# Patient Record
Sex: FEMALE | Race: WHITE | NOT HISPANIC OR LATINO | Employment: OTHER | URBAN - METROPOLITAN AREA
[De-identification: names, ages, dates, MRNs, and addresses within clinical notes are randomized per-mention and may not be internally consistent; named-entity substitution may affect disease eponyms.]

---

## 2017-01-12 ENCOUNTER — COMMUNICATION - HEALTHEAST (OUTPATIENT)
Dept: ADMINISTRATIVE | Facility: CLINIC | Age: 67
End: 2017-01-12

## 2017-03-20 ENCOUNTER — COMMUNICATION - HEALTHEAST (OUTPATIENT)
Dept: CARDIOLOGY | Facility: CLINIC | Age: 67
End: 2017-03-20

## 2017-03-20 DIAGNOSIS — I10 ESSENTIAL HYPERTENSION: ICD-10-CM

## 2017-04-17 ENCOUNTER — AMBULATORY - HEALTHEAST (OUTPATIENT)
Dept: CARDIOLOGY | Facility: CLINIC | Age: 67
End: 2017-04-17

## 2017-04-17 ENCOUNTER — COMMUNICATION - HEALTHEAST (OUTPATIENT)
Dept: CARDIOLOGY | Facility: CLINIC | Age: 67
End: 2017-04-17

## 2017-04-17 DIAGNOSIS — I10 ESSENTIAL HYPERTENSION: ICD-10-CM

## 2017-04-20 ENCOUNTER — AMBULATORY - HEALTHEAST (OUTPATIENT)
Dept: CARDIOLOGY | Facility: CLINIC | Age: 67
End: 2017-04-20

## 2017-04-20 ENCOUNTER — OFFICE VISIT - HEALTHEAST (OUTPATIENT)
Dept: CARDIOLOGY | Facility: CLINIC | Age: 67
End: 2017-04-20

## 2017-04-20 DIAGNOSIS — E78.2 MIXED HYPERLIPIDEMIA: ICD-10-CM

## 2017-04-20 DIAGNOSIS — I10 ESSENTIAL HYPERTENSION: ICD-10-CM

## 2017-04-20 DIAGNOSIS — I25.10 CORONARY ARTERY DISEASE INVOLVING NATIVE CORONARY ARTERY OF NATIVE HEART WITHOUT ANGINA PECTORIS: ICD-10-CM

## 2017-04-20 ASSESSMENT — MIFFLIN-ST. JEOR: SCORE: 1389.05

## 2017-06-19 ENCOUNTER — HOSPITAL ENCOUNTER (OUTPATIENT)
Dept: MAMMOGRAPHY | Facility: HOSPITAL | Age: 67
Discharge: HOME | End: 2017-06-19

## 2017-06-19 DIAGNOSIS — Z12.31 VISIT FOR SCREENING MAMMOGRAM: ICD-10-CM

## 2017-06-26 ENCOUNTER — COMMUNICATION - HEALTHEAST (OUTPATIENT)
Dept: CARDIOLOGY | Facility: CLINIC | Age: 67
End: 2017-06-26

## 2017-06-26 DIAGNOSIS — I10 ESSENTIAL HYPERTENSION: ICD-10-CM

## 2017-07-07 ENCOUNTER — COMMUNICATION - HEALTHEAST (OUTPATIENT)
Dept: CARDIOLOGY | Facility: CLINIC | Age: 67
End: 2017-07-07

## 2017-07-07 DIAGNOSIS — I10 ESSENTIAL HYPERTENSION: ICD-10-CM

## 2017-08-18 ENCOUNTER — TRANSFERRED RECORDS (OUTPATIENT)
Dept: HEALTH INFORMATION MANAGEMENT | Facility: CLINIC | Age: 67
End: 2017-08-18

## 2017-09-25 ENCOUNTER — RECORDS - HEALTHEAST (OUTPATIENT)
Dept: LAB | Facility: CLINIC | Age: 67
End: 2017-09-25

## 2017-09-25 LAB
CHOLEST SERPL-MCNC: 160 MG/DL
FASTING STATUS PATIENT QL REPORTED: YES
HDLC SERPL-MCNC: 48 MG/DL
LDLC SERPL CALC-MCNC: 80 MG/DL
TRIGL SERPL-MCNC: 161 MG/DL

## 2017-12-05 ENCOUNTER — COMMUNICATION - HEALTHEAST (OUTPATIENT)
Dept: CARDIOLOGY | Facility: CLINIC | Age: 67
End: 2017-12-05

## 2017-12-05 DIAGNOSIS — I10 ESSENTIAL HYPERTENSION: ICD-10-CM

## 2018-01-02 ENCOUNTER — RECORDS - HEALTHEAST (OUTPATIENT)
Dept: LAB | Facility: CLINIC | Age: 68
End: 2018-01-02

## 2018-01-03 LAB — BACTERIA SPEC CULT: NORMAL

## 2018-01-18 ENCOUNTER — COMMUNICATION - HEALTHEAST (OUTPATIENT)
Dept: CARDIOLOGY | Facility: CLINIC | Age: 68
End: 2018-01-18

## 2018-01-18 DIAGNOSIS — I10 ESSENTIAL HYPERTENSION: ICD-10-CM

## 2018-01-25 ENCOUNTER — COMMUNICATION - HEALTHEAST (OUTPATIENT)
Dept: ADMINISTRATIVE | Facility: CLINIC | Age: 68
End: 2018-01-25

## 2018-03-02 ENCOUNTER — COMMUNICATION - HEALTHEAST (OUTPATIENT)
Dept: CARDIOLOGY | Facility: CLINIC | Age: 68
End: 2018-03-02

## 2018-03-02 DIAGNOSIS — I25.10 CORONARY ARTERY DISEASE INVOLVING NATIVE CORONARY ARTERY WITHOUT ANGINA PECTORIS: ICD-10-CM

## 2018-04-11 ENCOUNTER — RECORDS - HEALTHEAST (OUTPATIENT)
Dept: LAB | Facility: CLINIC | Age: 68
End: 2018-04-11

## 2018-04-12 LAB — BACTERIA SPEC CULT: NO GROWTH

## 2018-04-19 ENCOUNTER — OFFICE VISIT - HEALTHEAST (OUTPATIENT)
Dept: CARDIOLOGY | Facility: CLINIC | Age: 68
End: 2018-04-19

## 2018-04-19 DIAGNOSIS — I25.10 CORONARY ARTERY DISEASE INVOLVING NATIVE CORONARY ARTERY OF NATIVE HEART WITHOUT ANGINA PECTORIS: ICD-10-CM

## 2018-04-19 DIAGNOSIS — E78.2 MIXED HYPERLIPIDEMIA: ICD-10-CM

## 2018-04-19 DIAGNOSIS — I10 ESSENTIAL HYPERTENSION: ICD-10-CM

## 2018-04-19 ASSESSMENT — MIFFLIN-ST. JEOR: SCORE: 1366.37

## 2018-06-12 ENCOUNTER — COMMUNICATION - HEALTHEAST (OUTPATIENT)
Dept: CARDIOLOGY | Facility: CLINIC | Age: 68
End: 2018-06-12

## 2018-06-12 DIAGNOSIS — I10 ESSENTIAL HYPERTENSION: ICD-10-CM

## 2018-06-20 ENCOUNTER — HOSPITAL ENCOUNTER (OUTPATIENT)
Dept: MAMMOGRAPHY | Facility: CLINIC | Age: 68
Discharge: HOME | End: 2018-06-20
Attending: FAMILY MEDICINE

## 2018-06-20 DIAGNOSIS — Z12.31 VISIT FOR SCREENING MAMMOGRAM: ICD-10-CM

## 2018-06-29 ENCOUNTER — RECORDS - HEALTHEAST (OUTPATIENT)
Dept: LAB | Facility: CLINIC | Age: 68
End: 2018-06-29

## 2018-06-29 LAB
ALBUMIN SERPL-MCNC: 4 G/DL (ref 3.5–5)
ALP SERPL-CCNC: 68 U/L (ref 45–120)
ALT SERPL W P-5'-P-CCNC: 22 U/L (ref 0–45)
ANION GAP SERPL CALCULATED.3IONS-SCNC: 10 MMOL/L (ref 5–18)
AST SERPL W P-5'-P-CCNC: 21 U/L (ref 0–40)
BILIRUB SERPL-MCNC: 0.7 MG/DL (ref 0–1)
BUN SERPL-MCNC: 15 MG/DL (ref 8–22)
CALCIUM SERPL-MCNC: 9.9 MG/DL (ref 8.5–10.5)
CHLORIDE BLD-SCNC: 104 MMOL/L (ref 98–107)
CHOLEST SERPL-MCNC: 162 MG/DL
CO2 SERPL-SCNC: 25 MMOL/L (ref 22–31)
CREAT SERPL-MCNC: 0.99 MG/DL (ref 0.6–1.1)
FASTING STATUS PATIENT QL REPORTED: YES
GFR SERPL CREATININE-BSD FRML MDRD: 56 ML/MIN/1.73M2
GLUCOSE BLD-MCNC: 138 MG/DL (ref 70–125)
HDLC SERPL-MCNC: 46 MG/DL
LDLC SERPL CALC-MCNC: 81 MG/DL
POTASSIUM BLD-SCNC: 4.5 MMOL/L (ref 3.5–5)
PROT SERPL-MCNC: 6.8 G/DL (ref 6–8)
SODIUM SERPL-SCNC: 139 MMOL/L (ref 136–145)
TRIGL SERPL-MCNC: 173 MG/DL

## 2018-07-20 ENCOUNTER — COMMUNICATION - HEALTHEAST (OUTPATIENT)
Dept: CARDIOLOGY | Facility: CLINIC | Age: 68
End: 2018-07-20

## 2018-07-20 DIAGNOSIS — I10 ESSENTIAL HYPERTENSION: ICD-10-CM

## 2018-07-26 ENCOUNTER — RECORDS - HEALTHEAST (OUTPATIENT)
Dept: LAB | Facility: CLINIC | Age: 68
End: 2018-07-26

## 2018-07-27 LAB — BACTERIA SPEC CULT: NO GROWTH

## 2018-12-12 ENCOUNTER — COMMUNICATION - HEALTHEAST (OUTPATIENT)
Dept: CARDIOLOGY | Facility: CLINIC | Age: 68
End: 2018-12-12

## 2018-12-12 DIAGNOSIS — I10 ESSENTIAL HYPERTENSION: ICD-10-CM

## 2019-01-17 ENCOUNTER — COMMUNICATION - HEALTHEAST (OUTPATIENT)
Dept: CARDIOLOGY | Facility: CLINIC | Age: 69
End: 2019-01-17

## 2019-01-17 DIAGNOSIS — I10 ESSENTIAL HYPERTENSION: ICD-10-CM

## 2019-02-14 ENCOUNTER — RECORDS - HEALTHEAST (OUTPATIENT)
Dept: LAB | Facility: CLINIC | Age: 69
End: 2019-02-14

## 2019-02-14 LAB
ANION GAP SERPL CALCULATED.3IONS-SCNC: 13 MMOL/L (ref 5–18)
BUN SERPL-MCNC: 18 MG/DL (ref 8–22)
CALCIUM SERPL-MCNC: 9.7 MG/DL (ref 8.5–10.5)
CHLORIDE BLD-SCNC: 104 MMOL/L (ref 98–107)
CO2 SERPL-SCNC: 23 MMOL/L (ref 22–31)
CREAT SERPL-MCNC: 0.89 MG/DL (ref 0.6–1.1)
GFR SERPL CREATININE-BSD FRML MDRD: >60 ML/MIN/1.73M2
GLUCOSE BLD-MCNC: 119 MG/DL (ref 70–125)
POTASSIUM BLD-SCNC: 4.7 MMOL/L (ref 3.5–5)
SODIUM SERPL-SCNC: 140 MMOL/L (ref 136–145)

## 2019-03-20 ENCOUNTER — COMMUNICATION - HEALTHEAST (OUTPATIENT)
Dept: ADMINISTRATIVE | Facility: CLINIC | Age: 69
End: 2019-03-20

## 2019-04-04 ENCOUNTER — COMMUNICATION - HEALTHEAST (OUTPATIENT)
Dept: CARDIOLOGY | Facility: CLINIC | Age: 69
End: 2019-04-04

## 2019-04-04 RX ORDER — HYDROCHLOROTHIAZIDE 12.5 MG/1
12.5 TABLET ORAL DAILY
Refills: 0 | Status: SHIPPED | COMMUNITY
Start: 2019-02-14

## 2019-04-09 ENCOUNTER — COMMUNICATION - HEALTHEAST (OUTPATIENT)
Dept: CARDIOLOGY | Facility: CLINIC | Age: 69
End: 2019-04-09

## 2019-04-09 DIAGNOSIS — I25.10 CORONARY ARTERY DISEASE INVOLVING NATIVE CORONARY ARTERY WITHOUT ANGINA PECTORIS: ICD-10-CM

## 2019-04-22 ENCOUNTER — COMMUNICATION - HEALTHEAST (OUTPATIENT)
Dept: CARDIOLOGY | Facility: CLINIC | Age: 69
End: 2019-04-22

## 2019-04-22 DIAGNOSIS — I10 ESSENTIAL HYPERTENSION: ICD-10-CM

## 2019-05-09 ENCOUNTER — RECORDS - HEALTHEAST (OUTPATIENT)
Dept: ADMINISTRATIVE | Facility: OTHER | Age: 69
End: 2019-05-09

## 2019-05-09 ENCOUNTER — AMBULATORY - HEALTHEAST (OUTPATIENT)
Dept: CARDIOLOGY | Facility: CLINIC | Age: 69
End: 2019-05-09

## 2019-05-14 ENCOUNTER — OFFICE VISIT - HEALTHEAST (OUTPATIENT)
Dept: CARDIOLOGY | Facility: CLINIC | Age: 69
End: 2019-05-14

## 2019-05-14 DIAGNOSIS — E78.2 MIXED HYPERLIPIDEMIA: ICD-10-CM

## 2019-05-14 DIAGNOSIS — I10 ESSENTIAL HYPERTENSION: ICD-10-CM

## 2019-05-14 DIAGNOSIS — I25.10 CORONARY ARTERY DISEASE INVOLVING NATIVE CORONARY ARTERY OF NATIVE HEART WITHOUT ANGINA PECTORIS: ICD-10-CM

## 2019-05-14 ASSESSMENT — MIFFLIN-ST. JEOR: SCORE: 1389.05

## 2019-06-11 ENCOUNTER — COMMUNICATION - HEALTHEAST (OUTPATIENT)
Dept: CARDIOLOGY | Facility: CLINIC | Age: 69
End: 2019-06-11

## 2019-06-11 DIAGNOSIS — I10 ESSENTIAL HYPERTENSION: ICD-10-CM

## 2019-06-28 ENCOUNTER — RECORDS - HEALTHEAST (OUTPATIENT)
Dept: LAB | Facility: CLINIC | Age: 69
End: 2019-06-28

## 2019-06-28 LAB
ALBUMIN SERPL-MCNC: 4 G/DL (ref 3.5–5)
ALP SERPL-CCNC: 68 U/L (ref 45–120)
ALT SERPL W P-5'-P-CCNC: 20 U/L (ref 0–45)
ANION GAP SERPL CALCULATED.3IONS-SCNC: 12 MMOL/L (ref 5–18)
AST SERPL W P-5'-P-CCNC: 20 U/L (ref 0–40)
BILIRUB SERPL-MCNC: 0.6 MG/DL (ref 0–1)
BUN SERPL-MCNC: 15 MG/DL (ref 8–22)
CALCIUM SERPL-MCNC: 9.5 MG/DL (ref 8.5–10.5)
CHLORIDE BLD-SCNC: 103 MMOL/L (ref 98–107)
CHOLEST SERPL-MCNC: 158 MG/DL
CO2 SERPL-SCNC: 24 MMOL/L (ref 22–31)
CREAT SERPL-MCNC: 0.88 MG/DL (ref 0.6–1.1)
FASTING STATUS PATIENT QL REPORTED: ABNORMAL
GFR SERPL CREATININE-BSD FRML MDRD: >60 ML/MIN/1.73M2
GLUCOSE BLD-MCNC: 120 MG/DL (ref 70–125)
HDLC SERPL-MCNC: 51 MG/DL
LDLC SERPL CALC-MCNC: 77 MG/DL
POTASSIUM BLD-SCNC: 4.2 MMOL/L (ref 3.5–5)
PROT SERPL-MCNC: 6.3 G/DL (ref 6–8)
SODIUM SERPL-SCNC: 139 MMOL/L (ref 136–145)
TRIGL SERPL-MCNC: 152 MG/DL

## 2019-07-22 ENCOUNTER — COMMUNICATION - HEALTHEAST (OUTPATIENT)
Dept: CARDIOLOGY | Facility: CLINIC | Age: 69
End: 2019-07-22

## 2019-07-22 DIAGNOSIS — I10 ESSENTIAL HYPERTENSION: ICD-10-CM

## 2019-07-24 ENCOUNTER — RECORDS - HEALTHEAST (OUTPATIENT)
Dept: LAB | Facility: CLINIC | Age: 69
End: 2019-07-24

## 2019-07-24 LAB
ALBUMIN SERPL-MCNC: 4.1 G/DL (ref 3.5–5)
ALP SERPL-CCNC: 79 U/L (ref 45–120)
ALT SERPL W P-5'-P-CCNC: 26 U/L (ref 0–45)
ANION GAP SERPL CALCULATED.3IONS-SCNC: 11 MMOL/L (ref 5–18)
AST SERPL W P-5'-P-CCNC: 23 U/L (ref 0–40)
BILIRUB SERPL-MCNC: 0.8 MG/DL (ref 0–1)
BUN SERPL-MCNC: 17 MG/DL (ref 8–22)
CALCIUM SERPL-MCNC: 9.9 MG/DL (ref 8.5–10.5)
CHLORIDE BLD-SCNC: 98 MMOL/L (ref 98–107)
CO2 SERPL-SCNC: 23 MMOL/L (ref 22–31)
CREAT SERPL-MCNC: 1.03 MG/DL (ref 0.6–1.1)
GFR SERPL CREATININE-BSD FRML MDRD: 53 ML/MIN/1.73M2
GLUCOSE BLD-MCNC: 128 MG/DL (ref 70–125)
POTASSIUM BLD-SCNC: 4.6 MMOL/L (ref 3.5–5)
PROT SERPL-MCNC: 6.6 G/DL (ref 6–8)
SODIUM SERPL-SCNC: 132 MMOL/L (ref 136–145)

## 2019-07-25 LAB — BACTERIA SPEC CULT: NO GROWTH

## 2019-08-27 ENCOUNTER — HOSPITAL ENCOUNTER (OUTPATIENT)
Dept: MAMMOGRAPHY | Facility: CLINIC | Age: 69
Discharge: HOME | End: 2019-08-27
Attending: FAMILY MEDICINE

## 2019-08-27 DIAGNOSIS — Z12.31 SCREENING MAMMOGRAM, ENCOUNTER FOR: ICD-10-CM

## 2019-12-06 ENCOUNTER — COMMUNICATION - HEALTHEAST (OUTPATIENT)
Dept: CARDIOLOGY | Facility: CLINIC | Age: 69
End: 2019-12-06

## 2019-12-06 DIAGNOSIS — I10 ESSENTIAL HYPERTENSION: ICD-10-CM

## 2020-01-22 ENCOUNTER — COMMUNICATION - HEALTHEAST (OUTPATIENT)
Dept: CARDIOLOGY | Facility: CLINIC | Age: 70
End: 2020-01-22

## 2020-01-22 DIAGNOSIS — I10 ESSENTIAL HYPERTENSION: ICD-10-CM

## 2020-02-26 ENCOUNTER — RECORDS - HEALTHEAST (OUTPATIENT)
Dept: LAB | Facility: CLINIC | Age: 70
End: 2020-02-26

## 2020-02-26 LAB
ALBUMIN SERPL-MCNC: 4.2 G/DL (ref 3.5–5)
ALP SERPL-CCNC: 68 U/L (ref 45–120)
ALT SERPL W P-5'-P-CCNC: 15 U/L (ref 0–45)
ANION GAP SERPL CALCULATED.3IONS-SCNC: 12 MMOL/L (ref 5–18)
AST SERPL W P-5'-P-CCNC: 18 U/L (ref 0–40)
BILIRUB SERPL-MCNC: 0.7 MG/DL (ref 0–1)
BUN SERPL-MCNC: 18 MG/DL (ref 8–28)
CALCIUM SERPL-MCNC: 9.2 MG/DL (ref 8.5–10.5)
CHLORIDE BLD-SCNC: 101 MMOL/L (ref 98–107)
CHOLEST SERPL-MCNC: 152 MG/DL
CO2 SERPL-SCNC: 24 MMOL/L (ref 22–31)
CREAT SERPL-MCNC: 1.02 MG/DL (ref 0.6–1.1)
FASTING STATUS PATIENT QL REPORTED: YES
GFR SERPL CREATININE-BSD FRML MDRD: 54 ML/MIN/1.73M2
GLUCOSE BLD-MCNC: 141 MG/DL (ref 70–125)
HDLC SERPL-MCNC: 47 MG/DL
LDLC SERPL CALC-MCNC: 67 MG/DL
POTASSIUM BLD-SCNC: 4.2 MMOL/L (ref 3.5–5)
PROT SERPL-MCNC: 6.7 G/DL (ref 6–8)
SODIUM SERPL-SCNC: 137 MMOL/L (ref 136–145)
TRIGL SERPL-MCNC: 191 MG/DL

## 2020-03-02 ENCOUNTER — COMMUNICATION - HEALTHEAST (OUTPATIENT)
Dept: CARDIOLOGY | Facility: CLINIC | Age: 70
End: 2020-03-02

## 2020-03-02 DIAGNOSIS — I10 ESSENTIAL HYPERTENSION: ICD-10-CM

## 2020-05-11 ENCOUNTER — RECORDS - HEALTHEAST (OUTPATIENT)
Dept: ADMINISTRATIVE | Facility: OTHER | Age: 70
End: 2020-05-11

## 2020-05-11 ENCOUNTER — AMBULATORY - HEALTHEAST (OUTPATIENT)
Dept: CARDIOLOGY | Facility: CLINIC | Age: 70
End: 2020-05-11

## 2020-05-14 ENCOUNTER — OFFICE VISIT - HEALTHEAST (OUTPATIENT)
Dept: CARDIOLOGY | Facility: CLINIC | Age: 70
End: 2020-05-14

## 2020-05-14 DIAGNOSIS — I10 ESSENTIAL HYPERTENSION: ICD-10-CM

## 2020-05-14 DIAGNOSIS — E78.2 MIXED HYPERLIPIDEMIA: ICD-10-CM

## 2020-05-14 DIAGNOSIS — I25.10 CORONARY ARTERY DISEASE INVOLVING NATIVE CORONARY ARTERY OF NATIVE HEART WITHOUT ANGINA PECTORIS: ICD-10-CM

## 2020-05-14 RX ORDER — ALBUTEROL SULFATE 90 UG/1
AEROSOL, METERED RESPIRATORY (INHALATION)
Status: SHIPPED | COMMUNITY
Start: 2020-05-14 | End: 2022-02-11

## 2020-07-20 ENCOUNTER — COMMUNICATION - HEALTHEAST (OUTPATIENT)
Dept: CARDIOLOGY | Facility: CLINIC | Age: 70
End: 2020-07-20

## 2020-07-20 DIAGNOSIS — I10 ESSENTIAL HYPERTENSION: ICD-10-CM

## 2020-07-22 ENCOUNTER — COMMUNICATION - HEALTHEAST (OUTPATIENT)
Dept: CARDIOLOGY | Facility: CLINIC | Age: 70
End: 2020-07-22

## 2020-07-22 DIAGNOSIS — I10 ESSENTIAL HYPERTENSION: ICD-10-CM

## 2020-08-31 ENCOUNTER — COMMUNICATION - HEALTHEAST (OUTPATIENT)
Dept: CARDIOLOGY | Facility: CLINIC | Age: 70
End: 2020-08-31

## 2020-08-31 ENCOUNTER — RECORDS - HEALTHEAST (OUTPATIENT)
Dept: LAB | Facility: CLINIC | Age: 70
End: 2020-08-31

## 2020-08-31 DIAGNOSIS — I10 ESSENTIAL HYPERTENSION: ICD-10-CM

## 2020-08-31 LAB
ALBUMIN SERPL-MCNC: 4.1 G/DL (ref 3.5–5)
ALP SERPL-CCNC: 63 U/L (ref 45–120)
ALT SERPL W P-5'-P-CCNC: 14 U/L (ref 0–45)
ANION GAP SERPL CALCULATED.3IONS-SCNC: 13 MMOL/L (ref 5–18)
AST SERPL W P-5'-P-CCNC: 14 U/L (ref 0–40)
BILIRUB SERPL-MCNC: 0.6 MG/DL (ref 0–1)
BUN SERPL-MCNC: 14 MG/DL (ref 8–28)
CALCIUM SERPL-MCNC: 9.2 MG/DL (ref 8.5–10.5)
CHLORIDE BLD-SCNC: 100 MMOL/L (ref 98–107)
CO2 SERPL-SCNC: 19 MMOL/L (ref 22–31)
CREAT SERPL-MCNC: 1.06 MG/DL (ref 0.6–1.1)
GFR SERPL CREATININE-BSD FRML MDRD: 51 ML/MIN/1.73M2
GLUCOSE BLD-MCNC: 179 MG/DL (ref 70–125)
POTASSIUM BLD-SCNC: 4.1 MMOL/L (ref 3.5–5)
PROT SERPL-MCNC: 6.7 G/DL (ref 6–8)
SODIUM SERPL-SCNC: 132 MMOL/L (ref 136–145)
VIT B12 SERPL-MCNC: 288 PG/ML (ref 213–816)

## 2020-10-08 ENCOUNTER — COMMUNICATION - HEALTHEAST (OUTPATIENT)
Dept: CARDIOLOGY | Facility: CLINIC | Age: 70
End: 2020-10-08

## 2020-10-08 DIAGNOSIS — I25.10 CORONARY ARTERY DISEASE INVOLVING NATIVE CORONARY ARTERY WITHOUT ANGINA PECTORIS: ICD-10-CM

## 2020-10-08 RX ORDER — NITROGLYCERIN 0.4 MG/1
0.4 TABLET SUBLINGUAL EVERY 5 MIN PRN
Qty: 1 BOTTLE | Refills: 2 | Status: SHIPPED | OUTPATIENT
Start: 2020-10-08 | End: 2022-07-18

## 2020-11-25 ENCOUNTER — HOSPITAL ENCOUNTER (OUTPATIENT)
Dept: MAMMOGRAPHY | Facility: CLINIC | Age: 70
Discharge: HOME | End: 2020-11-25
Attending: FAMILY MEDICINE

## 2020-11-25 DIAGNOSIS — Z12.31 VISIT FOR SCREENING MAMMOGRAM: ICD-10-CM

## 2020-12-28 ENCOUNTER — COMMUNICATION - HEALTHEAST (OUTPATIENT)
Dept: CARDIOLOGY | Facility: CLINIC | Age: 70
End: 2020-12-28

## 2020-12-28 DIAGNOSIS — I10 ESSENTIAL HYPERTENSION: ICD-10-CM

## 2020-12-29 RX ORDER — METOPROLOL SUCCINATE 50 MG/1
TABLET, EXTENDED RELEASE ORAL
Qty: 90 TABLET | Refills: 1 | Status: SHIPPED | OUTPATIENT
Start: 2020-12-29 | End: 2021-07-12

## 2021-01-13 LAB — RETINOPATHY: NORMAL

## 2021-02-24 ENCOUNTER — RECORDS - HEALTHEAST (OUTPATIENT)
Dept: LAB | Facility: CLINIC | Age: 71
End: 2021-02-24

## 2021-02-24 LAB
ALBUMIN SERPL-MCNC: 4.2 G/DL (ref 3.5–5)
ALP SERPL-CCNC: 58 U/L (ref 45–120)
ALT SERPL W P-5'-P-CCNC: 9 U/L (ref 0–45)
ANION GAP SERPL CALCULATED.3IONS-SCNC: 11 MMOL/L (ref 5–18)
AST SERPL W P-5'-P-CCNC: 14 U/L (ref 0–40)
BILIRUB SERPL-MCNC: 0.8 MG/DL (ref 0–1)
BUN SERPL-MCNC: 19 MG/DL (ref 8–28)
CALCIUM SERPL-MCNC: 9.1 MG/DL (ref 8.5–10.5)
CHLORIDE BLD-SCNC: 101 MMOL/L (ref 98–107)
CHOLEST SERPL-MCNC: 144 MG/DL
CO2 SERPL-SCNC: 25 MMOL/L (ref 22–31)
CREAT SERPL-MCNC: 1.12 MG/DL (ref 0.6–1.1)
CREAT UR-MCNC: 50.7 MG/DL
FASTING STATUS PATIENT QL REPORTED: YES
GFR SERPL CREATININE-BSD FRML MDRD: 48 ML/MIN/1.73M2
GLUCOSE BLD-MCNC: 116 MG/DL (ref 70–125)
HDLC SERPL-MCNC: 44 MG/DL
LDLC SERPL CALC-MCNC: 72 MG/DL
MICROALBUMIN UR-MCNC: <0.5 MG/DL (ref 0–1.99)
MICROALBUMIN/CREAT UR: NORMAL MG/G{CREAT}
POTASSIUM BLD-SCNC: 4.2 MMOL/L (ref 3.5–5)
PROT SERPL-MCNC: 6.7 G/DL (ref 6–8)
SODIUM SERPL-SCNC: 137 MMOL/L (ref 136–145)
TRIGL SERPL-MCNC: 139 MG/DL

## 2021-02-25 LAB — BACTERIA SPEC CULT: NO GROWTH

## 2021-03-01 ENCOUNTER — COMMUNICATION - HEALTHEAST (OUTPATIENT)
Dept: CARDIOLOGY | Facility: CLINIC | Age: 71
End: 2021-03-01

## 2021-03-01 DIAGNOSIS — I10 ESSENTIAL HYPERTENSION: ICD-10-CM

## 2021-05-04 ENCOUNTER — RECORDS - HEALTHEAST (OUTPATIENT)
Dept: CARDIOLOGY | Facility: CLINIC | Age: 71
End: 2021-05-04

## 2021-05-04 ENCOUNTER — RECORDS - HEALTHEAST (OUTPATIENT)
Dept: ADMINISTRATIVE | Facility: OTHER | Age: 71
End: 2021-05-04

## 2021-05-06 ENCOUNTER — OFFICE VISIT - HEALTHEAST (OUTPATIENT)
Dept: CARDIOLOGY | Facility: CLINIC | Age: 71
End: 2021-05-06

## 2021-05-06 DIAGNOSIS — I10 ESSENTIAL HYPERTENSION: ICD-10-CM

## 2021-05-06 DIAGNOSIS — E78.2 MIXED HYPERLIPIDEMIA: ICD-10-CM

## 2021-05-06 DIAGNOSIS — I25.10 CORONARY ARTERY DISEASE INVOLVING NATIVE CORONARY ARTERY OF NATIVE HEART WITHOUT ANGINA PECTORIS: ICD-10-CM

## 2021-05-06 RX ORDER — AMOXICILLIN 500 MG/1
TABLET, FILM COATED ORAL
Status: SHIPPED | COMMUNITY
Start: 2020-05-18 | End: 2024-02-26

## 2021-05-06 RX ORDER — FAMOTIDINE 20 MG/1
20 TABLET, FILM COATED ORAL DAILY
Status: SHIPPED | COMMUNITY
Start: 2020-10-07

## 2021-05-06 ASSESSMENT — MIFFLIN-ST. JEOR: SCORE: 1325.55

## 2021-05-26 ENCOUNTER — RECORDS - HEALTHEAST (OUTPATIENT)
Dept: LAB | Facility: CLINIC | Age: 71
End: 2021-05-26

## 2021-05-26 LAB
ANION GAP SERPL CALCULATED.3IONS-SCNC: 13 MMOL/L (ref 5–18)
BASOPHILS # BLD AUTO: 0.1 THOU/UL (ref 0–0.2)
BASOPHILS NFR BLD AUTO: 2 % (ref 0–2)
BUN SERPL-MCNC: 19 MG/DL (ref 8–28)
CALCIUM SERPL-MCNC: 9.2 MG/DL (ref 8.5–10.5)
CHLORIDE BLD-SCNC: 101 MMOL/L (ref 98–107)
CO2 SERPL-SCNC: 23 MMOL/L (ref 22–31)
CREAT SERPL-MCNC: 1.2 MG/DL (ref 0.6–1.1)
EOSINOPHIL # BLD AUTO: 0.2 THOU/UL (ref 0–0.4)
EOSINOPHIL NFR BLD AUTO: 4 % (ref 0–6)
ERYTHROCYTE [DISTWIDTH] IN BLOOD BY AUTOMATED COUNT: 12.9 % (ref 11–14.5)
GFR SERPL CREATININE-BSD FRML MDRD: 44 ML/MIN/1.73M2
GLUCOSE BLD-MCNC: 115 MG/DL (ref 70–125)
HCT VFR BLD AUTO: 39.4 % (ref 35–47)
HGB BLD-MCNC: 12.9 G/DL (ref 12–16)
IMM GRANULOCYTES # BLD: 0 THOU/UL
IMM GRANULOCYTES NFR BLD: 0 %
LYMPHOCYTES # BLD AUTO: 1.6 THOU/UL (ref 0.8–4.4)
LYMPHOCYTES NFR BLD AUTO: 30 % (ref 20–40)
MCH RBC QN AUTO: 30.4 PG (ref 27–34)
MCHC RBC AUTO-ENTMCNC: 32.7 G/DL (ref 32–36)
MCV RBC AUTO: 93 FL (ref 80–100)
MONOCYTES # BLD AUTO: 0.5 THOU/UL (ref 0–0.9)
MONOCYTES NFR BLD AUTO: 10 % (ref 2–10)
NEUTROPHILS # BLD AUTO: 2.9 THOU/UL (ref 2–7.7)
NEUTROPHILS NFR BLD AUTO: 54 % (ref 50–70)
PLATELET # BLD AUTO: 254 THOU/UL (ref 140–440)
PMV BLD AUTO: 10.8 FL (ref 8.5–12.5)
POTASSIUM BLD-SCNC: 4.4 MMOL/L (ref 3.5–5)
RBC # BLD AUTO: 4.25 MILL/UL (ref 3.8–5.4)
SODIUM SERPL-SCNC: 137 MMOL/L (ref 136–145)
VIT B12 SERPL-MCNC: 900 PG/ML (ref 213–816)
WBC: 5.3 THOU/UL (ref 4–11)

## 2021-05-27 VITALS
RESPIRATION RATE: 16 BRPM | DIASTOLIC BLOOD PRESSURE: 56 MMHG | HEART RATE: 68 BPM | BODY MASS INDEX: 34.78 KG/M2 | WEIGHT: 189 LBS | HEIGHT: 62 IN | SYSTOLIC BLOOD PRESSURE: 116 MMHG

## 2021-05-27 NOTE — TELEPHONE ENCOUNTER
----- Message from Elly Turner sent at 4/4/2019 12:44 PM CDT -----  Contact: Patient  Caller: Lisa     Primary cardiologist: Dr. Fernandez    Detailed reason for call: Lisa states her PCP has her taking Hydrochlorothiazide 12.5mg once a day in the morning and she is trying this for one month and wanted Dr. Fernandez to know.    Best phone number: [phone]  Best time to contact: Any  Ok to leave a detailed message? Yes  Device? No    Additional Info: Lisa has an appt scheduled w/Dr. Fernandez on 5/14/19.

## 2021-05-27 NOTE — TELEPHONE ENCOUNTER
----- Message from Ellyeverardo Turner sent at 2019  1:54 PM CDT -----  Contact: Patient  The medication or refill issue is below:    Primary Cardiologist: Dr. Feranndez    Medication: NITROSTAT 0.4 mg SL tablet     Issue / Concern: Lisa states her current Rx of Nitro is  and she asks for a refill of qty 25 to be sent or return call if questions.     Preferred Pharmacy:  [facility] [address] [place] [address] [address]    [facility] Phone Number for Patient: [phone]    Additional Info: Lisa has appt sched w/Dr. Fernandez on 19.

## 2021-05-28 ENCOUNTER — RECORDS - HEALTHEAST (OUTPATIENT)
Dept: ADMINISTRATIVE | Facility: CLINIC | Age: 71
End: 2021-05-28

## 2021-05-30 ENCOUNTER — RECORDS - HEALTHEAST (OUTPATIENT)
Dept: ADMINISTRATIVE | Facility: CLINIC | Age: 71
End: 2021-05-30

## 2021-05-30 VITALS — WEIGHT: 203 LBS | HEIGHT: 62 IN | BODY MASS INDEX: 37.36 KG/M2

## 2021-05-31 ENCOUNTER — RECORDS - HEALTHEAST (OUTPATIENT)
Dept: ADMINISTRATIVE | Facility: CLINIC | Age: 71
End: 2021-05-31

## 2021-06-01 ENCOUNTER — RECORDS - HEALTHEAST (OUTPATIENT)
Dept: LAB | Facility: CLINIC | Age: 71
End: 2021-06-01

## 2021-06-01 ENCOUNTER — RECORDS - HEALTHEAST (OUTPATIENT)
Dept: ADMINISTRATIVE | Facility: CLINIC | Age: 71
End: 2021-06-01

## 2021-06-01 VITALS — BODY MASS INDEX: 36.44 KG/M2 | WEIGHT: 198 LBS | HEIGHT: 62 IN

## 2021-06-01 DIAGNOSIS — I10 ESSENTIAL HYPERTENSION: ICD-10-CM

## 2021-06-01 LAB
SARS-COV-2 PCR COMMENT: NORMAL
SARS-COV-2 RNA SPEC QL NAA+PROBE: NEGATIVE
SARS-COV-2 VIRUS SPECIMEN SOURCE: NORMAL

## 2021-06-01 RX ORDER — LISINOPRIL 40 MG/1
TABLET ORAL
Qty: 90 TABLET | Refills: 1 | Status: SHIPPED | OUTPATIENT
Start: 2021-06-01 | End: 2022-02-08

## 2021-06-02 ENCOUNTER — RECORDS - HEALTHEAST (OUTPATIENT)
Dept: ADMINISTRATIVE | Facility: CLINIC | Age: 71
End: 2021-06-02

## 2021-06-03 VITALS — WEIGHT: 203 LBS | BODY MASS INDEX: 37.36 KG/M2 | HEIGHT: 62 IN

## 2021-06-04 VITALS
WEIGHT: 192.6 LBS | DIASTOLIC BLOOD PRESSURE: 66 MMHG | BODY MASS INDEX: 35.23 KG/M2 | SYSTOLIC BLOOD PRESSURE: 123 MMHG | HEART RATE: 72 BPM

## 2021-06-08 NOTE — PROGRESS NOTES
Review of Systems - History obtained from the patient  General ROS: negative  Psychological ROS: negative  Ophthalmic ROS: negative  ENT ROS: negative  Hematological and Lymphatic ROS: negative  Respiratory ROS: positive for - wheezing  Cardiovascular ROS: negative  Gastrointestinal ROS: negative  Genito-Urinary ROS: negative  Musculoskeletal ROS: negative  Neurological ROS: negative  Dermatological ROS: negative

## 2021-06-15 PROBLEM — I10 ESSENTIAL HYPERTENSION: Status: ACTIVE | Noted: 2017-04-20

## 2021-06-19 NOTE — LETTER
Letter by Latoya Fernandez MD at      Author: Latoya Fernandez MD Service: -- Author Type: --    Filed:  Encounter Date: 3/20/2019 Status: (Other)         Lisa SAM Reyna  [address] [place] [address]      March 20, 2019      Dear Lisa,    This letter is to remind you that you will be due for your follow up appointment with Dr. Latoya Fernandez. To help ensure you are in the best health possible, a regular follow-up with your cardiologist is essential.     Please call our Patient Scheduling Line at [phone] to schedule your appointment at your earliest convenience.  If you have recently scheduled an appointment, please disregard this letter.    We look forward to seeing you again. As always, we are available at the number  above for any questions or concerns you may have.      Sincerely,     The Physicians and Staff of [facility]

## 2021-06-23 ENCOUNTER — RECORDS - HEALTHEAST (OUTPATIENT)
Dept: LAB | Facility: CLINIC | Age: 71
End: 2021-06-23

## 2021-06-23 LAB
ALBUMIN SERPL-MCNC: 4.1 G/DL (ref 3.5–5)
ALP SERPL-CCNC: 73 U/L (ref 45–120)
ALT SERPL W P-5'-P-CCNC: 10 U/L (ref 0–45)
ANION GAP SERPL CALCULATED.3IONS-SCNC: 13 MMOL/L (ref 5–18)
AST SERPL W P-5'-P-CCNC: 15 U/L (ref 0–40)
BILIRUB SERPL-MCNC: 0.5 MG/DL (ref 0–1)
BUN SERPL-MCNC: 15 MG/DL (ref 8–28)
CALCIUM SERPL-MCNC: 9.2 MG/DL (ref 8.5–10.5)
CHLORIDE BLD-SCNC: 97 MMOL/L (ref 98–107)
CO2 SERPL-SCNC: 23 MMOL/L (ref 22–31)
CREAT SERPL-MCNC: 1.13 MG/DL (ref 0.6–1.1)
GFR SERPL CREATININE-BSD FRML MDRD: 47 ML/MIN/1.73M2
GLUCOSE BLD-MCNC: 140 MG/DL (ref 70–125)
POTASSIUM BLD-SCNC: 4.5 MMOL/L (ref 3.5–5)
PROT SERPL-MCNC: 6.7 G/DL (ref 6–8)
SODIUM SERPL-SCNC: 133 MMOL/L (ref 136–145)

## 2021-06-25 NOTE — PROGRESS NOTES
Progress Notes by Latoya Fernandez MD at 4/20/2017  8:30 AM     Author: Latoya Fernandez MD Service: -- Author Type: Physician    Filed: 4/20/2017  9:09 AM Encounter Date: 4/20/2017 Status: Signed    : Latoya Fernandez MD (Physician)           Click to link to [facility]     [facility] HEART CARE NOTE       Assessment/Plan:   A 66-year-old woman presents to clinic today for routine cardiac follow up.    1. Coronary artery disease, non-ST elevation MI.  She has no chest pain.  Continue aspirin, metoprolol, lisinopril, and Zocor.     2. Hypertension. Her blood pressure has been controlled well with metoprolol and lisinopril.      3. Hyperlipidemia. The patient currently on Zocor 80 mg daily.  She has no side effects from Zocor.  The patient couldn't do tolerant Lipitor.  She cannot afford Crestor.  Her last LDL was 96.  Continue lifestyle modifications including diet control, exercise, and weight loss.    Thank you for the opportunity to be involved in the care of Lisa Reyna. If you have any questions, please feel free to contact me.  I will see the patient again in 12 months.     History of Present Illness:   It is my pleasure to see Mrs. Lisa Reyna today at the [facility] for routine cardiology followup. Lisa is a 66-year-old pleasant woman with a medical history of coronary artery disease, myocardial infarction, mild to moderate disease in the LAD per coronary angiogram, hypertension, hyperlipidemia, and obesity.     The patient states that she has been doing quite well since last visit.  She denies any chest pain, shortness of breath, palpitations, dizziness, orthopnea, PND, or leg swelling.  Her blood pressure has been well controlled.      Past Medical History:     Patient Active Problem List   Diagnosis   ? Chest pain   ? Acute myocardial infarction   ? Hypertension   ? Hyperlipidemia   ? GERD (gastroesophageal reflux disease)   ? Angina pectoris   ? Coronary artery disease   ?  Obesity   ? Osteoarthritis of knee   ? Acute abdominal pain   ? Enteritis       Past Surgical History:     Past Surgical History:   Procedure Laterality Date   ? BREAST BIOPSY Left 2008    cyst removal   ? BREAST BIOPSY Right 2004   ? BREAST LUMPECTOMY Right 6/24/2004   ? CERVICAL DISC SURGERY  6/25/2007    C6-7 sarah laminectomy with microdiskectomy   ? OOPHORECTOMY Bilateral 2011   ? TOTAL HIP ARTHROPLASTY Left 9/30/2009   ? TUBAL LIGATION  1/10/1992    with endometrial polyp removal       Family History:     Family History   Problem Relation Age of Onset   ? Colon cancer Maternal Grandfather    ? Cancer Paternal Grandmother    ? Throat cancer Paternal Grandfather    ? Breast cancer Neg Hx        Social History:    reports that she has never smoked. She does not have any smokeless tobacco history on file. She reports that she drinks about 3.0 oz of alcohol per week  She reports that she does not use illicit drugs.    Review of Systems:   General: WNL  Eyes: WNL  Ears/Nose/Throat: WNL  Lungs: WNL  Heart: WNL  Stomach: WNL  Bladder: WNL  Muscle/Joints: WNL  Skin: WNL  Nervous System: WNL  Mental Health: WNL     Blood: WNL    Meds:     Current Outpatient Prescriptions:   ?  ascorbic acid (VITAMIN C) 1000 MG tablet, Take 1,000 mg by mouth as needed. , Disp: , Rfl:   ?  aspirin 81 MG EC tablet, Take 81 mg by mouth daily., Disp: , Rfl:   ?  baclofen (LIORESAL) 10 MG tablet, Take 10 mg by mouth as needed., Disp: , Rfl:   ?  calcium carbonate (OS-TAMI) 600 mg (1,500 mg) tablet, Take 600 mg by mouth 2 (two) times a day., Disp: , Rfl:   ?  cholecalciferol, vitamin D3, (VITAMIN D3) 1,000 unit capsule, Take 1,000 Units by mouth daily., Disp: , Rfl:   ?  lansoprazole (PREVACID) 30 MG capsule, Take 15 mg by mouth daily. , Disp: , Rfl:   ?  lisinopril (PRINIVIL,ZESTRIL) 40 MG tablet, TAKE ONE TABLET BY MOUTH ONCE DAILY, Disp: 90 tablet, Rfl: 0  ?  loratadine (CLARITIN) 10 mg tablet, Take 10 mg by mouth daily as needed for  "allergies., Disp: , Rfl:   ?  metoprolol succinate (TOPROL-XL) 50 MG 24 hr tablet, TAKE ONE TABLET BY MOUTH ONCE DAILY, Disp: 90 tablet, Rfl: 0  ?  nitroglycerin (NITROSTAT) 0.4 MG SL tablet, Place 1 tablet (0.4 mg total) under the tongue every 5 (five) minutes as needed for chest pain., Disp: 90 tablet, Rfl: 12  ?  OMEGA-3 FATTY ACIDS (FISH OIL CONCENTRATE ORAL), Take 1,200 mg by mouth 2 (two) times a day., Disp: , Rfl:   ?  pantoprazole (PROTONIX) 40 MG tablet, Take 40 mg by mouth daily., Disp: , Rfl:   ?  POLYETHYLENE GLYCOL 3350 (MIRALAX ORAL), Take 1 packet by mouth daily as needed. , Disp: , Rfl:   ?  simvastatin (ZOCOR) 80 MG tablet, Take 80 mg by mouth daily., Disp: , Rfl:     Allergies:   Bactrim [sulfamethoxazole-trimethoprim]; Morphine; and Percocet [oxycodone-acetaminophen]      Objective:      Physical Exam  203 lb (92.1 kg)  5' 2\" (1.575 m)  Body mass index is 37.13 kg/(m^2).  /72 (Patient Site: Left Arm, Patient Position: Sitting, Cuff Size: Adult Large)  Pulse 68  Resp 16  Ht 5' 2\" (1.575 m)  Wt 203 lb (92.1 kg)  LMP 06/15/2002  BMI 37.13 kg/m2    General Appearance:   Awake, Alert, No acute distress.   HEENT:  Pupil equal and reactive to light. No scleral icterus; the mucous membranes were moist.   Neck: No cervical bruits. No JVD. No thyromegaly.     Chest: The spine was straight. The chest was symmetric.   Lungs:   Respirations unlabored; Lungs are clear to auscultation. No crackles. No wheezing.   Cardiovascular:   Regular rhythm and rate, normal first and second heart sounds with no murmurs. No rubs or gallops.    Abdomen:  Obese. Soft. No tenderness. Non-distended. Bowels sounds are present   Extremities: Equal tibial pulses. No leg edema.   Skin: No rashes or ulcers. Warm, Dry.   Musculoskeletal: No tenderness. No deformity.   Neurologic: Mood and affect are appropriate. No focal deficits.         Cardiac Imaging Studies  ECHO on 11-3-2014:  Summary  Normal left ventricular size " and systolic function.  Normal left ventricle wall thickness.  Left ventricular ejection fraction is visually estimated to be 60%.  Moderate hypokinesis of the basal inferior segment(s) of the left  ventricle.  No significant valvular abnormalities.    Coronary angiogram with PCI on 7-:  Mild to moderate atherosclerosis, primarily in the LAD  distribution; no obstructive disease identified  NSTEMI may have been secondary to plaque rupture with  subsequent healing, no clear culprit apparent    Lab Review   Lab Results   Component Value Date     09/02/2016    K 4.6 09/02/2016     09/02/2016    CO2 25 09/02/2016    BUN 13 09/02/2016    CREATININE 0.99 09/02/2016    CALCIUM 9.9 09/02/2016     Lab Results   Component Value Date    WBC 4.6 02/25/2016    WBC 12.8 (H) 07/30/2014    HGB 11.6 (L) 02/25/2016    HCT 34.9 (L) 02/25/2016    MCV 94 02/25/2016     02/25/2016     Lab Results   Component Value Date    CHOL 182 09/02/2016    CHOL 169 08/19/2015    CHOL 196 11/03/2014     Lab Results   Component Value Date    HDL 49 (L) 09/02/2016    HDL 46 08/19/2015    HDL 49 11/03/2014     Lab Results   Component Value Date    LDLCALC 96 09/02/2016    LDLCALC 91 08/19/2015    LDLCALC 104 11/03/2014     Lab Results   Component Value Date    TRIG 184 (H) 09/02/2016    TRIG 161 (H) 08/19/2015    TRIG 215 (H) 11/03/2014

## 2021-06-26 NOTE — PROGRESS NOTES
Progress Notes by Latoya Fernandez MD at 4/19/2018  8:30 AM     Author: Latoya Fernandez MD Service: -- Author Type: Physician    Filed: 4/19/2018  8:54 AM Encounter Date: 4/19/2018 Status: Signed    : Latoya Fernandez MD (Physician)           Click to link to [facility]     [facility] HEART CARE NOTE       Assessment/Plan:   A 67-year-old woman presents to clinic today for routine cardiac follow up.    1. Coronary artery disease, non-ST elevation MI.  She has no chest pain.  Continue aspirin, metoprolol, lisinopril, and Zocor.     2. Essential hypertension. Her blood pressure has been controlled well with metoprolol XL 50 mg daily and lisinopril 40 mg daily.  She will monitor her blood pressure frequently.    3. Hyperlipidemia. The patient currently on Zocor 80 mg daily.  She has no side effects from Zocor.  The patient couldn't do tolerant Lipitor.  She cannot afford Crestor.  Her last LDL was 80.  Continue lifestyle modifications including diet control, exercise, and weight loss.    Thank you for the opportunity to be involved in the care of Lisa Reyna. If you have any questions, please feel free to contact me.  I will see the patient again in 12 months.     History of Present Illness:   It is my pleasure to see Mrs. Lisa Reyna today at the [facility] for routine cardiology followup. Lisa is a 67-year-old pleasant woman with a medical history of coronary artery disease, myocardial infarction, mild to moderate disease in the LAD per coronary angiogram in 7-2014, essential hypertension, hyperlipidemia, and obesity.     The patient states that she has been doing quite well over last year.  She denies chest pain, shortness of breath, palpitations, dizziness, orthopnea, PND, or leg swelling.  Her blood pressure has been well controlled.  It is high this morning due to her possible flu.  She just came back from [place] vacation. Her blood pressure was 120/70s 10 days ago.    Past Medical  History:     Patient Active Problem List   Diagnosis   ? Chest pain   ? Acute myocardial infarction   ? Hypertension   ? Hyperlipidemia   ? GERD (gastroesophageal reflux disease)   ? Angina pectoris   ? Coronary artery disease   ? Obesity   ? Osteoarthritis of knee   ? Acute abdominal pain   ? Enteritis   ? Essential hypertension       Past Surgical History:     Past Surgical History:   Procedure Laterality Date   ? BREAST BIOPSY Left 2008    cyst removal   ? BREAST BIOPSY Right 2004   ? BREAST LUMPECTOMY Right 6/24/2004   ? CERVICAL DISC SURGERY  6/25/2007    C6-7 sarah laminectomy with microdiskectomy   ? OOPHORECTOMY Bilateral 2011   ? TOTAL HIP ARTHROPLASTY Left 9/30/2009   ? TUBAL LIGATION  1/10/1992    with endometrial polyp removal       Family History:     Family History   Problem Relation Age of Onset   ? Colon cancer Maternal Grandfather    ? Cancer Paternal Grandmother    ? Throat cancer Paternal Grandfather    ? Breast cancer Neg Hx         Social History:    reports that she has never smoked. She has never used smokeless tobacco. She reports that she drinks about 3.0 oz of alcohol per week  She reports that she does not use illicit drugs.    Review of Systems:   General: WNL  Eyes: WNL  Ears/Nose/Throat: WNL  Lungs: WNL  Heart: WNL  Stomach: WNL  Bladder: WNL  Muscle/Joints: WNL  Skin: WNL  Nervous System: WNL  Mental Health: WNL     Blood: WNL    Meds:     Current Outpatient Prescriptions:   ?  ascorbic acid (VITAMIN C) 1000 MG tablet, Take 1,000 mg by mouth as needed. , Disp: , Rfl:   ?  aspirin 81 MG EC tablet, Take 81 mg by mouth daily., Disp: , Rfl:   ?  baclofen (LIORESAL) 10 MG tablet, Take 10 mg by mouth as needed., Disp: , Rfl:   ?  calcium carbonate (OS-TAMI) 600 mg (1,500 mg) tablet, Take 600 mg by mouth 2 (two) times a day., Disp: , Rfl:   ?  cholecalciferol, vitamin D3, (VITAMIN D3) 1,000 unit capsule, Take 1,000 Units by mouth daily., Disp: , Rfl:   ?  lansoprazole (PREVACID) 30 MG capsule,  "Take 15 mg by mouth daily. , Disp: , Rfl:   ?  lisinopril (PRINIVIL,ZESTRIL) 40 MG tablet, TAKE ONE TABLET BY MOUTH ONCE DAILY, Disp: 90 tablet, Rfl: 1  ?  loratadine (CLARITIN) 10 mg tablet, Take 10 mg by mouth daily as needed for allergies., Disp: , Rfl:   ?  metoprolol succinate (TOPROL-XL) 50 MG 24 hr tablet, TAKE ONE TABLET BY MOUTH ONCE DAILY, Disp: 90 tablet, Rfl: 1  ?  NITROSTAT 0.4 mg SL tablet, DISSOLVE ONE TABLET UNDER THE TONGUE EVERY 5 MINUTES AS NEEDED FOR CHEST PAIN., Disp: 25 tablet, Rfl: 6  ?  OMEGA-3 FATTY ACIDS (FISH OIL CONCENTRATE ORAL), Take 1,200 mg by mouth 2 (two) times a day., Disp: , Rfl:   ?  pantoprazole (PROTONIX) 40 MG tablet, Take 40 mg by mouth daily., Disp: , Rfl:   ?  POLYETHYLENE GLYCOL 3350 (MIRALAX ORAL), Take 1 packet by mouth daily as needed. , Disp: , Rfl:   ?  simvastatin (ZOCOR) 80 MG tablet, Take 80 mg by mouth daily., Disp: , Rfl:     Allergies:   Bactrim [sulfamethoxazole-trimethoprim]; Morphine; and Percocet [oxycodone-acetaminophen]      Objective:      Physical Exam  198 lb (89.8 kg)  5' 2\" (1.575 m)  Body mass index is 36.21 kg/(m^2).  /68 (Patient Site: Right Arm, Patient Position: Sitting, Cuff Size: Adult Large)  Pulse 60  Resp 18  Ht 5' 2\" (1.575 m)  Wt 198 lb (89.8 kg) Comment: per pt  LMP 06/15/2002  BMI 36.21 kg/m2    General Appearance:   Awake, Alert, No acute distress.   HEENT:  Pupil equal and reactive to light. No scleral icterus; the mucous membranes were moist.   Neck: No cervical bruits. No JVD. No thyromegaly.     Chest: The spine was straight. The chest was symmetric.   Lungs:   Respirations unlabored; Lungs are clear to auscultation. No crackles. No wheezing.   Cardiovascular:   Regular rhythm and rate, normal first and second heart sounds with no murmurs. No rubs or gallops.    Abdomen:  Obese. Soft. No tenderness. Non-distended. Bowels sounds are present   Extremities: Equal tibial pulses. No leg edema.   Skin: No rashes or ulcers. " Warm, Dry.   Musculoskeletal: No tenderness. No deformity.   Neurologic: Mood and affect are appropriate. No focal deficits.         EKG: Personally reviewed  Normal sinus rhythm  Septal infarct , age undetermined  Abnormal ECG  When compared with ECG of 31-AUG-2008 06:06,  Septal infarct is now Present    Cardiac Imaging Studies  ECHO on 11-3-2014:  Summary  Normal left ventricular size and systolic function.  Normal left ventricle wall thickness.  Left ventricular ejection fraction is visually estimated to be 60%.  Moderate hypokinesis of the basal inferior segment(s) of the left  ventricle.  No significant valvular abnormalities.    Coronary angiogram with PCI on 7-:  Mild to moderate atherosclerosis, primarily in the LAD  distribution; no obstructive disease identified  NSTEMI may have been secondary to plaque rupture with  subsequent healing, no clear culprit apparent    Lab Review   Lab Results   Component Value Date     11/30/2017    K 4.4 11/30/2017     11/30/2017    CO2 26 11/30/2017    BUN 15 11/30/2017    CREATININE 0.87 11/30/2017    CALCIUM 10.1 11/30/2017     Lab Results   Component Value Date    WBC 4.6 02/25/2016    WBC 12.8 (H) 07/30/2014    HGB 11.6 (L) 02/25/2016    HCT 34.9 (L) 02/25/2016    MCV 94 02/25/2016     02/25/2016     Lab Results   Component Value Date    CHOL 160 09/25/2017    CHOL 182 09/02/2016    CHOL 169 08/19/2015     Lab Results   Component Value Date    HDL 48 (L) 09/25/2017    HDL 49 (L) 09/02/2016    HDL 46 08/19/2015     Lab Results   Component Value Date    LDLCALC 80 09/25/2017    LDLCALC 96 09/02/2016    LDLCALC 91 08/19/2015     Lab Results   Component Value Date    TRIG 161 (H) 09/25/2017    TRIG 184 (H) 09/02/2016    TRIG 161 (H) 08/19/2015     Lab Results   Component Value Date    TROPONINI 1.95 (HH) 07/30/2014     No results found for: BNP  Lab Results   Component Value Date    TSH 3.31 07/31/2014

## 2021-06-27 NOTE — PROGRESS NOTES
Progress Notes by Latoya Fernandez MD at 5/14/2019 10:50 AM     Author: Latoya Fernandez MD Service: -- Author Type: Physician    Filed: 5/14/2019 12:58 PM Encounter Date: 5/14/2019 Status: Signed    : Latoya eFrnandez MD (Physician)           Click to link to [facility] HEART [facility] NOTE       Assessment/Plan:   A 68-year-old woman presents to clinic today for routine cardiac follow up.    1. Coronary artery disease, non-ST elevation MI.  She has no chest pain.  Continue aspirin, metoprolol, lisinopril, and Zocor.     2. Essential hypertension. Her blood pressure has been controlled well with metoprolol XL 50 mg daily and lisinopril 40 mg daily.       3. Hyperlipidemia. The patient currently on Zocor 80 mg daily.  She has no side effects from Zocor.  The patient couldn't do tolerant Lipitor.  She cannot afford Crestor.  She wants to have Lipid profile and LFT check at Dr. Gomez's office in annual physical exam.    Thank you for the opportunity to be involved in the care of Lisa Reyna. If you have any questions, please feel free to contact me.  I will see the patient again in 12 months.     History of Present Illness:   It is my pleasure to see Mrs. Lisa Reyna today at the [facility] for routine cardiology followup. Lisa is a 68-year-old pleasant woman with a medical history of coronary artery disease, myocardial infarction, mild to moderate disease in the LAD per coronary angiogram in 7-2014, essential hypertension, hyperlipidemia, and obesity.     The patient states that she has been doing fine over last year.  She denies chest pain, shortness of breath, palpitations, dizziness, orthopnea, PND, or leg swelling.  She complains of right knee pain. Her blood pressure has been well controlled.      Past Medical History:     Patient Active Problem List   Diagnosis   ? Chest pain   ? Acute myocardial infarction (H)   ? Hypertension   ? Hyperlipidemia   ? GERD (gastroesophageal  reflux disease)   ? Angina pectoris (H)   ? Coronary artery disease   ? Obesity   ? Osteoarthritis of knee   ? Acute abdominal pain   ? Enteritis   ? Essential hypertension       Past Surgical History:     Past Surgical History:   Procedure Laterality Date   ? BREAST BIOPSY Left 2008    cyst removal   ? BREAST BIOPSY Right 2004   ? BREAST LUMPECTOMY Right 6/24/2004   ? CERVICAL DISC SURGERY  6/25/2007    C6-7 sarah laminectomy with microdiskectomy   ? OOPHORECTOMY Bilateral 2011   ? TOTAL HIP ARTHROPLASTY Left 9/30/2009   ? TUBAL LIGATION  1/10/1992    with endometrial polyp removal       Family History:     Family History   Problem Relation Age of Onset   ? Colon cancer Maternal Grandfather    ? Cancer Paternal Grandmother    ? Throat cancer Paternal Grandfather    ? Breast cancer Neg Hx         Social History:    reports that she has never smoked. She has never used smokeless tobacco. She reports that she drinks about 3.0 oz of alcohol per week. She reports that she does not use drugs.    Review of Systems:   General: WNL  Eyes: WNL  Ears/Nose/Throat: WNL  Lungs: WNL  Heart: Leg Swelling  Stomach: WNL  Bladder: WNL  Muscle/Joints: WNL  Skin: WNL  Nervous System: WNL  Mental Health: WNL     Blood: WNL    Meds:     Current Outpatient Medications:   ?  aspirin 81 MG EC tablet, Take 81 mg by mouth daily., Disp: , Rfl:   ?  calcium carbonate (OS-TAMI) 600 mg (1,500 mg) tablet, Take 600 mg by mouth 2 (two) times a day., Disp: , Rfl:   ?  cholecalciferol, vitamin D3, (VITAMIN D3) 1,000 unit capsule, Take 1,000 Units by mouth daily., Disp: , Rfl:   ?  hydroCHLOROthiazide (HYDRODIURIL) 12.5 MG tablet, Take 12.5 mg by mouth daily., Disp: , Rfl: 0  ?  lansoprazole (PREVACID) 30 MG capsule, Take 15 mg by mouth daily. , Disp: , Rfl:   ?  lisinopril (PRINIVIL,ZESTRIL) 40 MG tablet, TAKE 1 TABLET BY MOUTH ONCE DAILY, Disp: 90 tablet, Rfl: 1  ?  metoprolol succinate (TOPROL-XL) 50 MG 24 hr tablet, TAKE 1 TABLET BY MOUTH ONCE DAILY  "**DUE  TO  SEE  DR  IN  APRIL**, Disp: 90 tablet, Rfl: 0  ?  nitroglycerin (NITROSTAT) 0.4 MG SL tablet, Place 1 tablet (0.4 mg total) under the tongue every 5 (five) minutes as needed for chest pain., Disp: 1 Bottle, Rfl: 2  ?  OMEGA-3 FATTY ACIDS (FISH OIL CONCENTRATE ORAL), Take 1,200 mg by mouth 2 (two) times a day., Disp: , Rfl:   ?  simvastatin (ZOCOR) 80 MG tablet, Take 80 mg by mouth daily., Disp: , Rfl:   ?  ascorbic acid (VITAMIN C) 1000 MG tablet, Take 1,000 mg by mouth as needed. , Disp: , Rfl:   ?  baclofen (LIORESAL) 10 MG tablet, Take 10 mg by mouth as needed., Disp: , Rfl:   ?  loratadine (CLARITIN) 10 mg tablet, Take 10 mg by mouth daily as needed for allergies., Disp: , Rfl:   ?  pantoprazole (PROTONIX) 40 MG tablet, Take 40 mg by mouth daily., Disp: , Rfl:   ?  POLYETHYLENE GLYCOL 3350 (MIRALAX ORAL), Take 1 packet by mouth daily as needed. , Disp: , Rfl:     Allergies:   Bactrim [sulfamethoxazole-trimethoprim]; Morphine; and Percocet [oxycodone-acetaminophen]      Objective:      Physical Exam  203 lb (92.1 kg)  5' 2\" (1.575 m)  Body mass index is 37.13 kg/m .  /50 (Patient Site: Left Arm, Patient Position: Sitting, Cuff Size: Adult Large)   Pulse 64   Resp 16   Ht 5' 2\" (1.575 m)   Wt 203 lb (92.1 kg)   LMP 06/15/2002   BMI 37.13 kg/m      General Appearance:   Awake, Alert, No acute distress.   HEENT:  Pupil equal and reactive to light. No scleral icterus; the mucous membranes were moist.   Neck: No cervical bruits. No JVD. No thyromegaly.     Chest: The spine was straight. The chest was symmetric.   Lungs:   Respirations unlabored; Lungs are clear to auscultation. No crackles. No wheezing.   Cardiovascular:   Regular rhythm and rate, normal first and second heart sounds with no murmurs. No rubs or gallops.    Abdomen:  Obese. Soft. No tenderness. Non-distended. Bowels sounds are present   Extremities: Equal tibial pulses. No leg edema.   Skin: No rashes or ulcers. Warm, Dry. "   Musculoskeletal: No tenderness. No deformity.   Neurologic: Mood and affect are appropriate. No focal deficits.         EKG: Personally reviewed  Normal sinus rhythm  Septal infarct , age undetermined  Abnormal ECG  When compared with ECG of 31-AUG-2008 06:06,  Septal infarct is now Present    Cardiac Imaging Studies  ECHO on 11-3-2014:  Summary  Normal left ventricular size and systolic function.  Normal left ventricle wall thickness.  Left ventricular ejection fraction is visually estimated to be 60%.  Moderate hypokinesis of the basal inferior segment(s) of the left  ventricle.  No significant valvular abnormalities.    Coronary angiogram with PCI on 7-:  Mild to moderate atherosclerosis, primarily in the LAD  distribution; no obstructive disease identified  NSTEMI may have been secondary to plaque rupture with  subsequent healing, no clear culprit apparent    Lab Review   Lab Results   Component Value Date     02/14/2019    K 4.7 02/14/2019     02/14/2019    CO2 23 02/14/2019    BUN 18 02/14/2019    CREATININE 0.89 02/14/2019    CALCIUM 9.7 02/14/2019     Lab Results   Component Value Date    WBC 4.6 02/25/2016    WBC 12.8 (H) 07/30/2014    HGB 11.6 (L) 02/25/2016    HCT 34.9 (L) 02/25/2016    MCV 94 02/25/2016     02/25/2016     Lab Results   Component Value Date    CHOL 162 06/29/2018    CHOL 160 09/25/2017    CHOL 182 09/02/2016     Lab Results   Component Value Date    HDL 46 (L) 06/29/2018    HDL 48 (L) 09/25/2017    HDL 49 (L) 09/02/2016     Lab Results   Component Value Date    LDLCALC 81 06/29/2018    LDLCALC 80 09/25/2017    LDLCALC 96 09/02/2016     Lab Results   Component Value Date    TRIG 173 (H) 06/29/2018    TRIG 161 (H) 09/25/2017    TRIG 184 (H) 09/02/2016     Lab Results   Component Value Date    TROPONINI 1.95 (HH) 07/30/2014     No results found for: BNP  Lab Results   Component Value Date    TSH 3.31 07/31/2014

## 2021-06-29 NOTE — PROGRESS NOTES
"Progress Notes by Latoya Fernandez MD at 5/14/2020 11:30 AM     Author: Latoya Fernandez MD Service: -- Author Type: Physician    Filed: 5/14/2020 11:17 AM Encounter Date: 5/14/2020 Status: Signed    : Latoya Fernandez MD (Physician)       The patient has been notified of following:     \"This telephone visit will be conducted via a call between you and your physician/provider. We have found that certain health care needs can be provided without the need for a physical exam.  This service lets us provide the care you need with a phone conversation.  If a prescription is necessary we can send it directly to your pharmacy.  If lab work is needed we can place an order for that and you can then stop by our lab to have the test done at a later time. If during the course of the call the physician/provider feels a telephone visit is not appropriate, you will not be charged for this service.\" Verbal consent has been obtained for this service by care team member:     HEART CARE PHONE ENCOUNTER      The patient has chosen to have the visit conducted as a telephone visit, to reduce risk of exposure given the current status of Coronavirus in our community. This telephone visit is being conducted via a call between the patient and physician/provider. Health care needs are being provided without a physical exam.      Assessment/Plan:   1. Coronary artery disease, non-ST elevation MI.  She has no chest pain.  Continue aspirin, metoprolol, lisinopril, and Zocor.      2. Essential hypertension. Her blood pressure has been controlled well with metoprolol XL 50 mg daily and lisinopril 40 mg daily.        3. Hyperlipidemia. The patient currently on Zocor 80 mg daily.  She has no side effects from Zocor.  The patient couldn't do tolerant Lipitor.  She cannot afford Crestor.  LDL was well controlled per lab on 2-.  LFT was normal.      Follow Up Plan: Follow up in 12 months.  I have reviewed the note as documented.  This accurately captures " the substance of my conversation with the patient.    Total time of call between patient and provider was 5 minutes   Start Time:  11:07 am  Stop Time:  11:12 am       History of Present Illness:   Lisa Reyna is a 69 y.o. female who is being evaluated via a billable telephone visit.    Lisa Reyna is a 69 y.o. female with a medical history of coronary artery disease, myocardial infarction, mild to moderate disease in the LAD per coronary angiogram in 7-2014, essential hypertension, hyperlipidemia, and obesity.      The patient states that she has been doing fine over last year.  She denies chest pain, shortness of breath, palpitations, dizziness, orthopnea, PND, or leg swelling.  She lost some weight by diet control. Her blood pressure has been well controlled.     I have reviewed and updated the patient's Past Medical History, Social History, Family History and Medication List.  Past Medical History:     Patient Active Problem List   Diagnosis   ? Chest pain   ? Acute myocardial infarction (H)   ? Hypertension   ? Hyperlipidemia   ? GERD (gastroesophageal reflux disease)   ? Angina pectoris (H)   ? Coronary artery disease   ? Obesity   ? Osteoarthritis of knee   ? Acute abdominal pain   ? Enteritis   ? Essential hypertension       Past Surgical History:     Past Surgical History:   Procedure Laterality Date   ? BREAST BIOPSY Left 2008    cyst removal   ? BREAST BIOPSY Right 2004   ? BREAST LUMPECTOMY Right 6/24/2004   ? CERVICAL DISC SURGERY  6/25/2007    C6-7 sarah laminectomy with microdiskectomy   ? OOPHORECTOMY Bilateral 2011   ? TOTAL HIP ARTHROPLASTY Left 9/30/2009   ? TUBAL LIGATION  1/10/1992    with endometrial polyp removal       Family History:     Family History   Problem Relation Age of Onset   ? Colon cancer Maternal Grandfather    ? Cancer Paternal Grandmother    ? Throat cancer Paternal Grandfather    ? Breast cancer Neg Hx        Social History:    reports that she has never smoked. She has  never used smokeless tobacco. She reports current alcohol use of about 5.0 standard drinks of alcohol per week. She reports that she does not use drugs.    Review of Systems:   12 systems are reviewed negative except for in HPI.    Meds:     Current Outpatient Medications:   ?  albuterol (PROAIR HFA;PROVENTIL HFA;VENTOLIN HFA) 90 mcg/actuation inhaler, 2 puff(s), Disp: , Rfl:   ?  ascorbic acid (VITAMIN C) 1000 MG tablet, Take 1,000 mg by mouth as needed. , Disp: , Rfl:   ?  aspirin 81 MG EC tablet, Take 81 mg by mouth daily., Disp: , Rfl:   ?  baclofen (LIORESAL) 10 MG tablet, Take 10 mg by mouth as needed., Disp: , Rfl:   ?  calcium carbonate (OS-TAMI) 600 mg (1,500 mg) tablet, Take 600 mg by mouth 2 (two) times a day., Disp: , Rfl:   ?  cholecalciferol, vitamin D3, (VITAMIN D3) 1,000 unit capsule, Take 1,000 Units by mouth daily., Disp: , Rfl:   ?  hydroCHLOROthiazide (HYDRODIURIL) 12.5 MG tablet, Take 12.5 mg by mouth daily., Disp: , Rfl: 0  ?  lansoprazole (PREVACID) 30 MG capsule, Take 15 mg by mouth daily. , Disp: , Rfl:   ?  lisinopriL (PRINIVIL,ZESTRIL) 40 MG tablet, Take 1 tablet (40 mg total) by mouth daily., Disp: 90 tablet, Rfl: 1  ?  loratadine (CLARITIN) 10 mg tablet, Take 10 mg by mouth daily as needed for allergies., Disp: , Rfl:   ?  metoprolol succinate (TOPROL-XL) 50 MG 24 hr tablet, Take 1 tablet (50 mg total) by mouth daily., Disp: 90 tablet, Rfl: 1  ?  OMEGA-3 FATTY ACIDS (FISH OIL CONCENTRATE ORAL), Take 1,200 mg by mouth 2 (two) times a day., Disp: , Rfl:   ?  pantoprazole (PROTONIX) 40 MG tablet, Take 40 mg by mouth daily., Disp: , Rfl:   ?  POLYETHYLENE GLYCOL 3350 (MIRALAX ORAL), Take 1 packet by mouth daily as needed. , Disp: , Rfl:   ?  simvastatin (ZOCOR) 80 MG tablet, Take 80 mg by mouth daily., Disp: , Rfl:   ?  nitroglycerin (NITROSTAT) 0.4 MG SL tablet, Place 1 tablet (0.4 mg total) under the tongue every 5 (five) minutes as needed for chest pain., Disp: 1 Bottle, Rfl: 2      Allergies:   Bactrim [sulfamethoxazole-trimethoprim]; Morphine; and Percocet [oxycodone-acetaminophen]    Objective:      Physical Exam    Physical examination are not performed given phone encounter.  :Physical Examination not performed given phone encounter  Lab Review   Lab Results   Component Value Date     02/26/2020    K 4.2 02/26/2020     02/26/2020    CO2 24 02/26/2020    BUN 18 02/26/2020    CREATININE 1.02 02/26/2020    CALCIUM 9.2 02/26/2020     Lab Results   Component Value Date    WBC 4.6 02/25/2016    WBC 12.8 (H) 07/30/2014    HGB 11.6 (L) 02/25/2016    HCT 34.9 (L) 02/25/2016    MCV 94 02/25/2016     02/25/2016     Lab Results   Component Value Date    CHOL 152 02/26/2020    TRIG 191 (H) 02/26/2020    HDL 47 (L) 02/26/2020        LDL                                                               67                                      02/26/2020

## 2021-06-30 NOTE — PROGRESS NOTES
Progress Notes by Latoya Fernandez MD at 5/6/2021  9:30 AM     Author: Latoya Fernandez MD Service: -- Author Type: Physician    Filed: 5/6/2021 10:14 AM Encounter Date: 5/6/2021 Status: Signed    : Latoya Fernandez MD (Physician)           Click to link to [facility] HEART [facility] NOTE       Assessment/Plan:   A 70-year-old woman presents to clinic today for routine cardiac follow up.    1. Coronary artery disease, non-ST elevation MI.  She has no chest pain.  Continue aspirin, metoprolol, lisinopril, and Zocor.     2. Essential hypertension. Her blood pressure has been controlled well with metoprolol XL 50 mg daily and lisinopril 40 mg daily.       3. Hyperlipidemia. The patient has been on Zocor 80 mg nightly.  She has no side effects of Zocor.  We discussed the dosage of Zocor.  FDA did not recommend milligrams of Zocor.  Her LDL was controlled well, liver function normal, no muscle aching.    Thank you for the opportunity to be involved in the care of Lisa Reyna. If you have any questions, please feel free to contact me.  I will see the patient again in 12 months and as needed.     History of Present Illness:   It is my pleasure to see Mrs. Lisa Reyna today at the [facility] for routine cardiology followup. Lisa is a 70-year-old pleasant woman with a medical history of coronary artery disease, myocardial infarction, mild to moderate disease in the LAD per coronary angiogram in 7-2014, essential hypertension, hyperlipidemia, and obesity.     The patient states that she has been doing fine over last year.  She denies chest pain, shortness of breath, palpitations, dizziness, orthopnea, PND, or leg swelling.  She complains of right knee pain. Her blood pressure and heart rate have been well controlled.  Her LDL was controlled with simvastatin.  She has been taking simvastatin 80 mg for many years, no side effects.  Recent laboratory test are reviewed, normal liver  function.    Past Medical History:     Patient Active Problem List   Diagnosis   ? Chest pain   ? Acute myocardial infarction (H)   ? Hypertension   ? Hyperlipidemia   ? GERD (gastroesophageal reflux disease)   ? Angina pectoris (H)   ? Coronary artery disease   ? Obesity   ? Osteoarthritis of knee   ? Acute abdominal pain   ? Enteritis   ? Essential hypertension       Past Surgical History:     Past Surgical History:   Procedure Laterality Date   ? BREAST BIOPSY Left 2008    cyst removal   ? BREAST BIOPSY Right 2004   ? BREAST LUMPECTOMY Right 6/24/2004   ? CERVICAL DISC SURGERY  6/25/2007    C6-7 sarah laminectomy with microdiskectomy   ? OOPHORECTOMY Bilateral 2011   ? TOTAL HIP ARTHROPLASTY Left 9/30/2009   ? TUBAL LIGATION  1/10/1992    with endometrial polyp removal       Family History:     Family History   Problem Relation Age of Onset   ? Colon cancer Maternal Grandfather    ? Cancer Paternal Grandmother    ? Throat cancer Paternal Grandfather    ? Breast cancer Neg Hx         Social History:    reports that she has never smoked. She has never used smokeless tobacco. She reports current alcohol use of about 5.0 standard drinks of alcohol per week. She reports that she does not use drugs.    Review of Systems:   General: WNL  Eyes: WNL  Ears/Nose/Throat: WNL  Lungs: WNL  Heart: WNL  Stomach: WNL  Bladder: WNL  Muscle/Joints: WNL  Skin: WNL  Nervous System: WNL  Mental Health: WNL     Blood: WNL    Meds:     Current Outpatient Medications:   ?  amoxicillin (AMOXIL) 500 MG tablet, TK 4 TS PO 1 HOUR BEFORE DENTAL APPOINTMENT., Disp: , Rfl:   ?  aspirin 81 MG EC tablet, Take 81 mg by mouth daily., Disp: , Rfl:   ?  calcium carbonate (OS-TAMI) 600 mg (1,500 mg) tablet, Take 600 mg by mouth 2 (two) times a day., Disp: , Rfl:   ?  cholecalciferol, vitamin D3, (VITAMIN D3) 1,000 unit capsule, Take 1,000 Units by mouth daily., Disp: , Rfl:   ?  cyanocobalamin (VITAMIN B-12) 1000 MCG tablet, Take 1,000 mcg by mouth  "daily., Disp: , Rfl:   ?  diclofenac sodium (VOLTAREN) 1 % Gel, APPLY 4 GRAMS TO AFFECTED AREA(S) FOUR TIMES A DAY, Disp: , Rfl:   ?  famotidine (PEPCID AC MAXIMUM STRENGTH) 20 MG tablet, Take 20 mg by mouth daily., Disp: , Rfl:   ?  hydroCHLOROthiazide (HYDRODIURIL) 12.5 MG tablet, Take 12.5 mg by mouth daily., Disp: , Rfl: 0  ?  lisinopriL (PRINIVIL,ZESTRIL) 40 MG tablet, TAKE ONE TABLET BY MOUTH EVERY DAY, Disp: 90 tablet, Rfl: 0  ?  metoprolol succinate (TOPROL-XL) 50 MG 24 hr tablet, TAKE ONE TABLET BY MOUTH EVERY DAY, Disp: 90 tablet, Rfl: 1  ?  nitroglycerin (NITROSTAT) 0.4 MG SL tablet, Place 1 tablet (0.4 mg total) under the tongue every 5 (five) minutes as needed for chest pain., Disp: 1 Bottle, Rfl: 2  ?  OMEGA-3 FATTY ACIDS (FISH OIL CONCENTRATE ORAL), Take 1,200 mg by mouth 2 (two) times a day., Disp: , Rfl:   ?  ONETOUCH ULTRA BLUE TEST STRIP strips, USE TO TEST BLOOD SUGAR ONCE A DAY, Disp: , Rfl:   ?  simvastatin (ZOCOR) 80 MG tablet, Take 80 mg by mouth daily., Disp: , Rfl:   ?  albuterol (PROAIR HFA;PROVENTIL HFA;VENTOLIN HFA) 90 mcg/actuation inhaler, 2 puff(s), Disp: , Rfl:   ?  ascorbic acid (VITAMIN C) 1000 MG tablet, Take 1,000 mg by mouth as needed. , Disp: , Rfl:   ?  baclofen (LIORESAL) 10 MG tablet, Take 10 mg by mouth as needed., Disp: , Rfl:   ?  lansoprazole (PREVACID) 30 MG capsule, Take 15 mg by mouth daily. , Disp: , Rfl:   ?  loratadine (CLARITIN) 10 mg tablet, Take 10 mg by mouth daily as needed for allergies., Disp: , Rfl:   ?  pantoprazole (PROTONIX) 40 MG tablet, Take 40 mg by mouth daily., Disp: , Rfl:   ?  POLYETHYLENE GLYCOL 3350 (MIRALAX ORAL), Take 1 packet by mouth daily as needed. , Disp: , Rfl:     Allergies:   Bactrim [sulfamethoxazole-trimethoprim], Morphine, and Percocet [oxycodone-acetaminophen]      Objective:      Physical Exam  189 lb (85.7 kg)  5' 2\" (1.575 m)  Body mass index is 34.57 kg/m .  /56 (Patient Site: Right Arm, Patient Position: Sitting, Cuff " "Size: Adult Large)   Pulse 68   Resp 16   Ht 5' 2\" (1.575 m)   Wt 189 lb (85.7 kg)   LMP 06/15/2002   BMI 34.57 kg/m      General Appearance:   Awake, Alert, No acute distress.   HEENT:  Pupil equal and reactive to light. No scleral icterus; the mucous membranes were moist.   Neck: No cervical bruits. No JVD. No thyromegaly.     Chest: The spine was straight. The chest was symmetric.   Lungs:   Respirations unlabored; Lungs are clear to auscultation. No crackles. No wheezing.   Cardiovascular:   Regular rhythm and rate, normal first and second heart sounds with no murmurs. No rubs or gallops.    Abdomen:  Obese. Soft. No tenderness. Non-distended. Bowels sounds are present   Extremities: Equal tibial pulses. No leg edema.   Skin: No rashes or ulcers. Warm, Dry.   Musculoskeletal: No tenderness. No deformity.   Neurologic: Mood and affect are appropriate. No focal deficits.         EKG: Personally reviewed  Normal sinus rhythm  Septal infarct , age undetermined  Abnormal ECG  When compared with ECG of 31-AUG-2008 06:06,  Septal infarct is now Present    Cardiac Imaging Studies  ECHO on 11-3-2014:  Summary  Normal left ventricular size and systolic function.  Normal left ventricle wall thickness.  Left ventricular ejection fraction is visually estimated to be 60%.  Moderate hypokinesis of the basal inferior segment(s) of the left  ventricle.  No significant valvular abnormalities.    Coronary angiogram with PCI on 7-:  Mild to moderate atherosclerosis, primarily in the LAD  distribution; no obstructive disease identified  NSTEMI may have been secondary to plaque rupture with  subsequent healing, no clear culprit apparent    Lab Review   Lab Results   Component Value Date     02/24/2021    K 4.2 02/24/2021     02/24/2021    CO2 25 02/24/2021    BUN 19 02/24/2021    CREATININE 1.12 (H) 02/24/2021    CALCIUM 9.1 02/24/2021     Lab Results   Component Value Date    WBC 4.6 02/25/2016    WBC 12.8 " (H) 07/30/2014    HGB 11.6 (L) 02/25/2016    HCT 34.9 (L) 02/25/2016    MCV 94 02/25/2016     02/25/2016     Lab Results   Component Value Date    CHOL 144 02/24/2021    CHOL 152 02/26/2020    CHOL 158 06/28/2019     Lab Results   Component Value Date    HDL 44 (L) 02/24/2021    HDL 47 (L) 02/26/2020    HDL 51 06/28/2019     Lab Results   Component Value Date    LDLCALC 72 02/24/2021    LDLCALC 67 02/26/2020    LDLCALC 77 06/28/2019     Lab Results   Component Value Date    TRIG 139 02/24/2021    TRIG 191 (H) 02/26/2020    TRIG 152 (H) 06/28/2019     Lab Results   Component Value Date    TROPONINI 1.95 (HH) 07/30/2014     No results found for: BNP  Lab Results   Component Value Date    TSH 3.31 07/31/2014

## 2021-07-13 ENCOUNTER — RECORDS - HEALTHEAST (OUTPATIENT)
Dept: ADMINISTRATIVE | Facility: CLINIC | Age: 71
End: 2021-07-13

## 2021-07-14 ENCOUNTER — RECORDS - HEALTHEAST (OUTPATIENT)
Dept: ADMINISTRATIVE | Facility: CLINIC | Age: 71
End: 2021-07-14

## 2021-07-21 ENCOUNTER — RECORDS - HEALTHEAST (OUTPATIENT)
Dept: ADMINISTRATIVE | Facility: CLINIC | Age: 71
End: 2021-07-21

## 2021-09-24 ENCOUNTER — LAB REQUISITION (OUTPATIENT)
Dept: LAB | Facility: CLINIC | Age: 71
End: 2021-09-24
Payer: MEDICARE

## 2021-09-24 DIAGNOSIS — G57.91 UNSPECIFIED MONONEUROPATHY OF RIGHT LOWER LIMB: ICD-10-CM

## 2021-09-24 LAB — VIT B12 SERPL-MCNC: 358 PG/ML (ref 213–816)

## 2021-09-24 PROCEDURE — 82607 VITAMIN B-12: CPT | Mod: ORL | Performed by: NURSE PRACTITIONER

## 2021-12-23 DIAGNOSIS — I10 ESSENTIAL HYPERTENSION: ICD-10-CM

## 2021-12-23 RX ORDER — METOPROLOL SUCCINATE 50 MG/1
50 TABLET, EXTENDED RELEASE ORAL DAILY
Qty: 90 TABLET | Refills: 2 | Status: SHIPPED | OUTPATIENT
Start: 2021-12-23 | End: 2022-11-01

## 2022-01-18 ENCOUNTER — ANCILLARY PROCEDURE (OUTPATIENT)
Dept: MAMMOGRAPHY | Facility: CLINIC | Age: 72
End: 2022-01-18
Attending: NURSE PRACTITIONER
Payer: MEDICARE

## 2022-01-18 DIAGNOSIS — Z12.31 VISIT FOR SCREENING MAMMOGRAM: ICD-10-CM

## 2022-01-18 PROCEDURE — 77067 SCR MAMMO BI INCL CAD: CPT

## 2022-01-20 ENCOUNTER — ANCILLARY PROCEDURE (OUTPATIENT)
Dept: MAMMOGRAPHY | Facility: CLINIC | Age: 72
End: 2022-01-20
Attending: NURSE PRACTITIONER
Payer: MEDICARE

## 2022-01-20 ENCOUNTER — TELEPHONE (OUTPATIENT)
Dept: SURGERY | Facility: CLINIC | Age: 72
End: 2022-01-20
Payer: MEDICARE

## 2022-01-20 DIAGNOSIS — R92.0 MICROCALCIFICATION OF BREAST: ICD-10-CM

## 2022-01-20 PROCEDURE — 77065 DX MAMMO INCL CAD UNI: CPT | Mod: RT

## 2022-01-20 PROCEDURE — 76642 ULTRASOUND BREAST LIMITED: CPT | Mod: RT

## 2022-01-20 NOTE — TELEPHONE ENCOUNTER
Per Breast Radiologist request, scheduled patient to see Dr. Alcantara  for a surgical consult on 1-27-22 at the [facility] [place].  Patient given RN contact information for questions or concerns.      Patient has a history of right breast cancer, DCIS, 18 years ago.  Treated with a lumpectomy followed by radiation.

## 2022-01-21 ENCOUNTER — ANCILLARY PROCEDURE (OUTPATIENT)
Dept: MAMMOGRAPHY | Facility: CLINIC | Age: 72
End: 2022-01-21
Attending: NURSE PRACTITIONER
Payer: MEDICARE

## 2022-01-21 DIAGNOSIS — R92.0 MICROCALCIFICATION OF BREAST: ICD-10-CM

## 2022-01-21 PROCEDURE — 250N000009 HC RX 250: Performed by: NURSE PRACTITIONER

## 2022-01-21 PROCEDURE — 272N000431 US BREAST BIOPSY CORE NEEDLE RIGHT

## 2022-01-21 PROCEDURE — 88305 TISSUE EXAM BY PATHOLOGIST: CPT | Mod: TC | Performed by: NURSE PRACTITIONER

## 2022-01-21 PROCEDURE — 999N000065 MA POST PROCEDURE RIGHT

## 2022-01-21 PROCEDURE — 19083 BX BREAST 1ST LESION US IMAG: CPT | Mod: RT

## 2022-01-21 RX ADMIN — LIDOCAINE HYDROCHLORIDE 10 ML: 10 INJECTION, SOLUTION EPIDURAL; INFILTRATION; INTRACAUDAL; PERINEURAL at 08:24

## 2022-01-24 ENCOUNTER — TELEPHONE (OUTPATIENT)
Dept: MAMMOGRAPHY | Facility: CLINIC | Age: 72
End: 2022-01-24

## 2022-01-24 LAB
PATH REPORT.COMMENTS IMP SPEC: ABNORMAL
PATH REPORT.COMMENTS IMP SPEC: YES
PATH REPORT.FINAL DX SPEC: ABNORMAL
PATH REPORT.GROSS SPEC: ABNORMAL
PATH REPORT.MICROSCOPIC SPEC OTHER STN: ABNORMAL
PATH REPORT.MICROSCOPIC SPEC OTHER STN: ABNORMAL
PATH REPORT.RELEVANT HX SPEC: ABNORMAL
PHOTO IMAGE: ABNORMAL

## 2022-01-24 PROCEDURE — 88305 TISSUE EXAM BY PATHOLOGIST: CPT | Mod: 26 | Performed by: PATHOLOGY

## 2022-01-24 PROCEDURE — 88360 TUMOR IMMUNOHISTOCHEM/MANUAL: CPT | Mod: 26 | Performed by: PATHOLOGY

## 2022-01-24 NOTE — TELEPHONE ENCOUNTER
Pathology report was reviewed with our Breast Center Radiologist, Dr. Ronquillo, who confirmed the recent breast imaging is concordant with the final surgical pathology results.    I phoned patient, confirmed her full name, date of birth, and notified patient of malignant breast biopsy results .      Per Breast Center Radiologist, I have assisted scheduling patient for a surgical consult with Dr. Alcantara on 1/27/22 at the [facility].     Questions were answered and I explained my role as the [facility]  Nurse Coordinator in assisting her with appointments and resources.  New diagnosis information packet will be available for patient at surgical consult.  She has my phone number if she has further questions.  Patient verbalized understanding and agrees with the plan of care.    Ordering provider  has been notified of the results, recommendations of scheduled surgical consultation.       Ashley Kuo RN, BSN, PHN  [facility] Nurse Coordinator  [facility]  [phone]

## 2022-01-27 ENCOUNTER — OFFICE VISIT (OUTPATIENT)
Dept: SURGERY | Facility: CLINIC | Age: 72
End: 2022-01-27
Attending: NURSE PRACTITIONER
Payer: MEDICARE

## 2022-01-27 DIAGNOSIS — C50.311 MALIGNANT NEOPLASM OF LOWER-INNER QUADRANT OF RIGHT BREAST OF FEMALE, ESTROGEN RECEPTOR POSITIVE (H): Primary | ICD-10-CM

## 2022-01-27 DIAGNOSIS — Z17.0 MALIGNANT NEOPLASM OF LOWER-INNER QUADRANT OF RIGHT BREAST OF FEMALE, ESTROGEN RECEPTOR POSITIVE (H): Primary | ICD-10-CM

## 2022-01-27 PROCEDURE — G0463 HOSPITAL OUTPT CLINIC VISIT: HCPCS

## 2022-01-27 PROCEDURE — 99205 OFFICE O/P NEW HI 60 MIN: CPT | Performed by: SPECIALIST

## 2022-01-27 NOTE — PROGRESS NOTES
This is a 71 year old  female who I'm asked to see by Cayla Skelton for evaluation of a right breast cancer.  This was picked up  on screening mammogram.   The patient cannot feel a mass.  A needle biopsy was done which shows an invasive ductal carcinoma.  It is estrogen receptor strongly positive , progesterone receptor positive  and HER-2 negative.  The patient is many years status post right lumpectomy with radiation.  The patient is about 16 years status post right lumpectomy followed by radiation for DCIS.  She did not have any lymph nodes removed.  This new cancer is not near the original area.    She has no family history of breast cancer.      PAST MEDICAL HISTORY:  Past Medical History:   Diagnosis Date     Asthma     Seaonal usually in spring     Breast cancer (H) 2004    rt lumpect     Coronary artery disease      Endometrial polyp 1992    was protruding through cervix     GERD (gastroesophageal reflux disease)      History of measles, mumps, or rubella as a child    hx of measles, mumps and Sami mealses. Rubella immune 5/1980     HTN (hypertension)      Hx of radiation therapy 2004     Hyperlipemia      Osteopenia      Ovarian cyst      Prediabetes      UTI (urinary tract infection)      Past Surgical History:   Procedure Laterality Date     BIOPSY BREAST Left 2008    cyst removal     BIOPSY BREAST Right 2004     CERVICAL DISC SURGERY  6/25/2007    C6-7 sarah laminectomy with microdiskectomy     LUMPECTOMY BREAST Right 6/24/2004     OOPHORECTOMY Bilateral 2011     TOTAL HIP ARTHROPLASTY Left 9/30/2009     TUBAL LIGATION  1/10/1992    with endometrial polyp removal       Medications:    Current Outpatient Medications:      albuterol (PROAIR HFA;PROVENTIL HFA;VENTOLIN HFA) 90 mcg/actuation inhaler, [ALBUTEROL (PROAIR HFA;PROVENTIL HFA;VENTOLIN HFA) 90 MCG/ACTUATION INHALER] 2 puff(s), Disp: , Rfl:      amoxicillin (AMOXIL) 500 MG tablet, [AMOXICILLIN (AMOXIL) 500 MG TABLET] TK 4 TS PO 1 HOUR  BEFORE DENTAL APPOINTMENT., Disp: , Rfl:      ascorbic acid (VITAMIN C) 1000 MG tablet, [ASCORBIC ACID (VITAMIN C) 1000 MG TABLET] Take 1,000 mg by mouth as needed. , Disp: , Rfl:      aspirin 81 MG EC tablet, [ASPIRIN 81 MG EC TABLET] Take 81 mg by mouth daily., Disp: , Rfl:      baclofen (LIORESAL) 10 MG tablet, [BACLOFEN (LIORESAL) 10 MG TABLET] Take 10 mg by mouth as needed., Disp: , Rfl:      calcium carbonate (OS-TAMI) 600 mg (1,500 mg) tablet, [CALCIUM CARBONATE (OS-TAMI) 600 MG (1,500 MG) TABLET] Take 600 mg by mouth 2 (two) times a day., Disp: , Rfl:      cholecalciferol, vitamin D3, (VITAMIN D3) 1,000 unit capsule, [CHOLECALCIFEROL, VITAMIN D3, (VITAMIN D3) 1,000 UNIT CAPSULE] Take 1,000 Units by mouth daily., Disp: , Rfl:      cyanocobalamin (VITAMIN B-12) 1000 MCG tablet, [CYANOCOBALAMIN (VITAMIN B-12) 1000 MCG TABLET] Take 1,000 mcg by mouth daily., Disp: , Rfl:      diclofenac sodium (VOLTAREN) 1 % Gel, [DICLOFENAC SODIUM (VOLTAREN) 1 % GEL] APPLY 4 GRAMS TO AFFECTED AREA(S) FOUR TIMES A DAY, Disp: , Rfl:      famotidine (PEPCID AC MAXIMUM STRENGTH) 20 MG tablet, [FAMOTIDINE (PEPCID AC MAXIMUM STRENGTH) 20 MG TABLET] Take 20 mg by mouth daily., Disp: , Rfl:      hydroCHLOROthiazide (HYDRODIURIL) 12.5 MG tablet, [HYDROCHLOROTHIAZIDE (HYDRODIURIL) 12.5 MG TABLET] Take 12.5 mg by mouth daily., Disp: , Rfl: 0     lansoprazole (PREVACID) 30 MG capsule, [LANSOPRAZOLE (PREVACID) 30 MG CAPSULE] Take 15 mg by mouth daily. , Disp: , Rfl:      lisinopriL (PRINIVIL,ZESTRIL) 40 MG tablet, [LISINOPRIL (PRINIVIL,ZESTRIL) 40 MG TABLET] TAKE ONE TABLET BY MOUTH EVERY DAY, Disp: 90 tablet, Rfl: 1     loratadine (CLARITIN) 10 mg tablet, [LORATADINE (CLARITIN) 10 MG TABLET] Take 10 mg by mouth daily as needed for allergies., Disp: , Rfl:      metoprolol succinate ER (TOPROL-XL) 50 MG 24 hr tablet, Take 1 tablet (50 mg) by mouth daily, Disp: 90 tablet, Rfl: 2     nitroglycerin (NITROSTAT) 0.4 MG SL tablet, [NITROGLYCERIN  (NITROSTAT) 0.4 MG SL TABLET] Place 1 tablet (0.4 mg total) under the tongue every 5 (five) minutes as needed for chest pain., Disp: 1 Bottle, Rfl: 2     OMEGA-3 FATTY ACIDS (FISH OIL CONCENTRATE ORAL), [OMEGA-3 FATTY ACIDS (FISH OIL CONCENTRATE ORAL)] Take 1,200 mg by mouth 2 (two) times a day., Disp: , Rfl:      ONETOUCH ULTRA BLUE TEST STRIP strips, [ONETOUCH ULTRA BLUE TEST STRIP STRIPS] USE TO TEST BLOOD SUGAR ONCE A DAY, Disp: , Rfl:      pantoprazole (PROTONIX) 40 MG tablet, [PANTOPRAZOLE (PROTONIX) 40 MG TABLET] Take 40 mg by mouth daily., Disp: , Rfl:      POLYETHYLENE GLYCOL 3350 (MIRALAX ORAL), [POLYETHYLENE GLYCOL 3350 (MIRALAX ORAL)] Take 1 packet by mouth daily as needed. , Disp: , Rfl:      simvastatin (ZOCOR) 80 MG tablet, [SIMVASTATIN (ZOCOR) 80 MG TABLET] Take 80 mg by mouth daily., Disp: , Rfl:     Allergies:  Allergies   Allergen Reactions     Bactrim [Sulfamethoxazole W/Trimethoprim] Other (See Comments)     Thrush     Morphine Nausea and Vomiting     Percocet [Oxycodone-Acetaminophen] Nausea and Vomiting        Social History:  Social History     Tobacco Use     Smoking status: Never Smoker     Smokeless tobacco: Never Used   Substance Use Topics     Alcohol use: Yes     Alcohol/week: 5.0 standard drinks     Comment: Alcoholic Drinks/day: 5 drinks a week of wine/liquor/beer     Drug use: No        ROS:  Pertinent items are noted in HPI.    Physical Exam  There were no vitals taken for this visit.  General:alert, appears stated age, cooperative and moderately obese  Lungs:clear to auscultation bilaterally  CV:regular rate and rhythm  Breasts: Significant bruising in the right breast.  No palpable masses.  Moderate radiation changes in the breast.  No palpable masses on the left.  Lymph Nodes:Supple without adenopathy  Neuro:Grossly normal  Musculoskeletal: Normal range of motion of her upper extremities.  Skin: Normal skin turgor.    Reviewed her mammograms and ultrasound and pathology.      Impression: Right Breast Cancer. Clinically T1, N0.  Discussed the typical surgical options for treatment of breast cancer which generally are a lumpectomy (partial mastectomy) combined with radiation versus a mastectomy.  Typically, when somebody has had a recurrence of her breast cancer after previous lumpectomy and radiation, I would recommend a mastectomy.  However, we now do not necessarily recommend radiation after lumpectomy in women over the age of 70.  For that reason, I think just doing a lumpectomy again is a reasonable option.  If a lymph node was involved then I think she might need something further done but if her lymph node is negative I think just a lumpectomy followed by hormone therapy) hopefully not chemotherapy) would be adequate.  I did explain that one never knows if you are really a candidate for lumpectomy until you do the surgery and wait to see the final results and explained that it takes several days to get the results back.  The need for reexcision lumpectomy is not uncommon.  I did also explained that I cannot really give her really good data for her risk of yet another breast cancer.  There just simply is not enough data on this type of situation.  I think her risk would be somewhere in the 10% range.  Discussed SLN biopsy.  The procedure and rationale were explained.  Discussed that at this point we do not know yet whether or not she will need chemotherapy and we may not know until we get all of the results of surgery back.  Often times, a test called an Oncotype is needed to determine whether or not chemotherapy is needed.  This can take several weeks to get the results back after surgery.  After thinking about it, the patient does state that she would prefer to just do a lumpectomy.  She understands she is at risk for getting yet another cancer someday.  She asked appropriate questions.  I would like to allow this bruising to go down before doing surgery.      Plan: We'll  schedule for a right lumpectomy with wire localization with sentinel lymph node biopsy.  Almost all of our breast surgeries are now done as an outpatient.  The risks and benefits of surgery were explained.  Also talked about expected recovery time.  Typically arrangements for oncology and radiation oncology are made after surgery once we have the results unless the patient is recommended to do neoadjuvant chemotherapy.

## 2022-01-27 NOTE — LETTER
1/27/2022         RE: Lisa Reyna  [address]  [place] [id number]        Dear Colleague,    Thank you for referring your patient, Lisa Reyna, to the [facility] [place]. Please see a copy of my visit note below.    This is a 71 year old  female who I'm asked to see by Cayla Skelton for evaluation of a right breast cancer.  This was picked up  on screening mammogram.   The patient cannot feel a mass.  A needle biopsy was done which shows an invasive ductal carcinoma.  It is estrogen receptor strongly positive , progesterone receptor positive  and HER-2 negative.  The patient is many years status post right lumpectomy with radiation.  The patient is about 16 years status post right lumpectomy followed by radiation for DCIS.  She did not have any lymph nodes removed.  This new cancer is not near the original area.    She has no family history of breast cancer.      PAST MEDICAL HISTORY:  Past Medical History:   Diagnosis Date     Asthma     Seaonal usually in spring     Breast cancer (H) 2004    rt lumpect     Coronary artery disease      Endometrial polyp 1992    was protruding through cervix     GERD (gastroesophageal reflux disease)      History of measles, mumps, or rubella as a child    hx of measles, mumps and Sinhala mealses. Rubella immune 5/1980     HTN (hypertension)      Hx of radiation therapy 2004     Hyperlipemia      Osteopenia      Ovarian cyst      Prediabetes      UTI (urinary tract infection)      Past Surgical History:   Procedure Laterality Date     BIOPSY BREAST Left 2008    cyst removal     BIOPSY BREAST Right 2004     CERVICAL DISC SURGERY  6/25/2007    C6-7 sarah laminectomy with microdiskectomy     LUMPECTOMY BREAST Right 6/24/2004     OOPHORECTOMY Bilateral 2011     TOTAL HIP ARTHROPLASTY Left 9/30/2009     TUBAL LIGATION  1/10/1992    with endometrial polyp removal       Medications:    Current Outpatient Medications:      albuterol  (PROAIR HFA;PROVENTIL HFA;VENTOLIN HFA) 90 mcg/actuation inhaler, [ALBUTEROL (PROAIR HFA;PROVENTIL HFA;VENTOLIN HFA) 90 MCG/ACTUATION INHALER] 2 puff(s), Disp: , Rfl:      amoxicillin (AMOXIL) 500 MG tablet, [AMOXICILLIN (AMOXIL) 500 MG TABLET] TK 4 TS PO 1 HOUR BEFORE DENTAL APPOINTMENT., Disp: , Rfl:      ascorbic acid (VITAMIN C) 1000 MG tablet, [ASCORBIC ACID (VITAMIN C) 1000 MG TABLET] Take 1,000 mg by mouth as needed. , Disp: , Rfl:      aspirin 81 MG EC tablet, [ASPIRIN 81 MG EC TABLET] Take 81 mg by mouth daily., Disp: , Rfl:      baclofen (LIORESAL) 10 MG tablet, [BACLOFEN (LIORESAL) 10 MG TABLET] Take 10 mg by mouth as needed., Disp: , Rfl:      calcium carbonate (OS-TAMI) 600 mg (1,500 mg) tablet, [CALCIUM CARBONATE (OS-TAMI) 600 MG (1,500 MG) TABLET] Take 600 mg by mouth 2 (two) times a day., Disp: , Rfl:      cholecalciferol, vitamin D3, (VITAMIN D3) 1,000 unit capsule, [CHOLECALCIFEROL, VITAMIN D3, (VITAMIN D3) 1,000 UNIT CAPSULE] Take 1,000 Units by mouth daily., Disp: , Rfl:      cyanocobalamin (VITAMIN B-12) 1000 MCG tablet, [CYANOCOBALAMIN (VITAMIN B-12) 1000 MCG TABLET] Take 1,000 mcg by mouth daily., Disp: , Rfl:      diclofenac sodium (VOLTAREN) 1 % Gel, [DICLOFENAC SODIUM (VOLTAREN) 1 % GEL] APPLY 4 GRAMS TO AFFECTED AREA(S) FOUR TIMES A DAY, Disp: , Rfl:      famotidine (PEPCID AC MAXIMUM STRENGTH) 20 MG tablet, [FAMOTIDINE (PEPCID AC MAXIMUM STRENGTH) 20 MG TABLET] Take 20 mg by mouth daily., Disp: , Rfl:      hydroCHLOROthiazide (HYDRODIURIL) 12.5 MG tablet, [HYDROCHLOROTHIAZIDE (HYDRODIURIL) 12.5 MG TABLET] Take 12.5 mg by mouth daily., Disp: , Rfl: 0     lansoprazole (PREVACID) 30 MG capsule, [LANSOPRAZOLE (PREVACID) 30 MG CAPSULE] Take 15 mg by mouth daily. , Disp: , Rfl:      lisinopriL (PRINIVIL,ZESTRIL) 40 MG tablet, [LISINOPRIL (PRINIVIL,ZESTRIL) 40 MG TABLET] TAKE ONE TABLET BY MOUTH EVERY DAY, Disp: 90 tablet, Rfl: 1     loratadine (CLARITIN) 10 mg tablet, [LORATADINE (CLARITIN) 10 MG  TABLET] Take 10 mg by mouth daily as needed for allergies., Disp: , Rfl:      metoprolol succinate ER (TOPROL-XL) 50 MG 24 hr tablet, Take 1 tablet (50 mg) by mouth daily, Disp: 90 tablet, Rfl: 2     nitroglycerin (NITROSTAT) 0.4 MG SL tablet, [NITROGLYCERIN (NITROSTAT) 0.4 MG SL TABLET] Place 1 tablet (0.4 mg total) under the tongue every 5 (five) minutes as needed for chest pain., Disp: 1 Bottle, Rfl: 2     OMEGA-3 FATTY ACIDS (FISH OIL CONCENTRATE ORAL), [OMEGA-3 FATTY ACIDS (FISH OIL CONCENTRATE ORAL)] Take 1,200 mg by mouth 2 (two) times a day., Disp: , Rfl:      ONETOUCH ULTRA BLUE TEST STRIP strips, [ONETOUCH ULTRA BLUE TEST STRIP STRIPS] USE TO TEST BLOOD SUGAR ONCE A DAY, Disp: , Rfl:      pantoprazole (PROTONIX) 40 MG tablet, [PANTOPRAZOLE (PROTONIX) 40 MG TABLET] Take 40 mg by mouth daily., Disp: , Rfl:      POLYETHYLENE GLYCOL 3350 (MIRALAX ORAL), [POLYETHYLENE GLYCOL 3350 (MIRALAX ORAL)] Take 1 packet by mouth daily as needed. , Disp: , Rfl:      simvastatin (ZOCOR) 80 MG tablet, [SIMVASTATIN (ZOCOR) 80 MG TABLET] Take 80 mg by mouth daily., Disp: , Rfl:     Allergies:  Allergies   Allergen Reactions     Bactrim [Sulfamethoxazole W/Trimethoprim] Other (See Comments)     Thrush     Morphine Nausea and Vomiting     Percocet [Oxycodone-Acetaminophen] Nausea and Vomiting        Social History:  Social History     Tobacco Use     Smoking status: Never Smoker     Smokeless tobacco: Never Used   Substance Use Topics     Alcohol use: Yes     Alcohol/week: 5.0 standard drinks     Comment: Alcoholic Drinks/day: 5 drinks a week of wine/liquor/beer     Drug use: No        ROS:  Pertinent items are noted in HPI.    Physical Exam  There were no vitals taken for this visit.  General:alert, appears stated age, cooperative and moderately obese  Lungs:clear to auscultation bilaterally  CV:regular rate and rhythm  Breasts: Significant bruising in the right breast.  No palpable masses.  Moderate radiation changes in the  breast.  No palpable masses on the left.  Lymph Nodes:Supple without adenopathy  Neuro:Grossly normal  Musculoskeletal: Normal range of motion of her upper extremities.  Skin: Normal skin turgor.    Reviewed her mammograms and ultrasound and pathology.     Impression: Right Breast Cancer. Clinically T1, N0.  Discussed the typical surgical options for treatment of breast cancer which generally are a lumpectomy (partial mastectomy) combined with radiation versus a mastectomy.  Typically, when somebody has had a recurrence of her breast cancer after previous lumpectomy and radiation, I would recommend a mastectomy.  However, we now do not necessarily recommend radiation after lumpectomy in women over the age of 70.  For that reason, I think just doing a lumpectomy again is a reasonable option.  If a lymph node was involved then I think she might need something further done but if her lymph node is negative I think just a lumpectomy followed by hormone therapy) hopefully not chemotherapy) would be adequate.  I did explain that one never knows if you are really a candidate for lumpectomy until you do the surgery and wait to see the final results and explained that it takes several days to get the results back.  The need for reexcision lumpectomy is not uncommon.  I did also explained that I cannot really give her really good data for her risk of yet another breast cancer.  There just simply is not enough data on this type of situation.  I think her risk would be somewhere in the 10% range.  Discussed SLN biopsy.  The procedure and rationale were explained.  Discussed that at this point we do not know yet whether or not she will need chemotherapy and we may not know until we get all of the results of surgery back.  Often times, a test called an Oncotype is needed to determine whether or not chemotherapy is needed.  This can take several weeks to get the results back after surgery.  After thinking about it, the patient  does state that she would prefer to just do a lumpectomy.  She understands she is at risk for getting yet another cancer someday.  She asked appropriate questions.  I would like to allow this bruising to go down before doing surgery.      Plan: We'll schedule for a right lumpectomy with wire localization with sentinel lymph node biopsy.  Almost all of our breast surgeries are now done as an outpatient.  The risks and benefits of surgery were explained.  Also talked about expected recovery time.  Typically arrangements for oncology and radiation oncology are made after surgery once we have the results unless the patient is recommended to do neoadjuvant chemotherapy.              Again, thank you for allowing me to participate in the care of your patient.        Sincerely,        Mami Alcantara MD

## 2022-01-27 NOTE — NURSING NOTE
Lisa is here with her sister to see Dr. Alcantara for a consult of a right breast invasive ductal carcinoma. She is 18 years s/p right lumpectomy with radiation.        Per Dr. Alcantara, I walked Lisa down to surgery scheduler to schedule for a right lumpectomy with wire localization and sentinel lymph node biopsy.   Breast cancer resource folder with direct contact information of breast cancer RN Care Coordinator was given to patient.  Invited questions and offered support.  Patient denies further questions, verbalizes understanding and agrees with this plan.      Ashley Kuo, RN, BSN, PHN  [phone]

## 2022-01-28 ENCOUNTER — LAB REQUISITION (OUTPATIENT)
Dept: LAB | Facility: CLINIC | Age: 72
End: 2022-01-28
Payer: MEDICARE

## 2022-01-28 DIAGNOSIS — G57.91 UNSPECIFIED MONONEUROPATHY OF RIGHT LOWER LIMB: ICD-10-CM

## 2022-01-28 DIAGNOSIS — E78.1 PURE HYPERGLYCERIDEMIA: ICD-10-CM

## 2022-01-28 DIAGNOSIS — E11.9 TYPE 2 DIABETES MELLITUS WITHOUT COMPLICATIONS (H): ICD-10-CM

## 2022-01-28 LAB
ALBUMIN SERPL-MCNC: 3.9 G/DL (ref 3.5–5)
ALP SERPL-CCNC: 67 U/L (ref 45–120)
ALT SERPL W P-5'-P-CCNC: 9 U/L (ref 0–45)
ANION GAP SERPL CALCULATED.3IONS-SCNC: 11 MMOL/L (ref 5–18)
AST SERPL W P-5'-P-CCNC: 15 U/L (ref 0–40)
BILIRUB SERPL-MCNC: 0.9 MG/DL (ref 0–1)
BUN SERPL-MCNC: 17 MG/DL (ref 8–28)
CALCIUM SERPL-MCNC: 9 MG/DL (ref 8.5–10.5)
CHLORIDE BLD-SCNC: 103 MMOL/L (ref 98–107)
CHOLEST SERPL-MCNC: 132 MG/DL
CO2 SERPL-SCNC: 25 MMOL/L (ref 22–31)
CREAT SERPL-MCNC: 0.95 MG/DL (ref 0.6–1.1)
FASTING STATUS PATIENT QL REPORTED: ABNORMAL
GFR SERPL CREATININE-BSD FRML MDRD: 63 ML/MIN/1.73M2
GLUCOSE BLD-MCNC: 126 MG/DL (ref 70–125)
HDLC SERPL-MCNC: 46 MG/DL
LDLC SERPL CALC-MCNC: 67 MG/DL
POTASSIUM BLD-SCNC: 3.8 MMOL/L (ref 3.5–5)
PROT SERPL-MCNC: 6.5 G/DL (ref 6–8)
SODIUM SERPL-SCNC: 139 MMOL/L (ref 136–145)
TRIGL SERPL-MCNC: 97 MG/DL
VIT B12 SERPL-MCNC: 327 PG/ML (ref 213–816)

## 2022-01-28 PROCEDURE — 80053 COMPREHEN METABOLIC PANEL: CPT | Mod: ORL | Performed by: NURSE PRACTITIONER

## 2022-01-28 PROCEDURE — 82607 VITAMIN B-12: CPT | Mod: ORL | Performed by: NURSE PRACTITIONER

## 2022-01-28 PROCEDURE — 80061 LIPID PANEL: CPT | Mod: ORL | Performed by: NURSE PRACTITIONER

## 2022-01-31 PROBLEM — Z17.0 MALIGNANT NEOPLASM OF LOWER-INNER QUADRANT OF RIGHT BREAST OF FEMALE, ESTROGEN RECEPTOR POSITIVE (H): Status: ACTIVE | Noted: 2022-01-31

## 2022-01-31 PROBLEM — C50.311 MALIGNANT NEOPLASM OF LOWER-INNER QUADRANT OF RIGHT BREAST OF FEMALE, ESTROGEN RECEPTOR POSITIVE (H): Status: ACTIVE | Noted: 2022-01-31

## 2022-02-01 DIAGNOSIS — Z11.59 ENCOUNTER FOR SCREENING FOR OTHER VIRAL DISEASES: Primary | ICD-10-CM

## 2022-02-07 ENCOUNTER — LAB (OUTPATIENT)
Dept: FAMILY MEDICINE | Facility: CLINIC | Age: 72
End: 2022-02-07
Payer: MEDICARE

## 2022-02-07 DIAGNOSIS — Z11.59 ENCOUNTER FOR SCREENING FOR OTHER VIRAL DISEASES: ICD-10-CM

## 2022-02-07 LAB — SARS-COV-2 RNA RESP QL NAA+PROBE: NEGATIVE

## 2022-02-07 PROCEDURE — U0005 INFEC AGEN DETEC AMPLI PROBE: HCPCS

## 2022-02-07 PROCEDURE — U0003 INFECTIOUS AGENT DETECTION BY NUCLEIC ACID (DNA OR RNA); SEVERE ACUTE RESPIRATORY SYNDROME CORONAVIRUS 2 (SARS-COV-2) (CORONAVIRUS DISEASE [COVID-19]), AMPLIFIED PROBE TECHNIQUE, MAKING USE OF HIGH THROUGHPUT TECHNOLOGIES AS DESCRIBED BY CMS-2020-01-R: HCPCS

## 2022-02-07 RX ORDER — AMLODIPINE BESYLATE 10 MG/1
10 TABLET ORAL DAILY
COMMUNITY
Start: 2021-09-25 | End: 2022-02-08

## 2022-02-07 RX ORDER — LOSARTAN POTASSIUM 50 MG/1
50 TABLET ORAL DAILY
COMMUNITY
Start: 2021-12-23 | End: 2024-02-26

## 2022-02-07 RX ORDER — LANCETS 33 GAUGE
EACH MISCELLANEOUS
COMMUNITY
Start: 2021-05-26 | End: 2024-02-26

## 2022-02-08 ENCOUNTER — LAB REQUISITION (OUTPATIENT)
Dept: LAB | Facility: CLINIC | Age: 72
End: 2022-02-08
Payer: MEDICARE

## 2022-02-08 ENCOUNTER — TRANSFERRED RECORDS (OUTPATIENT)
Dept: HEALTH INFORMATION MANAGEMENT | Facility: CLINIC | Age: 72
End: 2022-02-08

## 2022-02-08 DIAGNOSIS — I10 ESSENTIAL (PRIMARY) HYPERTENSION: ICD-10-CM

## 2022-02-08 DIAGNOSIS — Z01.818 ENCOUNTER FOR OTHER PREPROCEDURAL EXAMINATION: ICD-10-CM

## 2022-02-08 LAB
ANION GAP SERPL CALCULATED.3IONS-SCNC: 10 MMOL/L (ref 5–18)
BUN SERPL-MCNC: 17 MG/DL (ref 8–28)
CALCIUM SERPL-MCNC: 9.4 MG/DL (ref 8.5–10.5)
CHLORIDE BLD-SCNC: 102 MMOL/L (ref 98–107)
CO2 SERPL-SCNC: 27 MMOL/L (ref 22–31)
CREAT SERPL-MCNC: 1 MG/DL (ref 0.6–1.1)
ERYTHROCYTE [DISTWIDTH] IN BLOOD BY AUTOMATED COUNT: 12.6 % (ref 10–15)
GFR SERPL CREATININE-BSD FRML MDRD: 60 ML/MIN/1.73M2
GLUCOSE BLD-MCNC: 126 MG/DL (ref 70–125)
HCT VFR BLD AUTO: 41.4 % (ref 35–47)
HGB BLD-MCNC: 13.7 G/DL (ref 11.7–15.7)
MCH RBC QN AUTO: 31 PG (ref 26.5–33)
MCHC RBC AUTO-ENTMCNC: 33.1 G/DL (ref 31.5–36.5)
MCV RBC AUTO: 94 FL (ref 78–100)
PLATELET # BLD AUTO: 289 10E3/UL (ref 150–450)
POTASSIUM BLD-SCNC: 4 MMOL/L (ref 3.5–5)
RBC # BLD AUTO: 4.42 10E6/UL (ref 3.8–5.2)
SODIUM SERPL-SCNC: 139 MMOL/L (ref 136–145)
WBC # BLD AUTO: 6.4 10E3/UL (ref 4–11)

## 2022-02-08 PROCEDURE — 85027 COMPLETE CBC AUTOMATED: CPT | Mod: ORL | Performed by: NURSE PRACTITIONER

## 2022-02-08 PROCEDURE — 80048 BASIC METABOLIC PNL TOTAL CA: CPT | Mod: ORL | Performed by: NURSE PRACTITIONER

## 2022-02-08 ASSESSMENT — MIFFLIN-ST. JEOR: SCORE: 1325.08

## 2022-02-10 ENCOUNTER — ANESTHESIA EVENT (OUTPATIENT)
Dept: SURGERY | Facility: AMBULATORY SURGERY CENTER | Age: 72
End: 2022-02-10
Payer: MEDICARE

## 2022-02-11 ENCOUNTER — ANCILLARY PROCEDURE (OUTPATIENT)
Dept: MAMMOGRAPHY | Facility: CLINIC | Age: 72
End: 2022-02-11
Attending: SPECIALIST
Payer: MEDICARE

## 2022-02-11 ENCOUNTER — ANESTHESIA (OUTPATIENT)
Dept: SURGERY | Facility: AMBULATORY SURGERY CENTER | Age: 72
End: 2022-02-11
Payer: MEDICARE

## 2022-02-11 ENCOUNTER — TRANSFERRED RECORDS (OUTPATIENT)
Dept: HEALTH INFORMATION MANAGEMENT | Facility: CLINIC | Age: 72
End: 2022-02-11

## 2022-02-11 ENCOUNTER — HOSPITAL ENCOUNTER (OUTPATIENT)
Facility: AMBULATORY SURGERY CENTER | Age: 72
End: 2022-02-11
Attending: SPECIALIST
Payer: MEDICARE

## 2022-02-11 VITALS
DIASTOLIC BLOOD PRESSURE: 72 MMHG | HEIGHT: 62 IN | SYSTOLIC BLOOD PRESSURE: 189 MMHG | TEMPERATURE: 96 F | HEART RATE: 65 BPM | OXYGEN SATURATION: 98 % | WEIGHT: 190 LBS | RESPIRATION RATE: 16 BRPM | BODY MASS INDEX: 34.96 KG/M2

## 2022-02-11 DIAGNOSIS — Z17.0 MALIGNANT NEOPLASM OF LOWER-INNER QUADRANT OF RIGHT BREAST OF FEMALE, ESTROGEN RECEPTOR POSITIVE (H): ICD-10-CM

## 2022-02-11 DIAGNOSIS — C50.311 MALIGNANT NEOPLASM OF LOWER-INNER QUADRANT OF RIGHT BREAST OF FEMALE, ESTROGEN RECEPTOR POSITIVE (H): ICD-10-CM

## 2022-02-11 PROCEDURE — 272N000431 IMAGE GUIDED BREAST LOCALIZATION W SENT NODE INJ RIGHT

## 2022-02-11 PROCEDURE — 76098 X-RAY EXAM SURGICAL SPECIMEN: CPT

## 2022-02-11 PROCEDURE — A9520 TC99 TILMANOCEPT DIAG 0.5MCI: HCPCS | Performed by: SPECIALIST

## 2022-02-11 PROCEDURE — 19125 EXCISION BREAST LESION: CPT | Mod: RT | Performed by: SPECIALIST

## 2022-02-11 PROCEDURE — 38525 BIOPSY/REMOVAL LYMPH NODES: CPT | Mod: RT | Performed by: SPECIALIST

## 2022-02-11 PROCEDURE — 999N000065 MA POST PROCEDURE RIGHT

## 2022-02-11 PROCEDURE — 250N000009 HC RX 250: Performed by: SPECIALIST

## 2022-02-11 PROCEDURE — 343N000001 HC RX 343: Performed by: SPECIALIST

## 2022-02-11 RX ORDER — SODIUM CHLORIDE, SODIUM LACTATE, POTASSIUM CHLORIDE, CALCIUM CHLORIDE 600; 310; 30; 20 MG/100ML; MG/100ML; MG/100ML; MG/100ML
INJECTION, SOLUTION INTRAVENOUS CONTINUOUS
Status: DISCONTINUED | OUTPATIENT
Start: 2022-02-11 | End: 2022-02-12 | Stop reason: HOSPADM

## 2022-02-11 RX ORDER — OXYCODONE HYDROCHLORIDE 5 MG/1
5 TABLET ORAL EVERY 4 HOURS PRN
Status: DISCONTINUED | OUTPATIENT
Start: 2022-02-11 | End: 2022-02-12 | Stop reason: HOSPADM

## 2022-02-11 RX ORDER — ONDANSETRON 4 MG/1
4 TABLET, ORALLY DISINTEGRATING ORAL EVERY 30 MIN PRN
Status: DISCONTINUED | OUTPATIENT
Start: 2022-02-11 | End: 2022-02-12 | Stop reason: HOSPADM

## 2022-02-11 RX ORDER — HYDROCODONE BITARTRATE AND ACETAMINOPHEN 5; 325 MG/1; MG/1
1 TABLET ORAL
Status: DISCONTINUED | OUTPATIENT
Start: 2022-02-11 | End: 2022-02-12 | Stop reason: HOSPADM

## 2022-02-11 RX ORDER — DEXAMETHASONE SODIUM PHOSPHATE 4 MG/ML
INJECTION, SOLUTION INTRA-ARTICULAR; INTRALESIONAL; INTRAMUSCULAR; INTRAVENOUS; SOFT TISSUE PRN
Status: DISCONTINUED | OUTPATIENT
Start: 2022-02-11 | End: 2022-02-11

## 2022-02-11 RX ORDER — CEFAZOLIN SODIUM 2 G/100ML
2 INJECTION, SOLUTION INTRAVENOUS
Status: DISCONTINUED | OUTPATIENT
Start: 2022-02-11 | End: 2022-02-12 | Stop reason: HOSPADM

## 2022-02-11 RX ORDER — IBUPROFEN 600 MG/1
600 TABLET, FILM COATED ORAL
Status: DISCONTINUED | OUTPATIENT
Start: 2022-02-11 | End: 2022-02-12 | Stop reason: HOSPADM

## 2022-02-11 RX ORDER — HYDROMORPHONE HCL IN WATER/PF 6 MG/30 ML
0.2 PATIENT CONTROLLED ANALGESIA SYRINGE INTRAVENOUS EVERY 5 MIN PRN
Status: DISCONTINUED | OUTPATIENT
Start: 2022-02-11 | End: 2022-02-12 | Stop reason: HOSPADM

## 2022-02-11 RX ORDER — FENTANYL CITRATE 50 UG/ML
25 INJECTION, SOLUTION INTRAMUSCULAR; INTRAVENOUS
Status: DISCONTINUED | OUTPATIENT
Start: 2022-02-11 | End: 2022-02-12 | Stop reason: HOSPADM

## 2022-02-11 RX ORDER — LIDOCAINE 40 MG/G
CREAM TOPICAL
Status: DISCONTINUED | OUTPATIENT
Start: 2022-02-11 | End: 2022-02-12 | Stop reason: HOSPADM

## 2022-02-11 RX ORDER — ONDANSETRON 2 MG/ML
INJECTION INTRAMUSCULAR; INTRAVENOUS PRN
Status: DISCONTINUED | OUTPATIENT
Start: 2022-02-11 | End: 2022-02-11

## 2022-02-11 RX ORDER — CEFAZOLIN SODIUM 2 G/100ML
2 INJECTION, SOLUTION INTRAVENOUS SEE ADMIN INSTRUCTIONS
Status: DISCONTINUED | OUTPATIENT
Start: 2022-02-11 | End: 2022-02-12 | Stop reason: HOSPADM

## 2022-02-11 RX ORDER — HYDROCODONE BITARTRATE AND ACETAMINOPHEN 5; 325 MG/1; MG/1
1 TABLET ORAL EVERY 6 HOURS PRN
Qty: 15 TABLET | Refills: 0 | Status: SHIPPED | OUTPATIENT
Start: 2022-02-11 | End: 2022-03-25

## 2022-02-11 RX ORDER — MEPERIDINE HYDROCHLORIDE 25 MG/ML
12.5 INJECTION INTRAMUSCULAR; INTRAVENOUS; SUBCUTANEOUS
Status: DISCONTINUED | OUTPATIENT
Start: 2022-02-11 | End: 2022-02-12 | Stop reason: HOSPADM

## 2022-02-11 RX ORDER — FENTANYL CITRATE 50 UG/ML
INJECTION, SOLUTION INTRAMUSCULAR; INTRAVENOUS PRN
Status: DISCONTINUED | OUTPATIENT
Start: 2022-02-11 | End: 2022-02-11

## 2022-02-11 RX ORDER — PROPOFOL 10 MG/ML
INJECTION, EMULSION INTRAVENOUS PRN
Status: DISCONTINUED | OUTPATIENT
Start: 2022-02-11 | End: 2022-02-11

## 2022-02-11 RX ORDER — FENTANYL CITRATE 50 UG/ML
25 INJECTION, SOLUTION INTRAMUSCULAR; INTRAVENOUS EVERY 5 MIN PRN
Status: DISCONTINUED | OUTPATIENT
Start: 2022-02-11 | End: 2022-02-12 | Stop reason: HOSPADM

## 2022-02-11 RX ORDER — PROPOFOL 10 MG/ML
INJECTION, EMULSION INTRAVENOUS CONTINUOUS PRN
Status: DISCONTINUED | OUTPATIENT
Start: 2022-02-11 | End: 2022-02-11

## 2022-02-11 RX ORDER — LIDOCAINE HYDROCHLORIDE 20 MG/ML
INJECTION, SOLUTION INFILTRATION; PERINEURAL PRN
Status: DISCONTINUED | OUTPATIENT
Start: 2022-02-11 | End: 2022-02-11

## 2022-02-11 RX ORDER — BUPIVACAINE HYDROCHLORIDE 2.5 MG/ML
INJECTION, SOLUTION INFILTRATION; PERINEURAL PRN
Status: DISCONTINUED | OUTPATIENT
Start: 2022-02-11 | End: 2022-02-11 | Stop reason: HOSPADM

## 2022-02-11 RX ORDER — ONDANSETRON 2 MG/ML
4 INJECTION INTRAMUSCULAR; INTRAVENOUS EVERY 30 MIN PRN
Status: DISCONTINUED | OUTPATIENT
Start: 2022-02-11 | End: 2022-02-12 | Stop reason: HOSPADM

## 2022-02-11 RX ADMIN — SODIUM CHLORIDE, SODIUM LACTATE, POTASSIUM CHLORIDE, CALCIUM CHLORIDE: 600; 310; 30; 20 INJECTION, SOLUTION INTRAVENOUS at 10:45

## 2022-02-11 RX ADMIN — CEFAZOLIN SODIUM 2 G: 2 INJECTION, SOLUTION INTRAVENOUS at 10:48

## 2022-02-11 RX ADMIN — FENTANYL CITRATE 50 MCG: 50 INJECTION, SOLUTION INTRAMUSCULAR; INTRAVENOUS at 10:48

## 2022-02-11 RX ADMIN — DEXAMETHASONE SODIUM PHOSPHATE 10 MG: 4 INJECTION, SOLUTION INTRA-ARTICULAR; INTRALESIONAL; INTRAMUSCULAR; INTRAVENOUS; SOFT TISSUE at 10:55

## 2022-02-11 RX ADMIN — TILMANOCEPT 0.49 MILLICURIE: KIT at 09:22

## 2022-02-11 RX ADMIN — ONDANSETRON 4 MG: 2 INJECTION INTRAMUSCULAR; INTRAVENOUS at 10:55

## 2022-02-11 RX ADMIN — LIDOCAINE HYDROCHLORIDE 10 ML: 10 INJECTION, SOLUTION EPIDURAL; INFILTRATION; INTRACAUDAL; PERINEURAL at 09:33

## 2022-02-11 RX ADMIN — PROPOFOL 30 MG: 10 INJECTION, EMULSION INTRAVENOUS at 10:55

## 2022-02-11 RX ADMIN — PROPOFOL 180 MCG/KG/MIN: 10 INJECTION, EMULSION INTRAVENOUS at 10:55

## 2022-02-11 RX ADMIN — FENTANYL CITRATE 50 MCG: 50 INJECTION, SOLUTION INTRAMUSCULAR; INTRAVENOUS at 10:55

## 2022-02-11 RX ADMIN — LIDOCAINE HYDROCHLORIDE 3 ML: 20 INJECTION, SOLUTION INFILTRATION; PERINEURAL at 10:55

## 2022-02-11 NOTE — ANESTHESIA CARE TRANSFER NOTE
Patient: Lisa Reyna    Procedure: Procedure(s):  Right Lumpectomy after Wire Localization; [place] Lymph Node Biopsy       Diagnosis: Malignant neoplasm of lower-inner quadrant of right breast of female, estrogen receptor positive (H) [C50.311, Z17.0]  Diagnosis Additional Information: No value filed.    Anesthesia Type:   MAC     Note:      Level of Consciousness: awake  Oxygen Supplementation: room air    Independent Airway: airway patency satisfactory and stable  Dentition: dentition unchanged  Vital Signs Stable: post-procedure vital signs reviewed and stable  Report to RN Given: handoff report given  Patient transferred to: Phase II    Handoff Report: Identifed the Patient, Identified the Reponsible Provider, Reviewed the pertinent medical history, Discussed the surgical course, Reviewed Intra-OP anesthesia mangement and issues during anesthesia, Set expectations for post-procedure period and Allowed opportunity for questions and acknowledgement of understanding      Vitals:  Vitals Value Taken Time   /58 02/11/22 1133   Temp 96  F (35.6  C) 02/11/22 1133   Pulse 74 02/11/22 1133   Resp 16 02/11/22 1133   SpO2 100 % 02/11/22 1133       Electronically Signed By: GONZALO Alejandre CRNA  February 11, 2022  11:35 AM

## 2022-02-11 NOTE — OP NOTE
Operative Note    Name:  Lisa Reyna  PCP:  Cayla Skelton  Procedure Date:  2/11/2022       Procedure:  Procedure(s):  Right Lumpectomy after Wire Localization; [place] Lymph Node Biopsy     Pre-Procedure Diagnosis:  Malignant neoplasm of lower-inner quadrant of right breast of female, estrogen receptor positive (H) [C50.311, Z17.0]     Post-Procedure Diagnosis:    Same     Surgeon(s):  Mami Alcantara MD     Assistant: None      Anesthesia Type:  Monitor Anesthesia Care       Findings:  Normal appearing SLN.    Operative Report:    The patient was brought to the operating suite where she was placed in the supine position.  She is prepped and draped in a sterile fashion.  After infiltration with a combination of 1% lidocaine and quarter percent Marcaine an elliptical shaped incision was made adjacent to the wire in the medial right breast.  This was carried down through the subcutaneous tissues and then around the now palpable mass  widely with electrocautery.  Once removed the specimen was marked and sent to radiology and then  pathology for permanent sectioning.  A curvilinear incision was then made in the lower portion of the axilla and carried down deep into the axilla fat pad.  Using the gamma probe I identified 1 sentinel lymph node(s).  This(mitchell) were removed with electrocautery and sent for permanent sectioning.  Both wounds were inspected for hemostasis and closed with 3-0 Vicryl to the subcutaneous tissues and a subcuticular stitch of 4-0 Monocryl to the skin.  Dressings were placed.  The patient tolerated the procedure well.    Estimated Blood Loss:   15cc    Specimens:    ID Type Source Tests Collected by Time Destination   1 :  Lumpectomy Breast, Right SURGICAL PATHOLOGY EXAM Mami Alcantara MD 2/11/2022 11:07 AM    2 :  Biopsy Lymph Node(s) [place], Breast, Right SURGICAL PATHOLOGY EXAM Mami Alcantara MD 2/11/2022 11:11 AM            Drains:        Complications:    Kat MACKAY  MD Maricruz     Date: 2/11/2022  Time: 11:30 AM

## 2022-02-11 NOTE — DISCHARGE INSTRUCTIONS
If you have any questions or concerns regarding your procedure, please contact Dr. Alcantara, her office number is [phone].    Discharge Instructions for Lumpectomy or Breast Biopsy     You just had a procedure to remove a lump or a small piece of tissue from your breast. After surgery, be sure to have an adult drive you home. Ask your healthcare provider when you will get the results of the biopsy. Will they call you or will you talk about it at your next visit? Make a follow-up appointment as directed by your healthcare provider.     What to expect    These are common after a lumpectomy or breast biopsy:      Bruising and mild swelling around the incision    Mild discomfort for a few days    Feeling tired for a day or so    Feeling anxious or down    Diet    Here's what to eat and drink after a lumpectomy or breast biopsy:      Start with liquids and light, easy-to-digest foods, such as bananas and dry toast. As you feel up to it, slowly return to your normal diet.    Drink at least  6 to 8 glasses of water or other nonalcoholic fluids a day, unless directed otherwise.    Activity    What you can do after a lumpectomy or breast biopsy:     After the procedure, take it easy for the rest of the day.    If you had general anesthesia, don't use machinery or power tools, drink alcohol, or make any major decisions for at least the first 24 hours.    Ask your healthcare providers how you should care for the dressing and when you can take it off.    You may shower and pat the cut (incision) dry, but don't soak in a tub until you see the healthcare provider again.     Return to normal activities (including driving) after 24 hours.     Bandage and incision care    To care for your incision site:    Take pain medicines as directed. Don t wait until the pain gets bad before taking them. Don t drink alcohol while on pain medicines.    Place an ice pack over the bandaged incision for no longer than 20 minutes at a time. Do  this as instructed by your healthcare provider. To make an ice pack, put ice cubes in a plastic bag that seals at the top. Wrap the bag in a clean, thin towel or cloth. Never put ice or an ice pack directly on your skin.    Wear a comfortable, snug-fitting bra at all times, even to bed. This helps keep swelling down.    If your incision has been closed with glue, don't apply lotion, ointment, or cream to the area. Doing so can cause the glue to dissolve.    If strips of tape have been used to close your incision, don't pull them off. Let them fall off on their own.    If you have a gauze bandage, keep it and the wound dry for 48 hours. If the gauze bandage gets wet, replace it with a clean, dry bandage.    When to call your healthcare provider    Call your healthcare provider right away if you have any of these:     Vomiting or nausea that does not go away    Fever of 101.5  F (38.6 C) or higher, or chills    Fluid leaking from the incision that smells bad    Pain not relieved by pain medicines    Bleeding, warmth, redness, or hard swelling around the incision    Chest pain or shortness of breath    Trouble urinating, blood in urine, pain when urinating, or urine that's cloudy or smells bad    Discharge Instructions: After Your Surgery    You ve just had surgery. During surgery, you were given medicine called anesthesia to keep you relaxed and free of pain. After surgery, you may have some pain or nausea. This is common. Here are some tips for feeling better and getting well after surgery.    Going home    Your healthcare provider will show you how to take care of yourself when you go home. He or she will also answer your questions. Have an adult family member or friend drive you home. For the first 24 hours after your surgery:      Don't drive or use heavy equipment.    Don't make important decisions or sign legal papers.    Don't drink alcohol.    Have someone stay with you for the next 24 hours. He or she can  watch for problems and help keep you safe.  Be sure to go to all follow-up visits with your healthcare provider. And rest after your surgery for as long as your healthcare provider tells you to.    Coping with pain    *Pain after surgery in normal and expected*    If you have pain after surgery, pain medicine will help you feel better. Take it as told, before pain becomes severe. Also, ask your healthcare provider or pharmacist about other ways to control pain. This might be with heat, ice, or relaxation. And follow any other instructions your surgeon or nurse gives you.    Tips for taking pain medicine    To get the best relief possible, remember these points:      Pain medicines can upset your stomach. Taking them with a little food may help.    Most pain relievers taken by mouth need at least 20 to 30 minutes to start to work.    Don't wait till your pain becomes severe before you take your medicine. Try to time your medicine so that you can take it before starting an activity. This might be before you get dressed, go for a walk, or sit down for dinner.    Constipation is a common side effect of pain medicines. You can take medicines such as laxatives (Miralax) or stool softeners to help ease constipation. Drinking lots of fluids and eating foods such as fruits and vegetables that are high in fiber can also help.    Drinking alcohol and taking pain medicine can cause dizziness and slow your breathing. It can even be deadly. Don't drink alcohol while taking pain medicine.    Pain medicine can make you react more slowly to things. Don't drive or run machinery while taking pain medicine.  Your healthcare provider may tell you to take acetaminophen to help ease your pain. Ask him or her how much you are supposed to take each day. Acetaminophen or other pain relievers may interact with your prescription medicines or other over-the-counter (OTC) medicines. Some prescription medicines have acetaminophen and other  ingredients. Using both prescription and OTC acetaminophen for pain can cause you to overdose. Read the labels on your OTC medicines with care. This will help you to clearly know the list of ingredients, how much to take, and any warnings. It may also help you not take too much acetaminophen. If you have questions or don't understand the information, ask your pharmacist or healthcare provider to explain it to you before you take the OTC medicine.    Managing nausea    Some people have an upset stomach after surgery. This is often because of anesthesia, pain, or pain medicine, or the stress of surgery. These tips will help you handle nausea and eat healthy foods as you get better. If you were on a special food plan before surgery, ask your healthcare provider if you should follow it while you get better. These tips may help:      Don't push yourself to eat. Your body will tell you when to eat and how much.    Start off with clear liquids and soup. They are easier to digest.    Next try semi-solid foods, such as mashed potatoes, applesauce, and gelatin, as you feel ready.    Slowly move to solid foods. Don t eat fatty, rich, or spicy foods at first.    Don't force yourself to have 3 large meals a day. Instead eat smaller amounts more often.    Take pain medicines with a small amount of solid food, such as crackers or toast, to prevent nausea.    If you have obstructive sleep apnea    You were given anesthesia medicine during surgery to keep you comfortable and free of pain. After surgery, you may have more apnea spells because of this medicine and other medicines you were given. The spells may last longer than usual.   At home:      Keep using the continuous positive airway pressure (CPAP) device when you sleep. Unless your healthcare provider tells you not to, use it when you sleep, day or night. CPAP is a common device used to treat obstructive sleep apnea.    Talk with your provider before taking any pain medicine,  muscle relaxants, or sedatives. Your provider will tell you about the possible dangers of taking these medicines.

## 2022-02-11 NOTE — ANESTHESIA POSTPROCEDURE EVALUATION
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    Anesthesia Type:  MAC    Note:  Disposition: Outpatient   Postop Pain Control: Uneventful            Sign Out: Well controlled pain   PONV: No   Neuro/Psych: Uneventful            Sign Out: Acceptable/Baseline neuro status   Airway/Respiratory: Uneventful            Sign Out: Acceptable/Baseline resp. status   CV/Hemodynamics: Uneventful            Sign Out: Acceptable CV status; No obvious hypovolemia; No obvious fluid overload   Other NRE: NONE   DID A NON-ROUTINE EVENT OCCUR? No           Last vitals:  Vitals Value Taken Time   /78 02/11/22 1202   Temp 96  F (35.6  C) 02/11/22 1133   Pulse 63 02/11/22 1208   Resp 16 02/11/22 1200   SpO2 100 % 02/11/22 1208   Vitals shown include unvalidated device data.    Electronically Signed By: Fatemeh Fuentes MD  February 11, 2022  12:49 PM

## 2022-02-11 NOTE — PROGRESS NOTES
"Dr. Alcantara notified via telephone that patient would prefer prescription ofr tylenol-codeine. Pt reports she get ill from hydrocodone-tylenol and cant eat. Dr. Alcantara states, \"I will put an order in but it won't be until later.\" Patient agrees with plan.     Jacqueline Henson RN on 2/11/2022 at 12:32 PM    "

## 2022-02-11 NOTE — ANESTHESIA PREPROCEDURE EVALUATION
Anesthesia Pre-Procedure Evaluation    Patient: Lisa Reyna   MRN: [id number] : 1950        Preoperative Diagnosis: Malignant neoplasm of lower-inner quadrant of right breast of female, estrogen receptor positive (H) [C50.311, Z17.0]    Procedure : Procedure(s):  Right Lumpectomy after Wire Localization; [place] Lymph Node Biopsy          Past Medical History:   Diagnosis Date     Arthritis      Asthma     Seaonal usually in spring     Breast cancer (H)     rt lumpect     Coronary artery disease      Endometrial polyp     was protruding through cervix     GERD (gastroesophageal reflux disease)      Heart attack (H)      Heart murmur      History of measles, mumps, or rubella as a child    hx of measles, mumps and [place] mealses. Rubella immune 1980     HTN (hypertension)      Hx of radiation therapy      Hyperlipemia      Osteopenia      Other chronic pain      Ovarian cyst      Prediabetes      UTI (urinary tract infection)      Walking troubles       Past Surgical History:   Procedure Laterality Date     BIOPSY BREAST Left     cyst removal     BIOPSY BREAST Right      CERVICAL DISC SURGERY  2007    C6-7 sarah laminectomy with microdiskectomy     LUMPECTOMY BREAST Right 2004     OOPHORECTOMY Bilateral      TOTAL HIP ARTHROPLASTY Left 2009     TUBAL LIGATION  1/10/1992    with endometrial polyp removal     WRIST SURGERY        Allergies   Allergen Reactions     Vicodin Es [Hydrocodone-Acetaminophen] Nausea and Vomiting     Does well with Tylenol #3     Bactrim [Sulfamethoxazole W/Trimethoprim] Other (See Comments)     Thrush     Morphine Nausea and Vomiting     Percocet [Oxycodone-Acetaminophen] Nausea and Vomiting      Social History     Tobacco Use     Smoking status: Never Smoker     Smokeless tobacco: Never Used   Substance Use Topics     Alcohol use: Yes     Alcohol/week: 5.0 standard drinks     Comment: Alcoholic Drinks/day: 5 drinks a week of  wine/liquor/beer      Wt Readings from Last 1 Encounters:   02/08/22 86.2 kg (190 lb)        Anesthesia Evaluation            ROS/MED HX  ENT/Pulmonary:  - neg pulmonary ROS   (+) asthma     Neurologic:  - neg neurologic ROS     Cardiovascular:  - neg cardiovascular ROS   (+) hypertension--CAD angina-past MI --valvular problems/murmurs     METS/Exercise Tolerance:     Hematologic:  - neg hematologic  ROS     Musculoskeletal:  - neg musculoskeletal ROS     GI/Hepatic:  - neg GI/hepatic ROS     Renal/Genitourinary:  - neg Renal ROS     Endo:  - neg endo ROS   (+) Obesity,     Psychiatric/Substance Use:  - neg psychiatric ROS     Infectious Disease:  - neg infectious disease ROS     Malignancy:  - neg malignancy ROS     Other:  - neg other ROS    (+) , H/O Chronic Pain,        Physical Exam    Airway  airway exam normal      Mallampati: II   TM distance: > 3 FB   Neck ROM: full     Respiratory Devices and Support         Dental  no notable dental history         Cardiovascular   cardiovascular exam normal       Rhythm and rate: regular and normal     Pulmonary   pulmonary exam normal        breath sounds clear to auscultation           OUTSIDE LABS:  CBC:   Lab Results   Component Value Date    WBC 6.4 02/08/2022    WBC 5.3 05/26/2021    HGB 13.7 02/08/2022    HGB 12.9 05/26/2021    HCT 41.4 02/08/2022    HCT 39.4 05/26/2021     02/08/2022     05/26/2021     BMP:   Lab Results   Component Value Date     02/08/2022     01/28/2022    POTASSIUM 4.0 02/08/2022    POTASSIUM 3.8 01/28/2022    CHLORIDE 102 02/08/2022    CHLORIDE 103 01/28/2022    CO2 27 02/08/2022    CO2 25 01/28/2022    BUN 17 02/08/2022    BUN 17 01/28/2022    CR 1.00 02/08/2022    CR 0.95 01/28/2022     (H) 02/08/2022     (H) 01/28/2022     COAGS: No results found for: PTT, INR, FIBR  POC: No results found for: BGM, HCG, HCGS  HEPATIC:   Lab Results   Component Value Date    ALBUMIN 3.9 01/28/2022    PROTTOTAL 6.5  01/28/2022    ALT 9 01/28/2022    AST 15 01/28/2022    ALKPHOS 67 01/28/2022    BILITOTAL 0.9 01/28/2022     OTHER:   Lab Results   Component Value Date    TAMI 9.4 02/08/2022       Anesthesia Plan    ASA Status:  3      Anesthesia Type: MAC.      Maintenance: TIVA.        Consents            Postoperative Care    Pain management: IV analgesics.   PONV prophylaxis: Ondansetron (or other 5HT-3), Dexamethasone or Solumedrol     Comments:                Carlos Coffey MD

## 2022-02-14 ENCOUNTER — TELEPHONE (OUTPATIENT)
Dept: SURGERY | Facility: CLINIC | Age: 72
End: 2022-02-14
Payer: MEDICARE

## 2022-02-14 NOTE — TELEPHONE ENCOUNTER
Patient called, wondering if she needs to wear a bra continuously or can she remove it at night.  Told her ok to take it off at night.  Try to wear it as much as comfort allows.  Patient states she is doing well.  Only taking an occasional tylenol for pain as needed.  She will come see Dr. Alcantara for her post op this Friday. Told her to call sooner as needed.

## 2022-02-18 ENCOUNTER — OFFICE VISIT (OUTPATIENT)
Dept: SURGERY | Facility: CLINIC | Age: 72
End: 2022-02-18
Attending: NURSE PRACTITIONER
Payer: MEDICARE

## 2022-02-18 ENCOUNTER — PATIENT OUTREACH (OUTPATIENT)
Dept: ONCOLOGY | Facility: CLINIC | Age: 72
End: 2022-02-18

## 2022-02-18 DIAGNOSIS — Z17.0 MALIGNANT NEOPLASM OF LOWER-INNER QUADRANT OF RIGHT BREAST OF FEMALE, ESTROGEN RECEPTOR POSITIVE (H): Primary | ICD-10-CM

## 2022-02-18 DIAGNOSIS — C50.311 MALIGNANT NEOPLASM OF LOWER-INNER QUADRANT OF RIGHT BREAST OF FEMALE, ESTROGEN RECEPTOR POSITIVE (H): Primary | ICD-10-CM

## 2022-02-18 PROCEDURE — 99024 POSTOP FOLLOW-UP VISIT: CPT | Performed by: SPECIALIST

## 2022-02-18 ASSESSMENT — PAIN SCALES - GENERAL: PAINLEVEL: NO PAIN (0)

## 2022-02-18 NOTE — NURSING NOTE
Patient is accompanied by her sister, today for a post op visit with Dr. Alcantara S/P right lumpectomy with sentinel lymph node biopsy 2/11/22.     Patient reports she is doing well, and has no complaints of pain or signs of infection.     Per Dr. Alcantara, patient will be referred to [facility].  Pt. prefers the [facility] location.  Dr. Alcantara will contact her when the Oncotype is back.  Follow-up with Dr. Alcantara in 1 year with bilateral mammograms.    Patient denies further questions, verbalizes understanding and agrees with this plan.      Ashley Kuo, RN, BSN, PHN  Breast Center Imaging Nurse Coordinator   [facility]  [address]  [place] [address]  [phone]

## 2022-02-18 NOTE — PROGRESS NOTES
In for follow-up of her right lumpectomy  with sentinel lymph node biopsy.  She is feeling well.  She is having very minimal pain.      Physical exam:  Appears well.  Does not appear in any discomfort.  Breasts: Incisions are healing nicely with no signs of infection.  No swelling.    Pathology: The tumor was 1.2 cm.  The margins are negative.  Her sentinel lymph node is Negative.    Impression: Postop visit. Doing well. Reviewed with her again that she is at somewhat higher risk for local recurrence. There is no role for radiation since she is already had radiation. She definitely needs hormone therapy.  But also chemotherapy is not ruled out.  Did explain what an Oncotype assay and the role for that.  We talked about different oncology groups.  She would like to be seen at [facility] now.    Plan:We'll refer her onto [facility] oncology.  He prefers the [facility] location.  I will also contact her when the Oncotype is back.  Follow-up with me in 1 year with bilateral mammograms.

## 2022-02-18 NOTE — PROGRESS NOTES
New Patient Oncology Nurse Navigator Note     Referring provider: Dr. Mami Alcantara      Referring Clinic/Organization: [facility]     Referred to (specialty:) Medical Oncology      Date Referral Received: February 18, 2022     Evaluation for:  Breast cancer     Clinical History (per Nurse review of records provided):    Lisa has a newly diagnosed right breast cancer.  This was picked up  on screening mammogram.   The patient cannot feel a mass.  A needle biopsy was done which shows an invasive ductal carcinoma.  It is estrogen receptor strongly positive , progesterone receptor positive  and HER-2 negative.      Dr. Alcantara performed right lumpectomy and sentinel lymph node biopsy on 2/11/22.    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             a) Nuclear grade: Intermediate             b) Patterns: Solid and focal cribriform             c) Size: 10%   d) Margins: Uninvolved, at 3 mm from the nearest inferior margin, 5 mm   from medial margin        3) ADDITIONAL FINDINGS:        Background atrophic ductal and lobular units with focal weakly   proliferative fibrocystic changes, sclerosing fibroadenoma, sclerosing   adenosis, duct ectasia, and columnar cell changes        Benign skin with no evidence of Paget's disease   4) Previous biopsy site present, with cavity formation, extensive   hemorrhage, fat necrosis, and organizing changes     B) RIGHT SENTINEL LYMPH NODE, BIOPSY:        - ONE BENIGN LYMPH NODE (0/1), WITH NO EVIDENCE OF METASTATIC   CARCINOMA     PATHOLOGIC  STAGE: pT1c, (sn)pN0, pM-Not applicable     Dr. Alcantara has ordered an Oncotype and estimated report date is 3/9.  (Oncotype subsequently reported on 3/1/22 with RS of 30.)       2004  She had a previous diagnosis of high grade. DCIS in the right breast Dr. Javier Jones performed right lumpectomy on 6/24/2004 at [facility].  She did not have any lymph nodes removed.      She then followed with Dr. Noé Fernandes of [facility] for medical oncology.  Dr. Caleb Elena was her radiation oncologist.   Lisa received 37 treatments of radiation therapy.      Patient recalls starting tamoxifen and recalls taking it for >5 years however, Dr. Roper' notes indicate she started on anastrozole in October 2004 through September 2009.         Per Dr. Alcantara's notes, this new cancer is not near the original area.     In 2008 Dr. Javier Jones performed left breast excision for a cyst on 9/11/2008.       Records Location: See Bookmarked material     Records Needed: [facility] records from 2004 - medical oncology AND radiation oncology (if NO radiation records with [facility] please check [facility]  She saw Dr. Roper for at least 5 years and states she had radiation therapy with them as well (37 days).  She recalls her right lumpectomy in 2004 being performed at [facility].    2004 core biopsy pathology report and lumpectomy reports.   2008 cyst excision pathology (9/11/2008)  Consults and progress notes from Dr. Fernandes, medical oncologist, at [facility]  Consult and progress notes from radiation oncologist at [facility]  Consult and progress notes from Dr. Javier Jones ([facility]) in 2008 AND 2004

## 2022-02-18 NOTE — LETTER
2/18/2022         RE: Lisa Reyna  [address]  [place] [id number]        Dear Colleague,    Thank you for referring your patient, Lisa Reyna, to the [facility] [place]. Please see a copy of my visit note below.    In for follow-up of her right lumpectomy  with sentinel lymph node biopsy.  She is feeling well.  She is having very minimal pain.      Physical exam:  Appears well.  Does not appear in any discomfort.  Breasts: Incisions are healing nicely with no signs of infection.  No swelling.    Pathology: The tumor was 1.2 cm.  The margins are negative.  Her sentinel lymph node is Negative.    Impression: Postop visit. Doing well. Reviewed with her again that she is at somewhat higher risk for local recurrence. There is no role for radiation since she is already had radiation. She definitely needs hormone therapy.  But also chemotherapy is not ruled out.  Did explain what an Oncotype assay and the role for that.  We talked about different oncology groups.  She would like to be seen at [facility] now.    Plan:We'll refer her onto [facility] oncology.  He prefers the [facility] location.  I will also contact her when the Oncotype is back.  Follow-up with me in 1 year with bilateral mammograms.        Again, thank you for allowing me to participate in the care of your patient.        Sincerely,        Mami Alcantara MD

## 2022-02-21 ENCOUNTER — DOCUMENTATION ONLY (OUTPATIENT)
Dept: SURGERY | Facility: CLINIC | Age: 72
End: 2022-02-21
Payer: MEDICARE

## 2022-02-21 NOTE — PROGRESS NOTES
Per Dr. Alcantara's  request, order for Oncotype submitted through [employer] online portal.

## 2022-02-23 NOTE — PROGRESS NOTES
Telephoned and spoke with Lisa to get more background her previous breast cancer so records could be obtained if possible. Please see my original documentation on 2/18 with those details.

## 2022-03-01 ENCOUNTER — TELEPHONE (OUTPATIENT)
Dept: SURGERY | Facility: CLINIC | Age: 72
End: 2022-03-01
Payer: MEDICARE

## 2022-03-01 NOTE — TELEPHONE ENCOUNTER
Called patient with her Oncotype score of 30.  Told her this is a little higher than scores that fall into the low risk category and chemotherapy may be of benefit to her.  Told her the next step is to discuss this with Dr. Koch.  Her appointment is on 3-11-22, but told her WESLEY Barrett, said she could move her appointment up to 3-4-22 at 1100 if she wished.  Patient said she will ask her sister if she can come on 3-4-22. She will call Nena back either way about the move up of her Dr. Koch appointment.  Support and reassurance provided, invited calls.

## 2022-03-01 NOTE — PROGRESS NOTES
Oncotype report returned today and Lisa was offered an appointment with Dr. Koch on 3/4, moving her originally scheduled consult up one week.  Lisa telephoned to inform writer she is not able to come to see Dr. Koch on 3/4 and we will leave her consult as originally scheduled on 3/11. Nena Lee RN

## 2022-03-07 NOTE — PROGRESS NOTES
RECORDS STATUS - BREAST    RECORDS REQUESTED FROM: [facility]/ [facility] / [place] : [facility] records from 2004    DATE REQUESTED: 3/11/2022   NOTES DETAILS STATUS   OFFICE NOTE from referring provider Complete Mami Wyatt MD    OFFICE NOTE from medical oncologist [facility] and [facility] Records are being uploaded by HIM 3/10/2022 3:12pm KEB   OFFICE NOTE from surgeon Complete See Breast Biopsy in EPIC   OFFICE NOTE from radiation oncologist     DISCHARGE REPORT from the ER     OPERATIVE REPORT Complete See Breast Biopsy in EPIC   MEDICATION LIST Complete [facility]   CLINICAL TRIAL TREATMENTS TO DATE     LABS     REQUEST BLOCKS FOR ALL BREAST CANCER PTS     PATHOLOGY REPORTS  (Tissue diagnosis, Stage, ER/MO percentage positive and intensity of staining, HER2 IHC, FISH, and all biopsies from breast and any distant metastasis)                 Complete 2/11/2022  A) RIGHT BREAST, ORIENTED LUMPECTOMY:        1) INVASIVE DUCTAL CARCINOMA, WITH FOCAL MICROPAPILLARY PATTERN     1/21/2022  BREAST, RIGHT, 2:00, 6 CM FROM NIPPLE, MASS, ULTRASOUND-GUIDED NEEDLE CORE BIOPSY:    INVASIVE DUCTAL CARCINOMA    Original Biopsy was done 18 years ago at [facility] or [facility]. ([facility])    GENONOMIC TESTING     TYPE:   (Next Generation Sequencing, including Foundation One testing, and Oncotype score)     IMAGING (NEED IMAGES & REPORT)     CT SCANS     MRI     MAMMO Complete 2/11/2022 more in PACS   ULTRASOUND Complete US breast Biopsy 1/21/2022 more in PACS   PET     BONE SCAN     BRAIN MRI       Action    Action Taken 3/7/2022 2:20pm KEB     I called pt Lisa -unavailable. I called again - all her older records (dx with breast cancer 18 years ago) are either at [facility] or [facility].     I pulled her [facility] images into PACS    I called our internal HIM Dept to see if they can recover the pt's old biopsy reports. Ph: [phone] #3, #5  - I spoke to medical records and they asked me to fax over a  request. Fax: [phone]     3/10/2022 9:34AM KEB   [facility]'s HIM called said they are having trouble accessing the path report. They have to request her paper chart. They are going to do more digging...     3/10/2022 1:36pm LEA   I spoke to Clarisse in our internal HIM Dept ([facility] Records) . Clarisse said her records should be at [facility] - she has records dating back to 2004.     I called pt Lisa - she has records at [facility]   [phone] #[phone] - I spoke the medical records dept-     I sent nurse Barrett an ib-message letting her know anderina Gonzalez doesn't have a printer and isn't able to sign an POLLY for [facility].     I called [facility]'s medical records dept and they have historical records from 2004- 2005! They also have some [facility] records and will fax everything over very soon.     I sent nurse Barrett a follow up in-basket message with the good news.     2:20pm LEA   I faxed andreina Gonzalez's old path reports to HIM    3/10/2022 3:11pm LEA   I faxed records from [facility] to HIM!

## 2022-03-11 ENCOUNTER — PRE VISIT (OUTPATIENT)
Dept: ONCOLOGY | Facility: CLINIC | Age: 72
End: 2022-03-11

## 2022-03-11 ENCOUNTER — PATIENT OUTREACH (OUTPATIENT)
Dept: ONCOLOGY | Facility: CLINIC | Age: 72
End: 2022-03-11
Payer: MEDICARE

## 2022-03-11 ENCOUNTER — PREP FOR PROCEDURE (OUTPATIENT)
Dept: SURGERY | Facility: CLINIC | Age: 72
End: 2022-03-11
Payer: MEDICARE

## 2022-03-11 ENCOUNTER — HOSPITAL ENCOUNTER (OUTPATIENT)
Facility: AMBULATORY SURGERY CENTER | Age: 72
End: 2022-03-11
Attending: SPECIALIST
Payer: MEDICARE

## 2022-03-11 ENCOUNTER — TELEPHONE (OUTPATIENT)
Dept: SURGERY | Facility: CLINIC | Age: 72
End: 2022-03-11
Payer: MEDICARE

## 2022-03-11 ENCOUNTER — ONCOLOGY VISIT (OUTPATIENT)
Dept: ONCOLOGY | Facility: CLINIC | Age: 72
End: 2022-03-11
Attending: SPECIALIST
Payer: MEDICARE

## 2022-03-11 VITALS
DIASTOLIC BLOOD PRESSURE: 82 MMHG | TEMPERATURE: 97.9 F | HEART RATE: 95 BPM | RESPIRATION RATE: 18 BRPM | OXYGEN SATURATION: 98 % | BODY MASS INDEX: 35.74 KG/M2 | WEIGHT: 194.2 LBS | HEIGHT: 62 IN | SYSTOLIC BLOOD PRESSURE: 197 MMHG

## 2022-03-11 DIAGNOSIS — Z11.59 ENCOUNTER FOR SCREENING FOR OTHER VIRAL DISEASES: Primary | ICD-10-CM

## 2022-03-11 DIAGNOSIS — Z17.0 MALIGNANT NEOPLASM OF LOWER-INNER QUADRANT OF RIGHT BREAST OF FEMALE, ESTROGEN RECEPTOR POSITIVE (H): Primary | ICD-10-CM

## 2022-03-11 DIAGNOSIS — Z51.11 ENCOUNTER FOR ANTINEOPLASTIC CHEMOTHERAPY: ICD-10-CM

## 2022-03-11 DIAGNOSIS — C50.311 MALIGNANT NEOPLASM OF LOWER-INNER QUADRANT OF RIGHT BREAST OF FEMALE, ESTROGEN RECEPTOR POSITIVE (H): Primary | ICD-10-CM

## 2022-03-11 PROCEDURE — 99205 OFFICE O/P NEW HI 60 MIN: CPT | Performed by: INTERNAL MEDICINE

## 2022-03-11 PROCEDURE — G0463 HOSPITAL OUTPT CLINIC VISIT: HCPCS

## 2022-03-11 RX ORDER — PROCHLORPERAZINE MALEATE 10 MG
10 TABLET ORAL EVERY 6 HOURS PRN
Qty: 30 TABLET | Refills: 3 | Status: SHIPPED | OUTPATIENT
Start: 2022-03-11 | End: 2022-07-20

## 2022-03-11 RX ORDER — DEXAMETHASONE 4 MG/1
8 TABLET ORAL 2 TIMES DAILY WITH MEALS
Qty: 10 TABLET | Refills: 3 | Status: SHIPPED | OUTPATIENT
Start: 2022-03-11 | End: 2022-07-20

## 2022-03-11 RX ORDER — LIDOCAINE/PRILOCAINE 2.5 %-2.5%
CREAM (GRAM) TOPICAL ONCE
Qty: 30 G | Refills: 0 | Status: SHIPPED | OUTPATIENT
Start: 2022-03-11 | End: 2024-02-26

## 2022-03-11 ASSESSMENT — PAIN SCALES - GENERAL: PAINLEVEL: NO PAIN (0)

## 2022-03-11 NOTE — LETTER
"    3/11/2022         RE: Lisa Reyna  [address]  [facility] [address]        Dear Colleague,    Thank you for referring your patient, Lisa Reyna, to the [facility]. Please see a copy of my visit note below.    Oncology Rooming Note    March 11, 2022 11:03 AM   Lisa Reyna is a 71 year old female who presents for:    Chief Complaint   Patient presents with     Oncology Clinic Visit     Initial Vitals: BP (!) 197/82   Pulse 95   Temp 97.9  F (36.6  C)   Resp 18   Ht 1.575 m (5' 2\")   Wt 88.1 kg (194 lb 3.2 oz)   SpO2 98%   BMI 35.52 kg/m   Estimated body mass index is 35.52 kg/m  as calculated from the following:    Height as of this encounter: 1.575 m (5' 2\").    Weight as of this encounter: 88.1 kg (194 lb 3.2 oz). Body surface area is 1.96 meters squared.  No Pain (0) Comment: Data Unavailable   No LMP recorded. Patient is postmenopausal.  Allergies reviewed: Yes  Medications reviewed: Yes    Medications: Medication refills not needed today.  Pharmacy name entered into [facility]: [facility], [place], [place] [address] [place], [place] - [address] [place] [address]    Clinical concerns: None       Yamile Hernandez LPN              [facility] Hematology and Oncology Consult Note    Patient: Lisa Reyna  MRN: [id number]  Date of Service: Mar 11, 2022       Reason for Visit    Chief Complaint   Patient presents with     Oncology Clinic Visit         Assessment/Plan    #.  Invasive ductal carcinoma of LIQ of right breast, stage IA, ER+MA+HER2-. Oncotype 30/19%, >15%.  #.  H/o DCIS of right breast in 2004.  #. CAD with h/o NSTEMI in 2014. On medical Mx.  #. HTN, uncontrolled.     Reviewed clinical course and pathology, oncotype result. Discussed the pathophysiology of breast cancer, stages, treatment goals, treatment options. She has stage I breast cancer. Treatment goal is cure. Discussed about adjuvant therapy to decrease breast cancer recurrence and improve overall " survival. Given high oncotype score, she will benefit from adjuvant chemotherapy. She agreed with that. She has CAD and ongoing risk factors, therefore, I recommended non Adriamycin based chemotherapy with docetaxel/cyclophosamide every 3 weeks for 4 cycles. I reviewed the side effects.    Requested Dr. Alcantara for port placement.   She is considering reconstruction after right breast mastectomy. She asked about the benefit of prophylactic left breast mastectomy.  I explained that there would not likely benefit by doing prophylactic left breast mastectomy with current clinical information.  However, it is reasonable to discuss with genetic counselor and perform genetic testing to make educated decision.  She agree with that.  Referral to genetic counselor was made today.   Follow-up in a week or 2 after port placement and prior to starting cycle #1 adjuvant chemotherapy.  She will proceed with surgery after completion of chemotherapy.    ECOG Performance 0 - Independent    Encounter Diagnoses:    Problem List Items Addressed This Visit        Oncology Diagnoses    Malignant neoplasm of lower-inner quadrant of right breast of female, estrogen receptor positive (H) - Primary    Relevant Medications    prochlorperazine (COMPAZINE) 10 MG tablet    dexamethasone (DECADRON) 4 MG tablet    Other Relevant Orders    Cancer Risk Mgmt/Cancer Genetic Counseling Referral    Infusion Appointment Request       Other    Encounter for antineoplastic chemotherapy    Relevant Medications    prochlorperazine (COMPAZINE) 10 MG tablet    dexamethasone (DECADRON) 4 MG tablet    Other Relevant Orders    Infusion Appointment Request            ______________________________________________________________________________    Staging History  Cancer Staging  Malignant neoplasm of lower-inner quadrant of right breast of female, estrogen receptor positive (H)  Staging form: Breast, AJCC 8th Edition  - Pathologic stage from 2/11/2022: Stage IA  (pT1c, pN0(sn), cM0, G2, ER+, CO+, HER2-, Oncotype DX score: 30) - Signed by Tamar Koch MD on 3/13/2022  - Clinical: No stage assigned - Unsigned        History    Ms. Lisa Reyna is a very pleasant 71 year old female presented today accompanied by her sister, Joan.  She is here to discuss about adjuvant therapy for recent diagnosis of right breast cancer.  1/18/2022-screening mammogram detected possible calcification in the right breast at 4 o'clock position, middle depth.  1/20/2022-diagnostic mammogram and ultrasound showed a hypoechoic mass with microlobulated and spiculated margins containing calcification measuring 10 x 10 x 8 mm correlating with mammographic abnormality.  Right axilla showed no evidence of lymphadenopathy.  1/21/2022-ultrasound-guided core needle biopsy of the right breast mass showed invasive ductal carcinoma.  2/11/2022-underwent right breast lumpectomy and right sentinel lymph node biopsy (Dr. Alcantara).  Invasive ductal carcinoma with focal micropapillary pattern.  Grade 2.  12 mm.  Margins were negative but close at 0.5 mm from the nearest junction of the inferior and medial margin, 0.5 mm from medial margin and 2 mm from inferior margin.  There is associated DCIS with EIC negative, nuclear grade intermediate, solid and focal cribriform pattern of 10%.  DCIS margins were uninvolved at 3 mm from nearest inferior margin, 5 mm from medial margins.  1 sentinel lymph node was negative for metastatic carcinoma.  ER positive, greater than 10% of the cell, 61-70%, CO positive (21-30%), HER-2 negative by IHC (1+).  Oncotype DX recurrence score 30/19% / >15%.    Because of the close margins of invasive carcinoma and DCIS, she is recommended to proceed with mastectomy.  She had prior history of right breast DCIS, post right breast lumpectomy (Dr. Jones) on 6/24/2004, post adjuvant radiation (Dr. Caleb Elena) followed by adjuvant tamoxifen for 5 years (Dr. Umanzor  Dom).    She is here to discuss adjuvant therapy.  She has high blood pressure.  Her blood pressure is elevated today as she did not take her blood pressure medication this morning.  She has history of coronary artery disease and non-ST elevation MI in  when she had atypical presentation of cardiac chest pain (SOB and back pain).  No coronary intervention.  She was noted to have wall motion abnormalities by echocardiogram.  She is on medical management with aspirin, metoprolol, lisinopril and Zocor. She follows with Dr. Fernandez.    She has tingling in big toes for 6 months. No pain.     Retired from [employer] after 43 years. n  .  Lives with her 98 yo mother who is functional.Non smoker. Drinks ~2 drinks (wine/beer)/week.      Review of systems.  Apart from describing in history, the remainder of comprehensive ROS was negative.    Past History    Past Medical History:   Diagnosis Date     Arthritis      Asthma     Seaonal usually in spring     Breast cancer (H)     rt lumpect     Coronary artery disease      Endometrial polyp     was protruding through cervix     GERD (gastroesophageal reflux disease)      Heart attack (H)      Heart murmur      History of measles, mumps, or rubella as a child    hx of measles, mumps and Polish mealses. Rubella immune 1980     HTN (hypertension)      Hx of radiation therapy      Hyperlipemia      Osteopenia      Other chronic pain      Ovarian cyst      Prediabetes      UTI (urinary tract infection)      Walking troubles      Past Surgical History:   Procedure Laterality Date     BIOPSY BREAST Left     cyst removal     BIOPSY BREAST Right      CERVICAL DISC SURGERY  2007    C6-7 sarah laminectomy with microdiskectomy     LUMPECTOMY BREAST Right 2004     LUMPECTOMY BREAST Right 2022    Procedure: Right Lumpectomy after Wire Localization; [place] Lymph Node Biopsy;  Surgeon: Mami Alcantara MD;  Location: [facility]     OOPHORECTOMY  "Bilateral 2011     TOTAL HIP ARTHROPLASTY Left 9/30/2009     TUBAL LIGATION  1/10/1992    with endometrial polyp removal     WRIST SURGERY       Family History   Problem Relation Age of Onset     Colon Cancer Maternal Grandfather      Cancer Paternal Grandmother      Throat cancer Paternal Grandfather      Breast Cancer No family hx of      Social History     Socioeconomic History     Marital status: Single     Spouse name: Not on file     Number of children: Not on file     Years of education: Not on file     Highest education level: Not on file   Occupational History     Not on file   Tobacco Use     Smoking status: Never Smoker     Smokeless tobacco: Never Used   Substance and Sexual Activity     Alcohol use: Yes     Alcohol/week: 5.0 standard drinks     Comment: Alcoholic Drinks/day: 5 drinks a week of wine/liquor/beer     Drug use: No     Sexual activity: Not on file   Other Topics Concern     Not on file   Social History Narrative     Not on file     Social Determinants of Health     Financial Resource Strain: Not on file   Food Insecurity: Not on file   Transportation Needs: Not on file   Physical Activity: Not on file   Stress: Not on file   Social Connections: Not on file   Intimate Partner Violence: Not on file   Housing Stability: Not on file       Allergies    Allergies   Allergen Reactions     Vicodin Es [Hydrocodone-Acetaminophen] Nausea and Vomiting     Does well with Tylenol #3     Bactrim [Sulfamethoxazole W/Trimethoprim] Other (See Comments)     Thrush     Morphine Nausea and Vomiting     Percocet [Oxycodone-Acetaminophen] Nausea and Vomiting       Physical Exam    BP (!) 197/82   Pulse 95   Temp 97.9  F (36.6  C)   Resp 18   Ht 1.575 m (5' 2\")   Wt 88.1 kg (194 lb 3.2 oz)   SpO2 98%   BMI 35.52 kg/m      General: alert, awake, not in acute distress  HEENT: Head: Normal, normocephalic, atraumatic.  Eye: Normal external eye, conjunctiva, lids cornea, ALENA.  Nose: Normal external nose, mucus " membranes and septum.  Pharynx: Normal buccal mucosa. Normal pharynx.  Neck / Thyroid: Supple, no masses, nodes, nodules or enlargement.  Lymphatics: No abnormally enlarged lymph nodes.  Chest: Normal chest wall and respirations. Clear to auscultation.  Heart: S1 S2 RRR, no murmur.   Abdomen: abdomen is soft without significant tenderness, masses, organomegaly or guarding  Extremities: normal strength, tone, and muscle mass  Skin: normal. no rash or abnormalities  CNS: non focal.    Lab Results    Recent Results (from the past 168 hour(s))   Asymptomatic COVID-19 Virus (Coronavirus) by PCR Nose    Specimen: Nose; Swab   Result Value Ref Range    SARS CoV2 PCR Negative Negative, Testing sent to reference lab. Results will be returned via unsolicited result       Imaging Results    No results found.    I spent 60 minutes on the date of service, of which greater than 50% of the time was spent on counseling of the patent about the above medical conditions, educating patient on the medical conditions, treatment plan and support as well as coordination of care.    Signed by: Tamar Koch MD         Again, thank you for allowing me to participate in the care of your patient.        Sincerely,        Tamar Koch MD

## 2022-03-11 NOTE — PATIENT INSTRUCTIONS
Patient Education     Cyclophosphamide Injection 1000 mg  Uses  This medicine is used for the following purposes:    autoimmune disorder    cancer  Instructions  This is an IV medicine. It is given through a sterile tube directly into the vein by a healthcare provider.  This medicine is given gradually through the IV line.  This medicine should be given by a trained health care provider.  Drink plenty of water while on this medicine.  It is important that you keep taking each dose of this medicine on time even if you are feeling well.  If you miss a dose, contact your doctor for instructions.  Please tell your doctor and pharmacist about all the medicines you take. Include both prescription and over-the-counter medicines. Also tell them about any vitamins, herbal medicines, or anything else you take for your health.  Your doctor may prescribe other medications to reduce side effects. Follow instructions carefully.  It is very important that you continue using this medicine for the full number of days that it is prescribed. Please do not stop the medicine even if you start to feel better after the first few days.  It is very important that you keep all appointments for medical exams and tests while on this medicine.  Cautions  Tell your doctor and pharmacist if you ever had an allergic reaction to a medicine. Symptoms of an allergic reaction can include trouble breathing, skin rash, itching, swelling, or severe dizziness.  May cause mouth sores. Brush teeth gently. Avoid products containing alcohol. Rinse mouth with a mixture of water and baking soda or salt.  Some patients taking this medicine have experienced serious side effects. Please speak with your doctor to understand the risks and benefits associated with this medicine.  This medicine may increase the risk of some types of cancer. Please speak with your doctor about the risks and benefits of using this medicine.  Contact your doctor if you notice a change in  the amount or darkening of your urine.  This medicine may reduce your body's ability to fight infections. Try to avoid contact with people with colds, flu or other infections.  Contact your doctor if you develop any signs of a new infection such as fever, cough, sore throat, or chills.  Wash your hands often and avoid close contact with people with infections such as colds and flu.  Speak with your health care provider before receiving any vaccinations.  Tell the doctor or pharmacist if you are pregnant, planning to be pregnant, or breastfeeding.  Do not breastfeed while on this medicine. You may safely start breastfeeding 1 week after stopping treatment.  This medicine can hurt a new baby in the womb. If you become pregnant while on this medicine, tell your doctor immediately. Your doctor may switch you to a different medicine.  Women must use reliable forms of birth control while using this medicine and for 1 year after stopping to prevent pregnancy.  Men with a female partner who is of childbearing age must use a condom during sexual activity while taking this medicine and for 4 months after stopping to prevent pregnancy.  Ask your pharmacist if this medicine can interact with any of your other medicines. Be sure to tell them about all the medicines you take.  Please tell all your doctors and dentists that you are on this medicine before they provide care.  Do not start or stop any other medicines without first speaking to your doctor or pharmacist.  Call your doctor right away if you notice any unusual bleeding or bruising.  Side Effects  The following is a list of some common side effects from this medicine. Please speak with your doctor about what you should do if you experience these or other side effects.    decreased appetite    diarrhea    hair loss    increased risk for an infection    nausea    darkening of skin or nails    stomach upset or abdominal pain    vomiting  Call your doctor or get medical help  right away if you notice any of these more serious side effects:    bleeding that is severe or takes longer to stop    unusual bruising or discoloration on skin    chest pain    changes in memory, mood, or thinking    confusion    swelling of the legs, feet, and hands    fever or chills    flu-like symptoms    fast or irregular heart beats    pain in the joints    signs of kidney damage (such as bloody or bubbly urine, or changes in urine color or amount)    symptoms of liver damage (such as yellowing of skin or eyes, dark urine, unusual tiredness or weakness; severe stomach or back pain)    menstruation changes (missed or fewer periods)    mouth sores or irritation    muscle cramps    shortness of breath    sore throat    light colored stool    dark, tarry stool    unusual or unexplained tiredness or weakness    sudden or unexplained weight gain    slow wound healing  A few people may have an allergic reactions to this medicine. Symptoms can include difficulty breathing, skin rash, itching, swelling, or severe dizziness. If you notice any of these symptoms, seek medical help quickly.  Extra  Please speak with your doctor, nurse, or pharmacist if you have any questions about this medicine.  [email or web address]  IMPORTANT NOTE: This document tells you briefly how to take your medicine, but it does not tell you all there is to know about it.Your doctor or pharmacist may give you other documents about your medicine. Please talk to them if you have any questions.Always follow their advice. There is a more complete description of this medicine available in English.Scan this code on your smartphone or tablet or use the web address below. You can also ask your pharmacist for a printout. If you have any questions, please ask your pharmacist.     2021 [employer].      Patient Education     Docetaxel Injectable Solution 24 mg/mL  Uses  For treating cancer.  Instructions  This is an IV  medicine. It is given through a sterile tube directly into the vein by a healthcare provider.  This medicine is given gradually through the IV line.  This medicine should be given over 60 minutes.  This medicine should be given by a trained health care provider.  It is important that you keep taking each dose of this medicine on time even if you are feeling well.  If you miss a dose, contact your doctor for instructions.  Please tell your doctor and pharmacist about all the medicines you take. Include both prescription and over-the-counter medicines. Also tell them about any vitamins, herbal medicines, or anything else you take for your health.  Your doctor may prescribe other medications to reduce side effects. Follow instructions carefully.  It is very important that you continue using this medicine for the full number of days that it is prescribed. Please do not stop the medicine even if you start to feel better after the first few days.  It is very important that you keep all appointments for medical exams and tests while on this medicine.  Cautions  Tell your doctor and pharmacist if you ever had an allergic reaction to a medicine. Symptoms of an allergic reaction can include trouble breathing, skin rash, itching, swelling, or severe dizziness.  Some patients on this medicine have developed severe, life-threatening infections. Please speak with your doctor about the risks and benefits of using this medicine.  Some patients taking this medicine have experienced serious side effects. Please speak with your doctor to understand the risks and benefits associated with this medicine.  Your ability to stay alert or to react quickly may be impaired by this medicine. Do not drive or operate machinery until you know how this medicine will affect you.  Please check with your doctor before drinking alcohol while on this medicine.  If possible, avoid using with marijuana or other medicines that can cause dizziness or  drowsiness. These include allergy/cold products, muscle relaxers, sleep aids, and pain relievers.  This medicine may reduce your body's ability to fight infections. Try to avoid contact with people with colds, flu or other infections.  Contact your doctor if you develop any signs of a new infection such as fever, cough, sore throat, or chills.  Wash your hands often and avoid close contact with people with infections such as colds and flu.  Speak with your health care provider before receiving any vaccinations.  Tell the doctor or pharmacist if you are pregnant, planning to be pregnant, or breastfeeding.  Do not breastfeed while on this medicine. You may safely start breastfeeding 1 week after stopping treatment.  This medicine can hurt a new baby in the womb. If you become pregnant while on this medicine, tell your doctor immediately. Your doctor may switch you to a different medicine.  Women must use reliable forms of birth control while taking this medicine and for 6 months after stopping to prevent pregnancy.  Men with a female partner who is of childbearing age must use a condom and a second form of birth control during sexual activity while taking this medicine and for 3 months after stopping to prevent pregnancy.  Do not take Alex's wort while on this medicine.  Ask your pharmacist if this medicine can interact with any of your other medicines. Be sure to tell them about all the medicines you take.  Please tell all your doctors and dentists that you are on this medicine before they provide care.  Do not start or stop any other medicines without first speaking to your doctor or pharmacist.  Call your doctor right away if you notice any unusual bleeding or bruising.  Side Effects  The following is a list of some common side effects from this medicine. Please speak with your doctor about what you should do if you experience these or other side effects.    decreased  appetite    constipation    diarrhea    dizziness    drowsiness or sedation    feeling of heat or flushing    hair loss    increased risk for an infection    reaction at the area of the injection (pain, redness, swelling)    nausea    darkening of skin, especially face and breast    stomach upset or abdominal pain    vomiting  Call your doctor or get medical help right away if you notice any of these more serious side effects:    bleeding that is severe or takes longer to stop    unusual bruising or discoloration on skin    increased risk of cancer    chest pain    severe, watery or bloody diarrhea    swelling of the legs, feet, and hands    pain in the eye    fever or chills    flu-like symptoms    fast or irregular heart beats    pain in the joints    signs of kidney damage (such as bloody or bubbly urine, or changes in urine color or amount)    mouth sores or irritation    muscle cramps    muscle pain    muscle weakness    shortness of breath    sore throat    unusual or unexplained tiredness or weakness    blood in urine    blurring or changes of vision    sudden or unexplained weight gain  A few people may have an allergic reactions to this medicine. Symptoms can include difficulty breathing, skin rash, itching, swelling, or severe dizziness. If you notice any of these symptoms, seek medical help quickly.  Extra  Please speak with your doctor, nurse, or pharmacist if you have any questions about this medicine.  [email or web address]  IMPORTANT NOTE: This document tells you briefly how to take your medicine, but it does not tell you all there is to know about it.Your doctor or pharmacist may give you other documents about your medicine. Please talk to them if you have any questions.Always follow their advice. There is a more complete description of this medicine available in English.Scan this code on your smartphone or tablet or use the web address below. You can also ask your pharmacist  for a printout. If you have any questions, please ask your pharmacist.     2021 [employer].

## 2022-03-11 NOTE — TELEPHONE ENCOUNTER
Spoke with Lisa cameron regarding surgery scheduling   with Dr. Alcantara   at [facility] on  date: 3/16/2022 Estimated arrival 6:00 AM    Patient was informed of the followin. Patient has been informed to consult their PCP/Cardiologist about stopping their blood thinners about a week before surgery.  2. Pre Op Physical will need to be done within 30 days prior to surgery  3. Required Covid Test with in 4 days prior to surgery: Date: 3/12/2022, Location: 11:45  4. Ride required after surgery, can not use [employer] or public transportation.    Patient was informed that failure to do so may result in cancellation of surgery      Post Op:       Surgery Letter sent via

## 2022-03-11 NOTE — PROGRESS NOTES
"Oncology Rooming Note    March 11, 2022 11:03 AM   Lisa Reyna is a 71 year old female who presents for:    Chief Complaint   Patient presents with     Oncology Clinic Visit     Initial Vitals: BP (!) 197/82   Pulse 95   Temp 97.9  F (36.6  C)   Resp 18   Ht 1.575 m (5' 2\")   Wt 88.1 kg (194 lb 3.2 oz)   SpO2 98%   BMI 35.52 kg/m   Estimated body mass index is 35.52 kg/m  as calculated from the following:    Height as of this encounter: 1.575 m (5' 2\").    Weight as of this encounter: 88.1 kg (194 lb 3.2 oz). Body surface area is 1.96 meters squared.  No Pain (0) Comment: Data Unavailable   No LMP recorded. Patient is postmenopausal.  Allergies reviewed: Yes  Medications reviewed: Yes    Medications: Medication refills not needed today.  Pharmacy name entered into [facility]: [place] [address] [place] [address] [address] [place] [address]    Clinical concerns: None       Yamile Hernandez LPN            "

## 2022-03-11 NOTE — PROGRESS NOTES
[facility] Hematology and Oncology Consult Note    Patient: Lisa Reyna  MRN: [id number]  Date of Service: Mar 11, 2022       Reason for Visit    Chief Complaint   Patient presents with     Oncology Clinic Visit         Assessment/Plan    #.  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 Oncotype 30/19%, >15%.  #.  H/o DCIS of right breast in 2004.  #. CAD with h/o NSTEMI in 2014. On medical Mx.  #. HTN, uncontrolled.     Reviewed clinical course and pathology, oncotype result. Discussed the pathophysiology of breast cancer, stages, treatment goals, treatment options. She has stage I breast cancer. Treatment goal is cure. Discussed about adjuvant therapy to decrease breast cancer recurrence and improve overall survival. Given high oncotype score, she will benefit from adjuvant chemotherapy. She agreed with that. She has CAD and ongoing risk factors, therefore, I recommended non Adriamycin based chemotherapy with docetaxel/cyclophosamide every 3 weeks for 4 cycles. I reviewed the side effects.    Requested Dr. Alcantara for port placement.   She is considering reconstruction after right breast mastectomy. She asked about the benefit of prophylactic left breast mastectomy.  I explained that there would not likely benefit by doing prophylactic left breast mastectomy with current clinical information.  However, it is reasonable to discuss with genetic counselor and perform genetic testing to make educated decision.  She agree with that.  Referral to genetic counselor was made today.   Follow-up in a week or 2 after port placement and prior to starting cycle #1 adjuvant chemotherapy.  She will proceed with surgery after completion of chemotherapy.    ECOG Performance 0 - Independent    Encounter Diagnoses:    Problem List Items Addressed This Visit        Oncology Diagnoses    Malignant neoplasm of lower-inner quadrant of right breast of female, estrogen receptor positive (H) - Primary     Relevant Medications    prochlorperazine (COMPAZINE) 10 MG tablet    dexamethasone (DECADRON) 4 MG tablet    Other Relevant Orders    Cancer Risk Mgmt/Cancer Genetic Counseling Referral    Infusion Appointment Request       Other    Encounter for antineoplastic chemotherapy    Relevant Medications    prochlorperazine (COMPAZINE) 10 MG tablet    dexamethasone (DECADRON) 4 MG tablet    Other Relevant Orders    Infusion Appointment Request            ______________________________________________________________________________    Staging History  Cancer Staging  Malignant neoplasm of lower-inner quadrant of right breast of female, estrogen receptor positive (H)  Staging form: Breast, AJCC 8th Edition  - Pathologic stage from 2/11/2022: Stage IA (pT1c, pN0(sn), cM0, G2, ER+, NH+, HER2-, Oncotype DX score: 30) - Signed by Tamar Koch MD on 3/13/2022  - Clinical: No stage assigned - Unsigned        History    Ms. Lisa Reyna is a very pleasant 71 year old female presented today accompanied by her sister, Joan.  She is here to discuss about adjuvant therapy for recent diagnosis of right breast cancer.  1/18/2022-screening mammogram detected possible calcification in the right breast at 4 o'clock position, middle depth.  1/20/2022-diagnostic mammogram and ultrasound showed a hypoechoic mass with microlobulated and spiculated margins containing calcification measuring 10 x 10 x 8 mm correlating with mammographic abnormality.  Right axilla showed no evidence of lymphadenopathy.  1/21/2022-ultrasound-guided core needle biopsy of the right breast mass showed invasive ductal carcinoma.  2/11/2022-underwent right breast lumpectomy and right sentinel lymph node biopsy (Dr. Alcantara).  Invasive ductal carcinoma with focal micropapillary pattern.  Grade 2.  12 mm.  Margins were negative but close at 0.5 mm from the nearest junction of the inferior and medial margin, 0.5 mm from medial margin and 2 mm from inferior margin.   There is associated DCIS with EIC negative, nuclear grade intermediate, solid and focal cribriform pattern of 10%.  DCIS margins were uninvolved at 3 mm from nearest inferior margin, 5 mm from medial margins.  1 sentinel lymph node was negative for metastatic carcinoma.  ER positive, greater than 10% of the cell, 61-70%, VT positive (21-30%), HER-2 negative by IHC (1+).  Oncotype DX recurrence score 30/19% / >15%.    Because of the close margins of invasive carcinoma and DCIS, she is recommended to proceed with mastectomy.  She had prior history of right breast DCIS, post right breast lumpectomy (Dr. Jones) on 2004, post adjuvant radiation (Dr. Caleb Elena) followed by adjuvant tamoxifen for 5 years (Dr. Noé Fernandes).    She is here to discuss adjuvant therapy.  She has high blood pressure.  Her blood pressure is elevated today as she did not take her blood pressure medication this morning.  She has history of coronary artery disease and non-ST elevation MI in  when she had atypical presentation of cardiac chest pain (SOB and back pain).  No coronary intervention.  She was noted to have wall motion abnormalities by echocardiogram.  She is on medical management with aspirin, metoprolol, lisinopril and Zocor. She follows with Dr. Fernandez.    She has tingling in big toes for 6 months. No pain.     Retired from [employer] after 43 years. n  .  Lives with her 96 yo mother who is functional.Non smoker. Drinks ~2 drinks (wine/beer)/week.      Review of systems.  Apart from describing in history, the remainder of comprehensive ROS was negative.    Past History    Past Medical History:   Diagnosis Date     Arthritis      Asthma     Seaonal usually in spring     Breast cancer (H)     rt lumpect     Coronary artery disease      Endometrial polyp     was protruding through cervix     GERD (gastroesophageal reflux disease)      Heart attack (H)      Heart murmur      History of measles, mumps, or  rubella as a child    hx of measles, mumps and Bulgarian mealses. Rubella immune 5/1980     HTN (hypertension)      Hx of radiation therapy 2004     Hyperlipemia      Osteopenia      Other chronic pain      Ovarian cyst      Prediabetes      UTI (urinary tract infection)      Walking troubles      Past Surgical History:   Procedure Laterality Date     BIOPSY BREAST Left 2008    cyst removal     BIOPSY BREAST Right 2004     CERVICAL DISC SURGERY  6/25/2007    C6-7 sarah laminectomy with microdiskectomy     LUMPECTOMY BREAST Right 6/24/2004     LUMPECTOMY BREAST Right 2/11/2022    Procedure: Right Lumpectomy after Wire Localization; [place] Lymph Node Biopsy;  Surgeon: Mami Alcantara MD;  Location: [facility]     OOPHORECTOMY Bilateral 2011     TOTAL HIP ARTHROPLASTY Left 9/30/2009     TUBAL LIGATION  1/10/1992    with endometrial polyp removal     WRIST SURGERY       Family History   Problem Relation Age of Onset     Colon Cancer Maternal Grandfather      Cancer Paternal Grandmother      Throat cancer Paternal Grandfather      Breast Cancer No family hx of      Social History     Socioeconomic History     Marital status: Single     Spouse name: Not on file     Number of children: Not on file     Years of education: Not on file     Highest education level: Not on file   Occupational History     Not on file   Tobacco Use     Smoking status: Never Smoker     Smokeless tobacco: Never Used   Substance and Sexual Activity     Alcohol use: Yes     Alcohol/week: 5.0 standard drinks     Comment: Alcoholic Drinks/day: 5 drinks a week of wine/liquor/beer     Drug use: No     Sexual activity: Not on file   Other Topics Concern     Not on file   Social History Narrative     Not on file     Social Determinants of Health     Financial Resource Strain: Not on file   Food Insecurity: Not on file   Transportation Needs: Not on file   Physical Activity: Not on file   Stress: Not on file   Social Connections: Not on file   Intimate  "Partner Violence: Not on file   Housing Stability: Not on file       Allergies    Allergies   Allergen Reactions     Vicodin Es [Hydrocodone-Acetaminophen] Nausea and Vomiting     Does well with Tylenol #3     Bactrim [Sulfamethoxazole W/Trimethoprim] Other (See Comments)     Thrush     Morphine Nausea and Vomiting     Percocet [Oxycodone-Acetaminophen] Nausea and Vomiting       Physical Exam    BP (!) 197/82   Pulse 95   Temp 97.9  F (36.6  C)   Resp 18   Ht 1.575 m (5' 2\")   Wt 88.1 kg (194 lb 3.2 oz)   SpO2 98%   BMI 35.52 kg/m      General: alert, awake, not in acute distress  HEENT: Head: Normal, normocephalic, atraumatic.  Eye: Normal external eye, conjunctiva, lids cornea, ALENA.  Nose: Normal external nose, mucus membranes and septum.  Pharynx: Normal buccal mucosa. Normal pharynx.  Neck / Thyroid: Supple, no masses, nodes, nodules or enlargement.  Lymphatics: No abnormally enlarged lymph nodes.  Chest: Normal chest wall and respirations. Clear to auscultation.  Heart: S1 S2 RRR, no murmur.   Abdomen: abdomen is soft without significant tenderness, masses, organomegaly or guarding  Extremities: normal strength, tone, and muscle mass  Skin: normal. no rash or abnormalities  CNS: non focal.    Lab Results    Recent Results (from the past 168 hour(s))   Asymptomatic COVID-19 Virus (Coronavirus) by PCR Nose    Specimen: Nose; Swab   Result Value Ref Range    SARS CoV2 PCR Negative Negative, Testing sent to reference lab. Results will be returned via unsolicited result       Imaging Results    No results found.    I spent 60 minutes on the date of service, of which greater than 50% of the time was spent on counseling of the patent about the above medical conditions, educating patient on the medical conditions, treatment plan and support as well as coordination of care.    Signed by: Tamar Koch MD     "

## 2022-03-12 ENCOUNTER — LAB (OUTPATIENT)
Dept: FAMILY MEDICINE | Facility: CLINIC | Age: 72
End: 2022-03-12
Payer: MEDICARE

## 2022-03-12 DIAGNOSIS — Z11.59 ENCOUNTER FOR SCREENING FOR OTHER VIRAL DISEASES: ICD-10-CM

## 2022-03-12 LAB — SARS-COV-2 RNA RESP QL NAA+PROBE: NEGATIVE

## 2022-03-12 PROCEDURE — U0003 INFECTIOUS AGENT DETECTION BY NUCLEIC ACID (DNA OR RNA); SEVERE ACUTE RESPIRATORY SYNDROME CORONAVIRUS 2 (SARS-COV-2) (CORONAVIRUS DISEASE [COVID-19]), AMPLIFIED PROBE TECHNIQUE, MAKING USE OF HIGH THROUGHPUT TECHNOLOGIES AS DESCRIBED BY CMS-2020-01-R: HCPCS

## 2022-03-12 PROCEDURE — U0005 INFEC AGEN DETEC AMPLI PROBE: HCPCS

## 2022-03-14 VITALS — HEIGHT: 62 IN | BODY MASS INDEX: 34.6 KG/M2 | WEIGHT: 188 LBS

## 2022-03-15 ENCOUNTER — TELEPHONE (OUTPATIENT)
Dept: ONCOLOGY | Facility: CLINIC | Age: 72
End: 2022-03-15
Payer: MEDICARE

## 2022-03-15 RX ORDER — FENTANYL CITRATE 0.05 MG/ML
50 INJECTION, SOLUTION INTRAMUSCULAR; INTRAVENOUS
Status: CANCELLED | OUTPATIENT
Start: 2022-03-15

## 2022-03-15 RX ORDER — DOXYCYCLINE 100 MG/1
100 CAPSULE ORAL 2 TIMES DAILY
COMMUNITY
End: 2022-03-15 | Stop reason: HOSPADM

## 2022-03-15 RX ORDER — GUAIFENESIN AND DEXTROMETHORPHAN HYDROBROMIDE 600; 30 MG/1; MG/1
1 TABLET, EXTENDED RELEASE ORAL EVERY 12 HOURS
COMMUNITY
End: 2022-03-15 | Stop reason: HOSPADM

## 2022-03-15 RX ORDER — ALBUTEROL SULFATE 90 UG/1
2 AEROSOL, METERED RESPIRATORY (INHALATION) EVERY 4 HOURS PRN
COMMUNITY
End: 2022-03-15 | Stop reason: HOSPADM

## 2022-03-15 RX ORDER — LIDOCAINE 40 MG/G
CREAM TOPICAL
Status: CANCELLED | OUTPATIENT
Start: 2022-03-15

## 2022-03-15 RX ORDER — SODIUM CHLORIDE, SODIUM LACTATE, POTASSIUM CHLORIDE, CALCIUM CHLORIDE 600; 310; 30; 20 MG/100ML; MG/100ML; MG/100ML; MG/100ML
INJECTION, SOLUTION INTRAVENOUS CONTINUOUS
Status: CANCELLED | OUTPATIENT
Start: 2022-03-15

## 2022-03-15 NOTE — TELEPHONE ENCOUNTER
"Lisa called to relay that she just saw her PCP for \"the crud\". She had an x-ray of her chest that she said\"looked good\" but she can not lay flat on her back without coughing. She is not sure what her diagnosis was but was put on an antibiotic and an inhaler. She has not yet picked up her medication so does not know what medication or how long she will be on this. She is scheduled for her port placement on 3/16 and spoke with Dr. Alcantara's office and they are planning to cancel her placement scheduled on 3/16.  Hre port placement has not been rescheduled at this time.She is scheduled for chemo start on 3/25. She wonders is her chemo and possible rescheduling. Will discuss with provider and follow up with her afterward/Jami Tran RN  "

## 2022-03-16 ENCOUNTER — HOSPITAL ENCOUNTER (OUTPATIENT)
Facility: AMBULATORY SURGERY CENTER | Age: 72
End: 2022-03-16
Attending: SPECIALIST
Payer: MEDICARE

## 2022-03-16 ENCOUNTER — TELEPHONE (OUTPATIENT)
Dept: SURGERY | Facility: CLINIC | Age: 72
End: 2022-03-16
Payer: MEDICARE

## 2022-03-16 DIAGNOSIS — Z11.59 ENCOUNTER FOR SCREENING FOR OTHER VIRAL DISEASES: Primary | ICD-10-CM

## 2022-03-16 NOTE — TELEPHONE ENCOUNTER
Spoke with Lisa today to r/s surgery due to a recent breathing issue and medication prescribed by the PCP.  She will be done with the medication as of 3/26.    Port placement has been r/s to 4/1 at [facility] with Dr. Maricruz mayorga test scheduled at 11 am on 3/28 at [facility].    [facility] notified of the new procedure date.

## 2022-03-16 NOTE — TELEPHONE ENCOUNTER
BONITA left when reaching out to Lisa, to r/s Port Placement that was scheduled for this a.m.  Let her know that when she is ready to schedule this let us know.  Dr. Alcantara is out next week and we could possible get her in on March 28 or 30, even April 1st.

## 2022-03-17 NOTE — TELEPHONE ENCOUNTER
"Called Lisa to follow up regarding conversation from 3/15. Lisa started that she started antibx, doxycycline 100 mg BID x 10 days,. Yesterday for the \"rcrud\" She also picked up an inhaler but has not yet started using. She feels a little bit better already today. Her last dosing of antibx will be on 3/25. She is scheduled to start chemo on 3/25 and relayed that writer spoke with Dr. Koch and she is ok with her starting chemo as scheduled on that day. Chemo will be given peripherally. She was rescheduled for port placement on 4/1 (needed to be r/s due to being ill. She made a dental cleaning apt on 3/30 and shared that Dr. Koch would prefer that she reschedule her apt to around 10 days after her chemo to allow hr counts to recover. She also wondered about a 4th dose of a covid shot and relayed that if she wants to pursue  Dr. Koch recommends getting around day 10 , as well.   Confirmed chemo time and talked about things to bring to clinic such as nook, headphones to listen to music, light lunch, snacks, etc. Also reviewed hair loss resources. Full packet of materials will be provided when she comes to clinic. She has our contact information for future reference/Jami Tran RN  "

## 2022-03-25 ENCOUNTER — ONCOLOGY VISIT (OUTPATIENT)
Dept: ONCOLOGY | Facility: CLINIC | Age: 72
End: 2022-03-25
Attending: NURSE PRACTITIONER
Payer: MEDICARE

## 2022-03-25 ENCOUNTER — LAB (OUTPATIENT)
Dept: INFUSION THERAPY | Facility: CLINIC | Age: 72
End: 2022-03-25
Attending: NURSE PRACTITIONER
Payer: MEDICARE

## 2022-03-25 VITALS
BODY MASS INDEX: 35.21 KG/M2 | DIASTOLIC BLOOD PRESSURE: 88 MMHG | TEMPERATURE: 97.7 F | SYSTOLIC BLOOD PRESSURE: 199 MMHG | WEIGHT: 192.5 LBS | RESPIRATION RATE: 18 BRPM | OXYGEN SATURATION: 97 % | HEART RATE: 95 BPM

## 2022-03-25 DIAGNOSIS — C50.311 MALIGNANT NEOPLASM OF LOWER-INNER QUADRANT OF RIGHT BREAST OF FEMALE, ESTROGEN RECEPTOR POSITIVE (H): ICD-10-CM

## 2022-03-25 DIAGNOSIS — Z51.11 ENCOUNTER FOR ANTINEOPLASTIC CHEMOTHERAPY: ICD-10-CM

## 2022-03-25 DIAGNOSIS — Z17.0 MALIGNANT NEOPLASM OF LOWER-INNER QUADRANT OF RIGHT BREAST OF FEMALE, ESTROGEN RECEPTOR POSITIVE (H): ICD-10-CM

## 2022-03-25 DIAGNOSIS — Z51.11 ENCOUNTER FOR ANTINEOPLASTIC CHEMOTHERAPY: Primary | ICD-10-CM

## 2022-03-25 DIAGNOSIS — Z17.0 MALIGNANT NEOPLASM OF LOWER-INNER QUADRANT OF RIGHT BREAST OF FEMALE, ESTROGEN RECEPTOR POSITIVE (H): Primary | ICD-10-CM

## 2022-03-25 DIAGNOSIS — C50.311 MALIGNANT NEOPLASM OF LOWER-INNER QUADRANT OF RIGHT BREAST OF FEMALE, ESTROGEN RECEPTOR POSITIVE (H): Primary | ICD-10-CM

## 2022-03-25 LAB
ALBUMIN SERPL-MCNC: 4.1 G/DL (ref 3.5–5)
ALP SERPL-CCNC: 73 U/L (ref 45–120)
ALT SERPL W P-5'-P-CCNC: 9 U/L (ref 0–45)
ANION GAP SERPL CALCULATED.3IONS-SCNC: 10 MMOL/L (ref 5–18)
AST SERPL W P-5'-P-CCNC: 14 U/L (ref 0–40)
BASOPHILS # BLD AUTO: 0.1 10E3/UL (ref 0–0.2)
BASOPHILS NFR BLD AUTO: 1 %
BILIRUB SERPL-MCNC: 0.6 MG/DL (ref 0–1)
BUN SERPL-MCNC: 16 MG/DL (ref 8–28)
CALCIUM SERPL-MCNC: 9.7 MG/DL (ref 8.5–10.5)
CHLORIDE BLD-SCNC: 103 MMOL/L (ref 98–107)
CO2 SERPL-SCNC: 24 MMOL/L (ref 22–31)
CREAT SERPL-MCNC: 0.97 MG/DL (ref 0.6–1.1)
EOSINOPHIL # BLD AUTO: 0.5 10E3/UL (ref 0–0.7)
EOSINOPHIL NFR BLD AUTO: 6 %
ERYTHROCYTE [DISTWIDTH] IN BLOOD BY AUTOMATED COUNT: 12.8 % (ref 10–15)
GFR SERPL CREATININE-BSD FRML MDRD: 62 ML/MIN/1.73M2
GLUCOSE BLD-MCNC: 123 MG/DL (ref 70–125)
HCT VFR BLD AUTO: 41.4 % (ref 35–47)
HGB BLD-MCNC: 14 G/DL (ref 11.7–15.7)
IMM GRANULOCYTES # BLD: 0 10E3/UL
IMM GRANULOCYTES NFR BLD: 0 %
LYMPHOCYTES # BLD AUTO: 1.9 10E3/UL (ref 0.8–5.3)
LYMPHOCYTES NFR BLD AUTO: 25 %
MCH RBC QN AUTO: 30.3 PG (ref 26.5–33)
MCHC RBC AUTO-ENTMCNC: 33.8 G/DL (ref 31.5–36.5)
MCV RBC AUTO: 90 FL (ref 78–100)
MONOCYTES # BLD AUTO: 0.8 10E3/UL (ref 0–1.3)
MONOCYTES NFR BLD AUTO: 10 %
NEUTROPHILS # BLD AUTO: 4.5 10E3/UL (ref 1.6–8.3)
NEUTROPHILS NFR BLD AUTO: 58 %
NRBC # BLD AUTO: 0 10E3/UL
NRBC BLD AUTO-RTO: 0 /100
PLATELET # BLD AUTO: 296 10E3/UL (ref 150–450)
POTASSIUM BLD-SCNC: 3.9 MMOL/L (ref 3.5–5)
PROT SERPL-MCNC: 7.2 G/DL (ref 6–8)
RBC # BLD AUTO: 4.62 10E6/UL (ref 3.8–5.2)
SODIUM SERPL-SCNC: 137 MMOL/L (ref 136–145)
WBC # BLD AUTO: 7.7 10E3/UL (ref 4–11)

## 2022-03-25 PROCEDURE — 36415 COLL VENOUS BLD VENIPUNCTURE: CPT | Performed by: INTERNAL MEDICINE

## 2022-03-25 PROCEDURE — 96375 TX/PRO/DX INJ NEW DRUG ADDON: CPT

## 2022-03-25 PROCEDURE — 96377 APPLICATON ON-BODY INJECTOR: CPT | Mod: XS

## 2022-03-25 PROCEDURE — 96413 CHEMO IV INFUSION 1 HR: CPT

## 2022-03-25 PROCEDURE — 96372 THER/PROPH/DIAG INJ SC/IM: CPT | Performed by: NURSE PRACTITIONER

## 2022-03-25 PROCEDURE — 250N000011 HC RX IP 250 OP 636: Performed by: NURSE PRACTITIONER

## 2022-03-25 PROCEDURE — 96367 TX/PROPH/DG ADDL SEQ IV INF: CPT

## 2022-03-25 PROCEDURE — 85025 COMPLETE CBC W/AUTO DIFF WBC: CPT | Performed by: INTERNAL MEDICINE

## 2022-03-25 PROCEDURE — 96417 CHEMO IV INFUS EACH ADDL SEQ: CPT

## 2022-03-25 PROCEDURE — 80053 COMPREHEN METABOLIC PANEL: CPT | Performed by: INTERNAL MEDICINE

## 2022-03-25 PROCEDURE — G0463 HOSPITAL OUTPT CLINIC VISIT: HCPCS | Mod: 25

## 2022-03-25 PROCEDURE — 258N000003 HC RX IP 258 OP 636: Performed by: NURSE PRACTITIONER

## 2022-03-25 PROCEDURE — 99214 OFFICE O/P EST MOD 30 MIN: CPT | Performed by: NURSE PRACTITIONER

## 2022-03-25 RX ORDER — EPINEPHRINE 1 MG/ML
0.3 INJECTION, SOLUTION, CONCENTRATE INTRAVENOUS EVERY 5 MIN PRN
Status: DISCONTINUED | OUTPATIENT
Start: 2022-03-25 | End: 2022-03-25 | Stop reason: HOSPADM

## 2022-03-25 RX ORDER — ALBUTEROL SULFATE 0.83 MG/ML
2.5 SOLUTION RESPIRATORY (INHALATION)
Status: CANCELLED | OUTPATIENT
Start: 2022-03-25

## 2022-03-25 RX ORDER — ALBUTEROL SULFATE 0.83 MG/ML
2.5 SOLUTION RESPIRATORY (INHALATION)
Status: DISCONTINUED | OUTPATIENT
Start: 2022-03-25 | End: 2022-03-25 | Stop reason: HOSPADM

## 2022-03-25 RX ORDER — ALBUTEROL SULFATE 90 UG/1
1-2 AEROSOL, METERED RESPIRATORY (INHALATION)
Status: CANCELLED
Start: 2022-03-25

## 2022-03-25 RX ORDER — HEPARIN SODIUM,PORCINE 10 UNIT/ML
5 VIAL (ML) INTRAVENOUS
Status: CANCELLED | OUTPATIENT
Start: 2022-03-25

## 2022-03-25 RX ORDER — ALBUTEROL SULFATE 90 UG/1
1-2 AEROSOL, METERED RESPIRATORY (INHALATION)
Status: DISCONTINUED | OUTPATIENT
Start: 2022-03-25 | End: 2022-03-25 | Stop reason: HOSPADM

## 2022-03-25 RX ORDER — PALONOSETRON 0.05 MG/ML
0.25 INJECTION, SOLUTION INTRAVENOUS ONCE
Status: COMPLETED | OUTPATIENT
Start: 2022-03-25 | End: 2022-03-25

## 2022-03-25 RX ORDER — PALONOSETRON 0.05 MG/ML
0.25 INJECTION, SOLUTION INTRAVENOUS ONCE
Status: CANCELLED
Start: 2022-03-25 | End: 2022-03-25

## 2022-03-25 RX ORDER — METHYLPREDNISOLONE SODIUM SUCCINATE 125 MG/2ML
125 INJECTION, POWDER, LYOPHILIZED, FOR SOLUTION INTRAMUSCULAR; INTRAVENOUS
Status: CANCELLED
Start: 2022-03-25

## 2022-03-25 RX ORDER — MEPERIDINE HYDROCHLORIDE 50 MG/ML
25 INJECTION INTRAMUSCULAR; INTRAVENOUS; SUBCUTANEOUS EVERY 30 MIN PRN
Status: CANCELLED | OUTPATIENT
Start: 2022-03-25

## 2022-03-25 RX ORDER — NALOXONE HYDROCHLORIDE 0.4 MG/ML
0.2 INJECTION, SOLUTION INTRAMUSCULAR; INTRAVENOUS; SUBCUTANEOUS
Status: DISCONTINUED | OUTPATIENT
Start: 2022-03-25 | End: 2022-03-25 | Stop reason: HOSPADM

## 2022-03-25 RX ORDER — DIPHENHYDRAMINE HYDROCHLORIDE 50 MG/ML
50 INJECTION INTRAMUSCULAR; INTRAVENOUS
Status: CANCELLED
Start: 2022-03-25

## 2022-03-25 RX ORDER — METHYLPREDNISOLONE SODIUM SUCCINATE 125 MG/2ML
125 INJECTION, POWDER, LYOPHILIZED, FOR SOLUTION INTRAMUSCULAR; INTRAVENOUS
Status: DISCONTINUED | OUTPATIENT
Start: 2022-03-25 | End: 2022-03-25 | Stop reason: HOSPADM

## 2022-03-25 RX ORDER — ALBUTEROL SULFATE 90 UG/1
AEROSOL, METERED RESPIRATORY (INHALATION)
COMMUNITY
Start: 2022-03-15 | End: 2024-02-26

## 2022-03-25 RX ORDER — LORAZEPAM 2 MG/ML
0.5 INJECTION INTRAMUSCULAR EVERY 4 HOURS PRN
Status: CANCELLED | OUTPATIENT
Start: 2022-03-25

## 2022-03-25 RX ORDER — EPINEPHRINE 1 MG/ML
0.3 INJECTION, SOLUTION, CONCENTRATE INTRAVENOUS EVERY 5 MIN PRN
Status: CANCELLED | OUTPATIENT
Start: 2022-03-25

## 2022-03-25 RX ORDER — NALOXONE HYDROCHLORIDE 0.4 MG/ML
0.2 INJECTION, SOLUTION INTRAMUSCULAR; INTRAVENOUS; SUBCUTANEOUS
Status: CANCELLED | OUTPATIENT
Start: 2022-03-25

## 2022-03-25 RX ORDER — HEPARIN SODIUM (PORCINE) LOCK FLUSH IV SOLN 100 UNIT/ML 100 UNIT/ML
5 SOLUTION INTRAVENOUS
Status: CANCELLED | OUTPATIENT
Start: 2022-03-25

## 2022-03-25 RX ORDER — MEPERIDINE HYDROCHLORIDE 50 MG/ML
25 INJECTION INTRAMUSCULAR; INTRAVENOUS; SUBCUTANEOUS EVERY 30 MIN PRN
Status: DISCONTINUED | OUTPATIENT
Start: 2022-03-25 | End: 2022-03-25 | Stop reason: HOSPADM

## 2022-03-25 RX ORDER — DIPHENHYDRAMINE HYDROCHLORIDE 50 MG/ML
50 INJECTION INTRAMUSCULAR; INTRAVENOUS
Status: DISCONTINUED | OUTPATIENT
Start: 2022-03-25 | End: 2022-03-25 | Stop reason: HOSPADM

## 2022-03-25 RX ADMIN — CYCLOPHOSPHAMIDE 1175 MG: 1 INJECTION, POWDER, FOR SOLUTION INTRAVENOUS; ORAL at 15:11

## 2022-03-25 RX ADMIN — PEGFILGRASTIM 6 MG: KIT SUBCUTANEOUS at 15:38

## 2022-03-25 RX ADMIN — DOCETAXEL 148 MG: 20 INJECTION, SOLUTION INTRAVENOUS at 14:11

## 2022-03-25 RX ADMIN — DEXAMETHASONE SODIUM PHOSPHATE 12 MG: 10 INJECTION, SOLUTION INTRAMUSCULAR; INTRAVENOUS at 13:33

## 2022-03-25 RX ADMIN — PALONOSETRON 0.25 MG: 0.05 INJECTION, SOLUTION INTRAVENOUS at 13:33

## 2022-03-25 RX ADMIN — SODIUM CHLORIDE 250 ML: 9 INJECTION, SOLUTION INTRAVENOUS at 13:32

## 2022-03-25 ASSESSMENT — PAIN SCALES - GENERAL: PAINLEVEL: NO PAIN (0)

## 2022-03-25 NOTE — PROGRESS NOTES
"Oncology Rooming Note    March 25, 2022 12:24 PM   Lisa Reyna is a 71 year old female who presents for:    Chief Complaint   Patient presents with     Oncology Clinic Visit     Malignant neoplasm of lower-inner quadrant of right breast of female, estrogen receptor positive.      Initial Vitals: BP (!) 199/88 (BP Location: Left arm, Patient Position: Sitting, Cuff Size: Adult Large)   Pulse 95   Temp 97.7  F (36.5  C) (Oral)   Resp 18   Wt 87.3 kg (192 lb 8 oz)   SpO2 97%   BMI 35.21 kg/m   Estimated body mass index is 35.21 kg/m  as calculated from the following:    Height as of 3/14/22: 1.575 m (5' 2\").    Weight as of this encounter: 87.3 kg (192 lb 8 oz). Body surface area is 1.95 meters squared.  Data Unavailable Comment: Data Unavailable   No LMP recorded. Patient is postmenopausal.  Allergies reviewed: Yes  Medications reviewed: Yes    Medications: Medication refills not needed today.  Pharmacy name entered into [facility]: [address] [place] [address]    Clinical concerns: Visit with Provider, review labs.      Michelle Thompson RN              "

## 2022-03-25 NOTE — PATIENT INSTRUCTIONS
Recent Results (from the past 24 hour(s))   Comprehensive metabolic panel    Collection Time: 03/25/22 12:05 PM   Result Value Ref Range    Sodium 137 136 - 145 mmol/L    Potassium 3.9 3.5 - 5.0 mmol/L    Chloride 103 98 - 107 mmol/L    Carbon Dioxide (CO2) 24 22 - 31 mmol/L    Anion Gap 10 5 - 18 mmol/L    Urea Nitrogen 16 8 - 28 mg/dL    Creatinine 0.97 0.60 - 1.10 mg/dL    Calcium 9.7 8.5 - 10.5 mg/dL    Glucose 123 70 - 125 mg/dL    Alkaline Phosphatase 73 45 - 120 U/L    AST 14 0 - 40 U/L    ALT 9 0 - 45 U/L    Protein Total 7.2 6.0 - 8.0 g/dL    Albumin 4.1 3.5 - 5.0 g/dL    Bilirubin Total 0.6 0.0 - 1.0 mg/dL    GFR Estimate 62 >60 mL/min/1.73m2   CBC with platelets and differential    Collection Time: 03/25/22 12:05 PM   Result Value Ref Range    WBC Count 7.7 4.0 - 11.0 10e3/uL    RBC Count 4.62 3.80 - 5.20 10e6/uL    Hemoglobin 14.0 11.7 - 15.7 g/dL    Hematocrit 41.4 35.0 - 47.0 %    MCV 90 78 - 100 fL    MCH 30.3 26.5 - 33.0 pg    MCHC 33.8 31.5 - 36.5 g/dL    RDW 12.8 10.0 - 15.0 %    Platelet Count 296 150 - 450 10e3/uL    % Neutrophils 58 %    % Lymphocytes 25 %    % Monocytes 10 %    % Eosinophils 6 %    % Basophils 1 %    % Immature Granulocytes 0 %    NRBCs per 100 WBC 0 <1 /100    Absolute Neutrophils 4.5 1.6 - 8.3 10e3/uL    Absolute Lymphocytes 1.9 0.8 - 5.3 10e3/uL    Absolute Monocytes 0.8 0.0 - 1.3 10e3/uL    Absolute Eosinophils 0.5 0.0 - 0.7 10e3/uL    Absolute Basophils 0.1 0.0 - 0.2 10e3/uL    Absolute Immature Granulocytes 0.0 <=0.4 10e3/uL    Absolute NRBCs 0.0 10e3/uL

## 2022-03-25 NOTE — PROGRESS NOTES
[facility] Hematology and Oncology Progress Note    Patient: Lisa Reyna  MRN: [id number]  Date of Service: Mar 25, 2022         Reason for Visit:    D1C1 adjuvant TC chemotherapy + Neulasta.    Assessment and Plan:    1. Stage IA, ER+, SC+, HER2-, invasive ductal carcinoma of LIQ (R) breast.    71 year old with history of DCIS (R) breast in 2004 s/p (R) lumpectomy (Dr. Jones), breast conservation EBRT (Dr. Caleb Elena), followed by adjuvant tamoxifen for 5 (?) years (Dr. Noé Fernandes).  CAD with h/o NSTEMI in  - on medical Mx and HTN, uncontrolled.  History of wall motion abnormalities by echocardiogram.  She is on medical management with aspirin, metoprolol, lisinopril and Zocor. She follows with Dr. Fernandez.  History of  tingling in big toes past 6 months - no pain. Retired from  after 43 years.  .  Lives with her 98 yo mother who is functional.  Non smoker. Drinks ~2 drinks (wine/beer)/week.      Lisa presents today accompanied by her sister, Joan, anticipating starting adjuvant chemotherapy.  She overall looks and feels good.  Her appetite is good and weight stable.  She denies cough, fever, chills, unusual headaches, visual or mentation disturbance, bowel or bladder issues, rash.    CBC - WNL.    CMP - WNL.    I reviewed planned adjuvant docetaxel + cyclophosamide (TC) chemotherapy cycling every 3 weeks for 4 cycles.  I reviewed potential and common side effects of TC chemotherapy, including but not limited to cytopenias, infection, GI disturbances, rash.  I reviewed prescribed dexamethasone and Compazine anti-emetics and encouraged her to be proactive in treating any nausea, vomiting or bowel issues.  Residual questions and concerns were addressed.  She wishes to proceed.    A port-a-cath has not been placed due to having an URI at the time it was scheduled.  Since she is only having 4 treatments, I told her if all goes well with today's IV treatment she does not need a  port-a-cath placed and it can be cancelled.  She was happy to hear this and hopes today's infusion goes well.    Proceed with D1C1 adjuvant TC chemotherapy + Neulasta.    I reviewed potential for bone pain with Neulasta and encouraged OTC Claritin daily for days -1 through +3-5 following each Neulasta injection.      Instructed to contact us with fever >100.4F as antibiotics or even hospitalization may be indicated.    Lisa has received 3-SARScovid.19 vaccinations.    06/06/2022 - scheduled consult with Genetics.    04/04/2022 - mid-C1 f/up with CBC and BMP and possible IVFs.    04/15/2022 - f/up D1C2 adjuvant TC chemotherapy + Neulasta.    Due to (+) margins, anticipating (R) mastectomy and reconstruction after completion of chemotherapy.     Oncologic History:    1. Malignant neoplasm LIQ (R) breast    Pathologic Stage IA (pT1c, pN0(sn), cM0)    G2    ER+ 70%.  NM+ 30%.  HER-2 negative by IHC (1+)     Oncotype DX score: 30       01/18/2022 - screening mammogram detected possible calcification in the right breast at 4 o'clock position, middle depth.    01/20/2022 - diagnostic mammogram and ultrasound showed a hypoechoic mass with microlobulated and spiculated margins containing calcification measuring 10 x 10 x 8 mm correlating with mammographic abnormality.  Right axilla showed no evidence of lymphadenopathy.    01/21/2022 US core needle biopsy of the right breast mass - confirmed  invasive ductal carcinoma.    02/11/2022 (R) breast lumpectomy and right sentinel lymph node biopsy (Dr. Alcantara).      Pathology showed:    12 mm invasive ductal carcinoma with focal micropapillary pattern.  Grade 2.      Margins were negative but close at 0.5 mm from the nearest junction of the inferior and medial margin, 0.5 mm from medial margin and 2 mm from inferior margin.      Associated DCIS with EIC negative, nuclear grade intermediate, solid and focal cribriform pattern of 10%.  DCIS margins were uninvolved at 3 mm from  nearest inferior margin, 5 mm from medial margins.      1 sentinel lymph node was negative for metastatic carcinoma.      03/11/2022 consult with Dr Koch - because of the close margins of invasive carcinoma and DCIS, recommended mastectomy s/p adjuvant chemotherapy with docetaxel + cyclophosamide (TC) every 3 weeks for 4 cycles.      03/25/2022 - D1C1 adjuvant TC chemotherapy                ECOG Performance:    0 - Independent    Pain:    Pain Score: No Pain (0)    Encounter Diagnoses:    Problem List Items Addressed This Visit        Other    Malignant neoplasm of lower-inner quadrant of right breast of female, estrogen receptor positive (H) - Primary    Relevant Orders    CBC with platelets differential (Completed)    Comprehensive metabolic panel (Completed)    Infusion Appointment Request    Encounter for antineoplastic chemotherapy    Relevant Orders    CBC with platelets differential (Completed)    Comprehensive metabolic panel (Completed)    Infusion Appointment Request             Total time 38 minutes.    Cortez Vivas, CNP    CC: Cayla Skelton, NP   ______________________________________________________________________________    Review of Systems:    No fever or night sweats.  No loss of weight.  No lumps or bumps anywhere.  No unusual headaches or eyesight issues.  No dizziness.  No bleeding from the nose.  No sores in the mouth. No problems with swallowing.  No chest pain. No shortness of breath. No cough.  No abdominal pain. No nausea or vomiting.  No diarrhea or constipation.  No blood in stool or black colored stools.  No problems passing urine.  No numbness or tingling in hands or feet.  No skin rashes.    A 14 point review of systems is otherwise negative.    Past History:    Past Medical History:   Diagnosis Date     Arthritis      Asthma     Seaonal usually in spring     Breast cancer (H) 2004    rt lumpect     Coronary artery disease      Endometrial polyp 1992    was protruding through cervix      GERD (gastroesophageal reflux disease)      Heart attack (H)      Heart murmur      History of measles, mumps, or rubella as a child    hx of measles, mumps and [place] mealses. Rubella immune 5/1980     HTN (hypertension)      Hx of radiation therapy 2004     Hyperlipemia      Osteopenia      Other chronic pain      Ovarian cyst      Prediabetes      UTI (urinary tract infection)      Walking troubles        Past Surgical History:   Procedure Laterality Date     BIOPSY BREAST Left 2008    cyst removal     BIOPSY BREAST Right 2004     CERVICAL DISC SURGERY  6/25/2007    C6-7 sarah laminectomy with microdiskectomy     LUMPECTOMY BREAST Right 6/24/2004     LUMPECTOMY BREAST Right 2/11/2022    Procedure: Right Lumpectomy after Wire Localization; [place] Lymph Node Biopsy;  Surgeon: Mami Alcantara MD;  Location: [facility]     OOPHORECTOMY Bilateral 2011     TOTAL HIP ARTHROPLASTY Left 9/30/2009     TUBAL LIGATION  1/10/1992    with endometrial polyp removal     WRIST SURGERY       Physical Exam:    BP (!) 199/88 (BP Location: Left arm, Patient Position: Sitting, Cuff Size: Adult Large)   Pulse 95   Temp 97.7  F (36.5  C) (Oral)   Resp 18   Wt 87.3 kg (192 lb 8 oz)   SpO2 97%   BMI 35.21 kg/m      GENERAL:   Alert and oriented. Seated comfortably. In no distress.    HEENT:   Atraumatic and normocephalic. ALENA. EOMI.  No pallor.  No icterus.  No mucosal lesions.    LYMPH NODES:  No palpable cervical, axillary or inguinal lymphadenopathy.    CHEST:   Lungs clear to auscultation bilaterally.    BREASTS:  (R) c/w lumpectomy and sentinel lymph node biopsy.  Incisions healing nicely.  No s/s infection.    CVS:    S1 and S2 heard. Regular rate and rhythm.  No murmur, gallop or rub heard.  No peripheral edema.    ABDOMEN:   Soft.  Not tender.  Not distended.  No palpable hepatomegaly or splenomegaly.    EXTREMITIES:  Warm.    SKIN:    No rash, bruising or purpura noted.  Full head of hair.    Lab  Results:    Reviewed with patient.    Recent Results (from the past 168 hour(s))   Comprehensive metabolic panel   Result Value Ref Range    Sodium 137 136 - 145 mmol/L    Potassium 3.9 3.5 - 5.0 mmol/L    Chloride 103 98 - 107 mmol/L    Carbon Dioxide (CO2) 24 22 - 31 mmol/L    Anion Gap 10 5 - 18 mmol/L    Urea Nitrogen 16 8 - 28 mg/dL    Creatinine 0.97 0.60 - 1.10 mg/dL    Calcium 9.7 8.5 - 10.5 mg/dL    Glucose 123 70 - 125 mg/dL    Alkaline Phosphatase 73 45 - 120 U/L    AST 14 0 - 40 U/L    ALT 9 0 - 45 U/L    Protein Total 7.2 6.0 - 8.0 g/dL    Albumin 4.1 3.5 - 5.0 g/dL    Bilirubin Total 0.6 0.0 - 1.0 mg/dL    GFR Estimate 62 >60 mL/min/1.73m2   CBC with platelets and differential   Result Value Ref Range    WBC Count 7.7 4.0 - 11.0 10e3/uL    RBC Count 4.62 3.80 - 5.20 10e6/uL    Hemoglobin 14.0 11.7 - 15.7 g/dL    Hematocrit 41.4 35.0 - 47.0 %    MCV 90 78 - 100 fL    MCH 30.3 26.5 - 33.0 pg    MCHC 33.8 31.5 - 36.5 g/dL    RDW 12.8 10.0 - 15.0 %    Platelet Count 296 150 - 450 10e3/uL    % Neutrophils 58 %    % Lymphocytes 25 %    % Monocytes 10 %    % Eosinophils 6 %    % Basophils 1 %    % Immature Granulocytes 0 %    NRBCs per 100 WBC 0 <1 /100    Absolute Neutrophils 4.5 1.6 - 8.3 10e3/uL    Absolute Lymphocytes 1.9 0.8 - 5.3 10e3/uL    Absolute Monocytes 0.8 0.0 - 1.3 10e3/uL    Absolute Eosinophils 0.5 0.0 - 0.7 10e3/uL    Absolute Basophils 0.1 0.0 - 0.2 10e3/uL    Absolute Immature Granulocytes 0.0 <=0.4 10e3/uL    Absolute NRBCs 0.0 10e3/uL     Imaging:    No results found.

## 2022-03-25 NOTE — LETTER
3/25/2022         RE: Lisa Reyna  [address] [place] [address]        Dear Colleague,    Thank you for referring your patient, Lisa Reyna, to the [facility] [place]. Please see a copy of my visit note below.    [facility] Hematology and Oncology Progress Note    Patient: Lisa Reyna  MRN: [id number]  Date of Service: Mar 25, 2022         Reason for Visit:    D1C1 adjuvant TC chemotherapy + Neulasta.    Assessment and Plan:    1. Stage IA, ER+, KS+, HER2-, invasive ductal carcinoma of LIQ (R) breast.    71 year old with history of DCIS (R) breast in  s/p (R) lumpectomy (Dr. Jones), breast conservation EBRT (Dr. Caleb Elena), followed by adjuvant tamoxifen for 5 (?) years (Dr. Noé Fernandes).  CAD with h/o NSTEMI in  - on medical Mx and HTN, uncontrolled.  History of wall motion abnormalities by echocardiogram.  She is on medical management with aspirin, metoprolol, lisinopril and Zocor. She follows with Dr. Fernandez.  History of  tingling in big toes past 6 months - no pain. Retired from  after 43 years.  .  Lives with her 98 yo mother who is functional.  Non smoker. Drinks ~2 drinks (wine/beer)/week.      Lisa presents today accompanied by her sister, Joan, anticipating starting adjuvant chemotherapy.  She overall looks and feels good.  Her appetite is good and weight stable.  She denies cough, fever, chills, unusual headaches, visual or mentation disturbance, bowel or bladder issues, rash.    CBC - WNL.    CMP - WNL.    I reviewed planned adjuvant docetaxel + cyclophosamide (TC) chemotherapy cycling every 3 weeks for 4 cycles.  I reviewed potential and common side effects of TC chemotherapy, including but not limited to cytopenias, infection, GI disturbances, rash.  I reviewed prescribed dexamethasone and Compazine anti-emetics and encouraged her to be proactive in treating any nausea, vomiting or bowel issues.  Residual questions  and concerns were addressed.  She wishes to proceed.    A port-a-cath has not been placed due to having an URI at the time it was scheduled.  Since she is only having 4 treatments, I told her if all goes well with today's IV treatment she does not need a port-a-cath placed and it can be cancelled.  She was happy to hear this and hopes today's infusion goes well.    Proceed with D1C1 adjuvant TC chemotherapy + Neulasta.    I reviewed potential for bone pain with Neulasta and encouraged OTC Claritin daily for days -1 through +3-5 following each Neulasta injection.      Instructed to contact us with fever >100.4F as antibiotics or even hospitalization may be indicated.    Lisa has received 3-SARScovid.19 vaccinations.    06/06/2022 - scheduled consult with Genetics.    04/04/2022 - mid-C1 f/up with CBC and BMP and possible IVFs.    04/15/2022 - f/up D1C2 adjuvant TC chemotherapy + Neulasta.    Due to (+) margins, anticipating (R) mastectomy and reconstruction after completion of chemotherapy.     Oncologic History:    1. Malignant neoplasm LIQ (R) breast    Pathologic Stage IA (pT1c, pN0(sn), cM0)    G2    ER+ 70%.  ND+ 30%.  HER-2 negative by IHC (1+)     Oncotype DX score: 30       01/18/2022 - screening mammogram detected possible calcification in the right breast at 4 o'clock position, middle depth.    01/20/2022 - diagnostic mammogram and ultrasound showed a hypoechoic mass with microlobulated and spiculated margins containing calcification measuring 10 x 10 x 8 mm correlating with mammographic abnormality.  Right axilla showed no evidence of lymphadenopathy.    01/21/2022 US core needle biopsy of the right breast mass - confirmed  invasive ductal carcinoma.    02/11/2022 (R) breast lumpectomy and right sentinel lymph node biopsy (Dr. Alcantara).      Pathology showed:    12 mm invasive ductal carcinoma with focal micropapillary pattern.  Grade 2.      Margins were negative but close at 0.5 mm from the nearest  junction of the inferior and medial margin, 0.5 mm from medial margin and 2 mm from inferior margin.      Associated DCIS with EIC negative, nuclear grade intermediate, solid and focal cribriform pattern of 10%.  DCIS margins were uninvolved at 3 mm from nearest inferior margin, 5 mm from medial margins.      1 sentinel lymph node was negative for metastatic carcinoma.      03/11/2022 consult with Dr Koch - because of the close margins of invasive carcinoma and DCIS, recommended mastectomy s/p adjuvant chemotherapy with docetaxel + cyclophosamide (TC) every 3 weeks for 4 cycles.      03/25/2022 - D1C1 adjuvant TC chemotherapy                ECOG Performance:    0 - Independent    Pain:    Pain Score: No Pain (0)    Encounter Diagnoses:    Problem List Items Addressed This Visit        Other    Malignant neoplasm of lower-inner quadrant of right breast of female, estrogen receptor positive (H) - Primary    Relevant Orders    CBC with platelets differential (Completed)    Comprehensive metabolic panel (Completed)    Infusion Appointment Request    Encounter for antineoplastic chemotherapy    Relevant Orders    CBC with platelets differential (Completed)    Comprehensive metabolic panel (Completed)    Infusion Appointment Request             Total time 38 minutes.    Cortez Vivas, CNP    CC: Cayla Skelton NP   ______________________________________________________________________________    Review of Systems:    No fever or night sweats.  No loss of weight.  No lumps or bumps anywhere.  No unusual headaches or eyesight issues.  No dizziness.  No bleeding from the nose.  No sores in the mouth. No problems with swallowing.  No chest pain. No shortness of breath. No cough.  No abdominal pain. No nausea or vomiting.  No diarrhea or constipation.  No blood in stool or black colored stools.  No problems passing urine.  No numbness or tingling in hands or feet.  No skin rashes.    A 14 point review of systems is otherwise  negative.    Past History:    Past Medical History:   Diagnosis Date     Arthritis      Asthma     Seaonal usually in spring     Breast cancer (H) 2004    rt lumpect     Coronary artery disease      Endometrial polyp 1992    was protruding through cervix     GERD (gastroesophageal reflux disease)      Heart attack (H)      Heart murmur      History of measles, mumps, or rubella as a child    hx of measles, mumps and [place] mealses. Rubella immune 5/1980     HTN (hypertension)      Hx of radiation therapy 2004     Hyperlipemia      Osteopenia      Other chronic pain      Ovarian cyst      Prediabetes      UTI (urinary tract infection)      Walking troubles        Past Surgical History:   Procedure Laterality Date     BIOPSY BREAST Left 2008    cyst removal     BIOPSY BREAST Right 2004     CERVICAL DISC SURGERY  6/25/2007    C6-7 sarah laminectomy with microdiskectomy     LUMPECTOMY BREAST Right 6/24/2004     LUMPECTOMY BREAST Right 2/11/2022    Procedure: Right Lumpectomy after Wire Localization; [place] Lymph Node Biopsy;  Surgeon: Mami Alcantara MD;  Location: [facility]     OOPHORECTOMY Bilateral 2011     TOTAL HIP ARTHROPLASTY Left 9/30/2009     TUBAL LIGATION  1/10/1992    with endometrial polyp removal     WRIST SURGERY       Physical Exam:    BP (!) 199/88 (BP Location: Left arm, Patient Position: Sitting, Cuff Size: Adult Large)   Pulse 95   Temp 97.7  F (36.5  C) (Oral)   Resp 18   Wt 87.3 kg (192 lb 8 oz)   SpO2 97%   BMI 35.21 kg/m      GENERAL:   Alert and oriented. Seated comfortably. In no distress.    HEENT:   Atraumatic and normocephalic. ALENA. EOMI.  No pallor.  No icterus.  No mucosal lesions.    LYMPH NODES:  No palpable cervical, axillary or inguinal lymphadenopathy.    CHEST:   Lungs clear to auscultation bilaterally.    BREASTS:  (R) c/w lumpectomy and sentinel lymph node biopsy.  Incisions healing nicely.  No s/s infection.    CVS:    S1 and S2 heard. Regular rate and rhythm.  No  murmur, gallop or rub heard.  No peripheral edema.    ABDOMEN:   Soft.  Not tender.  Not distended.  No palpable hepatomegaly or splenomegaly.    EXTREMITIES:  Warm.    SKIN:    No rash, bruising or purpura noted.  Full head of hair.    Lab Results:    Reviewed with patient.    Recent Results (from the past 168 hour(s))   Comprehensive metabolic panel   Result Value Ref Range    Sodium 137 136 - 145 mmol/L    Potassium 3.9 3.5 - 5.0 mmol/L    Chloride 103 98 - 107 mmol/L    Carbon Dioxide (CO2) 24 22 - 31 mmol/L    Anion Gap 10 5 - 18 mmol/L    Urea Nitrogen 16 8 - 28 mg/dL    Creatinine 0.97 0.60 - 1.10 mg/dL    Calcium 9.7 8.5 - 10.5 mg/dL    Glucose 123 70 - 125 mg/dL    Alkaline Phosphatase 73 45 - 120 U/L    AST 14 0 - 40 U/L    ALT 9 0 - 45 U/L    Protein Total 7.2 6.0 - 8.0 g/dL    Albumin 4.1 3.5 - 5.0 g/dL    Bilirubin Total 0.6 0.0 - 1.0 mg/dL    GFR Estimate 62 >60 mL/min/1.73m2   CBC with platelets and differential   Result Value Ref Range    WBC Count 7.7 4.0 - 11.0 10e3/uL    RBC Count 4.62 3.80 - 5.20 10e6/uL    Hemoglobin 14.0 11.7 - 15.7 g/dL    Hematocrit 41.4 35.0 - 47.0 %    MCV 90 78 - 100 fL    MCH 30.3 26.5 - 33.0 pg    MCHC 33.8 31.5 - 36.5 g/dL    RDW 12.8 10.0 - 15.0 %    Platelet Count 296 150 - 450 10e3/uL    % Neutrophils 58 %    % Lymphocytes 25 %    % Monocytes 10 %    % Eosinophils 6 %    % Basophils 1 %    % Immature Granulocytes 0 %    NRBCs per 100 WBC 0 <1 /100    Absolute Neutrophils 4.5 1.6 - 8.3 10e3/uL    Absolute Lymphocytes 1.9 0.8 - 5.3 10e3/uL    Absolute Monocytes 0.8 0.0 - 1.3 10e3/uL    Absolute Eosinophils 0.5 0.0 - 0.7 10e3/uL    Absolute Basophils 0.1 0.0 - 0.2 10e3/uL    Absolute Immature Granulocytes 0.0 <=0.4 10e3/uL    Absolute NRBCs 0.0 10e3/uL     Imaging:    No results found.        Oncology Rooming Note    March 25, 2022 12:24 PM   Lisa Reyna is a 71 year old female who presents for:    Chief Complaint   Patient presents with     Oncology Clinic Visit      "Malignant neoplasm of lower-inner quadrant of right breast of female, estrogen receptor positive.      Initial Vitals: BP (!) 199/88 (BP Location: Left arm, Patient Position: Sitting, Cuff Size: Adult Large)   Pulse 95   Temp 97.7  F (36.5  C) (Oral)   Resp 18   Wt 87.3 kg (192 lb 8 oz)   SpO2 97%   BMI 35.21 kg/m   Estimated body mass index is 35.21 kg/m  as calculated from the following:    Height as of 3/14/22: 1.575 m (5' 2\").    Weight as of this encounter: 87.3 kg (192 lb 8 oz). Body surface area is 1.95 meters squared.  Data Unavailable Comment: Data Unavailable   No LMP recorded. Patient is postmenopausal.  Allergies reviewed: Yes  Medications reviewed: Yes    Medications: Medication refills not needed today.  Pharmacy name entered into [facility]: [address] - [address] [place] [address]    Clinical concerns: Visit with Provider, review labs.      Michelle Thompson RN                  Again, thank you for allowing me to participate in the care of your patient.        Sincerely,        Cortez Vivas, TIANNA    "

## 2022-03-25 NOTE — PROGRESS NOTES
Infusion Nursing Note:  Lisa Reyna presents today for D1C1 treatment.    Patient seen by provider today: Yes: Cortez Vivas CNP   present during visit today: Not Applicable.    Note: Chairside chemo teaching completed with patient and sister.  Neulasta delivery kit in place. Patient verbalized understanding of use, removal and disposal.    Intravenous Access:  Peripheral IV placed.    Treatment Conditions:  Results reviewed, labs MET treatment parameters, ok to proceed with treatment.      Post Infusion Assessment:  Premedicated with aloxi and dexamethasone. Docetaxel and cytoxan infused.  Patient tolerated infusion without incident.  Blood return noted pre and post infusion.  Site patent and intact, free from redness, edema or discomfort.  No evidence of extravasations.  Access discontinued per protocol.       Discharge Plan:   Patient and/or family verbalized understanding of discharge instructions and all questions answered.  Patient discharged in stable condition accompanied by: self.  Departure Mode: Ambulatory.      Bonny Bhatt RN

## 2022-03-28 ENCOUNTER — PATIENT OUTREACH (OUTPATIENT)
Dept: ONCOLOGY | Facility: CLINIC | Age: 72
End: 2022-03-28
Payer: MEDICARE

## 2022-03-28 NOTE — PROGRESS NOTES
[facility]: Cancer Care Post Treatment Assessment    Discussion with Patient:                                                      Followed up with patient post chemo treatment on 3/25/2022. She denies nausea and has not needed to take any prn compazine. She stated that she is having mild pain in knees and legs post neupogen and is taking OTC claritin  daily as well as tylenol, prn. She is eating baseline portions, drinking 64 oz of water daily. She reports having at least one-two soft stools per day, no diarrhea. She has very mild fatigue from not sleeping well at night Educated that dexamethasone can be stimulating and cause sleep disturbance for a few days after it is taken. She has decided against getting a port as she only has three cycles remaining.                         Dates of Treament:                                                      Infusion given in last 5 days     Administered MAR Action Medication Dose Rate Visit    03/25/2022 14:11 New Bag DOCEtaxel (TAXOTERE) 148 mg in sodium chloride in non-PVC bag 0.9 % 257.4 mL infusion 148 mg 257.4 mL/hr Infusion Therapy Visit on 03/25/2022 in [facility]    03/25/2022 15:11 New Bag cyclophosphamide (CYTOXAN) 1,175 mg in sodium chloride 0.9 % 308.75 mL infusion 1,175 mg 617.5 mL/hr Infusion Therapy Visit on 03/25/2022 in [facility]          Assessment:                                                         See above       Intervention/Education provided during outreach:                                                       Reviewed as needed medications for nausea. Encouraged to continue taking Claritin daily as she will also use for her seasonal allergies. Port placement has been cancelled    Patient to follow up for midcycle as scheduled on 4/1/2022     Jami Tran RN

## 2022-03-30 ENCOUNTER — PATIENT OUTREACH (OUTPATIENT)
Dept: ONCOLOGY | Facility: CLINIC | Age: 72
End: 2022-03-30
Payer: MEDICARE

## 2022-03-30 NOTE — PROGRESS NOTES
Lisa called the nurse triage line and relayed that she noted a new white oral sore in the inside of her lower lip (middle) that is tender. She does not see any white patches and does not have redness in her oral cavity. Relayed recipe for salt and soda rinse and instructed her to swish in mouth and gargle and spit, 5-6 times per day. Instructed her to mix a new batch daily. Instructed her to avoid any mouthwashes with alcohol or any alcohol containing produces, salty or spicy food as they can be irritating to the oral mucosa. She stated understanding and will start the salt and soda rinses and monitor her oral cavity. She does not think that she needs Magic mouthwash at this time. She has our number for future questions or concerns/Jami Tran RN

## 2022-03-31 DIAGNOSIS — C50.311 MALIGNANT NEOPLASM OF LOWER-INNER QUADRANT OF RIGHT BREAST OF FEMALE, ESTROGEN RECEPTOR POSITIVE (H): Primary | ICD-10-CM

## 2022-03-31 DIAGNOSIS — Z17.0 MALIGNANT NEOPLASM OF LOWER-INNER QUADRANT OF RIGHT BREAST OF FEMALE, ESTROGEN RECEPTOR POSITIVE (H): Primary | ICD-10-CM

## 2022-04-01 ENCOUNTER — PATIENT OUTREACH (OUTPATIENT)
Dept: ONCOLOGY | Facility: CLINIC | Age: 72
End: 2022-04-01
Payer: MEDICARE

## 2022-04-01 ENCOUNTER — ONCOLOGY VISIT (OUTPATIENT)
Dept: ONCOLOGY | Facility: CLINIC | Age: 72
End: 2022-04-01
Attending: NURSE PRACTITIONER
Payer: MEDICARE

## 2022-04-01 ENCOUNTER — LAB (OUTPATIENT)
Dept: INFUSION THERAPY | Facility: CLINIC | Age: 72
End: 2022-04-01
Attending: NURSE PRACTITIONER
Payer: MEDICARE

## 2022-04-01 VITALS
BODY MASS INDEX: 34.07 KG/M2 | HEART RATE: 107 BPM | OXYGEN SATURATION: 98 % | RESPIRATION RATE: 18 BRPM | SYSTOLIC BLOOD PRESSURE: 161 MMHG | TEMPERATURE: 98.5 F | DIASTOLIC BLOOD PRESSURE: 89 MMHG | WEIGHT: 186.3 LBS

## 2022-04-01 DIAGNOSIS — Z17.0 MALIGNANT NEOPLASM OF LOWER-INNER QUADRANT OF RIGHT BREAST OF FEMALE, ESTROGEN RECEPTOR POSITIVE (H): Primary | ICD-10-CM

## 2022-04-01 DIAGNOSIS — C50.311 MALIGNANT NEOPLASM OF LOWER-INNER QUADRANT OF RIGHT BREAST OF FEMALE, ESTROGEN RECEPTOR POSITIVE (H): Primary | ICD-10-CM

## 2022-04-01 DIAGNOSIS — Z17.0 MALIGNANT NEOPLASM OF LOWER-INNER QUADRANT OF RIGHT BREAST OF FEMALE, ESTROGEN RECEPTOR POSITIVE (H): ICD-10-CM

## 2022-04-01 DIAGNOSIS — C50.311 MALIGNANT NEOPLASM OF LOWER-INNER QUADRANT OF RIGHT BREAST OF FEMALE, ESTROGEN RECEPTOR POSITIVE (H): ICD-10-CM

## 2022-04-01 LAB
ANION GAP SERPL CALCULATED.3IONS-SCNC: 12 MMOL/L (ref 5–18)
BASOPHILS # BLD MANUAL: 0 10E3/UL (ref 0–0.2)
BASOPHILS NFR BLD MANUAL: 0 %
BUN SERPL-MCNC: 12 MG/DL (ref 8–28)
CALCIUM SERPL-MCNC: 9.6 MG/DL (ref 8.5–10.5)
CHLORIDE BLD-SCNC: 96 MMOL/L (ref 98–107)
CO2 SERPL-SCNC: 28 MMOL/L (ref 22–31)
CREAT SERPL-MCNC: 1.08 MG/DL (ref 0.6–1.1)
EOSINOPHIL # BLD MANUAL: 0.1 10E3/UL (ref 0–0.7)
EOSINOPHIL NFR BLD MANUAL: 4 %
ERYTHROCYTE [DISTWIDTH] IN BLOOD BY AUTOMATED COUNT: 12.3 % (ref 10–15)
GFR SERPL CREATININE-BSD FRML MDRD: 54 ML/MIN/1.73M2
GLUCOSE BLD-MCNC: 116 MG/DL (ref 70–125)
HCT VFR BLD AUTO: 41.2 % (ref 35–47)
HGB BLD-MCNC: 14.4 G/DL (ref 11.7–15.7)
LYMPHOCYTES # BLD MANUAL: 1.5 10E3/UL (ref 0.8–5.3)
LYMPHOCYTES NFR BLD MANUAL: 41 %
MCH RBC QN AUTO: 30.4 PG (ref 26.5–33)
MCHC RBC AUTO-ENTMCNC: 35 G/DL (ref 31.5–36.5)
MCV RBC AUTO: 87 FL (ref 78–100)
METAMYELOCYTES # BLD MANUAL: 0 10E3/UL
METAMYELOCYTES NFR BLD MANUAL: 1 %
MONOCYTES # BLD MANUAL: 1.1 10E3/UL (ref 0–1.3)
MONOCYTES NFR BLD MANUAL: 31 %
NEUTROPHILS # BLD MANUAL: 0.8 10E3/UL (ref 1.6–8.3)
NEUTROPHILS NFR BLD MANUAL: 23 %
PLAT MORPH BLD: ABNORMAL
PLATELET # BLD AUTO: 210 10E3/UL (ref 150–450)
POTASSIUM BLD-SCNC: 3.7 MMOL/L (ref 3.5–5)
RBC # BLD AUTO: 4.74 10E6/UL (ref 3.8–5.2)
RBC MORPH BLD: ABNORMAL
SODIUM SERPL-SCNC: 136 MMOL/L (ref 136–145)
WBC # BLD AUTO: 3.6 10E3/UL (ref 4–11)

## 2022-04-01 PROCEDURE — 36415 COLL VENOUS BLD VENIPUNCTURE: CPT

## 2022-04-01 PROCEDURE — G0463 HOSPITAL OUTPT CLINIC VISIT: HCPCS

## 2022-04-01 PROCEDURE — 99214 OFFICE O/P EST MOD 30 MIN: CPT | Performed by: INTERNAL MEDICINE

## 2022-04-01 PROCEDURE — 80048 BASIC METABOLIC PNL TOTAL CA: CPT

## 2022-04-01 PROCEDURE — 85027 COMPLETE CBC AUTOMATED: CPT

## 2022-04-01 ASSESSMENT — ENCOUNTER SYMPTOMS
NECK PAIN: 0
VOICE CHANGE: 0
ABDOMINAL PAIN: 0
DIARRHEA: 1
VOMITING: 0
FEVER: 0
UNEXPECTED WEIGHT CHANGE: 0
SORE THROAT: 0
BLOOD IN STOOL: 0
EYES NEGATIVE: 1
APPETITE CHANGE: 0
ENDOCRINE NEGATIVE: 1
NECK STIFFNESS: 0
DIAPHORESIS: 0
MYALGIAS: 0
HEMATOLOGIC/LYMPHATIC NEGATIVE: 1
DIFFICULTY URINATING: 0
CONSTIPATION: 0
ARTHRALGIAS: 1
CHILLS: 0
ABDOMINAL DISTENTION: 0
CARDIOVASCULAR NEGATIVE: 1
BACK PAIN: 1
NAUSEA: 0
FATIGUE: 0
TROUBLE SWALLOWING: 1
RESPIRATORY NEGATIVE: 1
FLANK PAIN: 0
NEUROLOGICAL NEGATIVE: 1
RECTAL PAIN: 0

## 2022-04-01 ASSESSMENT — PAIN SCALES - GENERAL: PAINLEVEL: MODERATE PAIN (4)

## 2022-04-01 NOTE — PROGRESS NOTES
Lisa called the nurse triage line and relayed that she had on episode of diarrhea this AM two small last night, and maybe one Tuesday and Wednesday.   Scheduled to be seen today for mid-cycle check. She had first cycle TC on 3/25/22.   She asked if she can be seen earlier today, will come in at 9:30. Need to provide education on when and how to treat diarrhea.  Patient did state when asked that she is drinking 64 ounces of water daily.  Elizabeth Ho RN

## 2022-04-01 NOTE — LETTER
"    4/1/2022         RE: Lisa Reyna  [id number]  [facility] [address]        Dear Colleague,    Thank you for referring your patient, Lisa Reyna, to the [facility]. Please see a copy of my visit note below.    Assessment & Plan   1. 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 Oncotype 30/19%, >15%.  2. H/o DCIS of right breast in 2004, with adjuvant radiation.  3. CAD with h/o NSTEMI in 2014. On medical Mx.  4. HTN, uncontrolled.  5. Diabetes mellitus - home glucoses running higher than before while on chemo  6. Diarrhea  7. Mucositis    First cycle of \"adjuvant\" TC complicated by mucositis, diarrhea and arthrlagias  She will use salt water/bicarbonate swishes very frequently  She will  OTC immodium  Patient would prefer to return for neulasta rather than have on-body  Switch chemo visit from 15 -> 18 April so she can have nurse administered growth factor      Interval History  This is a scheduled mid-cycle check after initiating Docetaxel Cyclophosphamide as adjuvant chemotherapy for Stage IA ER/OH positive HER2 negative breast cancer.  Today is Day 8 of Cycle 1.  She is achy all over, especially back knees and feet.  She's had such bad mouth sores that it limits what she can eat.  Blood sugars are running high.  She has had two days of watery diarrhea (1-2/day).    The patient was not considered a good candidate for anthracycline adjuvant therapy due to co-morbidities including hypertension and prior MI.  Despite early stage, the high OncoType score puts her at excess risk for recurrence, and justifies chemotherapy.  She is not a candidate for adjuvant radiation due to prior radiation.  She likely will have completion mastectomy after 4 cycles of TC though this is no 100% decided.  She anticipates genetic testing to determine whether she should have contralateral prophylactic mastectomy.  " There is no family history of breast cancer      Oncologic History Recorded 25 March 2022 by Cortez Vivas CNP:     1.           Malignant neoplasm LIQ (R) breast                          Pathologic Stage IA (pT1c, pN0(sn), cM0)                          G2                          ER+ 70%.  [place]+ 30%.  HER-2 negative by IHC (1+)                           Oncotype DX score: 30        01/18/2022 - screening mammogram detected possible calcification in the right breast at 4 o'clock position, middle depth.  ? 01/20/2022 - diagnostic mammogram and ultrasound showed a hypoechoic mass with microlobulated and spiculated margins containing calcification measuring 10 x 10 x 8 mm correlating with mammographic abnormality.  Right axilla showed no evidence of lymphadenopathy.  ? 01/21/2022 US core needle biopsy of the right breast mass - confirmed  invasive ductal carcinoma.    02/11/2022 (R) breast lumpectomy and right sentinel lymph node biopsy (Dr. Alcantara).    ? Pathology showed:    12 mm invasive ductal carcinoma with focal micropapillary pattern.  Grade 2.      Margins were negative but close at 0.5 mm from the nearest junction of the inferior and medial margin, 0.5 mm from medial margin and 2 mm from inferior margin.      Associated DCIS with EIC negative, nuclear grade intermediate, solid and focal cribriform pattern of 10%.  DCIS margins were uninvolved at 3 mm from nearest inferior margin, 5 mm from medial margins.      1 sentinel lymph node was negative for metastatic carcinoma.    ? 03/11/2022 consult with Dr Koch - because of the close margins of invasive carcinoma and DCIS, recommended mastectomy s/p adjuvant chemotherapy with docetaxel + cyclophosamide (TC) every 3 weeks for 4 cycles.      03/25/2022 - D1C1 adjuvant TC chemotherapy    Current Outpatient Medications   Medication     amoxicillin (AMOXIL) 500 MG tablet     ascorbic acid (VITAMIN C) 1000 MG tablet     aspirin 81 MG EC tablet     calcium carbonate  (OS-TAMI) 600 mg (1,500 mg) tablet     cholecalciferol, vitamin D3, (VITAMIN D3) 1,000 unit capsule     diclofenac sodium (VOLTAREN) 1 % Gel     famotidine (PEPCID AC MAXIMUM STRENGTH) 20 MG tablet     hydroCHLOROthiazide (HYDRODIURIL) 12.5 MG tablet     Lancets (ONETOUCH DELICA PLUS [id number]) MISC     loratadine (CLARITIN) 10 mg tablet     losartan (COZAAR) 50 MG tablet     metoprolol succinate ER (TOPROL-XL) 50 MG 24 hr tablet     OMEGA-3 FATTY ACIDS (FISH OIL CONCENTRATE ORAL)     MD InsiderTOUCH ULTRA BLUE TEST STRIP strips     POLYETHYLENE GLYCOL 3350 (MIRALAX ORAL)     simvastatin (ZOCOR) 80 MG tablet     albuterol (PROAIR HFA/PROVENTIL HFA/VENTOLIN HFA) 108 (90 Base) MCG/ACT inhaler     dexamethasone (DECADRON) 4 MG tablet     lidocaine-prilocaine (EMLA) 2.5-2.5 % external cream     nitroglycerin (NITROSTAT) 0.4 MG SL tablet     prochlorperazine (COMPAZINE) 10 MG tablet     No current facility-administered medications for this visit.     Past Medical History:   Diagnosis Date     Arthritis      Asthma     Seaonal usually in spring     Breast cancer (H) 2004    rt lumpect     Coronary artery disease      Endometrial polyp 1992    was protruding through cervix     GERD (gastroesophageal reflux disease)      Heart attack (H)      Heart murmur      History of measles, mumps, or rubella as a child    hx of measles, mumps and [place] mealses. Rubella immune 5/1980     HTN (hypertension)      Hx of radiation therapy 2004     Hyperlipemia      Osteopenia      Other chronic pain      Ovarian cyst      Prediabetes      UTI (urinary tract infection)      Walking troubles      Lives with and cares for her 97 year old mother.  She has a sister available if needed.    Review of Systems   Constitutional: Negative for appetite change, chills, diaphoresis, fatigue, fever and unexpected weight change.   HENT:   Positive for mouth sores and trouble swallowing. Negative for hearing loss, lump/mass, nosebleeds, sore throat, tinnitus and  "voice change.    Eyes: Negative.    Respiratory: Negative.    Cardiovascular: Negative.    Gastrointestinal: Positive for diarrhea. Negative for abdominal distention, abdominal pain, blood in stool, constipation, nausea, rectal pain and vomiting.   Endocrine: Negative.    Genitourinary: Negative.  Negative for difficulty urinating.         Does have some perineal soreness from frequent wiping.   Musculoskeletal: Positive for arthralgias and back pain. Negative for flank pain, myalgias, neck pain and neck stiffness.   Skin: Negative.    Neurological: Negative.    Hematological: Negative.    Psychiatric/Behavioral:        Acknowledges that she feels \"stressed\"   All other systems reviewed and are negative.      BP (!) 161/89   Pulse 107   Temp 98.5  F (36.9  C)   Resp 18   Wt 84.5 kg (186 lb 4.8 oz)   SpO2 98%   BMI 34.07 kg/m    Physical Exam  Vitals and nursing note reviewed.   Constitutional:       General: She is not in acute distress.     Appearance: Normal appearance. She is not ill-appearing.   HENT:      Head: Atraumatic.      Mouth/Throat:      Mouth: Mucous membranes are moist.      Dentition: Normal dentition.      Tongue: Lesions present.     Eyes:      Extraocular Movements: Extraocular movements intact.      Pupils: Pupils are equal, round, and reactive to light.   Cardiovascular:      Rate and Rhythm: Normal rate and regular rhythm.      Pulses: Normal pulses.      Heart sounds: Normal heart sounds. No murmur heard.  Pulmonary:      Effort: Pulmonary effort is normal.      Breath sounds: Normal breath sounds. No wheezing, rhonchi or rales.   Chest:   Breasts:      Right: Skin change present.      Left: Normal.         Abdominal:      General: Abdomen is flat. Bowel sounds are normal.      Palpations: Abdomen is soft. There is no mass.      Tenderness: There is no abdominal tenderness.   Musculoskeletal:         General: No swelling or deformity. Normal range of motion.      Cervical back: Normal " range of motion.      Right lower leg: No edema.      Left lower leg: No edema.   Skin:     General: Skin is warm and dry.   Neurological:      General: No focal deficit present.      Mental Status: She is alert and oriented to person, place, and time.      Cranial Nerves: No cranial nerve deficit.      Gait: Gait abnormal.      Deep Tendon Reflexes: Reflexes normal.      Comments: Walks independently but stiffly   Psychiatric:         Mood and Affect: Mood normal.         Behavior: Behavior normal.         Thought Content: Thought content normal.       Recent Results (from the past 720 hour(s))   Asymptomatic COVID-19 Virus (Coronavirus) by PCR Nose    Collection Time: 03/12/22 11:36 AM    Specimen: Nose; Swab   Result Value Ref Range    SARS CoV2 PCR Negative Negative, Testing sent to reference lab. Results will be returned via unsolicited result   Comprehensive metabolic panel    Collection Time: 03/25/22 12:05 PM   Result Value Ref Range    Sodium 137 136 - 145 mmol/L    Potassium 3.9 3.5 - 5.0 mmol/L    Chloride 103 98 - 107 mmol/L    Carbon Dioxide (CO2) 24 22 - 31 mmol/L    Anion Gap 10 5 - 18 mmol/L    Urea Nitrogen 16 8 - 28 mg/dL    Creatinine 0.97 0.60 - 1.10 mg/dL    Calcium 9.7 8.5 - 10.5 mg/dL    Glucose 123 70 - 125 mg/dL    Alkaline Phosphatase 73 45 - 120 U/L    AST 14 0 - 40 U/L    ALT 9 0 - 45 U/L    Protein Total 7.2 6.0 - 8.0 g/dL    Albumin 4.1 3.5 - 5.0 g/dL    Bilirubin Total 0.6 0.0 - 1.0 mg/dL    GFR Estimate 62 >60 mL/min/1.73m2   CBC with platelets and differential    Collection Time: 03/25/22 12:05 PM   Result Value Ref Range    WBC Count 7.7 4.0 - 11.0 10e3/uL    RBC Count 4.62 3.80 - 5.20 10e6/uL    Hemoglobin 14.0 11.7 - 15.7 g/dL    Hematocrit 41.4 35.0 - 47.0 %    MCV 90 78 - 100 fL    MCH 30.3 26.5 - 33.0 pg    MCHC 33.8 31.5 - 36.5 g/dL    RDW 12.8 10.0 - 15.0 %    Platelet Count 296 150 - 450 10e3/uL    % Neutrophils 58 %    % Lymphocytes 25 %    % Monocytes 10 %    % Eosinophils  6 %    % Basophils 1 %    % Immature Granulocytes 0 %    NRBCs per 100 WBC 0 <1 /100    Absolute Neutrophils 4.5 1.6 - 8.3 10e3/uL    Absolute Lymphocytes 1.9 0.8 - 5.3 10e3/uL    Absolute Monocytes 0.8 0.0 - 1.3 10e3/uL    Absolute Eosinophils 0.5 0.0 - 0.7 10e3/uL    Absolute Basophils 0.1 0.0 - 0.2 10e3/uL    Absolute Immature Granulocytes 0.0 <=0.4 10e3/uL    Absolute NRBCs 0.0 10e3/uL   Basic metabolic panel    Collection Time: 04/01/22  9:10 AM   Result Value Ref Range    Sodium 136 136 - 145 mmol/L    Potassium 3.7 3.5 - 5.0 mmol/L    Chloride 96 (L) 98 - 107 mmol/L    Carbon Dioxide (CO2) 28 22 - 31 mmol/L    Anion Gap 12 5 - 18 mmol/L    Urea Nitrogen 12 8 - 28 mg/dL    Creatinine 1.08 0.60 - 1.10 mg/dL    Calcium 9.6 8.5 - 10.5 mg/dL    Glucose 116 70 - 125 mg/dL    GFR Estimate 54 (L) >60 mL/min/1.73m2   CBC with platelets and differential    Collection Time: 04/01/22  9:10 AM   Result Value Ref Range    WBC Count 3.6 (L) 4.0 - 11.0 10e3/uL    RBC Count 4.74 3.80 - 5.20 10e6/uL    Hemoglobin 14.4 11.7 - 15.7 g/dL    Hematocrit 41.2 35.0 - 47.0 %    MCV 87 78 - 100 fL    MCH 30.4 26.5 - 33.0 pg    MCHC 35.0 31.5 - 36.5 g/dL    RDW 12.3 10.0 - 15.0 %    Platelet Count 210 150 - 450 10e3/uL   Manual Differential    Collection Time: 04/01/22  9:10 AM   Result Value Ref Range    % Neutrophils 23 %    % Lymphocytes 41 %    % Monocytes 31 %    % Eosinophils 4 %    % Basophils 0 %    % Metamyelocytes 1 %    Absolute Neutrophils 0.8 (L) 1.6 - 8.3 10e3/uL    Absolute Lymphocytes 1.5 0.8 - 5.3 10e3/uL    Absolute Monocytes 1.1 0.0 - 1.3 10e3/uL    Absolute Eosinophils 0.1 0.0 - 0.7 10e3/uL    Absolute Basophils 0.0 0.0 - 0.2 10e3/uL    Absolute Metamyelocytes 0.0 <=0.0 10e3/uL    RBC Morphology Confirmed RBC Indices     Platelet Assessment  Automated Count Confirmed. Platelet morphology is normal.     Automated Count Confirmed. Platelet morphology is normal.           Oncology Rooming Note    April 1, 2022 9:19 AM  "  Lisa Reyna is a 71 year old female who presents for:    Chief Complaint   Patient presents with     Oncology Clinic Visit     Initial Vitals: BP (!) 161/89   Pulse 107   Temp 98.5  F (36.9  C)   Resp 18   Wt 84.5 kg (186 lb 4.8 oz)   SpO2 98%   BMI 34.07 kg/m   Estimated body mass index is 34.07 kg/m  as calculated from the following:    Height as of 3/14/22: 1.575 m (5' 2\").    Weight as of this encounter: 84.5 kg (186 lb 4.8 oz). Body surface area is 1.92 meters squared.  Moderate Pain (4) Comment: Data Unavailable   No LMP recorded. Patient is postmenopausal.  Allergies reviewed: Yes  Medications reviewed: Yes    Medications: Medication refills not needed today.  Pharmacy name entered into [facility]: [address] [place] [address]    Clinical concerns: None       Yamile Hernandez LPN                Again, thank you for allowing me to participate in the care of your patient.        Sincerely,        Shandra Santizo MD    "

## 2022-04-01 NOTE — PROGRESS NOTES
"Oncology Rooming Note    April 1, 2022 9:19 AM   Lisa Reyna is a 71 year old female who presents for:    Chief Complaint   Patient presents with     Oncology Clinic Visit     Initial Vitals: BP (!) 161/89   Pulse 107   Temp 98.5  F (36.9  C)   Resp 18   Wt 84.5 kg (186 lb 4.8 oz)   SpO2 98%   BMI 34.07 kg/m   Estimated body mass index is 34.07 kg/m  as calculated from the following:    Height as of 3/14/22: 1.575 m (5' 2\").    Weight as of this encounter: 84.5 kg (186 lb 4.8 oz). Body surface area is 1.92 meters squared.  Moderate Pain (4) Comment: Data Unavailable   No LMP recorded. Patient is postmenopausal.  Allergies reviewed: Yes  Medications reviewed: Yes    Medications: Medication refills not needed today.  Pharmacy name entered into "[employer]: [address] - [address] [place] [address]    Clinical concerns: None       Yamile Hernandez LPN            "

## 2022-04-01 NOTE — PROGRESS NOTES
"Assessment & Plan   1. Stage IA breast cancer, post right mastectomy:  Invasive ductal carcinoma of LIQ of right breast, stage IA, ER+AR+HER2-. Oncotype 30/19%, >15%.  2. H/o DCIS of right breast in 2004, with adjuvant radiation.  3. CAD with h/o NSTEMI in 2014. On medical Mx.  4. HTN, uncontrolled.  5. Diabetes mellitus - home glucoses running higher than before while on chemo  6. Diarrhea  7. Mucositis    First cycle of \"adjuvant\" TC complicated by mucositis, diarrhea and arthrlagias  She will use salt water/bicarbonate swishes very frequently  She will  OTC immodium  Patient would prefer to return for neulasta rather than have on-body  Switch chemo visit from 15 -> 18 April so she can have nurse administered growth factor      Interval History  This is a scheduled mid-cycle check after initiating Docetaxel Cyclophosphamide as adjuvant chemotherapy for Stage IA ER/AR positive HER2 negative breast cancer.  Today is Day 8 of Cycle 1.  She is achy all over, especially back knees and feet.  She's had such bad mouth sores that it limits what she can eat.  Blood sugars are running high.  She has had two days of watery diarrhea (1-2/day).    The patient was not considered a good candidate for anthracycline adjuvant therapy due to co-morbidities including hypertension and prior MI.  Despite early stage, the high OncoType score puts her at excess risk for recurrence, and justifies chemotherapy.  She is not a candidate for adjuvant radiation due to prior radiation.  She likely will have completion mastectomy after 4 cycles of TC though this is no 100% decided.  She anticipates genetic testing to determine whether she should have contralateral prophylactic mastectomy.  There is no family history of breast cancer      Oncologic History Recorded 25 March 2022 by Cortez Vivas CNP:     1.           Malignant neoplasm LIQ (R) breast                          Pathologic Stage IA (pT1c, pN0(sn), cM0)                         "  G2                          ER+ 70%.  ND+ 30%.  HER-2 negative by IHC (1+)                           Oncotype DX score: 30        01/18/2022 - screening mammogram detected possible calcification in the right breast at 4 o'clock position, middle depth.  ? 01/20/2022 - diagnostic mammogram and ultrasound showed a hypoechoic mass with microlobulated and spiculated margins containing calcification measuring 10 x 10 x 8 mm correlating with mammographic abnormality.  Right axilla showed no evidence of lymphadenopathy.  ? 01/21/2022 US core needle biopsy of the right breast mass - confirmed  invasive ductal carcinoma.    02/11/2022 (R) breast lumpectomy and right sentinel lymph node biopsy (Dr. Alcantara).    ? Pathology showed:    12 mm invasive ductal carcinoma with focal micropapillary pattern.  Grade 2.      Margins were negative but close at 0.5 mm from the nearest junction of the inferior and medial margin, 0.5 mm from medial margin and 2 mm from inferior margin.      Associated DCIS with EIC negative, nuclear grade intermediate, solid and focal cribriform pattern of 10%.  DCIS margins were uninvolved at 3 mm from nearest inferior margin, 5 mm from medial margins.      1 sentinel lymph node was negative for metastatic carcinoma.    ? 03/11/2022 consult with Dr Koch - because of the close margins of invasive carcinoma and DCIS, recommended mastectomy s/p adjuvant chemotherapy with docetaxel + cyclophosamide (TC) every 3 weeks for 4 cycles.      03/25/2022 - D1C1 adjuvant TC chemotherapy    Current Outpatient Medications   Medication     amoxicillin (AMOXIL) 500 MG tablet     ascorbic acid (VITAMIN C) 1000 MG tablet     aspirin 81 MG EC tablet     calcium carbonate (OS-TAMI) 600 mg (1,500 mg) tablet     cholecalciferol, vitamin D3, (VITAMIN D3) 1,000 unit capsule     diclofenac sodium (VOLTAREN) 1 % Gel     famotidine (PEPCID AC MAXIMUM STRENGTH) 20 MG tablet     hydroCHLOROthiazide (HYDRODIURIL) 12.5 MG tablet     Lancets  (ONETOUCH DELICA PLUS [id number]) MISC     loratadine (CLARITIN) 10 mg tablet     losartan (COZAAR) 50 MG tablet     metoprolol succinate ER (TOPROL-XL) 50 MG 24 hr tablet     OMEGA-3 FATTY ACIDS (FISH OIL CONCENTRATE ORAL)     ONETOUCH ULTRA BLUE TEST STRIP strips     POLYETHYLENE GLYCOL 3350 (MIRALAX ORAL)     simvastatin (ZOCOR) 80 MG tablet     albuterol (PROAIR HFA/PROVENTIL HFA/VENTOLIN HFA) 108 (90 Base) MCG/ACT inhaler     dexamethasone (DECADRON) 4 MG tablet     lidocaine-prilocaine (EMLA) 2.5-2.5 % external cream     nitroglycerin (NITROSTAT) 0.4 MG SL tablet     prochlorperazine (COMPAZINE) 10 MG tablet     No current facility-administered medications for this visit.     Past Medical History:   Diagnosis Date     Arthritis      Asthma     Seaonal usually in spring     Breast cancer (H) 2004    rt lumpect     Coronary artery disease      Endometrial polyp 1992    was protruding through cervix     GERD (gastroesophageal reflux disease)      Heart attack (H)      Heart murmur      History of measles, mumps, or rubella as a child    hx of measles, mumps and Salv[place] mealses. Rubella immune 5/1980     HTN (hypertension)      Hx of radiation therapy 2004     Hyperlipemia      Osteopenia      Other chronic pain      Ovarian cyst      Prediabetes      UTI (urinary tract infection)      Walking troubles      Lives with and cares for her 97 year old mother.  She has a sister available if needed.    Review of Systems   Constitutional: Negative for appetite change, chills, diaphoresis, fatigue, fever and unexpected weight change.   HENT:   Positive for mouth sores and trouble swallowing. Negative for hearing loss, lump/mass, nosebleeds, sore throat, tinnitus and voice change.    Eyes: Negative.    Respiratory: Negative.    Cardiovascular: Negative.    Gastrointestinal: Positive for diarrhea. Negative for abdominal distention, abdominal pain, blood in stool, constipation, nausea, rectal pain and vomiting.   Endocrine:  "Negative.    Genitourinary: Negative.  Negative for difficulty urinating.         Does have some perineal soreness from frequent wiping.   Musculoskeletal: Positive for arthralgias and back pain. Negative for flank pain, myalgias, neck pain and neck stiffness.   Skin: Negative.    Neurological: Negative.    Hematological: Negative.    Psychiatric/Behavioral:        Acknowledges that she feels \"stressed\"   All other systems reviewed and are negative.      BP (!) 161/89   Pulse 107   Temp 98.5  F (36.9  C)   Resp 18   Wt 84.5 kg (186 lb 4.8 oz)   SpO2 98%   BMI 34.07 kg/m    Physical Exam  Vitals and nursing note reviewed.   Constitutional:       General: She is not in acute distress.     Appearance: Normal appearance. She is not ill-appearing.   HENT:      Head: Atraumatic.      Mouth/Throat:      Mouth: Mucous membranes are moist.      Dentition: Normal dentition.      Tongue: Lesions present.     Eyes:      Extraocular Movements: Extraocular movements intact.      Pupils: Pupils are equal, round, and reactive to light.   Cardiovascular:      Rate and Rhythm: Normal rate and regular rhythm.      Pulses: Normal pulses.      Heart sounds: Normal heart sounds. No murmur heard.  Pulmonary:      Effort: Pulmonary effort is normal.      Breath sounds: Normal breath sounds. No wheezing, rhonchi or rales.   Chest:   Breasts:      Right: Skin change present.      Left: Normal.         Abdominal:      General: Abdomen is flat. Bowel sounds are normal.      Palpations: Abdomen is soft. There is no mass.      Tenderness: There is no abdominal tenderness.   Musculoskeletal:         General: No swelling or deformity. Normal range of motion.      Cervical back: Normal range of motion.      Right lower leg: No edema.      Left lower leg: No edema.   Skin:     General: Skin is warm and dry.   Neurological:      General: No focal deficit present.      Mental Status: She is alert and oriented to person, place, and time.      " Cranial Nerves: No cranial nerve deficit.      Gait: Gait abnormal.      Deep Tendon Reflexes: Reflexes normal.      Comments: Walks independently but stiffly   Psychiatric:         Mood and Affect: Mood normal.         Behavior: Behavior normal.         Thought Content: Thought content normal.       Recent Results (from the past 720 hour(s))   Asymptomatic COVID-19 Virus (Coronavirus) by PCR Nose    Collection Time: 03/12/22 11:36 AM    Specimen: Nose; Swab   Result Value Ref Range    SARS CoV2 PCR Negative Negative, Testing sent to reference lab. Results will be returned via unsolicited result   Comprehensive metabolic panel    Collection Time: 03/25/22 12:05 PM   Result Value Ref Range    Sodium 137 136 - 145 mmol/L    Potassium 3.9 3.5 - 5.0 mmol/L    Chloride 103 98 - 107 mmol/L    Carbon Dioxide (CO2) 24 22 - 31 mmol/L    Anion Gap 10 5 - 18 mmol/L    Urea Nitrogen 16 8 - 28 mg/dL    Creatinine 0.97 0.60 - 1.10 mg/dL    Calcium 9.7 8.5 - 10.5 mg/dL    Glucose 123 70 - 125 mg/dL    Alkaline Phosphatase 73 45 - 120 U/L    AST 14 0 - 40 U/L    ALT 9 0 - 45 U/L    Protein Total 7.2 6.0 - 8.0 g/dL    Albumin 4.1 3.5 - 5.0 g/dL    Bilirubin Total 0.6 0.0 - 1.0 mg/dL    GFR Estimate 62 >60 mL/min/1.73m2   CBC with platelets and differential    Collection Time: 03/25/22 12:05 PM   Result Value Ref Range    WBC Count 7.7 4.0 - 11.0 10e3/uL    RBC Count 4.62 3.80 - 5.20 10e6/uL    Hemoglobin 14.0 11.7 - 15.7 g/dL    Hematocrit 41.4 35.0 - 47.0 %    MCV 90 78 - 100 fL    MCH 30.3 26.5 - 33.0 pg    MCHC 33.8 31.5 - 36.5 g/dL    RDW 12.8 10.0 - 15.0 %    Platelet Count 296 150 - 450 10e3/uL    % Neutrophils 58 %    % Lymphocytes 25 %    % Monocytes 10 %    % Eosinophils 6 %    % Basophils 1 %    % Immature Granulocytes 0 %    NRBCs per 100 WBC 0 <1 /100    Absolute Neutrophils 4.5 1.6 - 8.3 10e3/uL    Absolute Lymphocytes 1.9 0.8 - 5.3 10e3/uL    Absolute Monocytes 0.8 0.0 - 1.3 10e3/uL    Absolute Eosinophils 0.5 0.0 -  0.7 10e3/uL    Absolute Basophils 0.1 0.0 - 0.2 10e3/uL    Absolute Immature Granulocytes 0.0 <=0.4 10e3/uL    Absolute NRBCs 0.0 10e3/uL   Basic metabolic panel    Collection Time: 04/01/22  9:10 AM   Result Value Ref Range    Sodium 136 136 - 145 mmol/L    Potassium 3.7 3.5 - 5.0 mmol/L    Chloride 96 (L) 98 - 107 mmol/L    Carbon Dioxide (CO2) 28 22 - 31 mmol/L    Anion Gap 12 5 - 18 mmol/L    Urea Nitrogen 12 8 - 28 mg/dL    Creatinine 1.08 0.60 - 1.10 mg/dL    Calcium 9.6 8.5 - 10.5 mg/dL    Glucose 116 70 - 125 mg/dL    GFR Estimate 54 (L) >60 mL/min/1.73m2   CBC with platelets and differential    Collection Time: 04/01/22  9:10 AM   Result Value Ref Range    WBC Count 3.6 (L) 4.0 - 11.0 10e3/uL    RBC Count 4.74 3.80 - 5.20 10e6/uL    Hemoglobin 14.4 11.7 - 15.7 g/dL    Hematocrit 41.2 35.0 - 47.0 %    MCV 87 78 - 100 fL    MCH 30.4 26.5 - 33.0 pg    MCHC 35.0 31.5 - 36.5 g/dL    RDW 12.3 10.0 - 15.0 %    Platelet Count 210 150 - 450 10e3/uL   Manual Differential    Collection Time: 04/01/22  9:10 AM   Result Value Ref Range    % Neutrophils 23 %    % Lymphocytes 41 %    % Monocytes 31 %    % Eosinophils 4 %    % Basophils 0 %    % Metamyelocytes 1 %    Absolute Neutrophils 0.8 (L) 1.6 - 8.3 10e3/uL    Absolute Lymphocytes 1.5 0.8 - 5.3 10e3/uL    Absolute Monocytes 1.1 0.0 - 1.3 10e3/uL    Absolute Eosinophils 0.1 0.0 - 0.7 10e3/uL    Absolute Basophils 0.0 0.0 - 0.2 10e3/uL    Absolute Metamyelocytes 0.0 <=0.0 10e3/uL    RBC Morphology Confirmed RBC Indices     Platelet Assessment  Automated Count Confirmed. Platelet morphology is normal.     Automated Count Confirmed. Platelet morphology is normal.

## 2022-04-18 ENCOUNTER — LAB (OUTPATIENT)
Dept: INFUSION THERAPY | Facility: CLINIC | Age: 72
End: 2022-04-18
Attending: INTERNAL MEDICINE
Payer: MEDICARE

## 2022-04-18 ENCOUNTER — ONCOLOGY VISIT (OUTPATIENT)
Dept: ONCOLOGY | Facility: CLINIC | Age: 72
End: 2022-04-18
Attending: NURSE PRACTITIONER
Payer: MEDICARE

## 2022-04-18 ENCOUNTER — INFUSION THERAPY VISIT (OUTPATIENT)
Dept: INFUSION THERAPY | Facility: CLINIC | Age: 72
End: 2022-04-18
Attending: NURSE PRACTITIONER
Payer: MEDICARE

## 2022-04-18 VITALS
HEART RATE: 85 BPM | SYSTOLIC BLOOD PRESSURE: 148 MMHG | HEIGHT: 62 IN | DIASTOLIC BLOOD PRESSURE: 79 MMHG | WEIGHT: 188 LBS | OXYGEN SATURATION: 99 % | BODY MASS INDEX: 34.6 KG/M2 | TEMPERATURE: 98.3 F

## 2022-04-18 DIAGNOSIS — C50.311 MALIGNANT NEOPLASM OF LOWER-INNER QUADRANT OF RIGHT BREAST OF FEMALE, ESTROGEN RECEPTOR POSITIVE (H): ICD-10-CM

## 2022-04-18 DIAGNOSIS — C50.311 MALIGNANT NEOPLASM OF LOWER-INNER QUADRANT OF RIGHT BREAST OF FEMALE, ESTROGEN RECEPTOR POSITIVE (H): Primary | ICD-10-CM

## 2022-04-18 DIAGNOSIS — Z17.0 MALIGNANT NEOPLASM OF LOWER-INNER QUADRANT OF RIGHT BREAST OF FEMALE, ESTROGEN RECEPTOR POSITIVE (H): ICD-10-CM

## 2022-04-18 DIAGNOSIS — Z17.0 MALIGNANT NEOPLASM OF LOWER-INNER QUADRANT OF RIGHT BREAST OF FEMALE, ESTROGEN RECEPTOR POSITIVE (H): Primary | ICD-10-CM

## 2022-04-18 DIAGNOSIS — Z51.11 ENCOUNTER FOR ANTINEOPLASTIC CHEMOTHERAPY: Primary | ICD-10-CM

## 2022-04-18 LAB
ALBUMIN SERPL-MCNC: 3.8 G/DL (ref 3.5–5)
ALP SERPL-CCNC: 65 U/L (ref 45–120)
ALT SERPL W P-5'-P-CCNC: 12 U/L (ref 0–45)
ANION GAP SERPL CALCULATED.3IONS-SCNC: 12 MMOL/L (ref 5–18)
AST SERPL W P-5'-P-CCNC: 14 U/L (ref 0–40)
BASOPHILS # BLD AUTO: 0.2 10E3/UL (ref 0–0.2)
BASOPHILS NFR BLD AUTO: 3 %
BILIRUB SERPL-MCNC: 0.7 MG/DL (ref 0–1)
BUN SERPL-MCNC: 17 MG/DL (ref 8–28)
CALCIUM SERPL-MCNC: 9 MG/DL (ref 8.5–10.5)
CHLORIDE BLD-SCNC: 106 MMOL/L (ref 98–107)
CO2 SERPL-SCNC: 23 MMOL/L (ref 22–31)
CREAT SERPL-MCNC: 1.06 MG/DL (ref 0.6–1.1)
EOSINOPHIL # BLD AUTO: 0.9 10E3/UL (ref 0–0.7)
EOSINOPHIL NFR BLD AUTO: 16 %
ERYTHROCYTE [DISTWIDTH] IN BLOOD BY AUTOMATED COUNT: 13.2 % (ref 10–15)
GFR SERPL CREATININE-BSD FRML MDRD: 56 ML/MIN/1.73M2
GLUCOSE BLD-MCNC: 122 MG/DL (ref 70–125)
HCT VFR BLD AUTO: 38.3 % (ref 35–47)
HGB BLD-MCNC: 12.8 G/DL (ref 11.7–15.7)
IMM GRANULOCYTES # BLD: 0 10E3/UL
IMM GRANULOCYTES NFR BLD: 0 %
LYMPHOCYTES # BLD AUTO: 1.1 10E3/UL (ref 0.8–5.3)
LYMPHOCYTES NFR BLD AUTO: 20 %
MCH RBC QN AUTO: 30.1 PG (ref 26.5–33)
MCHC RBC AUTO-ENTMCNC: 33.4 G/DL (ref 31.5–36.5)
MCV RBC AUTO: 90 FL (ref 78–100)
MONOCYTES # BLD AUTO: 0.4 10E3/UL (ref 0–1.3)
MONOCYTES NFR BLD AUTO: 8 %
NEUTROPHILS # BLD AUTO: 2.9 10E3/UL (ref 1.6–8.3)
NEUTROPHILS NFR BLD AUTO: 53 %
NRBC # BLD AUTO: 0 10E3/UL
NRBC BLD AUTO-RTO: 0 /100
PLATELET # BLD AUTO: 359 10E3/UL (ref 150–450)
POTASSIUM BLD-SCNC: 3.8 MMOL/L (ref 3.5–5)
PROT SERPL-MCNC: 6.9 G/DL (ref 6–8)
RBC # BLD AUTO: 4.25 10E6/UL (ref 3.8–5.2)
SODIUM SERPL-SCNC: 141 MMOL/L (ref 136–145)
WBC # BLD AUTO: 5.4 10E3/UL (ref 4–11)

## 2022-04-18 PROCEDURE — 250N000011 HC RX IP 250 OP 636: Performed by: INTERNAL MEDICINE

## 2022-04-18 PROCEDURE — G0463 HOSPITAL OUTPT CLINIC VISIT: HCPCS | Mod: 25

## 2022-04-18 PROCEDURE — 85025 COMPLETE CBC W/AUTO DIFF WBC: CPT | Performed by: NURSE PRACTITIONER

## 2022-04-18 PROCEDURE — 96375 TX/PRO/DX INJ NEW DRUG ADDON: CPT

## 2022-04-18 PROCEDURE — 99215 OFFICE O/P EST HI 40 MIN: CPT | Performed by: INTERNAL MEDICINE

## 2022-04-18 PROCEDURE — 36415 COLL VENOUS BLD VENIPUNCTURE: CPT | Performed by: NURSE PRACTITIONER

## 2022-04-18 PROCEDURE — 80053 COMPREHEN METABOLIC PANEL: CPT | Performed by: NURSE PRACTITIONER

## 2022-04-18 PROCEDURE — 96367 TX/PROPH/DG ADDL SEQ IV INF: CPT

## 2022-04-18 PROCEDURE — 96417 CHEMO IV INFUS EACH ADDL SEQ: CPT

## 2022-04-18 PROCEDURE — 258N000003 HC RX IP 258 OP 636: Performed by: INTERNAL MEDICINE

## 2022-04-18 PROCEDURE — 96413 CHEMO IV INFUSION 1 HR: CPT

## 2022-04-18 RX ORDER — PALONOSETRON 0.05 MG/ML
0.25 INJECTION, SOLUTION INTRAVENOUS ONCE
Status: CANCELLED
Start: 2022-04-18 | End: 2022-04-18

## 2022-04-18 RX ORDER — LORAZEPAM 2 MG/ML
0.5 INJECTION INTRAMUSCULAR EVERY 4 HOURS PRN
Status: CANCELLED | OUTPATIENT
Start: 2022-04-18

## 2022-04-18 RX ORDER — HEPARIN SODIUM (PORCINE) LOCK FLUSH IV SOLN 100 UNIT/ML 100 UNIT/ML
5 SOLUTION INTRAVENOUS
Status: CANCELLED | OUTPATIENT
Start: 2022-04-18

## 2022-04-18 RX ORDER — EPINEPHRINE 1 MG/ML
0.3 INJECTION, SOLUTION, CONCENTRATE INTRAVENOUS EVERY 5 MIN PRN
Status: CANCELLED | OUTPATIENT
Start: 2022-04-18

## 2022-04-18 RX ORDER — METHYLPREDNISOLONE SODIUM SUCCINATE 125 MG/2ML
125 INJECTION, POWDER, LYOPHILIZED, FOR SOLUTION INTRAMUSCULAR; INTRAVENOUS
Status: CANCELLED
Start: 2022-04-18

## 2022-04-18 RX ORDER — HEPARIN SODIUM,PORCINE 10 UNIT/ML
5 VIAL (ML) INTRAVENOUS
Status: CANCELLED | OUTPATIENT
Start: 2022-04-18

## 2022-04-18 RX ORDER — MEPERIDINE HYDROCHLORIDE 50 MG/ML
25 INJECTION INTRAMUSCULAR; INTRAVENOUS; SUBCUTANEOUS EVERY 30 MIN PRN
Status: CANCELLED | OUTPATIENT
Start: 2022-04-18

## 2022-04-18 RX ORDER — ALBUTEROL SULFATE 90 UG/1
1-2 AEROSOL, METERED RESPIRATORY (INHALATION)
Status: CANCELLED
Start: 2022-04-18

## 2022-04-18 RX ORDER — ALBUTEROL SULFATE 0.83 MG/ML
2.5 SOLUTION RESPIRATORY (INHALATION)
Status: CANCELLED | OUTPATIENT
Start: 2022-04-18

## 2022-04-18 RX ORDER — NALOXONE HYDROCHLORIDE 0.4 MG/ML
0.2 INJECTION, SOLUTION INTRAMUSCULAR; INTRAVENOUS; SUBCUTANEOUS
Status: CANCELLED | OUTPATIENT
Start: 2022-04-18

## 2022-04-18 RX ORDER — DIPHENHYDRAMINE HYDROCHLORIDE 50 MG/ML
50 INJECTION INTRAMUSCULAR; INTRAVENOUS
Status: CANCELLED
Start: 2022-04-18

## 2022-04-18 RX ORDER — PALONOSETRON 0.05 MG/ML
0.25 INJECTION, SOLUTION INTRAVENOUS ONCE
Status: COMPLETED | OUTPATIENT
Start: 2022-04-18 | End: 2022-04-18

## 2022-04-18 RX ADMIN — DOCETAXEL 148 MG: 20 INJECTION, SOLUTION INTRAVENOUS at 11:03

## 2022-04-18 RX ADMIN — PALONOSETRON 0.25 MG: 0.05 INJECTION, SOLUTION INTRAVENOUS at 10:15

## 2022-04-18 RX ADMIN — CYCLOPHOSPHAMIDE 1175 MG: 1 INJECTION, POWDER, FOR SOLUTION INTRAVENOUS; ORAL at 12:10

## 2022-04-18 RX ADMIN — DEXAMETHASONE SODIUM PHOSPHATE 12 MG: 10 INJECTION, SOLUTION INTRAMUSCULAR; INTRAVENOUS at 10:40

## 2022-04-18 RX ADMIN — SODIUM CHLORIDE 250 ML: 9 INJECTION, SOLUTION INTRAVENOUS at 10:11

## 2022-04-18 ASSESSMENT — ENCOUNTER SYMPTOMS
EYES NEGATIVE: 1
CARDIOVASCULAR NEGATIVE: 1
NEUROLOGICAL NEGATIVE: 1
PSYCHIATRIC NEGATIVE: 1
ENDOCRINE NEGATIVE: 1
RESPIRATORY NEGATIVE: 1
CONSTITUTIONAL NEGATIVE: 1
HEMATOLOGIC/LYMPHATIC NEGATIVE: 1
DIARRHEA: 1
MUSCULOSKELETAL NEGATIVE: 1

## 2022-04-18 ASSESSMENT — PAIN SCALES - GENERAL: PAINLEVEL: NO PAIN (0)

## 2022-04-18 NOTE — PROGRESS NOTES
Infusion Nursing Note:  Lisa Reyna presents today for Chemotherapy.    Patient seen by provider today: No   present during visit today: Not Applicable.    Note: Tolerated chemotherapy well  Offers no complaints.      Intravenous Access:  Labs drawn without difficulty.  Peripheral IV placed.    Treatment Conditions:  Results reviewed, labs MET treatment parameters, ok to proceed with treatment.      Post Infusion Assessment:  Patient tolerated infusion without incident.  Access discontinued per protocol.       Discharge Plan:   Patient and/or family verbalized understanding of discharge instructions and all questions answered.      Geri Sosa RN

## 2022-04-18 NOTE — LETTER
4/18/2022         RE: Lisa Reyna  [address] [place] [address]        Dear Colleague,    Thank you for referring your patient, Lisa Reyna, to the [facility]. Please see a copy of my visit note below.    Assessment & Plan   High risk ER/HI pos HER2 neg breast cancer on adjuvant TC  # C1 complicated by diarrhea and mucositis, now resolved    D1C2 today      Interval History  Patient in good spirits, feeling fine now.  She lives with her mother who will soon be 98.  They like a small glass of red wine together, wonder if that's OK (I said yes).    She's had some more problems requiring inhaler due to mold in the air.  Takes Claritin and was told this also helps with GCSF side effects.      Oncologic History Recorded 25 March 2022 by Cortez Vivas CNP:     1.           Malignant neoplasm LIQ (R) breast                          Pathologic Stage IA (pT1c, pN0(sn), cM0)                          G2                          ER+ 70%.  HI+ 30%.  HER-2 negative by IHC (1+)                           Oncotype DX score: 30        01/18/2022 - screening mammogram detected possible calcification in the right breast at 4 o'clock position, middle depth.  ? 01/20/2022 - diagnostic mammogram and ultrasound showed a hypoechoic mass with microlobulated and spiculated margins containing calcification measuring 10 x 10 x 8 mm correlating with mammographic abnormality.  Right axilla showed no evidence of lymphadenopathy.  ? 01/21/2022 US core needle biopsy of the right breast mass - confirmed  invasive ductal carcinoma.    02/11/2022 (R) breast lumpectomy and right sentinel lymph node biopsy (Dr. Alcantara).    ? Pathology showed:    12 mm invasive ductal carcinoma with focal micropapillary pattern.  Grade 2.      Margins were negative but close at 0.5 mm from the nearest junction of the inferior and medial margin, 0.5 mm from medial margin and 2 mm from inferior margin.      Associated DCIS  "with EIC negative, nuclear grade intermediate, solid and focal cribriform pattern of 10%.  DCIS margins were uninvolved at 3 mm from nearest inferior margin, 5 mm from medial margins.      1 sentinel lymph node was negative for metastatic carcinoma.    ? 03/11/2022 consult with Dr Koch - because of the close margins of invasive carcinoma and DCIS, recommended mastectomy s/p adjuvant chemotherapy with docetaxel + cyclophosamide (TC) every 3 weeks for 4 cycles.      03/25/2022 - D1C1 adjuvant TC chemotherapy    Review of Systems   Constitutional: Negative.    HENT:   Positive for mouth sores (resolved with soda swishes).         Sinus symptoms from mold   Eyes: Negative.    Respiratory: Negative.    Cardiovascular: Negative.    Gastrointestinal: Positive for diarrhea (resolved).   Endocrine: Negative.    Genitourinary: Negative.     Musculoskeletal: Negative.         Her knees and low back =T3 have arthritis    Would like to take 650 mg acetaminophen when needed.  We discussed no more than 4000 mg/day   Skin: Negative.    Neurological: Negative.    Hematological: Negative.    Psychiatric/Behavioral: Negative.    All other systems reviewed and are negative.      BP (!) 148/79 (BP Location: Right arm, Patient Position: Sitting, Cuff Size: Adult Large)   Pulse 85   Temp 98.3  F (36.8  C)   Ht 1.575 m (5' 2\")   Wt 85.3 kg (188 lb)   SpO2 99%   BMI 34.39 kg/m      Physical Exam  Constitutional:       General: She is not in acute distress.     Appearance: Normal appearance. She is normal weight. She is not ill-appearing or toxic-appearing.      Comments: Wearing a turban due to hair loss   HENT:      Head: Normocephalic and atraumatic.      Nose: Nose normal.   Eyes:      Extraocular Movements: Extraocular movements intact.      Pupils: Pupils are equal, round, and reactive to light.   Cardiovascular:      Rate and Rhythm: Normal rate and regular rhythm.      Heart sounds: Normal heart sounds. No murmur heard.    No " friction rub. No gallop.   Pulmonary:      Effort: Pulmonary effort is normal. No respiratory distress.      Breath sounds: Normal breath sounds.   Abdominal:      General: Abdomen is flat. Bowel sounds are normal.      Palpations: Abdomen is soft. There is no mass.      Tenderness: There is no guarding.   Musculoskeletal:         General: No swelling or deformity. Normal range of motion.      Cervical back: Normal range of motion and neck supple.      Right lower leg: No edema.      Left lower leg: No edema.   Lymphadenopathy:      Cervical: No cervical adenopathy.   Skin:     General: Skin is warm and dry.   Neurological:      General: No focal deficit present.      Mental Status: She is alert and oriented to person, place, and time.      Cranial Nerves: No cranial nerve deficit.      Motor: No weakness.      Deep Tendon Reflexes: Reflexes normal.   Psychiatric:         Mood and Affect: Mood normal.         Behavior: Behavior normal.         Thought Content: Thought content normal.         Judgment: Judgment normal.         Recent Results (from the past 720 hour(s))   Comprehensive metabolic panel    Collection Time: 03/25/22 12:05 PM   Result Value Ref Range    Sodium 137 136 - 145 mmol/L    Potassium 3.9 3.5 - 5.0 mmol/L    Chloride 103 98 - 107 mmol/L    Carbon Dioxide (CO2) 24 22 - 31 mmol/L    Anion Gap 10 5 - 18 mmol/L    Urea Nitrogen 16 8 - 28 mg/dL    Creatinine 0.97 0.60 - 1.10 mg/dL    Calcium 9.7 8.5 - 10.5 mg/dL    Glucose 123 70 - 125 mg/dL    Alkaline Phosphatase 73 45 - 120 U/L    AST 14 0 - 40 U/L    ALT 9 0 - 45 U/L    Protein Total 7.2 6.0 - 8.0 g/dL    Albumin 4.1 3.5 - 5.0 g/dL    Bilirubin Total 0.6 0.0 - 1.0 mg/dL    GFR Estimate 62 >60 mL/min/1.73m2   CBC with platelets and differential    Collection Time: 03/25/22 12:05 PM   Result Value Ref Range    WBC Count 7.7 4.0 - 11.0 10e3/uL    RBC Count 4.62 3.80 - 5.20 10e6/uL    Hemoglobin 14.0 11.7 - 15.7 g/dL    Hematocrit 41.4 35.0 - 47.0 %     MCV 90 78 - 100 fL    MCH 30.3 26.5 - 33.0 pg    MCHC 33.8 31.5 - 36.5 g/dL    RDW 12.8 10.0 - 15.0 %    Platelet Count 296 150 - 450 10e3/uL    % Neutrophils 58 %    % Lymphocytes 25 %    % Monocytes 10 %    % Eosinophils 6 %    % Basophils 1 %    % Immature Granulocytes 0 %    NRBCs per 100 WBC 0 <1 /100    Absolute Neutrophils 4.5 1.6 - 8.3 10e3/uL    Absolute Lymphocytes 1.9 0.8 - 5.3 10e3/uL    Absolute Monocytes 0.8 0.0 - 1.3 10e3/uL    Absolute Eosinophils 0.5 0.0 - 0.7 10e3/uL    Absolute Basophils 0.1 0.0 - 0.2 10e3/uL    Absolute Immature Granulocytes 0.0 <=0.4 10e3/uL    Absolute NRBCs 0.0 10e3/uL   Basic metabolic panel    Collection Time: 04/01/22  9:10 AM   Result Value Ref Range    Sodium 136 136 - 145 mmol/L    Potassium 3.7 3.5 - 5.0 mmol/L    Chloride 96 (L) 98 - 107 mmol/L    Carbon Dioxide (CO2) 28 22 - 31 mmol/L    Anion Gap 12 5 - 18 mmol/L    Urea Nitrogen 12 8 - 28 mg/dL    Creatinine 1.08 0.60 - 1.10 mg/dL    Calcium 9.6 8.5 - 10.5 mg/dL    Glucose 116 70 - 125 mg/dL    GFR Estimate 54 (L) >60 mL/min/1.73m2   CBC with platelets and differential    Collection Time: 04/01/22  9:10 AM   Result Value Ref Range    WBC Count 3.6 (L) 4.0 - 11.0 10e3/uL    RBC Count 4.74 3.80 - 5.20 10e6/uL    Hemoglobin 14.4 11.7 - 15.7 g/dL    Hematocrit 41.2 35.0 - 47.0 %    MCV 87 78 - 100 fL    MCH 30.4 26.5 - 33.0 pg    MCHC 35.0 31.5 - 36.5 g/dL    RDW 12.3 10.0 - 15.0 %    Platelet Count 210 150 - 450 10e3/uL   Manual Differential    Collection Time: 04/01/22  9:10 AM   Result Value Ref Range    % Neutrophils 23 %    % Lymphocytes 41 %    % Monocytes 31 %    % Eosinophils 4 %    % Basophils 0 %    % Metamyelocytes 1 %    Absolute Neutrophils 0.8 (L) 1.6 - 8.3 10e3/uL    Absolute Lymphocytes 1.5 0.8 - 5.3 10e3/uL    Absolute Monocytes 1.1 0.0 - 1.3 10e3/uL    Absolute Eosinophils 0.1 0.0 - 0.7 10e3/uL    Absolute Basophils 0.0 0.0 - 0.2 10e3/uL    Absolute Metamyelocytes 0.0 <=0.0 10e3/uL    RBC Morphology  Confirmed RBC Indices     Platelet Assessment  Automated Count Confirmed. Platelet morphology is normal.     Automated Count Confirmed. Platelet morphology is normal.   Comprehensive metabolic panel    Collection Time: 04/18/22  8:35 AM   Result Value Ref Range    Sodium 141 136 - 145 mmol/L    Potassium 3.8 3.5 - 5.0 mmol/L    Chloride 106 98 - 107 mmol/L    Carbon Dioxide (CO2) 23 22 - 31 mmol/L    Anion Gap 12 5 - 18 mmol/L    Urea Nitrogen 17 8 - 28 mg/dL    Creatinine 1.06 0.60 - 1.10 mg/dL    Calcium 9.0 8.5 - 10.5 mg/dL    Glucose 122 70 - 125 mg/dL    Alkaline Phosphatase 65 45 - 120 U/L    AST 14 0 - 40 U/L    ALT 12 0 - 45 U/L    Protein Total 6.9 6.0 - 8.0 g/dL    Albumin 3.8 3.5 - 5.0 g/dL    Bilirubin Total 0.7 0.0 - 1.0 mg/dL    GFR Estimate 56 (L) >60 mL/min/1.73m2   CBC with platelets and differential    Collection Time: 04/18/22  8:35 AM   Result Value Ref Range    WBC Count 5.4 4.0 - 11.0 10e3/uL    RBC Count 4.25 3.80 - 5.20 10e6/uL    Hemoglobin 12.8 11.7 - 15.7 g/dL    Hematocrit 38.3 35.0 - 47.0 %    MCV 90 78 - 100 fL    MCH 30.1 26.5 - 33.0 pg    MCHC 33.4 31.5 - 36.5 g/dL    RDW 13.2 10.0 - 15.0 %    Platelet Count 359 150 - 450 10e3/uL    % Neutrophils 53 %    % Lymphocytes 20 %    % Monocytes 8 %    % Eosinophils 16 %    % Basophils 3 %    % Immature Granulocytes 0 %    NRBCs per 100 WBC 0 <1 /100    Absolute Neutrophils 2.9 1.6 - 8.3 10e3/uL    Absolute Lymphocytes 1.1 0.8 - 5.3 10e3/uL    Absolute Monocytes 0.4 0.0 - 1.3 10e3/uL    Absolute Eosinophils 0.9 (H) 0.0 - 0.7 10e3/uL    Absolute Basophils 0.2 0.0 - 0.2 10e3/uL    Absolute Immature Granulocytes 0.0 <=0.4 10e3/uL    Absolute NRBCs 0.0 10e3/uL     I spent 50 minutes in the care of this patient today, which included time necessary for preparation for the visit, face to face time with the patient, communication of recommendations to the care team, and documentation time.    Oncology Rooming Note    April 18, 2022 8:57 AM   Lisa VARGAS  "Maribell is a 71 year old female who presents for:    Chief Complaint   Patient presents with     Oncology Clinic Visit     Initial Vitals: BP (!) 148/79 (BP Location: Right arm, Patient Position: Sitting, Cuff Size: Adult Large)   Pulse 85   Temp 98.3  F (36.8  C)   Ht 1.575 m (5' 2\")   Wt 85.3 kg (188 lb)   SpO2 99%   BMI 34.39 kg/m   Estimated body mass index is 34.39 kg/m  as calculated from the following:    Height as of this encounter: 1.575 m (5' 2\").    Weight as of this encounter: 85.3 kg (188 lb). Body surface area is 1.93 meters squared.  No Pain (0) Comment: Data Unavailable   No LMP recorded. Patient is postmenopausal.  Allergies reviewed: Yes  Medications reviewed: Yes    Medications: Medication refills not needed today.  Pharmacy name entered into [facility]: [address] [place] [address]    Clinical concerns: follow up with labs and chemo      Tamera Pope MA                Again, thank you for allowing me to participate in the care of your patient.        Sincerely,        Shandra Santizo MD    "

## 2022-04-18 NOTE — PROGRESS NOTES
"Oncology Rooming Note    April 18, 2022 8:57 AM   Lisa Reyna is a 71 year old female who presents for:    Chief Complaint   Patient presents with     Oncology Clinic Visit     Initial Vitals: BP (!) 148/79 (BP Location: Right arm, Patient Position: Sitting, Cuff Size: Adult Large)   Pulse 85   Temp 98.3  F (36.8  C)   Ht 1.575 m (5' 2\")   Wt 85.3 kg (188 lb)   SpO2 99%   BMI 34.39 kg/m   Estimated body mass index is 34.39 kg/m  as calculated from the following:    Height as of this encounter: 1.575 m (5' 2\").    Weight as of this encounter: 85.3 kg (188 lb). Body surface area is 1.93 meters squared.  No Pain (0) Comment: Data Unavailable   No LMP recorded. Patient is postmenopausal.  Allergies reviewed: Yes  Medications reviewed: Yes    Medications: Medication refills not needed today.  Pharmacy name entered into [facility]: [place] [address] [place] [address] [address] [place] [address]    Clinical concerns: follow up with labs and chemo      Tamera Pope MA            "

## 2022-04-18 NOTE — PROGRESS NOTES
Assessment & Plan   High risk ER/WY pos HER2 neg breast cancer on adjuvant TC  # C1 complicated by diarrhea and mucositis, now resolved    D1C2 today      Interval History  Patient in good spirits, feeling fine now.  She lives with her mother who will soon be 98.  They like a small glass of red wine together, wonder if that's OK (I said yes).    She's had some more problems requiring inhaler due to mold in the air.  Takes Claritin and was told this also helps with GCSF side effects.      Oncologic History Recorded 25 March 2022 by Cortez Vivas CNP:     1.           Malignant neoplasm LIQ (R) breast                          Pathologic Stage IA (pT1c, pN0(sn), cM0)                          G2                          ER+ 70%.  WY+ 30%.  HER-2 negative by IHC (1+)                           Oncotype DX score: 30        01/18/2022 - screening mammogram detected possible calcification in the right breast at 4 o'clock position, middle depth.  ? 01/20/2022 - diagnostic mammogram and ultrasound showed a hypoechoic mass with microlobulated and spiculated margins containing calcification measuring 10 x 10 x 8 mm correlating with mammographic abnormality.  Right axilla showed no evidence of lymphadenopathy.  ? 01/21/2022 US core needle biopsy of the right breast mass - confirmed  invasive ductal carcinoma.    02/11/2022 (R) breast lumpectomy and right sentinel lymph node biopsy (Dr. Alcantara).    ? Pathology showed:    12 mm invasive ductal carcinoma with focal micropapillary pattern.  Grade 2.      Margins were negative but close at 0.5 mm from the nearest junction of the inferior and medial margin, 0.5 mm from medial margin and 2 mm from inferior margin.      Associated DCIS with EIC negative, nuclear grade intermediate, solid and focal cribriform pattern of 10%.  DCIS margins were uninvolved at 3 mm from nearest inferior margin, 5 mm from medial margins.      1 sentinel lymph node was negative for metastatic  "carcinoma.    ? 03/11/2022 consult with Dr Koch - because of the close margins of invasive carcinoma and DCIS, recommended mastectomy s/p adjuvant chemotherapy with docetaxel + cyclophosamide (TC) every 3 weeks for 4 cycles.      03/25/2022 - D1C1 adjuvant TC chemotherapy    Review of Systems   Constitutional: Negative.    HENT:   Positive for mouth sores (resolved with soda swishes).         Sinus symptoms from mold   Eyes: Negative.    Respiratory: Negative.    Cardiovascular: Negative.    Gastrointestinal: Positive for diarrhea (resolved).   Endocrine: Negative.    Genitourinary: Negative.     Musculoskeletal: Negative.         Her knees and low back =T3 have arthritis    Would like to take 650 mg acetaminophen when needed.  We discussed no more than 4000 mg/day   Skin: Negative.    Neurological: Negative.    Hematological: Negative.    Psychiatric/Behavioral: Negative.    All other systems reviewed and are negative.      BP (!) 148/79 (BP Location: Right arm, Patient Position: Sitting, Cuff Size: Adult Large)   Pulse 85   Temp 98.3  F (36.8  C)   Ht 1.575 m (5' 2\")   Wt 85.3 kg (188 lb)   SpO2 99%   BMI 34.39 kg/m      Physical Exam  Constitutional:       General: She is not in acute distress.     Appearance: Normal appearance. She is normal weight. She is not ill-appearing or toxic-appearing.      Comments: Wearing a turban due to hair loss   HENT:      Head: Normocephalic and atraumatic.      Nose: Nose normal.   Eyes:      Extraocular Movements: Extraocular movements intact.      Pupils: Pupils are equal, round, and reactive to light.   Cardiovascular:      Rate and Rhythm: Normal rate and regular rhythm.      Heart sounds: Normal heart sounds. No murmur heard.    No friction rub. No gallop.   Pulmonary:      Effort: Pulmonary effort is normal. No respiratory distress.      Breath sounds: Normal breath sounds.   Abdominal:      General: Abdomen is flat. Bowel sounds are normal.      Palpations: Abdomen " is soft. There is no mass.      Tenderness: There is no guarding.   Musculoskeletal:         General: No swelling or deformity. Normal range of motion.      Cervical back: Normal range of motion and neck supple.      Right lower leg: No edema.      Left lower leg: No edema.   Lymphadenopathy:      Cervical: No cervical adenopathy.   Skin:     General: Skin is warm and dry.   Neurological:      General: No focal deficit present.      Mental Status: She is alert and oriented to person, place, and time.      Cranial Nerves: No cranial nerve deficit.      Motor: No weakness.      Deep Tendon Reflexes: Reflexes normal.   Psychiatric:         Mood and Affect: Mood normal.         Behavior: Behavior normal.         Thought Content: Thought content normal.         Judgment: Judgment normal.         Recent Results (from the past 720 hour(s))   Comprehensive metabolic panel    Collection Time: 03/25/22 12:05 PM   Result Value Ref Range    Sodium 137 136 - 145 mmol/L    Potassium 3.9 3.5 - 5.0 mmol/L    Chloride 103 98 - 107 mmol/L    Carbon Dioxide (CO2) 24 22 - 31 mmol/L    Anion Gap 10 5 - 18 mmol/L    Urea Nitrogen 16 8 - 28 mg/dL    Creatinine 0.97 0.60 - 1.10 mg/dL    Calcium 9.7 8.5 - 10.5 mg/dL    Glucose 123 70 - 125 mg/dL    Alkaline Phosphatase 73 45 - 120 U/L    AST 14 0 - 40 U/L    ALT 9 0 - 45 U/L    Protein Total 7.2 6.0 - 8.0 g/dL    Albumin 4.1 3.5 - 5.0 g/dL    Bilirubin Total 0.6 0.0 - 1.0 mg/dL    GFR Estimate 62 >60 mL/min/1.73m2   CBC with platelets and differential    Collection Time: 03/25/22 12:05 PM   Result Value Ref Range    WBC Count 7.7 4.0 - 11.0 10e3/uL    RBC Count 4.62 3.80 - 5.20 10e6/uL    Hemoglobin 14.0 11.7 - 15.7 g/dL    Hematocrit 41.4 35.0 - 47.0 %    MCV 90 78 - 100 fL    MCH 30.3 26.5 - 33.0 pg    MCHC 33.8 31.5 - 36.5 g/dL    RDW 12.8 10.0 - 15.0 %    Platelet Count 296 150 - 450 10e3/uL    % Neutrophils 58 %    % Lymphocytes 25 %    % Monocytes 10 %    % Eosinophils 6 %    %  Basophils 1 %    % Immature Granulocytes 0 %    NRBCs per 100 WBC 0 <1 /100    Absolute Neutrophils 4.5 1.6 - 8.3 10e3/uL    Absolute Lymphocytes 1.9 0.8 - 5.3 10e3/uL    Absolute Monocytes 0.8 0.0 - 1.3 10e3/uL    Absolute Eosinophils 0.5 0.0 - 0.7 10e3/uL    Absolute Basophils 0.1 0.0 - 0.2 10e3/uL    Absolute Immature Granulocytes 0.0 <=0.4 10e3/uL    Absolute NRBCs 0.0 10e3/uL   Basic metabolic panel    Collection Time: 04/01/22  9:10 AM   Result Value Ref Range    Sodium 136 136 - 145 mmol/L    Potassium 3.7 3.5 - 5.0 mmol/L    Chloride 96 (L) 98 - 107 mmol/L    Carbon Dioxide (CO2) 28 22 - 31 mmol/L    Anion Gap 12 5 - 18 mmol/L    Urea Nitrogen 12 8 - 28 mg/dL    Creatinine 1.08 0.60 - 1.10 mg/dL    Calcium 9.6 8.5 - 10.5 mg/dL    Glucose 116 70 - 125 mg/dL    GFR Estimate 54 (L) >60 mL/min/1.73m2   CBC with platelets and differential    Collection Time: 04/01/22  9:10 AM   Result Value Ref Range    WBC Count 3.6 (L) 4.0 - 11.0 10e3/uL    RBC Count 4.74 3.80 - 5.20 10e6/uL    Hemoglobin 14.4 11.7 - 15.7 g/dL    Hematocrit 41.2 35.0 - 47.0 %    MCV 87 78 - 100 fL    MCH 30.4 26.5 - 33.0 pg    MCHC 35.0 31.5 - 36.5 g/dL    RDW 12.3 10.0 - 15.0 %    Platelet Count 210 150 - 450 10e3/uL   Manual Differential    Collection Time: 04/01/22  9:10 AM   Result Value Ref Range    % Neutrophils 23 %    % Lymphocytes 41 %    % Monocytes 31 %    % Eosinophils 4 %    % Basophils 0 %    % Metamyelocytes 1 %    Absolute Neutrophils 0.8 (L) 1.6 - 8.3 10e3/uL    Absolute Lymphocytes 1.5 0.8 - 5.3 10e3/uL    Absolute Monocytes 1.1 0.0 - 1.3 10e3/uL    Absolute Eosinophils 0.1 0.0 - 0.7 10e3/uL    Absolute Basophils 0.0 0.0 - 0.2 10e3/uL    Absolute Metamyelocytes 0.0 <=0.0 10e3/uL    RBC Morphology Confirmed RBC Indices     Platelet Assessment  Automated Count Confirmed. Platelet morphology is normal.     Automated Count Confirmed. Platelet morphology is normal.   Comprehensive metabolic panel    Collection Time: 04/18/22  8:35  AM   Result Value Ref Range    Sodium 141 136 - 145 mmol/L    Potassium 3.8 3.5 - 5.0 mmol/L    Chloride 106 98 - 107 mmol/L    Carbon Dioxide (CO2) 23 22 - 31 mmol/L    Anion Gap 12 5 - 18 mmol/L    Urea Nitrogen 17 8 - 28 mg/dL    Creatinine 1.06 0.60 - 1.10 mg/dL    Calcium 9.0 8.5 - 10.5 mg/dL    Glucose 122 70 - 125 mg/dL    Alkaline Phosphatase 65 45 - 120 U/L    AST 14 0 - 40 U/L    ALT 12 0 - 45 U/L    Protein Total 6.9 6.0 - 8.0 g/dL    Albumin 3.8 3.5 - 5.0 g/dL    Bilirubin Total 0.7 0.0 - 1.0 mg/dL    GFR Estimate 56 (L) >60 mL/min/1.73m2   CBC with platelets and differential    Collection Time: 04/18/22  8:35 AM   Result Value Ref Range    WBC Count 5.4 4.0 - 11.0 10e3/uL    RBC Count 4.25 3.80 - 5.20 10e6/uL    Hemoglobin 12.8 11.7 - 15.7 g/dL    Hematocrit 38.3 35.0 - 47.0 %    MCV 90 78 - 100 fL    MCH 30.1 26.5 - 33.0 pg    MCHC 33.4 31.5 - 36.5 g/dL    RDW 13.2 10.0 - 15.0 %    Platelet Count 359 150 - 450 10e3/uL    % Neutrophils 53 %    % Lymphocytes 20 %    % Monocytes 8 %    % Eosinophils 16 %    % Basophils 3 %    % Immature Granulocytes 0 %    NRBCs per 100 WBC 0 <1 /100    Absolute Neutrophils 2.9 1.6 - 8.3 10e3/uL    Absolute Lymphocytes 1.1 0.8 - 5.3 10e3/uL    Absolute Monocytes 0.4 0.0 - 1.3 10e3/uL    Absolute Eosinophils 0.9 (H) 0.0 - 0.7 10e3/uL    Absolute Basophils 0.2 0.0 - 0.2 10e3/uL    Absolute Immature Granulocytes 0.0 <=0.4 10e3/uL    Absolute NRBCs 0.0 10e3/uL     I spent 50 minutes in the care of this patient today, which included time necessary for preparation for the visit, face to face time with the patient, communication of recommendations to the care team, and documentation time.

## 2022-04-19 ENCOUNTER — INFUSION THERAPY VISIT (OUTPATIENT)
Dept: INFUSION THERAPY | Facility: CLINIC | Age: 72
End: 2022-04-19
Attending: INTERNAL MEDICINE
Payer: MEDICARE

## 2022-04-19 DIAGNOSIS — C50.311 MALIGNANT NEOPLASM OF LOWER-INNER QUADRANT OF RIGHT BREAST OF FEMALE, ESTROGEN RECEPTOR POSITIVE (H): ICD-10-CM

## 2022-04-19 DIAGNOSIS — Z51.11 ENCOUNTER FOR ANTINEOPLASTIC CHEMOTHERAPY: Primary | ICD-10-CM

## 2022-04-19 DIAGNOSIS — Z17.0 MALIGNANT NEOPLASM OF LOWER-INNER QUADRANT OF RIGHT BREAST OF FEMALE, ESTROGEN RECEPTOR POSITIVE (H): ICD-10-CM

## 2022-04-19 PROCEDURE — 250N000011 HC RX IP 250 OP 636: Performed by: INTERNAL MEDICINE

## 2022-04-19 PROCEDURE — 96372 THER/PROPH/DIAG INJ SC/IM: CPT | Performed by: INTERNAL MEDICINE

## 2022-04-19 RX ADMIN — PEGFILGRASTIM 6 MG: 6 INJECTION SUBCUTANEOUS at 13:19

## 2022-04-19 NOTE — PROGRESS NOTES
Lisa was here today for injection.  Pt tolerated injection well and was discharged.      Shira Nielsen, CMA

## 2022-04-29 ENCOUNTER — PATIENT OUTREACH (OUTPATIENT)
Dept: ONCOLOGY | Facility: CLINIC | Age: 72
End: 2022-04-29
Payer: MEDICARE

## 2022-05-02 NOTE — PROGRESS NOTES
"[facility]: Cancer Care Post Treatment Assessment    Discussion with Patient:                                                    Patient called with loss of tatste.Sounds complete, although she reported she \"May have\" been able to discern a small lemon hint when eating fish with lemon.Started approx two-three days ago.                                Dates of Treament:                                                      Infusion given in last 28 days     Administered MAR Action Medication Dose Rate Visit    04/18/2022 11:03 New Bag DOCEtaxel (TAXOTERE) 148 mg in sodium chloride in non-PVC bag 0.9 % 257.4 mL infusion 148 mg 257.4 mL/hr Infusion Therapy Visit on 04/18/2022 in [facility]    04/18/2022 12:10 New Bag cyclophosphamide (CYTOXAN) 1,175 mg in sodium chloride 0.9 % 308.75 mL infusion 1,175 mg 617.5 mL/hr Infusion Therapy Visit on 04/18/2022 in [facility]          Assessment:                                                      Assessment completed with:: Patient    Constitutional  Fatigue: Fatigue relieved by rest (few days after steroid     Gastrointestinal  Patient Reported Gastrointestinal Symptoms?: Yes (loss of taste x 2-3 days, only symptom    Patient Coping: Accepting    Denies any cold like symptoms, Vaccinated for COVID x four.    Intervention/Education provided during outreach:                                                     Educated that there can be loss of taste with cyclophosphamide, but unusual, especially with cycle two. Patient will test at home for COVID. Continue to eat varied diet.    Patient to follow up as scheduled at next apt         Signature:  Elizabeth Ho RN      "

## 2022-05-10 ENCOUNTER — LAB (OUTPATIENT)
Dept: INFUSION THERAPY | Facility: CLINIC | Age: 72
End: 2022-05-10
Attending: INTERNAL MEDICINE
Payer: MEDICARE

## 2022-05-10 ENCOUNTER — ONCOLOGY VISIT (OUTPATIENT)
Dept: ONCOLOGY | Facility: CLINIC | Age: 72
End: 2022-05-10
Attending: INTERNAL MEDICINE
Payer: MEDICARE

## 2022-05-10 VITALS
DIASTOLIC BLOOD PRESSURE: 76 MMHG | SYSTOLIC BLOOD PRESSURE: 156 MMHG | HEIGHT: 62 IN | BODY MASS INDEX: 33.8 KG/M2 | HEART RATE: 84 BPM | OXYGEN SATURATION: 99 % | WEIGHT: 183.7 LBS | TEMPERATURE: 98.3 F

## 2022-05-10 DIAGNOSIS — E87.6 HYPOKALEMIA: ICD-10-CM

## 2022-05-10 DIAGNOSIS — Z17.0 MALIGNANT NEOPLASM OF LOWER-INNER QUADRANT OF RIGHT BREAST OF FEMALE, ESTROGEN RECEPTOR POSITIVE (H): Primary | ICD-10-CM

## 2022-05-10 DIAGNOSIS — C50.311 MALIGNANT NEOPLASM OF LOWER-INNER QUADRANT OF RIGHT BREAST OF FEMALE, ESTROGEN RECEPTOR POSITIVE (H): ICD-10-CM

## 2022-05-10 DIAGNOSIS — Z17.0 MALIGNANT NEOPLASM OF LOWER-INNER QUADRANT OF RIGHT BREAST OF FEMALE, ESTROGEN RECEPTOR POSITIVE (H): ICD-10-CM

## 2022-05-10 DIAGNOSIS — Z51.11 ENCOUNTER FOR ANTINEOPLASTIC CHEMOTHERAPY: ICD-10-CM

## 2022-05-10 DIAGNOSIS — C50.311 MALIGNANT NEOPLASM OF LOWER-INNER QUADRANT OF RIGHT BREAST OF FEMALE, ESTROGEN RECEPTOR POSITIVE (H): Primary | ICD-10-CM

## 2022-05-10 DIAGNOSIS — Z51.11 ENCOUNTER FOR ANTINEOPLASTIC CHEMOTHERAPY: Primary | ICD-10-CM

## 2022-05-10 LAB
ALBUMIN SERPL-MCNC: 3.6 G/DL (ref 3.5–5)
ALP SERPL-CCNC: 55 U/L (ref 45–120)
ALT SERPL W P-5'-P-CCNC: 13 U/L (ref 0–45)
ANION GAP SERPL CALCULATED.3IONS-SCNC: 11 MMOL/L (ref 5–18)
AST SERPL W P-5'-P-CCNC: 16 U/L (ref 0–40)
BASOPHILS # BLD AUTO: 0.1 10E3/UL (ref 0–0.2)
BASOPHILS NFR BLD AUTO: 2 %
BILIRUB SERPL-MCNC: 1 MG/DL (ref 0–1)
BUN SERPL-MCNC: 15 MG/DL (ref 8–28)
CALCIUM SERPL-MCNC: 9 MG/DL (ref 8.5–10.5)
CHLORIDE BLD-SCNC: 105 MMOL/L (ref 98–107)
CO2 SERPL-SCNC: 25 MMOL/L (ref 22–31)
CREAT SERPL-MCNC: 1 MG/DL (ref 0.6–1.1)
EOSINOPHIL # BLD AUTO: 0.1 10E3/UL (ref 0–0.7)
EOSINOPHIL NFR BLD AUTO: 3 %
ERYTHROCYTE [DISTWIDTH] IN BLOOD BY AUTOMATED COUNT: 14 % (ref 10–15)
GFR SERPL CREATININE-BSD FRML MDRD: 60 ML/MIN/1.73M2
GLUCOSE BLD-MCNC: 131 MG/DL (ref 70–125)
HCT VFR BLD AUTO: 34.9 % (ref 35–47)
HGB BLD-MCNC: 11.6 G/DL (ref 11.7–15.7)
IMM GRANULOCYTES # BLD: 0 10E3/UL
IMM GRANULOCYTES NFR BLD: 0 %
LYMPHOCYTES # BLD AUTO: 0.6 10E3/UL (ref 0.8–5.3)
LYMPHOCYTES NFR BLD AUTO: 13 %
MCH RBC QN AUTO: 30.7 PG (ref 26.5–33)
MCHC RBC AUTO-ENTMCNC: 33.2 G/DL (ref 31.5–36.5)
MCV RBC AUTO: 92 FL (ref 78–100)
MONOCYTES # BLD AUTO: 0.5 10E3/UL (ref 0–1.3)
MONOCYTES NFR BLD AUTO: 11 %
NEUTROPHILS # BLD AUTO: 3.3 10E3/UL (ref 1.6–8.3)
NEUTROPHILS NFR BLD AUTO: 71 %
NRBC # BLD AUTO: 0 10E3/UL
NRBC BLD AUTO-RTO: 0 /100
PLATELET # BLD AUTO: 282 10E3/UL (ref 150–450)
POTASSIUM BLD-SCNC: 3.2 MMOL/L (ref 3.5–5)
PROT SERPL-MCNC: 6.6 G/DL (ref 6–8)
RBC # BLD AUTO: 3.78 10E6/UL (ref 3.8–5.2)
SODIUM SERPL-SCNC: 141 MMOL/L (ref 136–145)
WBC # BLD AUTO: 4.6 10E3/UL (ref 4–11)

## 2022-05-10 PROCEDURE — 96367 TX/PROPH/DG ADDL SEQ IV INF: CPT

## 2022-05-10 PROCEDURE — 82040 ASSAY OF SERUM ALBUMIN: CPT | Performed by: INTERNAL MEDICINE

## 2022-05-10 PROCEDURE — G0463 HOSPITAL OUTPT CLINIC VISIT: HCPCS | Mod: 25

## 2022-05-10 PROCEDURE — 36415 COLL VENOUS BLD VENIPUNCTURE: CPT | Performed by: INTERNAL MEDICINE

## 2022-05-10 PROCEDURE — 250N000011 HC RX IP 250 OP 636: Performed by: INTERNAL MEDICINE

## 2022-05-10 PROCEDURE — 85025 COMPLETE CBC W/AUTO DIFF WBC: CPT | Performed by: INTERNAL MEDICINE

## 2022-05-10 PROCEDURE — 96375 TX/PRO/DX INJ NEW DRUG ADDON: CPT

## 2022-05-10 PROCEDURE — 258N000003 HC RX IP 258 OP 636: Performed by: INTERNAL MEDICINE

## 2022-05-10 PROCEDURE — 80053 COMPREHEN METABOLIC PANEL: CPT | Performed by: INTERNAL MEDICINE

## 2022-05-10 PROCEDURE — 250N000013 HC RX MED GY IP 250 OP 250 PS 637: Performed by: INTERNAL MEDICINE

## 2022-05-10 PROCEDURE — 99214 OFFICE O/P EST MOD 30 MIN: CPT | Performed by: INTERNAL MEDICINE

## 2022-05-10 PROCEDURE — 96417 CHEMO IV INFUS EACH ADDL SEQ: CPT

## 2022-05-10 PROCEDURE — 96413 CHEMO IV INFUSION 1 HR: CPT

## 2022-05-10 RX ORDER — POTASSIUM CHLORIDE 1500 MG/1
40 TABLET, EXTENDED RELEASE ORAL ONCE
Status: DISCONTINUED | OUTPATIENT
Start: 2022-05-10 | End: 2022-05-10 | Stop reason: ALTCHOICE

## 2022-05-10 RX ORDER — METHYLPREDNISOLONE SODIUM SUCCINATE 125 MG/2ML
125 INJECTION, POWDER, LYOPHILIZED, FOR SOLUTION INTRAMUSCULAR; INTRAVENOUS
Status: DISCONTINUED | OUTPATIENT
Start: 2022-05-10 | End: 2022-05-10 | Stop reason: HOSPADM

## 2022-05-10 RX ORDER — EPINEPHRINE 1 MG/ML
0.3 INJECTION, SOLUTION, CONCENTRATE INTRAVENOUS EVERY 5 MIN PRN
Status: CANCELLED | OUTPATIENT
Start: 2022-05-10

## 2022-05-10 RX ORDER — ALBUTEROL SULFATE 90 UG/1
1-2 AEROSOL, METERED RESPIRATORY (INHALATION)
Status: CANCELLED
Start: 2022-05-10

## 2022-05-10 RX ORDER — METHYLPREDNISOLONE SODIUM SUCCINATE 125 MG/2ML
125 INJECTION, POWDER, LYOPHILIZED, FOR SOLUTION INTRAMUSCULAR; INTRAVENOUS
Status: CANCELLED
Start: 2022-05-10

## 2022-05-10 RX ORDER — ALBUTEROL SULFATE 0.83 MG/ML
2.5 SOLUTION RESPIRATORY (INHALATION)
Status: CANCELLED | OUTPATIENT
Start: 2022-05-10

## 2022-05-10 RX ORDER — POTASSIUM CHLORIDE 1500 MG/1
40 TABLET, EXTENDED RELEASE ORAL ONCE
Status: COMPLETED | OUTPATIENT
Start: 2022-05-10 | End: 2022-05-10

## 2022-05-10 RX ORDER — ALBUTEROL SULFATE 90 UG/1
1-2 AEROSOL, METERED RESPIRATORY (INHALATION)
Status: DISCONTINUED | OUTPATIENT
Start: 2022-05-10 | End: 2022-05-10 | Stop reason: HOSPADM

## 2022-05-10 RX ORDER — DIPHENHYDRAMINE HYDROCHLORIDE 50 MG/ML
50 INJECTION INTRAMUSCULAR; INTRAVENOUS
Status: DISCONTINUED | OUTPATIENT
Start: 2022-05-10 | End: 2022-05-10 | Stop reason: HOSPADM

## 2022-05-10 RX ORDER — HEPARIN SODIUM,PORCINE 10 UNIT/ML
5 VIAL (ML) INTRAVENOUS
Status: CANCELLED | OUTPATIENT
Start: 2022-05-10

## 2022-05-10 RX ORDER — PALONOSETRON 0.05 MG/ML
0.25 INJECTION, SOLUTION INTRAVENOUS ONCE
Status: CANCELLED
Start: 2022-05-10 | End: 2022-05-10

## 2022-05-10 RX ORDER — NALOXONE HYDROCHLORIDE 0.4 MG/ML
0.2 INJECTION, SOLUTION INTRAMUSCULAR; INTRAVENOUS; SUBCUTANEOUS
Status: DISCONTINUED | OUTPATIENT
Start: 2022-05-10 | End: 2022-05-10 | Stop reason: HOSPADM

## 2022-05-10 RX ORDER — HEPARIN SODIUM (PORCINE) LOCK FLUSH IV SOLN 100 UNIT/ML 100 UNIT/ML
5 SOLUTION INTRAVENOUS
Status: CANCELLED | OUTPATIENT
Start: 2022-05-10

## 2022-05-10 RX ORDER — PALONOSETRON 0.05 MG/ML
0.25 INJECTION, SOLUTION INTRAVENOUS ONCE
Status: COMPLETED | OUTPATIENT
Start: 2022-05-10 | End: 2022-05-10

## 2022-05-10 RX ORDER — NALOXONE HYDROCHLORIDE 0.4 MG/ML
0.2 INJECTION, SOLUTION INTRAMUSCULAR; INTRAVENOUS; SUBCUTANEOUS
Status: CANCELLED | OUTPATIENT
Start: 2022-05-10

## 2022-05-10 RX ORDER — EPINEPHRINE 1 MG/ML
0.3 INJECTION, SOLUTION, CONCENTRATE INTRAVENOUS EVERY 5 MIN PRN
Status: DISCONTINUED | OUTPATIENT
Start: 2022-05-10 | End: 2022-05-10 | Stop reason: HOSPADM

## 2022-05-10 RX ORDER — MEPERIDINE HYDROCHLORIDE 50 MG/ML
25 INJECTION INTRAMUSCULAR; INTRAVENOUS; SUBCUTANEOUS EVERY 30 MIN PRN
Status: CANCELLED | OUTPATIENT
Start: 2022-05-10

## 2022-05-10 RX ORDER — LORAZEPAM 2 MG/ML
0.5 INJECTION INTRAMUSCULAR EVERY 4 HOURS PRN
Status: CANCELLED | OUTPATIENT
Start: 2022-05-10

## 2022-05-10 RX ORDER — ALBUTEROL SULFATE 0.83 MG/ML
2.5 SOLUTION RESPIRATORY (INHALATION)
Status: DISCONTINUED | OUTPATIENT
Start: 2022-05-10 | End: 2022-05-10 | Stop reason: HOSPADM

## 2022-05-10 RX ORDER — DIPHENHYDRAMINE HYDROCHLORIDE 50 MG/ML
50 INJECTION INTRAMUSCULAR; INTRAVENOUS
Status: CANCELLED
Start: 2022-05-10

## 2022-05-10 RX ORDER — MEPERIDINE HYDROCHLORIDE 50 MG/ML
25 INJECTION INTRAMUSCULAR; INTRAVENOUS; SUBCUTANEOUS EVERY 30 MIN PRN
Status: DISCONTINUED | OUTPATIENT
Start: 2022-05-10 | End: 2022-05-10 | Stop reason: HOSPADM

## 2022-05-10 RX ADMIN — DEXAMETHASONE SODIUM PHOSPHATE 12 MG: 10 INJECTION, SOLUTION INTRAMUSCULAR; INTRAVENOUS at 09:20

## 2022-05-10 RX ADMIN — PALONOSETRON HYDROCHLORIDE 0.25 MG: 0.25 INJECTION INTRAVENOUS at 09:17

## 2022-05-10 RX ADMIN — DOCETAXEL 148 MG: 20 INJECTION, SOLUTION INTRAVENOUS at 09:52

## 2022-05-10 RX ADMIN — CYCLOPHOSPHAMIDE 1175 MG: 1 INJECTION, POWDER, FOR SOLUTION INTRAVENOUS; ORAL at 11:21

## 2022-05-10 RX ADMIN — SODIUM CHLORIDE 250 ML: 9 INJECTION, SOLUTION INTRAVENOUS at 09:17

## 2022-05-10 RX ADMIN — POTASSIUM CHLORIDE 40 MEQ: 20 TABLET, EXTENDED RELEASE ORAL at 09:44

## 2022-05-10 ASSESSMENT — PAIN SCALES - GENERAL: PAINLEVEL: NO PAIN (0)

## 2022-05-10 NOTE — PROGRESS NOTES
"Oncology Rooming Note    May 10, 2022 8:24 AM   Lisa Reyna is a 71 year old female who presents for:    Chief Complaint   Patient presents with     Oncology Clinic Visit     Initial Vitals: BP (!) 156/76 (BP Location: Left arm, Patient Position: Sitting, Cuff Size: Adult Regular)   Pulse 84   Temp 98.3  F (36.8  C)   Ht 1.575 m (5' 2\")   Wt 83.3 kg (183 lb 11.2 oz)   SpO2 99%   BMI 33.60 kg/m   Estimated body mass index is 33.6 kg/m  as calculated from the following:    Height as of this encounter: 1.575 m (5' 2\").    Weight as of this encounter: 83.3 kg (183 lb 11.2 oz). Body surface area is 1.91 meters squared.  No Pain (0) Comment: Data Unavailable   No LMP recorded. Patient is postmenopausal.  Allergies reviewed: Yes  Medications reviewed: Yes    Medications: Medication refills not needed today.  Pharmacy name entered into [facility]: [facility], [address] - [address] [place] [address]    Clinical concerns: follow up with labs and chemo      Tamera Pope MA            "

## 2022-05-10 NOTE — LETTER
"    5/10/2022         RE: Lisa Reyna  [address]  [facility] [address]        Dear Colleague,    Thank you for referring your patient, Lisa Reyna, to the [facility]. Please see a copy of my visit note below.    Oncology Rooming Note    May 10, 2022 8:24 AM   Lisa Reyna is a 71 year old female who presents for:    Chief Complaint   Patient presents with     Oncology Clinic Visit     Initial Vitals: BP (!) 156/76 (BP Location: Left arm, Patient Position: Sitting, Cuff Size: Adult Regular)   Pulse 84   Temp 98.3  F (36.8  C)   Ht 1.575 m (5' 2\")   Wt 83.3 kg (183 lb 11.2 oz)   SpO2 99%   BMI 33.60 kg/m   Estimated body mass index is 33.6 kg/m  as calculated from the following:    Height as of this encounter: 1.575 m (5' 2\").    Weight as of this encounter: 83.3 kg (183 lb 11.2 oz). Body surface area is 1.91 meters squared.  No Pain (0) Comment: Data Unavailable   No LMP recorded. Patient is postmenopausal.  Allergies reviewed: Yes  Medications reviewed: Yes    Medications: Medication refills not needed today.  Pharmacy name entered into [employer]: [facility], [place], [place] [address] [facility] [address] [place] [address]    Clinical concerns: follow up with labs and chemo      Tamera Pope MA              [facility] Hematology and Oncology Progress Note    Patient: Lisa Reyna  MRN: [id number]  Date of Service: May 10, 2022         Reason for Visit    Chief Complaint   Patient presents with     Oncology Clinic Visit       Assessment and Plan    Cancer Staging  Malignant neoplasm of lower-inner quadrant of right breast of female, estrogen receptor positive (H)  Staging form: Breast, AJCC 8th Edition  - Pathologic stage from 2/11/2022: Stage IA (pT1c, pN0(sn), cM0, G2, ER+, MT+, HER2-, Oncotype DX score: 30) - Signed by Tamar Koch MD on 3/13/2022  - Clinical: No stage assigned - Unsigned      ECOG Performance    0 - Independent     Pain  Pain Score: No " Pain (0)    #.  Invasive ductal carcinoma of LIQ of right breast, stage IA, ER+VT+HER2-. Oncotype 30/19%, >15%.  #.  Grade 1 neuropathy of both feet, exacerbated by chemotherapy on pre-existing neuropathy of both feet  #.  Diarrhea, related to chemotherapy  #.  Bone pain, related to Neulasta.  #.  Mild hypokalemia     She has several but manageable side effects from current chemotherapy.  She has lost about 5 pounds attributed to diarrhea lasted for a few days after chemotherapy.  She has been maintaining oral intake very well.  Reviewed her labs.  CBC is adequate.  She has normal kidney function and liver function.    Will proceed with cycle #3 chemotherapy at current dose.   Regarding Neulasta related bone pain, she will continue with Claritin which she has been taking normally for seasonal allergy.  She takes Tylenol 1-2-2 tablets at bedtime for 2 days as current for pain management.  She is advised to call us if the pain control is not adequate.   Regarding diarrhea which occur about a week after chemotherapy, she will use Imodium as needed.      Follow-up labs and provider visit in 3 weeks prior to last cycle of adjuvant chemotherapy.   After chemotherapy, she we will proceed with right breast mastectomy.    #.  Genetic counseling   Currently scheduled on 6/6 for counseling.  I will request to move of the appointment to a earlier date as genetic testing to assist in decision making on surgery.    Encounter Diagnoses:    Problem List Items Addressed This Visit        Oncology Diagnoses    Malignant neoplasm of lower-inner quadrant of right breast of female, estrogen receptor positive (H) - Primary    Relevant Orders    Infusion Appointment Request    Infusion Appointment Request       Other    Encounter for antineoplastic chemotherapy    Relevant Orders    Infusion Appointment Request    Infusion Appointment Request      Other Visit Diagnoses     Hypokalemia                 CC: Cayla Skelton NP    ______________________________________________________________________________  Diagnosis  6/24/2004- She had prior history of right breast DCIS    1/18/2022- screening mammogram detected     Invasive ductal carcinoma with focal micropapillary pattern.  Grade 2.  12 mm.     Margins were negative but close at 0.5 mm from the nearest junction of the inferior and medial margin, 0.5 mm from medial margin and 2 mm from inferior margin.     There is associated DCIS with EIC negative, nuclear grade intermediate, solid and focal cribriform pattern of 10%.  DCIS margins were uninvolved at 3 mm from nearest inferior margin, 5 mm from medial margins.     1 sentinel lymph node was negative for metastatic carcinoma.    ER positive, greater than 10% of the cell, 61-70%, CO positive (21-30%), HER-2 negative by IHC (1+).     Oncotype DX recurrence score 30/19% / >15%.      Treatment to date  6/2004- post right breast lumpectomy (Dr. Jones) on 6/24/2004, post adjuvant radiation (Dr. Caleb Elena) followed by adjuvant tamoxifen for 5 years (Dr. Noé Fernandes).    2/11/2022- right breast lumpectomy and right sentinel lymph node biopsy (Dr. Alcantara).     History of Present Illness    Ms. Lisa Reyna presented today accompanied by her sister.  She reported bone pain lasted for a couple days after Neulasta.  She normally takes Tylenol 1-2 tablets at bedtime and overall doing okay with that.  She has diarrhea started about a week after chemotherapy.  Imodium usually helps.  She had mild sores after the first treatment.  She was proactive with baking soda mouth rinse during second cycle of treatment and she did better.  She has slightly worsening neuropathy in her feet describing as walking on a marshmallow.  No pain.  No other recalled side effects with chemotherapy.  She is feeling okay today.  No signs and symptoms suggestive of infection.    Review of systems  Apart from describing in HPI, the remainder of  "comprehensive ROS was negative.    Past History    Past Medical History:   Diagnosis Date     Arthritis      Asthma     Seaonal usually in spring     Breast cancer (H) 2004    rt lumpect     Coronary artery disease      Endometrial polyp 1992    was protruding through cervix     GERD (gastroesophageal reflux disease)      Heart attack (H)      Heart murmur      History of measles, mumps, or rubella as a child    hx of measles, mumps and Yi mealses. Rubella immune 5/1980     HTN (hypertension)      Hx of radiation therapy 2004     Hyperlipemia      Osteopenia      Other chronic pain      Ovarian cyst      Prediabetes      UTI (urinary tract infection)      Walking troubles        Past Surgical History:   Procedure Laterality Date     BIOPSY BREAST Left 2008    cyst removal     BIOPSY BREAST Right 2004     CERVICAL DISC SURGERY  6/25/2007    C6-7 sarah laminectomy with microdiskectomy     LUMPECTOMY BREAST Right 6/24/2004     LUMPECTOMY BREAST Right 2/11/2022    Procedure: Right Lumpectomy after Wire Localization; Roggen Lymph Node Biopsy;  Surgeon: Mami Alcantara MD;  Location: [facility]     OOPHORECTOMY Bilateral 2011     TOTAL HIP ARTHROPLASTY Left 9/30/2009     TUBAL LIGATION  1/10/1992    with endometrial polyp removal     WRIST SURGERY         Physical Exam    BP (!) 156/76 (BP Location: Left arm, Patient Position: Sitting, Cuff Size: Adult Regular)   Pulse 84   Temp 98.3  F (36.8  C)   Ht 1.575 m (5' 2\")   Wt 83.3 kg (183 lb 11.2 oz)   SpO2 99%   BMI 33.60 kg/m      General: alert, awake, not in acute distress  HEENT: Head: Normal, normocephalic, atraumatic.  Eye: Normal external eye, conjunctiva, lids cornea, ALENA.  Nose: Normal external nose, mucus membranes and septum.  Pharynx: Normal buccal mucosa. Normal pharynx.  Neck / Thyroid: Supple, no masses, nodes, nodules or enlargement.  Lymphatics: No abnormally enlarged lymph nodes.  Chest: Normal chest wall and respirations. Clear to " auscultation.  Heart: S1 S2 RRR, no murmur.   Abdomen: abdomen is soft without significant tenderness, masses, organomegaly or guarding  Extremities: normal strength, tone, and muscle mass  Skin: normal. no rash or abnormalities  CNS: non focal.    Lab Results    Recent Results (from the past 168 hour(s))   Comprehensive metabolic panel   Result Value Ref Range    Sodium 141 136 - 145 mmol/L    Potassium 3.2 (L) 3.5 - 5.0 mmol/L    Chloride 105 98 - 107 mmol/L    Carbon Dioxide (CO2) 25 22 - 31 mmol/L    Anion Gap 11 5 - 18 mmol/L    Urea Nitrogen 15 8 - 28 mg/dL    Creatinine 1.00 0.60 - 1.10 mg/dL    Calcium 9.0 8.5 - 10.5 mg/dL    Glucose 131 (H) 70 - 125 mg/dL    Alkaline Phosphatase 55 45 - 120 U/L    AST 16 0 - 40 U/L    ALT 13 0 - 45 U/L    Protein Total 6.6 6.0 - 8.0 g/dL    Albumin 3.6 3.5 - 5.0 g/dL    Bilirubin Total 1.0 0.0 - 1.0 mg/dL    GFR Estimate 60 (L) >60 mL/min/1.73m2   CBC with platelets and differential   Result Value Ref Range    WBC Count 4.6 4.0 - 11.0 10e3/uL    RBC Count 3.78 (L) 3.80 - 5.20 10e6/uL    Hemoglobin 11.6 (L) 11.7 - 15.7 g/dL    Hematocrit 34.9 (L) 35.0 - 47.0 %    MCV 92 78 - 100 fL    MCH 30.7 26.5 - 33.0 pg    MCHC 33.2 31.5 - 36.5 g/dL    RDW 14.0 10.0 - 15.0 %    Platelet Count 282 150 - 450 10e3/uL    % Neutrophils 71 %    % Lymphocytes 13 %    % Monocytes 11 %    % Eosinophils 3 %    % Basophils 2 %    % Immature Granulocytes 0 %    NRBCs per 100 WBC 0 <1 /100    Absolute Neutrophils 3.3 1.6 - 8.3 10e3/uL    Absolute Lymphocytes 0.6 (L) 0.8 - 5.3 10e3/uL    Absolute Monocytes 0.5 0.0 - 1.3 10e3/uL    Absolute Eosinophils 0.1 0.0 - 0.7 10e3/uL    Absolute Basophils 0.1 0.0 - 0.2 10e3/uL    Absolute Immature Granulocytes 0.0 <=0.4 10e3/uL    Absolute NRBCs 0.0 10e3/uL       Imaging    No results found.    Signed by: Tamar Koch MD          Again, thank you for allowing me to participate in the care of your patient.        Sincerely,        Tamar Koch MD

## 2022-05-10 NOTE — PROGRESS NOTES
Infusion Nursing Note:  Lisa Reyna presents today for Cycle 3, Day 1 of her chemotherapy plan.    Patient seen by provider today: Yes: Dr. Koch.   present during visit today: Not Applicable.    Note: Potassium 3.2 today. Potassium chloride 40 mEq po given as ordered per Dr. Koch.   Pre medications Aloxi and dexamethasone administered 30 minutes prior to treatment as ordered.     Intravenous Access:  Peripheral IV placed.    Treatment Conditions:  Lab Results   Component Value Date    HGB 11.6 (L) 05/10/2022    WBC 4.6 05/10/2022    ANEU 0.8 (L) 04/01/2022    ANEUTAUTO 3.3 05/10/2022     05/10/2022      Results reviewed, labs MET treatment parameters, ok to proceed with treatment.    Post Infusion Assessment:  Patient tolerated infusion without incident.  Blood return noted pre and post infusions.  Site patent and intact, free from redness, edema or discomfort.  No evidence of extravasations.  Access discontinued per protocol.     Discharge Plan:   Copy of AVS reviewed with patient.  Patient will return tomorrow at 1200 for neulasta injection.  Patient discharged in stable condition accompanied by: self.  Departure Mode: Ambulatory.      Veena Espinal RN

## 2022-05-10 NOTE — PROGRESS NOTES
[facility] Hematology and Oncology Progress Note    Patient: Lisa Reyna  MRN: [id number]  Date of Service: May 10, 2022         Reason for Visit    Chief Complaint   Patient presents with     Oncology Clinic Visit       Assessment and Plan    Cancer Staging  Malignant neoplasm of lower-inner quadrant of right breast of female, estrogen receptor positive (H)  Staging form: Breast, AJCC 8th Edition  - Pathologic stage from 2/11/2022: Stage IA (pT1c, pN0(sn), cM0, G2, ER+, SD+, HER2-, Oncotype DX score: 30) - Signed by Tamar Koch MD on 3/13/2022  - Clinical: No stage assigned - Unsigned      ECOG Performance    0 - Independent     Pain  Pain Score: No Pain (0)    #.  Invasive ductal carcinoma of LIQ of right breast, stage IA, ER+SD+HER2-. Oncotype 30/19%, >15%.  #.  Grade 1 neuropathy of both feet, exacerbated by chemotherapy on pre-existing neuropathy of both feet  #.  Diarrhea, related to chemotherapy  #.  Bone pain, related to Neulasta.  #.  Mild hypokalemia     She has several but manageable side effects from current chemotherapy.  She has lost about 5 pounds attributed to diarrhea lasted for a few days after chemotherapy.  She has been maintaining oral intake very well.  Reviewed her labs.  CBC is adequate.  She has normal kidney function and liver function.    Will proceed with cycle #3 chemotherapy at current dose.   Regarding Neulasta related bone pain, she will continue with Claritin which she has been taking normally for seasonal allergy.  She takes Tylenol 1-2-2 tablets at bedtime for 2 days as current for pain management.  She is advised to call us if the pain control is not adequate.   Regarding diarrhea which occur about a week after chemotherapy, she will use Imodium as needed.      Follow-up labs and provider visit in 3 weeks prior to last cycle of adjuvant chemotherapy.   After chemotherapy, she we will proceed with right breast mastectomy.    #.  Genetic counseling   Currently  scheduled on 6/6 for counseling.  I will request to move of the appointment to a earlier date as genetic testing to assist in decision making on surgery.    Encounter Diagnoses:    Problem List Items Addressed This Visit        Oncology Diagnoses    Malignant neoplasm of lower-inner quadrant of right breast of female, estrogen receptor positive (H) - Primary    Relevant Orders    Infusion Appointment Request    Infusion Appointment Request       Other    Encounter for antineoplastic chemotherapy    Relevant Orders    Infusion Appointment Request    Infusion Appointment Request      Other Visit Diagnoses     Hypokalemia                 CC: Cayla VARGASFito Skelton, NP   ______________________________________________________________________________  Diagnosis  6/24/2004- She had prior history of right breast DCIS    1/18/2022- screening mammogram detected     Invasive ductal carcinoma with focal micropapillary pattern.  Grade 2.  12 mm.     Margins were negative but close at 0.5 mm from the nearest junction of the inferior and medial margin, 0.5 mm from medial margin and 2 mm from inferior margin.     There is associated DCIS with EIC negative, nuclear grade intermediate, solid and focal cribriform pattern of 10%.  DCIS margins were uninvolved at 3 mm from nearest inferior margin, 5 mm from medial margins.     1 sentinel lymph node was negative for metastatic carcinoma.    ER positive, greater than 10% of the cell, 61-70%, AK positive (21-30%), HER-2 negative by IHC (1+).     Oncotype DX recurrence score 30/19% / >15%.      Treatment to date  6/2004- post right breast lumpectomy (Dr. Jones) on 6/24/2004, post adjuvant radiation (Dr. Caleb Elena) followed by adjuvant tamoxifen for 5 years (Dr. Noé Fernandes).    2/11/2022- right breast lumpectomy and right sentinel lymph node biopsy (Dr. Alcantara).     History of Present Illness    Ms. Lisa Reyna presented today accompanied by her sister.  She reported bone  pain lasted for a couple days after Neulasta.  She normally takes Tylenol 1-2 tablets at bedtime and overall doing okay with that.  She has diarrhea started about a week after chemotherapy.  Imodium usually helps.  She had mild sores after the first treatment.  She was proactive with baking soda mouth rinse during second cycle of treatment and she did better.  She has slightly worsening neuropathy in her feet describing as walking on a marshmallow.  No pain.  No other recalled side effects with chemotherapy.  She is feeling okay today.  No signs and symptoms suggestive of infection.    Review of systems  Apart from describing in HPI, the remainder of comprehensive ROS was negative.    Past History    Past Medical History:   Diagnosis Date     Arthritis      Asthma     Seaonal usually in spring     Breast cancer (H) 2004    rt lumpect     Coronary artery disease      Endometrial polyp 1992    was protruding through cervix     GERD (gastroesophageal reflux disease)      Heart attack (H)      Heart murmur      History of measles, mumps, or rubella as a child    hx of measles, mumps and Polish mealses. Rubella immune 5/1980     HTN (hypertension)      Hx of radiation therapy 2004     Hyperlipemia      Osteopenia      Other chronic pain      Ovarian cyst      Prediabetes      UTI (urinary tract infection)      Walking troubles        Past Surgical History:   Procedure Laterality Date     BIOPSY BREAST Left 2008    cyst removal     BIOPSY BREAST Right 2004     CERVICAL DISC SURGERY  6/25/2007    C6-7 sarah laminectomy with microdiskectomy     LUMPECTOMY BREAST Right 6/24/2004     LUMPECTOMY BREAST Right 2/11/2022    Procedure: Right Lumpectomy after Wire Localization; [place] Lymph Node Biopsy;  Surgeon: Mami Alcantara MD;  Location: [facility]     OOPHORECTOMY Bilateral 2011     TOTAL HIP ARTHROPLASTY Left 9/30/2009     TUBAL LIGATION  1/10/1992    with endometrial polyp removal     WRIST SURGERY         Physical  "Exam    BP (!) 156/76 (BP Location: Left arm, Patient Position: Sitting, Cuff Size: Adult Regular)   Pulse 84   Temp 98.3  F (36.8  C)   Ht 1.575 m (5' 2\")   Wt 83.3 kg (183 lb 11.2 oz)   SpO2 99%   BMI 33.60 kg/m      General: alert, awake, not in acute distress  HEENT: Head: Normal, normocephalic, atraumatic.  Eye: Normal external eye, conjunctiva, lids cornea, ALENA.  Nose: Normal external nose, mucus membranes and septum.  Pharynx: Normal buccal mucosa. Normal pharynx.  Neck / Thyroid: Supple, no masses, nodes, nodules or enlargement.  Lymphatics: No abnormally enlarged lymph nodes.  Chest: Normal chest wall and respirations. Clear to auscultation.  Heart: S1 S2 RRR, no murmur.   Abdomen: abdomen is soft without significant tenderness, masses, organomegaly or guarding  Extremities: normal strength, tone, and muscle mass  Skin: normal. no rash or abnormalities  CNS: non focal.    Lab Results    Recent Results (from the past 168 hour(s))   Comprehensive metabolic panel   Result Value Ref Range    Sodium 141 136 - 145 mmol/L    Potassium 3.2 (L) 3.5 - 5.0 mmol/L    Chloride 105 98 - 107 mmol/L    Carbon Dioxide (CO2) 25 22 - 31 mmol/L    Anion Gap 11 5 - 18 mmol/L    Urea Nitrogen 15 8 - 28 mg/dL    Creatinine 1.00 0.60 - 1.10 mg/dL    Calcium 9.0 8.5 - 10.5 mg/dL    Glucose 131 (H) 70 - 125 mg/dL    Alkaline Phosphatase 55 45 - 120 U/L    AST 16 0 - 40 U/L    ALT 13 0 - 45 U/L    Protein Total 6.6 6.0 - 8.0 g/dL    Albumin 3.6 3.5 - 5.0 g/dL    Bilirubin Total 1.0 0.0 - 1.0 mg/dL    GFR Estimate 60 (L) >60 mL/min/1.73m2   CBC with platelets and differential   Result Value Ref Range    WBC Count 4.6 4.0 - 11.0 10e3/uL    RBC Count 3.78 (L) 3.80 - 5.20 10e6/uL    Hemoglobin 11.6 (L) 11.7 - 15.7 g/dL    Hematocrit 34.9 (L) 35.0 - 47.0 %    MCV 92 78 - 100 fL    MCH 30.7 26.5 - 33.0 pg    MCHC 33.2 31.5 - 36.5 g/dL    RDW 14.0 10.0 - 15.0 %    Platelet Count 282 150 - 450 10e3/uL    % Neutrophils 71 %    % " Lymphocytes 13 %    % Monocytes 11 %    % Eosinophils 3 %    % Basophils 2 %    % Immature Granulocytes 0 %    NRBCs per 100 WBC 0 <1 /100    Absolute Neutrophils 3.3 1.6 - 8.3 10e3/uL    Absolute Lymphocytes 0.6 (L) 0.8 - 5.3 10e3/uL    Absolute Monocytes 0.5 0.0 - 1.3 10e3/uL    Absolute Eosinophils 0.1 0.0 - 0.7 10e3/uL    Absolute Basophils 0.1 0.0 - 0.2 10e3/uL    Absolute Immature Granulocytes 0.0 <=0.4 10e3/uL    Absolute NRBCs 0.0 10e3/uL       Imaging    No results found.    Signed by: Tamar Koch MD

## 2022-05-11 ENCOUNTER — INFUSION THERAPY VISIT (OUTPATIENT)
Dept: INFUSION THERAPY | Facility: CLINIC | Age: 72
End: 2022-05-11
Attending: INTERNAL MEDICINE
Payer: MEDICARE

## 2022-05-11 VITALS
SYSTOLIC BLOOD PRESSURE: 152 MMHG | RESPIRATION RATE: 18 BRPM | HEART RATE: 81 BPM | DIASTOLIC BLOOD PRESSURE: 71 MMHG | OXYGEN SATURATION: 99 %

## 2022-05-11 DIAGNOSIS — Z51.11 ENCOUNTER FOR ANTINEOPLASTIC CHEMOTHERAPY: Primary | ICD-10-CM

## 2022-05-11 DIAGNOSIS — Z17.0 MALIGNANT NEOPLASM OF LOWER-INNER QUADRANT OF RIGHT BREAST OF FEMALE, ESTROGEN RECEPTOR POSITIVE (H): ICD-10-CM

## 2022-05-11 DIAGNOSIS — C50.311 MALIGNANT NEOPLASM OF LOWER-INNER QUADRANT OF RIGHT BREAST OF FEMALE, ESTROGEN RECEPTOR POSITIVE (H): ICD-10-CM

## 2022-05-11 PROCEDURE — 250N000011 HC RX IP 250 OP 636: Performed by: INTERNAL MEDICINE

## 2022-05-11 PROCEDURE — 96372 THER/PROPH/DIAG INJ SC/IM: CPT | Performed by: INTERNAL MEDICINE

## 2022-05-11 RX ADMIN — PEGFILGRASTIM 6 MG: 6 INJECTION SUBCUTANEOUS at 12:11

## 2022-05-16 ENCOUNTER — TELEPHONE (OUTPATIENT)
Dept: ONCOLOGY | Facility: CLINIC | Age: 72
End: 2022-05-16
Payer: MEDICARE

## 2022-05-16 NOTE — TELEPHONE ENCOUNTER
Per Shandra HASSAN the patient can be rescheduled with her in any of her return spots. The patient said she will call back to schedule her appointment.

## 2022-05-17 ENCOUNTER — PATIENT OUTREACH (OUTPATIENT)
Dept: ONCOLOGY | Facility: CLINIC | Age: 72
End: 2022-05-17
Payer: MEDICARE

## 2022-05-17 NOTE — PROGRESS NOTES
[facility]: Cancer Care Short Note                                    Discussion with Patient:                                                      Called to check in on patient after her cycle #3 treatment on 5/10/22. Left message for patient to return my call.      Assessment:                                                      Assessment not done.     Intervention/Education provided during outreach:                                                       No return call from patient.    Patient to follow up as scheduled at next apt    Signature:  Anais Galindo RN

## 2022-05-19 ENCOUNTER — PATIENT OUTREACH (OUTPATIENT)
Dept: ONCOLOGY | Facility: CLINIC | Age: 72
End: 2022-05-19
Payer: MEDICARE

## 2022-05-19 NOTE — PROGRESS NOTES
"[facility]: Cancer Care Post Treatment Assessment    Discussion with Patient:                                                      Patient called and left message on triage line stating diarrhea X 4 days.  Taking imodium but says not controlled. She asked for call back to [phone].    Called patient. She said she did not get my message on Tues but said her mother is in TCU so has been occupied with that.    Patient reports Chemo on 5/10. Had diarrhea on 14th. Took 1 tablet of imodium and was fine the next day. She said the day off, she had \"little bouts\" of diarrhea-liquid stool-for past 3 days. She said she liquid stool this morning.  She said when she takes imodium, she just takes 1 tablet.  Instructed her on how to take imodium:  Take 2 tablets with 1st diarrhea stool of each day and 1 tablet with each subsequent stool. No more than 8 tablets per today. Repeat instructions next day, if still having diarrhea.  Do not take if not having diarrhea. Call if diarrhea worsens or imodium doesn't help. Patient verbalized understanding.    Patient denies N/V. Reports no taste.  She said she is trying to drink (3) 16 oz of water per day.    Patient reports red raised rash to bilat legs. She denies itching and said sometimes intermittent sharp pains but pain quickly resolves.  She said had same rash to bilat legs after cycles #2 and #3. She used Cerave lotion the last time and said that helped.  Patient instructed to call Cancer Care if rash worsens, develops fever. Patient verbalized understanding.    Patient reports history of plantar fascitis.  She said she has intermittent pain in her feet at night. She takes Tylenol and it improves her pain and allows her to sleep.                                Dates of Treament:                                                      Infusion given in last 28 days     Administered MAR Action Medication Dose Rate Visit    05/10/2022 09:52 New Bag DOCEtaxel (TAXOTERE) 148 mg in " sodium chloride in non-PVC bag 0.9 % 257.4 mL infusion 148 mg 257.4 mL/hr Infusion Therapy Visit on 05/10/2022 in [facility]    05/10/2022 11:21 New Bag cyclophosphamide (CYTOXAN) 1,175 mg in sodium chloride 0.9 % 308.75 mL infusion 1,175 mg 617.5 mL/hr Infusion Therapy Visit on 05/10/2022 in [facility]          Assessment:                                                       RNCC Post Treatment Symptom Review:   Assessment completed with:: Patient    Constitutional  Fatigue: Fatigue relieved by rest  Gastrointestinal  Patient Reported Gastrointestinal Symptoms?: Yes  Anorexia: Loss of appetite without alteration in eating habits (food has no taste)  Nausea: Absent or within normal limits  Vomiting: Absent or within normal limits  Dehydration: Absent or within normal limits (drinking fluids well. Trying to drink at least (3) 16 oz of water per day.)  Diarrhea: Increase of less than 4 stools per day over baseline OR mild increase in ostomy output compared to baseline  Integumentary  Patient Reported Integumentary Symptoms?: Yes  Rash Maculo-Papular: Macules/papules covering less than 10% BSA with or without symptoms (e.g., pruritus, burning, tightness) (red raised rash to bilat legs. Noticed after 2nd and 3rd cycles of treatment.)    Pain  Patient Reported Pain?: Yes, is currently well-managed (intermittent, legs & feet. Tylenol helps.)  Pain Score: No Pain (0)    Patient Coping: Accepting    Clinic Utilization  Patient Aware of Next Appointment?: Yes (5/31/22)    Other Patient Concerns  Other Patient Reported Concerns: No      Intervention/Education provided during outreach:                                                         Patient to follow up as scheduled at next apt         Signature:  Anais Galindo RN

## 2022-05-24 ENCOUNTER — OFFICE VISIT (OUTPATIENT)
Dept: SURGERY | Facility: CLINIC | Age: 72
End: 2022-05-24
Attending: NURSE PRACTITIONER
Payer: MEDICARE

## 2022-05-24 DIAGNOSIS — Z17.0 MALIGNANT NEOPLASM OF LOWER-INNER QUADRANT OF RIGHT BREAST OF FEMALE, ESTROGEN RECEPTOR POSITIVE (H): Primary | ICD-10-CM

## 2022-05-24 DIAGNOSIS — C50.311 MALIGNANT NEOPLASM OF LOWER-INNER QUADRANT OF RIGHT BREAST OF FEMALE, ESTROGEN RECEPTOR POSITIVE (H): Primary | ICD-10-CM

## 2022-05-24 PROCEDURE — 99214 OFFICE O/P EST MOD 30 MIN: CPT | Performed by: SPECIALIST

## 2022-05-24 PROCEDURE — G0463 HOSPITAL OUTPT CLINIC VISIT: HCPCS

## 2022-05-24 NOTE — NURSING NOTE
Lisa presents to [facility] for a surgical follow up appointment with Dr. Alcantara.  She is accompanied by her friend for consult.  RN assessment and EMR update.  Patient has one dose of her chemo left and came to discuss more surgery with Dr. Alcantara.  Patient met with Dr. Alcantara, see dictation for details of visit and follow up plan. Patient will think over options and call with her decision.  Support provided, invited calls.  RN time 15 mins

## 2022-05-24 NOTE — PROGRESS NOTES
This is a 71 year old woman who comes in for  continued follow-up of her recurrent right breast cancer.  She is now 3 months  status post right  lumpectomy.  She is about 12 years status post right lumpectomy with radiation.  The Oncotype came back high on this 1 and she is just about to finish her fourth dose of Cytoxan/Taxol.  She is feeling okay.  Having a lot of problems with swelling and pain in her hands related to the chemotherapy.  She comes in today to talk about whether or not we should do further surgery.      Please see the chart review for PMH, Meds, allergies, FH and SH.    ROS:  Pertinent items are noted in HPI.      Physical Exam:  There were no vitals taken for this visit.  General appearance: alert, appears stated age and cooperative  Breasts: There are no palpable masses. There are minimal radiation changes. The scar(s) has(ve) healed up well.  The right breast definitely has a volume loss medially compared to the left.  Lymph nodes: Cervical, supraclavicular, and axillary nodes normal.  Neurologic: Grossly normal        Impression: Recurrent right breast cancer.  She comes in again today to talk about what would be the best local treatment for her.  I have gone over this with her before but we went over it again today.  I explained that when she first saw me, the textbook answer at that time was to do a mastectomy.  She however was very averse to it.  I did explain to her at that time that doing a lumpectomy would be reasonable but she had to understand that we could not add radiation again and it would be hard to know what her local recurrence risk was.  On that lumpectomy the tumor was out with a somewhat close margin.  Unfortunately came back with a high Oncotype and that is why she is doing chemotherapy.  I told her that we simply do not have good data on what the recurrence risk would be for her if she sticks with the lumpectomy as stands.  I do not think it is 40% but I also think it is  going to be quite a bit higher than 10%.  The question I have for her is if it comes back and its aggressive again, would she do chemotherapy again.  The 1 benefit with a mastectomy for her would be that it would help decrease her risk of having to go through chemotherapy again.  I did tell her we could do a reexcision lumpectomy just to try to get better margins but I do not have any good confidence that that would significantly decrease her local recurrence.  I told her would only make us feel a little bit better but I have no data to support that it would be of significant benefit to her.  She is at this point actually leaning toward a reexcision lumpectomy.  I did warn her that there is going to be even more volume loss in her breast.  I did tell her that I can do a bit of a breast lift on the left side just to give her better symmetry.  That is typically covered by insurance.  Again, I told her once more that the textbook answer would be to do a mastectomy.  But it really is her option.    Plan: I encouraged her to take some time to think about this.  She also has an appointment with genetic counseling tomorrow.  She has 0 family history of breast cancer so I am not sure that it is going to change anything that she does.  She will be done with her chemotherapy by next week and then I told her she should wait at least another 3 weeks before she does anything.  She is going to think about it and will let me know.

## 2022-05-24 NOTE — LETTER
5/24/2022         RE: Lisa Reyna  [address] [place]  [facility] [address]        Dear Colleague,    Thank you for referring your patient, Lisa Reyna, to the [facility] [place]. Please see a copy of my visit note below.    This is a 71 year old woman who comes in for  continued follow-up of her recurrent right breast cancer.  She is now 3 months  status post right  lumpectomy.  She is about 12 years status post right lumpectomy with radiation.  The Oncotype came back high on this 1 and she is just about to finish her fourth dose of Cytoxan/Taxol.  She is feeling okay.  Having a lot of problems with swelling and pain in her hands related to the chemotherapy.  She comes in today to talk about whether or not we should do further surgery.      Please see the chart review for PMH, Meds, allergies, FH and SH.    ROS:  Pertinent items are noted in HPI.      Physical Exam:  There were no vitals taken for this visit.  General appearance: alert, appears stated age and cooperative  Breasts: There are no palpable masses. There are minimal radiation changes. The scar(s) has(ve) healed up well.  The right breast definitely has a volume loss medially compared to the left.  Lymph nodes: Cervical, supraclavicular, and axillary nodes normal.  Neurologic: Grossly normal        Impression: Recurrent right breast cancer.  She comes in again today to talk about what would be the best local treatment for her.  I have gone over this with her before but we went over it again today.  I explained that when she first saw me, the textbook answer at that time was to do a mastectomy.  She however was very averse to it.  I did explain to her at that time that doing a lumpectomy would be reasonable but she had to understand that we could not add radiation again and it would be hard to know what her local recurrence risk was.  On that lumpectomy the tumor was out with a somewhat close margin.  Unfortunately came back  with a high Oncotype and that is why she is doing chemotherapy.  I told her that we simply do not have good data on what the recurrence risk would be for her if she sticks with the lumpectomy as stands.  I do not think it is 40% but I also think it is going to be quite a bit higher than 10%.  The question I have for her is if it comes back and its aggressive again, would she do chemotherapy again.  The 1 benefit with a mastectomy for her would be that it would help decrease her risk of having to go through chemotherapy again.  I did tell her we could do a reexcision lumpectomy just to try to get better margins but I do not have any good confidence that that would significantly decrease her local recurrence.  I told her would only make us feel a little bit better but I have no data to support that it would be of significant benefit to her.  She is at this point actually leaning toward a reexcision lumpectomy.  I did warn her that there is going to be even more volume loss in her breast.  I did tell her that I can do a bit of a breast lift on the left side just to give her better symmetry.  That is typically covered by insurance.  Again, I told her once more that the textbook answer would be to do a mastectomy.  But it really is her option.    Plan: I encouraged her to take some time to think about this.  She also has an appointment with genetic counseling tomorrow.  She has 0 family history of breast cancer so I am not sure that it is going to change anything that she does.  She will be done with her chemotherapy by next week and then I told her she should wait at least another 3 weeks before she does anything.  She is going to think about it and will let me know.            Again, thank you for allowing me to participate in the care of your patient.        Sincerely,        Mami Alcantara MD

## 2022-05-25 ENCOUNTER — VIRTUAL VISIT (OUTPATIENT)
Dept: ONCOLOGY | Facility: CLINIC | Age: 72
End: 2022-05-25
Attending: INTERNAL MEDICINE
Payer: MEDICARE

## 2022-05-25 DIAGNOSIS — C50.311 MALIGNANT NEOPLASM OF LOWER-INNER QUADRANT OF RIGHT BREAST OF FEMALE, ESTROGEN RECEPTOR POSITIVE (H): ICD-10-CM

## 2022-05-25 DIAGNOSIS — Z80.0 FAMILY HISTORY OF COLON CANCER: Primary | ICD-10-CM

## 2022-05-25 DIAGNOSIS — Z17.0 MALIGNANT NEOPLASM OF LOWER-INNER QUADRANT OF RIGHT BREAST OF FEMALE, ESTROGEN RECEPTOR POSITIVE (H): ICD-10-CM

## 2022-05-25 PROCEDURE — 96040 HC GENETIC COUNSELING, EACH 30 MINUTES: CPT | Mod: PN | Performed by: GENETIC COUNSELOR, MS

## 2022-05-25 NOTE — LETTER
5/25/2022         RE: Lisa Reyna  [address]  [facility] [address]        Dear Colleague,    Thank you for referring your patient, Lisa Reyna, to the [facility]. Please see a copy of my visit note below.    5/25/2022    Referring Provider: Tamar Koch    Present for Today's Visit: Lisa and her sister, Joan    Presenting Information:   I met with Lisa Reyna today for genetic counseling (video visit due to covid19) to discuss her personal history of breast cancer.  She is here today to review this history, cancer screening recommendations, and available genetic testing options.    Personal History:  Lisa is a 71 year old female. 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 She was most recently diagnosed with a recurrence of her right breast cancer (invasive ductal carcinoma; ER and VT positive, HER2 negative). She underwent a lumpectomy on 2/11/2022 and is currently completing adjuvant chemotherapy. Margins of her lumpectomy were close, and she will undergo another breast surgery upon completion of chemotherapy (final treatment scheduled for 5/31). Lisa stated that the results from genetic testing will help determine whether bilateral mastectomy might be the right surgical option for her.      She had her first menstrual period at age 12 or 13, she does not have children, and reports that she went through menopause at age 41. Lisa is s/p bilateral salpingo-oophorectomy for a benign indication. Uterus remains in place. She reports past history of oral contraceptive use for about 5 years and that she has never been on hormone replacement therapy.      Most recent colonoscopy around age 69 (patient report) was normal and follow-up was recommended in 10 years. She denies a history of colon polyps. She does not regularly do  any other cancer screening at this time.  Lisa reported no tobacco use, and about 4 drinks per week for alcohol use.    Family History: Cancer family history and pertinent information reviewed below (Please see scanned pedigree for detailed family history information)    Sister (Joan), age 68, has a history of a benign breast lesion in her 20's and approximately 5 colon polyps.     Mother, age 98, has a history of a basal cell carcinoma removed from her nose and one from her forehead in her early 90's. She is s/p colon resection due to diverticulosis.     Maternal grandfather passed at age 82 from colon cancer diagnosed in his early 80's.     Paternal uncle passed in his 70's/80's from a gastrointestinal cancer (unknown specific diagnosis) diagnosed in his 70's or 80's.    Paternal uncle had a history of skin cancer (unknwon type).    Paternal grandmother passed in her early 70's from liver cancer diagnosed in her 70's.     Paternal grandfather passed from throat cancer in his 50's. He was a smoker.     Paternal first-cousin had a history of testicular cancer diagnosed in his 20's.     Paternal first-cousin's son passed at age 30 from colon cancer.     There is no known Ashkenazi Druze ancestry on either side of her family. There is no reported consanguinity.    Discussion:    Based upon her current personal and family history, Lisa is likely at low risk for a hereditary cancer syndrome. Lisa is the only person in her family with breast cancer, and almost all of the other cancers in her family were diagnosed at older ages (except for her paternal cousin's testicular cancer-a third-degree relative; and her paternal cousin's colon cancer-a fourth-degree relative). However, it is worth noting that there is an unspecified gastrointestinal cancer in her paternal uncle, which very well may have been pancreatic cancer, in which case our suspicion for a hereditary cancer syndrome would be increased.  Based on her  personal and family history, Lisa does not meet current National Comprehensive Cancer Network (NCCN) criteria for genetic testing of BRCA1/2.  However, according to the American Society of Breast Surgeons Consensus Guideline on Genetic Testing for Hereditary Breast Cancer, genetic testing should be available to all patients who have been diagnosed with breast cancer.  The guidelines state that testing should include BRCA1, BRCA2, and PALB2, and that additional testing may be warranted based on personal and/or family history.   We discussed the natural history and genetics of hereditary cancers. A detailed handout regarding the information we discussed will be sent to Lisa in [place] mail. Topics included: inheritance pattern, cancer risks, cancer screening recommendations, and also risks, benefits and limitations of testing.  We reviewed that the most common cause of hereditary breast cancer is Hereditary Breast and Ovarian Cancer (HBOC) syndrome, which is caused by mutations in the genes BRCA1 and BRCA2. Women with a mutation in either of these genes are at increased risk for breast and ovarian cancer. There is also an increased risk for a second primary breast cancer for women. Men with a mutation in either BRCA1 or BRCA2 are at increased risk for male breast and prostate cancer. Both women and men may also be at increased risk for pancreatic cancer and melanoma.     We discussed that there are additional genes that could cause increased risk for breast cancer and the other cancers in her family. As many of these genes present with overlapping features in a family and accurate cancer risk cannot always be established based upon the pedigree analysis alone, it would be reasonable for Lisa to consider panel genetic testing to analyze multiple genes at once.    We reviewed genetic testing options given Lisa's personal and family history including targeted and expanded options. After our discussion, Lisa opted to  proceed with genetic testing via the Common Hereditary Cancers panel through [facility].   Genetic testing is available for 47 genes associated with cancers of the breast, ovary, uterus, prostate and gastrointestinal system: [facility] Common Hereditary Cancers panel (APC, PETTY, AXIN2, BARD1, BMPR1A, BRCA1, BRCA2, BRIP1, CDH1, CDK4, CDKN2A, CHEK2, CTNNA1, DICER1, EPCAM, GREM1, HOXB13, KIT, MEN1, MLH1, MSH2, MSH3, MSH6, MUTYH, NBN, NF1, NTHL1, PALB2, PDGFRA, PMS2, POLD1, POLE, PTEN,RAD50, RAD51C, RAD51D, SDHA, SDHB, SDHC, SDHD, SMAD4, SMARCA4, STK11, TP53,TSC1, TSC2, VHL).    We discussed that many of these genes are associated with specific hereditary cancer syndromes and published management guidelines: Hereditary Breast and Ovarian Cancer syndrome (BRCA1, BRCA2), Jennings syndrome (MLH1, MSH2, MSH6, PMS2, EPCAM), Familial Adenomatous Polyposis (APC), Hereditary Diffuse Gastric Cancer (CDH1), Familial Atypical Multiple Mole Melanoma syndrome (CDK4, CDKN2A), Multiple Endocrine Neoplasia type 1 (MEN1), Juvenile Polyposis syndrome (BMPR1A, SMAD4), Cowden syndrome (PTEN), Li Fraumeni syndrome (TP53), Hereditary Paraganglioma and Pheochromocytoma syndrome (SDHA, SDHB, SDHC, SDHD), Peutz-Jeghers syndrome (STK11), MUTYH Associated Polyposis (MUTYH), Tuberous sclerosis complex (TSC1, TSC2), Von Hippel-Lindau disease (VHL), and Neurofibromatosis type 1 (NF1).   The PETTY, AXIN2, BRIP1, CHEK2, GREM1, MSH3, NBN, NTHL1, PALB2, POLD1, POLE, RAD51C, and RAD51D genes are associated with increased cancer risk and have published management guidelines for certain cancers.   The remaining genes (BARD1, CTNNA1, DICER1, HOXB13, KIT, PDGFRA, RAD50, and SMARCA4) are associated with increased cancer risk and may allow us to make medical recommendations when mutations are identified.     Consent was obtained over video with no witness required due to the current covid19 global pandemic.    Medical Management: For Lisa, we reviewed that the  information from genetic testing may determine:    surgery to treat Lisa's active cancer diagnosis (i.e. lumpectomy versus bilateral mastectomy, partial versus total colectomy, etc.),    additional cancer screening for which Lisa may qualify (i.e. mammogram and breast MRI, more frequent colonoscopies, more frequent dermatologic exams, etc.),    options for risk reducing surgeries Lisa could consider (i.e. bilateral mastectomy, surgery to remove uterus, etc.),      and targeted chemotherapies for Lisa if she were to develop certain cancers in the future (i.e. immunotherapy for individuals with Jennings syndrome, PARP inhibitors, etc.).     These recommendations will be discussed in detail once genetic testing is completed.     Lisa will be drawn for the genetic testing at her upcoming infusion on 5/31.    Plan:  1) Today Lisa elected to proceed with Common Hereditary Cancers panel genetic testing (47 genes) through [facility].  2) This information should be available in approximately 2-3 weeks.  3) Lisa will be contacted by our scheduling department to set up a result disclosure appointment.       Video Start Time: 10:17am   Video End Time: 11:14am      Shandra Fraser MS, [facility] [place]  Licensed, Certified Genetic Counselor  [facility]  [phone]  [email or web address]

## 2022-05-25 NOTE — PROGRESS NOTES
Lisa is a 71 year old who is being evaluated via a billable video visit.      Pt is in [place]    How would you like to obtain your AVS? MyChart  If the video visit is dropped, the invitation should be resent by: Text to cell phone: [phone]  Will anyone else be joining your video visit? Sister, Joan, is with pt    Video-Visit Details    Type of service:  Video Visit    Originating Location (pt. Location): Home    Distant Location (provider location):  [facility]     Platform used for Video Visit: Sara Delacruz VF

## 2022-05-27 NOTE — PATIENT INSTRUCTIONS
Assessing Cancer Risk  Only about 5-10% of cancers are thought to be due to an inherited cancer susceptibility gene.    These families often have:  Several people with the same or related types of cancer  Cancers diagnosed at a young age (before age 50)  Individuals with more than one primary cancer  Multiple generations of the family affected with cancer    Some people may be candidates for genetic testing of more than one gene.  For these families, genetic testing using a multi-gene cancer panel may be offered.  These panels may test genes known to increase the risk for breast (and other) cancers: PETTY, BRCA1, BRCA2, CDH1, CHEK2, PALB2, PTEN, and TP53.  The purpose of this handout is to serve as a brief summary of the high/moderate-risk breast cancer genes which have published clinical management guidelines for individuals who are found to carry a mutation.    Hereditary Breast and Ovarian Cancer Syndrome (HBOC)  A single mutation in one of the copies of BRCA1 or BRCA2 increases the risk for breast and ovarian cancer, among others.  The risk for pancreatic cancer and melanoma may also be slightly increased in some families.  The tables below list the chance that someone with a BRCA mutation would develop cancer in his or her lifetime1,2,3,4.          Women   Men     General Population BRCA+    General Population BRCA+   Breast  12% 60-85%  Breast <1% ~7%   Ovarian  1-2% Up to 58%  Prostate 16% 20%     A person s ethnic background is also important to consider, as individuals of Ashkenazi Taoism ancestry have a higher chance of having a BRCA gene mutation.  There are three BRCA mutations that occur more frequently in this population.    Li-Fraumeni Syndrome (LFS)  LFS is a cancer predisposition syndrome. Individuals with LFS are at an increased risk for developing cancer at a young age. The general lifetime risk for development of cancer is 50% by age 30 and 90% by age 60.  LFS is caused by a mutation in the TP53  gene.  A single mutation in one of the copies of TP53 increases the risk for multiple cancers.     Core Cancers: Sarcomas, Breast, Brain, Lung, Leukemias/Lymphomas, Adrenocortical carcinomas  Other Cancers: Gastrointestinal, Thyroid, Skin, Genitourinary    Cowden Syndrome  Cowden syndrome is a hereditary condition that increases the risk for breast, thyroid, endometrial, and kidney cancer.  Cowden syndrome is caused by a mutation in the PTEN gene.  A single mutation in one of the copies of PTEN causes Cowden syndrome and increases cancer risk.  The table below shows the chance that someone with a PTEN mutation would develop cancer in their lifetime5,6.  Other benign features seen in some individuals with Cowden syndrome include benign skin lesions (facial papules, keratoses, lipomas), learning disability, autism, thyroid nodules, colon polyps, and larger head size.      Lifetime Cancer Risk   Cancer Type General Population Cowden Syndrome   Breast  12% 25-50%*   Thyroid  1% 35%   Renal 1-2% 35%   Endometrial  2-3% 28%   Colon 5% 9%   Melanoma 2-3% >5%     *One recent study found breast cancer risk to be increased to 85%    Hereditary Diffuse Gastric Cancer (HDGC)  Currently, one gene is known to cause hereditary diffuse gastric cancer: CDH1.  Individuals with HDGC are at increased risk for diffuse gastric cancer and lobular breast cancer. Of people diagnosed with HDGC, 30-50% have a mutation in the CDH1 gene.  This suggests there are likely other genes that may cause HDGC that have not been identified yet.      Lifetime Cancer Risks    General Pop HDGC    Diffuse Gastric  <1% ~80%   Breast 12% 39-52%     Additional Genes Associated with Increased Breast Cancer Risk  PALB2  Mutations in PALB2 have been shown to increase the risk of breast cancer up to 33-58% in some families; where individuals fall within this risk range is dependent upon family history.9 PALB2 mutations have also been associated with increased risk  for pancreatic cancer, although this risk has not been quantified yet.  Individuals who inherit two PALB2 mutations--one from their mother and one from their father--have a condition called Fanconi Anemia.  This rare autosomal recessive condition is associated with short stature, developmental delay, bone marrow failure, and increased risk for childhood cancers.    PETTY  PETTY is a moderate-risk breast cancer gene. Women who have a mutation in PETTY can have between a 2-4 fold increased risk for breast cancer compared to the general population.10  PETTY mutations have also been associated with increased risk for pancreatic cancer, however an estimate of this cancer risk is not well understood.11  Individuals who inherit two PETTY mutations have a condition called ataxia-telangiectasia (AT).  This rare autosomal recessive condition affects the nervous system and immune system, and is associated with progressive cerebellar ataxia beginning in childhood.  Individuals with ataxia-telangiectasia often have a weakened immune system and have an increased risk for childhood cancers.           CHEK2   CHEK2 is a moderate-risk breast cancer gene.  Women who have a mutation in CHEK2 have around a 2-fold increased risk for breast cancer compared to the general population, and this risk may be higher depending upon family history.12,13,14  Mutations in CHEK2 have also been shown to increase the risk of a number of other cancers, including colon and prostate, however these cancer risks are currently not well understood.         Inheritance   All of the genes reviewed above are inherited in an autosomal dominant pattern.  This means that if a parent has a mutation, each of his or her children will have a 50% chance of inheriting that same mutation.  Therefore, each child--male or female--would have a 50% chance of being at increased risk for developing cancer.    Image obtained from [employer], 2013     Mutations in some genes  can occur de rosi, which means that a person s mutation occurred for the first time in them and was not inherited from a parent.  Now that they have the mutation, however, it can be passed on to future generations.    Genetic Testing  Genetic testing involves a blood test and will look for any harmful mutations within genes that are associated with increased cancer risk.  If possible, it is recommended that the person(s) who has had cancer be tested before other family members.  That person will give us the most useful information about whether or not a specific gene is associated with the cancer in the family.    Results  There are three possible results of genetic testing:  Positive--a harmful mutation was identified in one or more of the genes  Negative--no mutation was identified in any of the genes on this panel  Variant of unknown significance--a variation in one of the genes was identified, but it is unclear how this impacts cancer risk in the family    Advantages and Disadvantages  There are advantages and disadvantages to genetic testing.  Advantages  May clarify your cancer risk  Can help you make medical decisions  May explain the cancers in your family  May give useful information to your family members (if you share your results)    Disadvantages  Possible negative emotional impact of learning about inherited cancer risk  Uncertainty in interpreting a negative test result in some situations  Possible genetic discrimination concerns (see below)    Genetic Information Nondiscrimination Act ([place])  [place] is a federal law that protects individuals from health insurance or employment discrimination based on a genetic test result alone.  Although rare, there are currently no legal discrimination protections in terms of life insurance, long term care, or disability insurances.  Visit the National Human Genome Research [place] genome.gov/82024754 to learn more.    Reducing Cancer Risk  Each of the genes listed  within this handout have nationally recognized cancer screening guidelines that would be recommended for individuals who test positive.  In addition to increased cancer screening, surgeries may be offered or recommended to reduce cancer risk.  Recommendations are based upon an individual s genetic test result as well as their personal and family history of cancer.    Questions to Think About Regarding Genetic Testing  What effect will the test result have on me and my relationship with my family members if I have an inherited gene mutation?  If I don t have a gene mutation?  Should I share my test results, and how will my family react to this news, which may also affect them?  Are my children ready to learn new information that may one day affect their own health?    Resources  FORCE: Facing Our Risk of Cancer Empowered facingourrisk.org   Bright Pink bebrightpink.org   Li-Fraumeni Syndrome Association lfsassociation.org   PTEN World PTENworld.Sports.ws   No stomach for cancer, Inc. nostomachforcancer.org   Stomach cancer relief network scrnet.org   Collaborative Group of the Americas on Inherited Colorectal Cancer (CGA) cgaicc.com    Cancer Care cancercare.org   American Cancer Society (ACS) cancer.org   National Cancer [place] (NCI) cancer.gov     Please call us if you have any questions or concerns.   Cancer Risk Management Program [phone] ([phone])  Luis Recinos, MS [facility] [place] [phone]  Yenni March, MS, [facility] [phone]  Erma Wallace, MS, [facility]  [phone]  Silva Malloy, MS, [facility]  [phone]  Sanjuana Almeida, MS, [facility]  [phone]  Billie Lechuga, MS, [facility] [phone]  Shandra Fraser, MS, [facility] [phone]    -References  A-jose armando A, Claudia PDP, Aletha S, Adilia LYNCH, Mary JE, Pilar JL, Chencho N, Royal H, Sanchez O, Kyra A, Teri B, Aida P, Prince S, Promise DM, Joce N, Poppy E, Bijan H, Wilfred E, Molly J, Peterson J, Tom B, Zach H, Igor S, Maris H, Harrison GARCIA, Anjali VELÁZQUEZ,  Sachin DAVEY. Average risks of breast and ovarian cancer associated with BRCA1 or BRCA2 mutations detected in case series unselected for family history: a combined analysis of 222 studies. Am J Hum Luba. 2003;72:1117-30.  Gerardo N, Chata M, Jinny G.  BRCA1 and BRCA2 Hereditary Breast and Ovarian Cancer. Gene Reviews online. 2013.  Cristian YC, Sue S, Tano G, Sun S. Breast cancer risk among male BRCA1 and BRCA2 mutation carriers. J Natl Cancer Inst. 2007;99:1811-4.  Bradford VILLASENOR, Rachel I, Olu J, Taylor E, Eddie ER, Zurdo F. Risk of breast cancer in male BRCA2 carriers. J Med Luba. 2010;47:710-1.  Jorge MH, Joellen J, Bindu J, Анна LA, Lizbet MS, Jacqueline C. Lifetime cancer risks in individuals with germline PTEN mutations. Clin Cancer Res. 2012;18:400-7.  Pat R. Cowden Syndrome: A Critical Review of the Clinical Literature. J Luba . 2009:18:13-27.  National Comprehensive Cancer Network. Clinical practice guidelines in oncology, colorectal cancer screening. Available online (registration required). 2013.  National Cancer [place]. SEER Cancer Stat Fact Sheets.  December 2013.  Juan R MORA., et al. Breast-Cancer Risk in Families with Mutations in PALB2. NEJM. 2014; 371(6):497-506.  Zenia A, Jose Manuel D, Sam S, Patricia P, Job T, Kenya M, Luis B, Rosaura H, Stevan R, Simone K, Ivett L, Bradford VILLASENOR, Promise D, Shimon DF, Aubree MR, The Breast Cancer Susceptibility Collaboration (UK) & Glenna N. PETTY mutations that cause ataxia-telangiectasia are breast cancer susceptibility alleles. Nature Genetics. 2006;38:873-875  Jamie N , Beatrice Y, Maria Alejandra J, River L, Mac GM , Samina ML, Isaac S, Soliz AG, Syngal S, Pedro ML, Darion J , Hitesh R, Nick SZ, Demario JR, Zackary VE, Haylie M, Vogelstein B, Jeanette N, Janny RH, Andrae KW, and Marilee AP. PETTY mutations in patients with hereditary pancreatic cancer. Cancer Discover. 2012;2:41-46  CHEK2 Breast Cancer  Case-Control Consortium. CHEK2*1100delC and susceptibility to breast cancer: A collaborative analysis involving 10,860 breast cancer cases and 9,065 controls from 10 studies. Am J Hum Luba, 74 (2004), pp. 1580-2134  Arjun T, Manuel S, Delia K, et al. Spectrum of Mutations in BRCA1, BRCA2, CHEK2, and TP53 in Families at High Risk of Breast Cancer. ZAIDA. 2006;295(12):6798-4537.   Patricio VARGAS, Francisco EASTON, Jelly AMBRIZ, et al. Risk of breast cancer in women with a CHEK2 mutation with and without a family history of breast cancer. J Clin Oncol. 2011;29:0289-0943

## 2022-05-27 NOTE — PROGRESS NOTES
5/25/2022    Referring Provider: Tamar Koch    Present for Today's Visit: Lisa and her sister, Joan    Presenting Information:   I met with Lisa Reyna today for genetic counseling (video visit due to covid19) to discuss her personal history of breast cancer.  She is here today to review this history, cancer screening recommendations, and available genetic testing options.    Personal History:  Lisa is a 71 year old female. 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 She was most recently diagnosed with a recurrence of her right breast cancer (invasive ductal carcinoma; ER and VA positive, HER2 negative). She underwent a lumpectomy on 2/11/2022 and is currently completing adjuvant chemotherapy. Margins of her lumpectomy were close, and she will undergo another breast surgery upon completion of chemotherapy (final treatment scheduled for 5/31). Lisa stated that the results from genetic testing will help determine whether bilateral mastectomy might be the right surgical option for her.      She had her first menstrual period at age 12 or 13, she does not have children, and reports that she went through menopause at age 41. Lisa is s/p bilateral salpingo-oophorectomy for a benign indication. Uterus remains in place. She reports past history of oral contraceptive use for about 5 years and that she has never been on hormone replacement therapy.      Most recent colonoscopy around age 69 (patient report) was normal and follow-up was recommended in 10 years. She denies a history of colon polyps. She does not regularly do any other cancer screening at this time.  Lisa reported no tobacco use, and about 4 drinks per week for alcohol use.    Family History: Cancer family history and pertinent information reviewed below (Please see scanned pedigree for detailed family  history information)    Sister (Joan), age 68, has a history of a benign breast lesion in her 20's and approximately 5 colon polyps.     Mother, age 98, has a history of a basal cell carcinoma removed from her nose and one from her forehead in her early 90's. She is s/p colon resection due to diverticulosis.     Maternal grandfather passed at age 82 from colon cancer diagnosed in his early 80's.     Paternal uncle passed in his 70's/80's from a gastrointestinal cancer (unknown specific diagnosis) diagnosed in his 70's or 80's.    Paternal uncle had a history of skin cancer (unknwon type).    Paternal grandmother passed in her early 70's from liver cancer diagnosed in her 70's.     Paternal grandfather passed from throat cancer in his 50's. He was a smoker.     Paternal first-cousin had a history of testicular cancer diagnosed in his 20's.     Paternal first-cousin's son passed at age 30 from colon cancer.     There is no known Ashkenazi Nondenominational ancestry on either side of her family. There is no reported consanguinity.    Discussion:    Based upon her current personal and family history, Lisa is likely at low risk for a hereditary cancer syndrome. Lisa is the only person in her family with breast cancer, and almost all of the other cancers in her family were diagnosed at older ages (except for her paternal cousin's testicular cancer-a third-degree relative; and her paternal cousin's colon cancer-a fourth-degree relative). However, it is worth noting that there is an unspecified gastrointestinal cancer in her paternal uncle, which very well may have been pancreatic cancer, in which case our suspicion for a hereditary cancer syndrome would be increased.  Based on her personal and family history, Lisa does not meet current National Comprehensive Cancer Network (NCCN) criteria for genetic testing of BRCA1/2.  However, according to the American Society of Breast Surgeons Consensus Guideline on Genetic Testing for  Hereditary Breast Cancer, genetic testing should be available to all patients who have been diagnosed with breast cancer.  The guidelines state that testing should include BRCA1, BRCA2, and PALB2, and that additional testing may be warranted based on personal and/or family history.   We discussed the natural history and genetics of hereditary cancers. A detailed handout regarding the information we discussed will be sent to Lisa in US mail. Topics included: inheritance pattern, cancer risks, cancer screening recommendations, and also risks, benefits and limitations of testing.  We reviewed that the most common cause of hereditary breast cancer is Hereditary Breast and Ovarian Cancer (HBOC) syndrome, which is caused by mutations in the genes BRCA1 and BRCA2. Women with a mutation in either of these genes are at increased risk for breast and ovarian cancer. There is also an increased risk for a second primary breast cancer for women. Men with a mutation in either BRCA1 or BRCA2 are at increased risk for male breast and prostate cancer. Both women and men may also be at increased risk for pancreatic cancer and melanoma.     We discussed that there are additional genes that could cause increased risk for breast cancer and the other cancers in her family. As many of these genes present with overlapping features in a family and accurate cancer risk cannot always be established based upon the pedigree analysis alone, it would be reasonable for Lisa to consider panel genetic testing to analyze multiple genes at once.    We reviewed genetic testing options given Lisa's personal and family history including targeted and expanded options. After our discussion, Lisa opted to proceed with genetic testing via the Common Hereditary Cancers panel through [facility].   Genetic testing is available for 47 genes associated with cancers of the breast, ovary, uterus, prostate and gastrointestinal system: [facility] Common Hereditary  Cancers panel (APC, PETTY, AXIN2, BARD1, BMPR1A, BRCA1, BRCA2, BRIP1, CDH1, CDK4, CDKN2A, CHEK2, CTNNA1, DICER1, EPCAM, GREM1, HOXB13, KIT, MEN1, MLH1, MSH2, MSH3, MSH6, MUTYH, NBN, NF1, NTHL1, PALB2, PDGFRA, PMS2, POLD1, POLE, PTEN,RAD50, RAD51C, RAD51D, SDHA, SDHB, SDHC, SDHD, SMAD4, SMARCA4, STK11, TP53,TSC1, TSC2, VHL).    We discussed that many of these genes are associated with specific hereditary cancer syndromes and published management guidelines: Hereditary Breast and Ovarian Cancer syndrome (BRCA1, BRCA2), Jennings syndrome (MLH1, MSH2, MSH6, PMS2, EPCAM), Familial Adenomatous Polyposis (APC), Hereditary Diffuse Gastric Cancer (CDH1), Familial Atypical Multiple Mole Melanoma syndrome (CDK4, CDKN2A), Multiple Endocrine Neoplasia type 1 (MEN1), Juvenile Polyposis syndrome (BMPR1A, SMAD4), Cowden syndrome (PTEN), Li Fraumeni syndrome (TP53), Hereditary Paraganglioma and Pheochromocytoma syndrome (SDHA, SDHB, SDHC, SDHD), Peutz-Jeghers syndrome (STK11), MUTYH Associated Polyposis (MUTYH), Tuberous sclerosis complex (TSC1, TSC2), Von Hippel-Lindau disease (VHL), and Neurofibromatosis type 1 (NF1).   The PETTY, AXIN2, BRIP1, CHEK2, GREM1, MSH3, NBN, NTHL1, PALB2, POLD1, POLE, RAD51C, and RAD51D genes are associated with increased cancer risk and have published management guidelines for certain cancers.   The remaining genes (BARD1, CTNNA1, DICER1, HOXB13, KIT, PDGFRA, RAD50, and SMARCA4) are associated with increased cancer risk and may allow us to make medical recommendations when mutations are identified.     Consent was obtained over video with no witness required due to the current covid19 global pandemic.    Medical Management: For Lisa, we reviewed that the information from genetic testing may determine:    surgery to treat Lisa's active cancer diagnosis (i.e. lumpectomy versus bilateral mastectomy, partial versus total colectomy, etc.),    additional cancer screening for which Lisa may qualify (i.e.  mammogram and breast MRI, more frequent colonoscopies, more frequent dermatologic exams, etc.),    options for risk reducing surgeries Lisa could consider (i.e. bilateral mastectomy, surgery to remove uterus, etc.),      and targeted chemotherapies for Lisa if she were to develop certain cancers in the future (i.e. immunotherapy for individuals with Jennings syndrome, PARP inhibitors, etc.).     These recommendations will be discussed in detail once genetic testing is completed.     Lisa will be drawn for the genetic testing at her upcoming infusion on 5/31.    Plan:  1) Today Lisa elected to proceed with Common Hereditary Cancers panel genetic testing (47 genes) through [facility].  2) This information should be available in approximately 2-3 weeks.  3) Lisa will be contacted by our scheduling department to set up a result disclosure appointment.       Video Start Time: 10:17am   Video End Time: 11:14am    Shandra Fraser MS, [facility]  Licensed, Certified Genetic Counselor  [facility]  [phone]  [email or web address]

## 2022-05-31 ENCOUNTER — LAB (OUTPATIENT)
Dept: INFUSION THERAPY | Facility: CLINIC | Age: 72
End: 2022-05-31
Attending: INTERNAL MEDICINE
Payer: MEDICARE

## 2022-05-31 ENCOUNTER — PATIENT OUTREACH (OUTPATIENT)
Dept: ONCOLOGY | Facility: CLINIC | Age: 72
End: 2022-05-31

## 2022-05-31 ENCOUNTER — ONCOLOGY VISIT (OUTPATIENT)
Dept: ONCOLOGY | Facility: CLINIC | Age: 72
End: 2022-05-31
Attending: INTERNAL MEDICINE
Payer: MEDICARE

## 2022-05-31 VITALS
RESPIRATION RATE: 16 BRPM | TEMPERATURE: 98.5 F | BODY MASS INDEX: 31.92 KG/M2 | WEIGHT: 174.5 LBS | SYSTOLIC BLOOD PRESSURE: 134 MMHG | DIASTOLIC BLOOD PRESSURE: 62 MMHG | OXYGEN SATURATION: 100 % | HEART RATE: 93 BPM

## 2022-05-31 DIAGNOSIS — Z17.0 MALIGNANT NEOPLASM OF LOWER-INNER QUADRANT OF RIGHT BREAST OF FEMALE, ESTROGEN RECEPTOR POSITIVE (H): Primary | ICD-10-CM

## 2022-05-31 DIAGNOSIS — C50.311 MALIGNANT NEOPLASM OF LOWER-INNER QUADRANT OF RIGHT BREAST OF FEMALE, ESTROGEN RECEPTOR POSITIVE (H): ICD-10-CM

## 2022-05-31 DIAGNOSIS — Z80.0 FAMILY HISTORY OF COLON CANCER: ICD-10-CM

## 2022-05-31 DIAGNOSIS — R23.4 PEELING SKIN: ICD-10-CM

## 2022-05-31 DIAGNOSIS — Z17.0 MALIGNANT NEOPLASM OF LOWER-INNER QUADRANT OF RIGHT BREAST OF FEMALE, ESTROGEN RECEPTOR POSITIVE (H): ICD-10-CM

## 2022-05-31 DIAGNOSIS — Z51.11 ENCOUNTER FOR ANTINEOPLASTIC CHEMOTHERAPY: Primary | ICD-10-CM

## 2022-05-31 DIAGNOSIS — E87.6 HYPOKALEMIA: ICD-10-CM

## 2022-05-31 DIAGNOSIS — C50.311 MALIGNANT NEOPLASM OF LOWER-INNER QUADRANT OF RIGHT BREAST OF FEMALE, ESTROGEN RECEPTOR POSITIVE (H): Primary | ICD-10-CM

## 2022-05-31 LAB
ALBUMIN SERPL-MCNC: 3.3 G/DL (ref 3.5–5)
ALP SERPL-CCNC: 53 U/L (ref 45–120)
ALT SERPL W P-5'-P-CCNC: <9 U/L (ref 0–45)
ANION GAP SERPL CALCULATED.3IONS-SCNC: 13 MMOL/L (ref 5–18)
AST SERPL W P-5'-P-CCNC: 15 U/L (ref 0–40)
BASOPHILS # BLD AUTO: 0.1 10E3/UL (ref 0–0.2)
BASOPHILS NFR BLD AUTO: 2 %
BILIRUB SERPL-MCNC: 0.8 MG/DL (ref 0–1)
BUN SERPL-MCNC: 17 MG/DL (ref 8–28)
CALCIUM SERPL-MCNC: 9.1 MG/DL (ref 8.5–10.5)
CHLORIDE BLD-SCNC: 101 MMOL/L (ref 98–107)
CO2 SERPL-SCNC: 26 MMOL/L (ref 22–31)
CREAT SERPL-MCNC: 1.03 MG/DL (ref 0.6–1.1)
EOSINOPHIL # BLD AUTO: 0 10E3/UL (ref 0–0.7)
EOSINOPHIL NFR BLD AUTO: 1 %
ERYTHROCYTE [DISTWIDTH] IN BLOOD BY AUTOMATED COUNT: 14.7 % (ref 10–15)
GFR SERPL CREATININE-BSD FRML MDRD: 57 ML/MIN/1.73M2
GLUCOSE BLD-MCNC: 119 MG/DL (ref 70–125)
HCT VFR BLD AUTO: 29 % (ref 35–47)
HGB BLD-MCNC: 9.8 G/DL (ref 11.7–15.7)
IMM GRANULOCYTES # BLD: 0 10E3/UL
IMM GRANULOCYTES NFR BLD: 0 %
LYMPHOCYTES # BLD AUTO: 0.6 10E3/UL (ref 0.8–5.3)
LYMPHOCYTES NFR BLD AUTO: 10 %
MCH RBC QN AUTO: 30.5 PG (ref 26.5–33)
MCHC RBC AUTO-ENTMCNC: 33.8 G/DL (ref 31.5–36.5)
MCV RBC AUTO: 90 FL (ref 78–100)
MONOCYTES # BLD AUTO: 0.9 10E3/UL (ref 0–1.3)
MONOCYTES NFR BLD AUTO: 16 %
NEUTROPHILS # BLD AUTO: 4 10E3/UL (ref 1.6–8.3)
NEUTROPHILS NFR BLD AUTO: 71 %
NRBC # BLD AUTO: 0 10E3/UL
NRBC BLD AUTO-RTO: 0 /100
PLATELET # BLD AUTO: 328 10E3/UL (ref 150–450)
POTASSIUM BLD-SCNC: 3.2 MMOL/L (ref 3.5–5)
PROT SERPL-MCNC: 6.6 G/DL (ref 6–8)
RBC # BLD AUTO: 3.21 10E6/UL (ref 3.8–5.2)
SODIUM SERPL-SCNC: 140 MMOL/L (ref 136–145)
WBC # BLD AUTO: 5.5 10E3/UL (ref 4–11)

## 2022-05-31 PROCEDURE — 96375 TX/PRO/DX INJ NEW DRUG ADDON: CPT

## 2022-05-31 PROCEDURE — 250N000011 HC RX IP 250 OP 636: Performed by: INTERNAL MEDICINE

## 2022-05-31 PROCEDURE — 85025 COMPLETE CBC W/AUTO DIFF WBC: CPT | Performed by: INTERNAL MEDICINE

## 2022-05-31 PROCEDURE — 80053 COMPREHEN METABOLIC PANEL: CPT | Performed by: INTERNAL MEDICINE

## 2022-05-31 PROCEDURE — 250N000013 HC RX MED GY IP 250 OP 250 PS 637: Performed by: INTERNAL MEDICINE

## 2022-05-31 PROCEDURE — G0463 HOSPITAL OUTPT CLINIC VISIT: HCPCS

## 2022-05-31 PROCEDURE — 36415 COLL VENOUS BLD VENIPUNCTURE: CPT | Performed by: INTERNAL MEDICINE

## 2022-05-31 PROCEDURE — 82040 ASSAY OF SERUM ALBUMIN: CPT | Performed by: INTERNAL MEDICINE

## 2022-05-31 PROCEDURE — 96367 TX/PROPH/DG ADDL SEQ IV INF: CPT

## 2022-05-31 PROCEDURE — 96413 CHEMO IV INFUSION 1 HR: CPT

## 2022-05-31 PROCEDURE — 96417 CHEMO IV INFUS EACH ADDL SEQ: CPT

## 2022-05-31 PROCEDURE — 258N000003 HC RX IP 258 OP 636: Performed by: INTERNAL MEDICINE

## 2022-05-31 PROCEDURE — 99214 OFFICE O/P EST MOD 30 MIN: CPT | Performed by: INTERNAL MEDICINE

## 2022-05-31 RX ORDER — POTASSIUM CHLORIDE 1500 MG/1
40 TABLET, EXTENDED RELEASE ORAL ONCE
Status: DISCONTINUED | OUTPATIENT
Start: 2022-05-31 | End: 2022-05-31

## 2022-05-31 RX ORDER — ALBUTEROL SULFATE 90 UG/1
1-2 AEROSOL, METERED RESPIRATORY (INHALATION)
Status: DISCONTINUED | OUTPATIENT
Start: 2022-05-31 | End: 2022-05-31 | Stop reason: HOSPADM

## 2022-05-31 RX ORDER — PALONOSETRON 0.05 MG/ML
0.25 INJECTION, SOLUTION INTRAVENOUS ONCE
Status: CANCELLED
Start: 2022-05-31 | End: 2022-05-31

## 2022-05-31 RX ORDER — ALBUTEROL SULFATE 90 UG/1
1-2 AEROSOL, METERED RESPIRATORY (INHALATION)
Status: CANCELLED
Start: 2022-05-31

## 2022-05-31 RX ORDER — POTASSIUM CHLORIDE 1500 MG/1
40 TABLET, EXTENDED RELEASE ORAL ONCE
Status: COMPLETED | OUTPATIENT
Start: 2022-05-31 | End: 2022-05-31

## 2022-05-31 RX ORDER — EPINEPHRINE 1 MG/ML
0.3 INJECTION, SOLUTION, CONCENTRATE INTRAVENOUS EVERY 5 MIN PRN
Status: DISCONTINUED | OUTPATIENT
Start: 2022-05-31 | End: 2022-05-31 | Stop reason: HOSPADM

## 2022-05-31 RX ORDER — NALOXONE HYDROCHLORIDE 0.4 MG/ML
0.2 INJECTION, SOLUTION INTRAMUSCULAR; INTRAVENOUS; SUBCUTANEOUS
Status: DISCONTINUED | OUTPATIENT
Start: 2022-05-31 | End: 2022-05-31 | Stop reason: HOSPADM

## 2022-05-31 RX ORDER — EPINEPHRINE 1 MG/ML
0.3 INJECTION, SOLUTION, CONCENTRATE INTRAVENOUS EVERY 5 MIN PRN
Status: CANCELLED | OUTPATIENT
Start: 2022-05-31

## 2022-05-31 RX ORDER — DIPHENHYDRAMINE HYDROCHLORIDE 50 MG/ML
50 INJECTION INTRAMUSCULAR; INTRAVENOUS
Status: DISCONTINUED | OUTPATIENT
Start: 2022-05-31 | End: 2022-05-31 | Stop reason: HOSPADM

## 2022-05-31 RX ORDER — MEPERIDINE HYDROCHLORIDE 50 MG/ML
25 INJECTION INTRAMUSCULAR; INTRAVENOUS; SUBCUTANEOUS EVERY 30 MIN PRN
Status: DISCONTINUED | OUTPATIENT
Start: 2022-05-31 | End: 2022-05-31 | Stop reason: HOSPADM

## 2022-05-31 RX ORDER — ALBUTEROL SULFATE 0.83 MG/ML
2.5 SOLUTION RESPIRATORY (INHALATION)
Status: CANCELLED | OUTPATIENT
Start: 2022-05-31

## 2022-05-31 RX ORDER — HEPARIN SODIUM (PORCINE) LOCK FLUSH IV SOLN 100 UNIT/ML 100 UNIT/ML
5 SOLUTION INTRAVENOUS
Status: CANCELLED | OUTPATIENT
Start: 2022-05-31

## 2022-05-31 RX ORDER — HEPARIN SODIUM,PORCINE 10 UNIT/ML
5 VIAL (ML) INTRAVENOUS
Status: CANCELLED | OUTPATIENT
Start: 2022-05-31

## 2022-05-31 RX ORDER — LORAZEPAM 2 MG/ML
0.5 INJECTION INTRAMUSCULAR EVERY 4 HOURS PRN
Status: CANCELLED | OUTPATIENT
Start: 2022-05-31

## 2022-05-31 RX ORDER — NALOXONE HYDROCHLORIDE 0.4 MG/ML
0.2 INJECTION, SOLUTION INTRAMUSCULAR; INTRAVENOUS; SUBCUTANEOUS
Status: CANCELLED | OUTPATIENT
Start: 2022-05-31

## 2022-05-31 RX ORDER — ALBUTEROL SULFATE 0.83 MG/ML
2.5 SOLUTION RESPIRATORY (INHALATION)
Status: DISCONTINUED | OUTPATIENT
Start: 2022-05-31 | End: 2022-05-31 | Stop reason: HOSPADM

## 2022-05-31 RX ORDER — DIPHENHYDRAMINE HYDROCHLORIDE 50 MG/ML
50 INJECTION INTRAMUSCULAR; INTRAVENOUS
Status: CANCELLED
Start: 2022-05-31

## 2022-05-31 RX ORDER — METHYLPREDNISOLONE SODIUM SUCCINATE 125 MG/2ML
125 INJECTION, POWDER, LYOPHILIZED, FOR SOLUTION INTRAMUSCULAR; INTRAVENOUS
Status: DISCONTINUED | OUTPATIENT
Start: 2022-05-31 | End: 2022-05-31 | Stop reason: HOSPADM

## 2022-05-31 RX ORDER — PALONOSETRON 0.05 MG/ML
0.25 INJECTION, SOLUTION INTRAVENOUS ONCE
Status: COMPLETED | OUTPATIENT
Start: 2022-05-31 | End: 2022-05-31

## 2022-05-31 RX ORDER — METHYLPREDNISOLONE SODIUM SUCCINATE 125 MG/2ML
125 INJECTION, POWDER, LYOPHILIZED, FOR SOLUTION INTRAMUSCULAR; INTRAVENOUS
Status: CANCELLED
Start: 2022-05-31

## 2022-05-31 RX ORDER — MEPERIDINE HYDROCHLORIDE 50 MG/ML
25 INJECTION INTRAMUSCULAR; INTRAVENOUS; SUBCUTANEOUS EVERY 30 MIN PRN
Status: CANCELLED | OUTPATIENT
Start: 2022-05-31

## 2022-05-31 RX ADMIN — CYCLOPHOSPHAMIDE 1115 MG: 1 INJECTION, POWDER, FOR SOLUTION INTRAVENOUS; ORAL at 11:30

## 2022-05-31 RX ADMIN — DOCETAXEL 140 MG: 20 INJECTION, SOLUTION INTRAVENOUS at 10:13

## 2022-05-31 RX ADMIN — DEXAMETHASONE SODIUM PHOSPHATE 12 MG: 10 INJECTION, SOLUTION INTRAMUSCULAR; INTRAVENOUS at 09:35

## 2022-05-31 RX ADMIN — SODIUM CHLORIDE 250 ML: 9 INJECTION, SOLUTION INTRAVENOUS at 09:26

## 2022-05-31 RX ADMIN — POTASSIUM CHLORIDE 40 MEQ: 1500 TABLET, EXTENDED RELEASE ORAL at 09:20

## 2022-05-31 RX ADMIN — PALONOSETRON 0.25 MG: 0.05 INJECTION, SOLUTION INTRAVENOUS at 09:27

## 2022-05-31 ASSESSMENT — PAIN SCALES - GENERAL: PAINLEVEL: NO PAIN (0)

## 2022-05-31 NOTE — PROGRESS NOTES
[facility] Hematology and Oncology Progress Note    Patient: Lisa Reyna  MRN: [id number]  Date of Service: May 31, 2022         Reason for Visit    Chief Complaint   Patient presents with     Oncology Clinic Visit     Malignant neoplasm of lower inner quadrant of right breast..       Assessment and Plan    Cancer Staging  Malignant neoplasm of lower-inner quadrant of right breast of female, estrogen receptor positive (H)  Staging form: Breast, AJCC 8th Edition  - Pathologic stage from 2/11/2022: Stage IA (pT1c, pN0(sn), cM0, G2, ER+, DE+, HER2-, Oncotype DX score: 30) - Signed by Tamar Koch MD on 3/13/2022  - Clinical: No stage assigned - Unsigned      ECOG Performance    0 - Independent     Pain  Pain Score: No Pain (0)    #.  Invasive ductal carcinoma of LIQ of right breast, stage IA, ER+DE+HER2-. Oncotype 30/19%, >15%.  #.  Grade 1 neuropathy of both feet, exacerbated by chemotherapy on pre-existing neuropathy of both feet  #.  Diarrhea, related to chemotherapy  #.  Bone pain, related to Neulasta.  #.  Mild hypokalemia  #.  Dry skin and skin desquamation-dermatologic toxicity likely secondary to docetaxel, less than 5% of the skin involved  #.  Moderate normocytic anemia, secondary to chemotherapy.     Clinically stable.  Reviewed labs.  WBC, platelet counts are normal.  Her hemoglobin continues to decline to moderate degree.  Renal function, liver functions are normal.  She has persistent mild hypokalemia.       Diarrhea from chemotherapy is managing well with Imodium 2 tablets.  Advised to take Tylenol as needed for Neulasta related bone pain and Claritin daily.     Discussed about we can consider dose reduction of docetaxel for possible skin reaction.  However it is mild and manageable with moisturizer.  She wanted to complete the last dose of chemotherapy at full dose.  Proceed with cycle #4 chemotherapy today.       Will replace potassium chloride 40 mEq x 1 today     Follow-up with surgery  in a couple weeks for right breast mastectomy.       Follow-up with me a couple weeks after surgery for discussion of adjuvant endocrine therapy     Requested DEXA scan report from [facility].    #.  Genetic counseling   She had genetic counseling completed on 5/26/2022 and had blood test completed today.     Encounter Diagnoses:    Problem List Items Addressed This Visit        Oncology Diagnoses    Malignant neoplasm of lower-inner quadrant of right breast of female, estrogen receptor positive (H) - Primary      Other Visit Diagnoses     Hypokalemia        Peeling skin                 CC: Cayla SINGH Skelton, NP   ______________________________________________________________________________  Diagnosis  6/24/2004- She had prior history of right breast DCIS    1/18/2022- screening mammogram detected     Invasive ductal carcinoma with focal micropapillary pattern.  Grade 2.  12 mm.     Margins were negative but close at 0.5 mm from the nearest junction of the inferior and medial margin, 0.5 mm from medial margin and 2 mm from inferior margin.     There is associated DCIS with EIC negative, nuclear grade intermediate, solid and focal cribriform pattern of 10%.  DCIS margins were uninvolved at 3 mm from nearest inferior margin, 5 mm from medial margins.     1 sentinel lymph node was negative for metastatic carcinoma.    ER positive, greater than 10% of the cell, 61-70%, FL positive (21-30%), HER-2 negative by IHC (1+).     Oncotype DX recurrence score 30/19% / >15%.      Treatment to date  6/2004- post right breast lumpectomy (Dr. Jones) on 6/24/2004, post adjuvant radiation (Dr. Caleb Elena) followed by adjuvant tamoxifen for 5 years (Dr. Noé Fernandes).    2/11/2022- right breast lumpectomy and right sentinel lymph node biopsy (Dr. Alcantara).     History of Present Illness    Ms. Lisa Reyna presented today unaccompanied.    She had skin peeling on inner aspect of thigh around the knee area started a few  days after chemotherapy.  It was started as erythematous rash and the skin peeled off.  No itch.  She noted the similar skin peeling in her hands.  Not in feet or sole.  She has been using moisturizer.  She noted that the skin peeling was started after cycle #2 chemotherapy and less intense after cycle #3.  No fever or infection symptoms.  Poor appetite.  She takes Tylenol for Neulasta induced pain.    Review of systems  Apart from describing in HPI, the remainder of comprehensive ROS was negative.    Past History    Past Medical History:   Diagnosis Date     Arthritis      Asthma     Seaonal usually in spring     Breast cancer (H) 2004    rt lumpect     Coronary artery disease      Endometrial polyp 1992    was protruding through cervix     GERD (gastroesophageal reflux disease)      Heart attack (H)      Heart murmur      History of measles, mumps, or rubella as a child    hx of measles, mumps and Maltese mealses. Rubella immune 5/1980     HTN (hypertension)      Hx of radiation therapy 2004     Hyperlipemia      Osteopenia      Other chronic pain      Ovarian cyst      Prediabetes      UTI (urinary tract infection)      Walking troubles        Past Surgical History:   Procedure Laterality Date     BIOPSY BREAST Left 2008    cyst removal     BIOPSY BREAST Right 2004     CERVICAL DISC SURGERY  6/25/2007    C6-7 sarah laminectomy with microdiskectomy     LUMPECTOMY BREAST Right 6/24/2004     LUMPECTOMY BREAST Right 2/11/2022    Procedure: Right Lumpectomy after Wire Localization; [place] Lymph Node Biopsy;  Surgeon: Mami Alcantara MD;  Location: [facility]     OOPHORECTOMY Bilateral 2011     TOTAL HIP ARTHROPLASTY Left 9/30/2009     TUBAL LIGATION  1/10/1992    with endometrial polyp removal     WRIST SURGERY         Physical Exam    /62 (BP Location: Right arm)   Pulse 93   Temp 98.5  F (36.9  C) (Oral)   Resp 16   Wt 79.2 kg (174 lb 8 oz)   SpO2 100%   BMI 31.92 kg/m      General: alert, awake,  not in acute distress  HEENT: Head: Normal, normocephalic, atraumatic.  Eye: Normal external eye, conjunctiva, lids cornea, ALENA.  Pharynx: Normal buccal mucosa. Normal pharynx.  Neck / Thyroid: Supple, no masses, nodes, nodules or enlargement.  Lymphatics: No abnormally enlarged lymph nodes.  Chest: Normal chest wall and respirations. Clear to auscultation.  Heart: S1 S2 RRR, no murmur.   Abdomen: abdomen is soft without significant tenderness, masses, organomegaly or guarding  Extremities: normal strength, tone, and muscle mass  Skin: Dry skin peeling in hands and inner thigh.  No redness.  No excoriation or signs of infection.  CNS: non focal.    Lab Results    Recent Results (from the past 168 hour(s))   Comprehensive metabolic panel   Result Value Ref Range    Sodium 140 136 - 145 mmol/L    Potassium 3.2 (L) 3.5 - 5.0 mmol/L    Chloride 101 98 - 107 mmol/L    Carbon Dioxide (CO2) 26 22 - 31 mmol/L    Anion Gap 13 5 - 18 mmol/L    Urea Nitrogen 17 8 - 28 mg/dL    Creatinine 1.03 0.60 - 1.10 mg/dL    Calcium 9.1 8.5 - 10.5 mg/dL    Glucose 119 70 - 125 mg/dL    Alkaline Phosphatase 53 45 - 120 U/L    AST 15 0 - 40 U/L    ALT <9 0 - 45 U/L    Protein Total 6.6 6.0 - 8.0 g/dL    Albumin 3.3 (L) 3.5 - 5.0 g/dL    Bilirubin Total 0.8 0.0 - 1.0 mg/dL    GFR Estimate 57 (L) >60 mL/min/1.73m2   CBC with platelets and differential   Result Value Ref Range    WBC Count 5.5 4.0 - 11.0 10e3/uL    RBC Count 3.21 (L) 3.80 - 5.20 10e6/uL    Hemoglobin 9.8 (L) 11.7 - 15.7 g/dL    Hematocrit 29.0 (L) 35.0 - 47.0 %    MCV 90 78 - 100 fL    MCH 30.5 26.5 - 33.0 pg    MCHC 33.8 31.5 - 36.5 g/dL    RDW 14.7 10.0 - 15.0 %    Platelet Count 328 150 - 450 10e3/uL    % Neutrophils 71 %    % Lymphocytes 10 %    % Monocytes 16 %    % Eosinophils 1 %    % Basophils 2 %    % Immature Granulocytes 0 %    NRBCs per 100 WBC 0 <1 /100    Absolute Neutrophils 4.0 1.6 - 8.3 10e3/uL    Absolute Lymphocytes 0.6 (L) 0.8 - 5.3 10e3/uL    Absolute  Monocytes 0.9 0.0 - 1.3 10e3/uL    Absolute Eosinophils 0.0 0.0 - 0.7 10e3/uL    Absolute Basophils 0.1 0.0 - 0.2 10e3/uL    Absolute Immature Granulocytes 0.0 <=0.4 10e3/uL    Absolute NRBCs 0.0 10e3/uL       Imaging    No results found.    Signed by: Tamar Koch MD

## 2022-05-31 NOTE — PROGRESS NOTES
[facility]: Cancer Care Short Note                                    Discussion with Patient:                                                      Patient here today for labs, provider visit with Dr. Kcoh, and Infusion.  Checked in with patient while she was in Infusion.    Assessment:                                                      Patient reports rash on legs improved.    Slightly pink, dry skin.  She reports she picked up some Cerave lotion.  Gave her samples of aquaphor to try.    Patient diarrhea has improved. She reports if she has diarrhea, she takes 2 imodium tablets and diarrhea resolves.    Patient excited to be done with last treatment. She said she needs to meet with Surgeon again and schedule surgery couple of weeks from today. Patient will follow-up with Dr. Koch again after surgery.      Intervention/Education provided during outreach:                                                       Patient instructed to call with symptoms, concerns. Patient verbalized understanding.     Patient to follow up a few weeks after surgery.    Signature:  Anais Galindo RN

## 2022-05-31 NOTE — PROGRESS NOTES
"Oncology Rooming Note    May 31, 2022 8:19 AM   Lisa Reyna is a 71 year old female who presents for:    Chief Complaint   Patient presents with     Oncology Clinic Visit     Malignant neoplasm of lower inner quadrant of right breast..     Initial Vitals: /62 (BP Location: Right arm)   Pulse 93   Temp 98.5  F (36.9  C) (Oral)   Resp 16   Wt 79.2 kg (174 lb 8 oz)   SpO2 100%   BMI 31.92 kg/m   Estimated body mass index is 31.92 kg/m  as calculated from the following:    Height as of 5/10/22: 1.575 m (5' 2\").    Weight as of this encounter: 79.2 kg (174 lb 8 oz). Body surface area is 1.86 meters squared.  No Pain (0) Comment: Data Unavailable   No LMP recorded. Patient is postmenopausal.  Allergies reviewed: Yes  Medications reviewed: Yes    Medications: Medication refills not needed today.  Pharmacy name entered into [facility]: [place], [place] [address] [place], [place] - [address] [place] [address]    Clinical concerns Peeling on thighs after chemotherapy. Loss of taste.       Maribel Xiao LPN              " person/place

## 2022-05-31 NOTE — PROGRESS NOTES
Infusion Nursing Note:  Lisa Reyna presents today for Cycle 4, Day 1 of her chemotherapy plan.    Patient seen by provider today: Yes; Dr. Koch.   present during visit today: Not Applicable.    Note: Aloxi and dexamethasone administered 30 minutes prior to treatment as ordered.  K+ 3.2 today. 40 mEq potassium chloride administered as ordered per Dr. Koch.    Intravenous Access:  Peripheral IV placed in right upper forearm.    Treatment Conditions:  Lab Results   Component Value Date    HGB 9.8 (L) 05/31/2022    WBC 5.5 05/31/2022    ANEU 0.8 (L) 04/01/2022    ANEUTAUTO 4.0 05/31/2022     05/31/2022      Lab Results   Component Value Date     05/31/2022    POTASSIUM 3.2 (L) 05/31/2022    CR 1.03 05/31/2022    TAMI 9.1 05/31/2022    BILITOTAL 0.8 05/31/2022    ALBUMIN 3.3 (L) 05/31/2022    ALT <9 05/31/2022    AST 15 05/31/2022     Results reviewed, labs MET treatment parameters, ok to proceed with treatment.    Post Infusion Assessment:  Patient tolerated infusion without incident.  Blood return noted pre and post infusion.  Site patent and intact, free from redness, edema or discomfort.  Access discontinued per protocol.     Discharge Plan:  AVS reviewed with patient.   Patient will return tomorrow for next appointment.   Patient discharged in stable condition accompanied by: self.  Departure Mode: Ambulatory.      Veena Espinal RN

## 2022-05-31 NOTE — LETTER
"    5/31/2022         RE: Lisa Reyna  [address]  [facility] [address]        Dear Colleague,    Thank you for referring your patient, Lisa Reyna, to the [facility] CANCER [facility]. Please see a copy of my visit note below.    Oncology Rooming Note    May 31, 2022 8:19 AM   Lisa Reyna is a 71 year old female who presents for:    Chief Complaint   Patient presents with     Oncology Clinic Visit     Malignant neoplasm of lower inner quadrant of right breast..     Initial Vitals: /62 (BP Location: Right arm)   Pulse 93   Temp 98.5  F (36.9  C) (Oral)   Resp 16   Wt 79.2 kg (174 lb 8 oz)   SpO2 100%   BMI 31.92 kg/m   Estimated body mass index is 31.92 kg/m  as calculated from the following:    Height as of 5/10/22: 1.575 m (5' 2\").    Weight as of this encounter: 79.2 kg (174 lb 8 oz). Body surface area is 1.86 meters squared.  No Pain (0) Comment: Data Unavailable   No LMP recorded. Patient is postmenopausal.  Allergies reviewed: Yes  Medications reviewed: Yes    Medications: Medication refills not needed today.  Pharmacy name entered into [facility]: [facility], [address] [place] [address]    Clinical concerns Peeling on thighs after chemotherapy. Loss of taste.       Maribel Xiao LPN                [facility] Hematology and Oncology Progress Note    Patient: Lisa Reyna  MRN: [id number]  Date of Service: May 31, 2022         Reason for Visit    Chief Complaint   Patient presents with     Oncology Clinic Visit     Malignant neoplasm of lower inner quadrant of right breast..       Assessment and Plan    Cancer Staging  Malignant neoplasm of lower-inner quadrant of right breast of female, estrogen receptor positive (H)  Staging form: Breast, AJCC 8th Edition  - Pathologic stage from 2/11/2022: Stage IA (pT1c, pN0(sn), cM0, G2, ER+, MT+, HER2-, Oncotype DX score: 30) - Signed by Tamar Koch MD on 3/13/2022  - Clinical: No stage " assigned - Unsigned      ECOG Performance    0 - Independent     Pain  Pain Score: No Pain (0)    #.  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 Oncotype 30/19%, >15%.  #.  Grade 1 neuropathy of both feet, exacerbated by chemotherapy on pre-existing neuropathy of both feet  #.  Diarrhea, related to chemotherapy  #.  Bone pain, related to Neulasta.  #.  Mild hypokalemia  #.  Dry skin and skin desquamation-dermatologic toxicity likely secondary to docetaxel, less than 5% of the skin involved  #.  Moderate normocytic anemia, secondary to chemotherapy.     Clinically stable.  Reviewed labs.  WBC, platelet counts are normal.  Her hemoglobin continues to decline to moderate degree.  Renal function, liver functions are normal.  She has persistent mild hypokalemia.       Diarrhea from chemotherapy is managing well with Imodium 2 tablets.  Advised to take Tylenol as needed for Neulasta related bone pain and Claritin daily.     Discussed about we can consider dose reduction of docetaxel for possible skin reaction.  However it is mild and manageable with moisturizer.  She wanted to complete the last dose of chemotherapy at full dose.  Proceed with cycle #4 chemotherapy today.       Will replace potassium chloride 40 mEq x 1 today     Follow-up with surgery in a couple weeks for right breast mastectomy.       Follow-up with me a couple weeks after surgery for discussion of adjuvant endocrine therapy     Requested DEXA scan report from [facility].    #.  Genetic counseling   She had genetic counseling completed on 5/26/2022 and had blood test completed today.     Encounter Diagnoses:    Problem List Items Addressed This Visit        Oncology Diagnoses    Malignant neoplasm of lower-inner quadrant of right breast of female, estrogen receptor positive (H) - Primary      Other Visit Diagnoses     Hypokalemia        Peeling skin                 CC: Cayla Skelton NP    ______________________________________________________________________________  Diagnosis  6/24/2004- She had prior history of right breast DCIS    1/18/2022- screening mammogram detected     Invasive ductal carcinoma with focal micropapillary pattern.  Grade 2.  12 mm.     Margins were negative but close at 0.5 mm from the nearest junction of the inferior and medial margin, 0.5 mm from medial margin and 2 mm from inferior margin.     There is associated DCIS with EIC negative, nuclear grade intermediate, solid and focal cribriform pattern of 10%.  DCIS margins were uninvolved at 3 mm from nearest inferior margin, 5 mm from medial margins.     1 sentinel lymph node was negative for metastatic carcinoma.    ER positive, greater than 10% of the cell, 61-70%, HI positive (21-30%), HER-2 negative by IHC (1+).     Oncotype DX recurrence score 30/19% / >15%.      Treatment to date  6/2004- post right breast lumpectomy (Dr. Jones) on 6/24/2004, post adjuvant radiation (Dr. Caleb Elena) followed by adjuvant tamoxifen for 5 years (Dr. Noé Fernandes).    2/11/2022- right breast lumpectomy and right sentinel lymph node biopsy (Dr. Alcantara).     History of Present Illness    Ms. Lisa Reyna presented today unaccompanied.    She had skin peeling on inner aspect of thigh around the knee area started a few days after chemotherapy.  It was started as erythematous rash and the skin peeled off.  No itch.  She noted the similar skin peeling in her hands.  Not in feet or sole.  She has been using moisturizer.  She noted that the skin peeling was started after cycle #2 chemotherapy and less intense after cycle #3.  No fever or infection symptoms.  Poor appetite.  She takes Tylenol for Neulasta induced pain.    Review of systems  Apart from describing in HPI, the remainder of comprehensive ROS was negative.    Past History    Past Medical History:   Diagnosis Date     Arthritis      Asthma     Seaonal usually in  spring     Breast cancer (H) 2004    rt lumpect     Coronary artery disease      Endometrial polyp 1992    was protruding through cervix     GERD (gastroesophageal reflux disease)      Heart attack (H)      Heart murmur      History of measles, mumps, or rubella as a child    hx of measles, mumps and Pashto mealses. Rubella immune 5/1980     HTN (hypertension)      Hx of radiation therapy 2004     Hyperlipemia      Osteopenia      Other chronic pain      Ovarian cyst      Prediabetes      UTI (urinary tract infection)      Walking troubles        Past Surgical History:   Procedure Laterality Date     BIOPSY BREAST Left 2008    cyst removal     BIOPSY BREAST Right 2004     CERVICAL DISC SURGERY  6/25/2007    C6-7 sarah laminectomy with microdiskectomy     LUMPECTOMY BREAST Right 6/24/2004     LUMPECTOMY BREAST Right 2/11/2022    Procedure: Right Lumpectomy after Wire Localization; [place] Lymph Node Biopsy;  Surgeon: Mami Alcantara MD;  Location: [facility]     OOPHORECTOMY Bilateral 2011     TOTAL HIP ARTHROPLASTY Left 9/30/2009     TUBAL LIGATION  1/10/1992    with endometrial polyp removal     WRIST SURGERY         Physical Exam    /62 (BP Location: Right arm)   Pulse 93   Temp 98.5  F (36.9  C) (Oral)   Resp 16   Wt 79.2 kg (174 lb 8 oz)   SpO2 100%   BMI 31.92 kg/m      General: alert, awake, not in acute distress  HEENT: Head: Normal, normocephalic, atraumatic.  Eye: Normal external eye, conjunctiva, lids cornea, ALENA.  Pharynx: Normal buccal mucosa. Normal pharynx.  Neck / Thyroid: Supple, no masses, nodes, nodules or enlargement.  Lymphatics: No abnormally enlarged lymph nodes.  Chest: Normal chest wall and respirations. Clear to auscultation.  Heart: S1 S2 RRR, no murmur.   Abdomen: abdomen is soft without significant tenderness, masses, organomegaly or guarding  Extremities: normal strength, tone, and muscle mass  Skin: Dry skin peeling in hands and inner thigh.  No redness.  No  excoriation or signs of infection.  CNS: non focal.    Lab Results    Recent Results (from the past 168 hour(s))   Comprehensive metabolic panel   Result Value Ref Range    Sodium 140 136 - 145 mmol/L    Potassium 3.2 (L) 3.5 - 5.0 mmol/L    Chloride 101 98 - 107 mmol/L    Carbon Dioxide (CO2) 26 22 - 31 mmol/L    Anion Gap 13 5 - 18 mmol/L    Urea Nitrogen 17 8 - 28 mg/dL    Creatinine 1.03 0.60 - 1.10 mg/dL    Calcium 9.1 8.5 - 10.5 mg/dL    Glucose 119 70 - 125 mg/dL    Alkaline Phosphatase 53 45 - 120 U/L    AST 15 0 - 40 U/L    ALT <9 0 - 45 U/L    Protein Total 6.6 6.0 - 8.0 g/dL    Albumin 3.3 (L) 3.5 - 5.0 g/dL    Bilirubin Total 0.8 0.0 - 1.0 mg/dL    GFR Estimate 57 (L) >60 mL/min/1.73m2   CBC with platelets and differential   Result Value Ref Range    WBC Count 5.5 4.0 - 11.0 10e3/uL    RBC Count 3.21 (L) 3.80 - 5.20 10e6/uL    Hemoglobin 9.8 (L) 11.7 - 15.7 g/dL    Hematocrit 29.0 (L) 35.0 - 47.0 %    MCV 90 78 - 100 fL    MCH 30.5 26.5 - 33.0 pg    MCHC 33.8 31.5 - 36.5 g/dL    RDW 14.7 10.0 - 15.0 %    Platelet Count 328 150 - 450 10e3/uL    % Neutrophils 71 %    % Lymphocytes 10 %    % Monocytes 16 %    % Eosinophils 1 %    % Basophils 2 %    % Immature Granulocytes 0 %    NRBCs per 100 WBC 0 <1 /100    Absolute Neutrophils 4.0 1.6 - 8.3 10e3/uL    Absolute Lymphocytes 0.6 (L) 0.8 - 5.3 10e3/uL    Absolute Monocytes 0.9 0.0 - 1.3 10e3/uL    Absolute Eosinophils 0.0 0.0 - 0.7 10e3/uL    Absolute Basophils 0.1 0.0 - 0.2 10e3/uL    Absolute Immature Granulocytes 0.0 <=0.4 10e3/uL    Absolute NRBCs 0.0 10e3/uL       Imaging    No results found.    Signed by: Tamar Koch MD        Again, thank you for allowing me to participate in the care of your patient.        Sincerely,        Tamar Koch MD

## 2022-06-01 ENCOUNTER — PATIENT OUTREACH (OUTPATIENT)
Dept: ONCOLOGY | Facility: CLINIC | Age: 72
End: 2022-06-01
Payer: MEDICARE

## 2022-06-01 ENCOUNTER — INFUSION THERAPY VISIT (OUTPATIENT)
Dept: INFUSION THERAPY | Facility: CLINIC | Age: 72
End: 2022-06-01
Attending: INTERNAL MEDICINE
Payer: MEDICARE

## 2022-06-01 DIAGNOSIS — Z51.11 ENCOUNTER FOR ANTINEOPLASTIC CHEMOTHERAPY: Primary | ICD-10-CM

## 2022-06-01 DIAGNOSIS — C50.311 MALIGNANT NEOPLASM OF LOWER-INNER QUADRANT OF RIGHT BREAST OF FEMALE, ESTROGEN RECEPTOR POSITIVE (H): ICD-10-CM

## 2022-06-01 DIAGNOSIS — Z17.0 MALIGNANT NEOPLASM OF LOWER-INNER QUADRANT OF RIGHT BREAST OF FEMALE, ESTROGEN RECEPTOR POSITIVE (H): ICD-10-CM

## 2022-06-01 PROCEDURE — 250N000011 HC RX IP 250 OP 636: Performed by: INTERNAL MEDICINE

## 2022-06-01 PROCEDURE — 96372 THER/PROPH/DIAG INJ SC/IM: CPT | Performed by: INTERNAL MEDICINE

## 2022-06-01 RX ADMIN — PEGFILGRASTIM 6 MG: 6 INJECTION SUBCUTANEOUS at 12:46

## 2022-06-01 NOTE — PROGRESS NOTES
Infusion Nursing Note:  Lisa Reyna presents today for Injection/ Neulasta     Patient seen by provider today: No   present during visit today: Not Applicable.          Post Infusion Assessment:  Patient tolerated injection without incident.         Maribel Xiao LPN

## 2022-06-01 NOTE — PROGRESS NOTES
[facility]: Cancer Care Short Note                                    Discussion with Patient:                                                      Lisa called to relay that yesterday after she got home from her infusion she had three large watery stools. She took two  immodium and she has had no subequent stools. She is able to tolerate oral fluids and has already have 8 oz of fluid this morning. She is planning on eating toast this morning and will continue to push fluids. She asked about coffee and relayed that it does stimulate the bowel and the caffieine dehydrates. She stated understanding and will limit intake. She understands that if she does have watery stools today, she can take 2 immodium with the first stool followed by 1 immodium with subsequent stools up to 8 tablets  Per day.         Assessment:                                                      Lisa reports three  watery stools yesterday controlled by taking 2 immodium. She is able to tolerate oral intake    Intervention/Education provided during outreach:                                                       Lisa has appointment today at 12 with infusion. She has our contact information if any further questions or concerns arise/Jami Tran RN

## 2022-06-06 ENCOUNTER — TELEPHONE (OUTPATIENT)
Dept: CARDIOLOGY | Facility: CLINIC | Age: 72
End: 2022-06-06
Payer: MEDICARE

## 2022-06-06 NOTE — TELEPHONE ENCOUNTER
Called patient to review recent elevated blood pressure reading.  Per [place] Community [place] guidelines, patients blood pressure is out of parameters and recheck blood pressure is recommended.    Last Blood Pressure: 148/79  Last Heart Rate: 85  Date: 4/1822  Location: Other Specialty     5/31/22  Blood Pressure: 134/62   Heart Rate: 93  Location:     Patient reported blood pressure updated in Epic. Blood pressure falls within [place] Community [place].  Patient will follow up as previously advised.   KALYAN Pichardo

## 2022-06-07 ENCOUNTER — PATIENT OUTREACH (OUTPATIENT)
Dept: ONCOLOGY | Facility: CLINIC | Age: 72
End: 2022-06-07
Payer: MEDICARE

## 2022-06-07 NOTE — PROGRESS NOTES
"[facility]: Cancer Care Short Note                                    Discussion with Patient:                                                      Dietary choices after two episodes of diarrhea 6/06, also with food having taste issues.      Assessment:                                                      Assessment completed with:: Patient    Constitutional  Fatigue: Fatigue relieved by rest    Respiratory       Gastrointestinal  Anorexia: Loss of appetite without alteration in eating habits (food has no taste)  Vomiting: Absent or within normal limits  Dehydration: Absent or within normal limits  Constipation: Absent or within normal limits  Diarrhea: Increase of less than 4 stools per day over baseline OR mild increase in ostomy output compared to baseline (patient had two episodes 6/6)    Genitourinary       Integumentary       Pain  Patient Reported Pain?: Yes, is currently well-managed    Patient Coping  Accepting    Clinic Utilization  Patient Aware of Next Appointment?: Yes (\"after I meet with surgeon\")    Other Patient Concerns  Other Patient Reported Concerns: No      Intervention/Education provided during outreach:                                                       RN educated on small, more frequent meals, adding protein, spices.  Coping with Taste Changes  What happens when treatment causes  changes in taste?  Some treatments will cause foods to taste or  smell different.  There are no medical treatments to improve your  sense of taste or smell.  If the changes in taste are side effects of your  treatment, food should taste normal again after your  treatment has ended.  What else can I do to cope with taste changes?    Try a range of foods to find what works for you.    Before eating, rinse your mouth with tea, ginger  ale or water with baking soda. This will help clear  your taste buds.    If you have a metal taste in your mouth, try using  plastic forks and spoons.    Pour high-calorie " nutrition drinks out of their  metal cans. Or, if odor is a problem, cover the  drinks and use a straw.    Serve foods cold or at room temperature. They  will likely taste better and have less odor.    Mix fresh fruits into milk shakes, ice cream  or yogurt.    Fresh vegetables sometimes taste better than  canned or frozen ones.    If foods are too sweet, add sour sauces, lemon  or salt.    Add flavor to your foods with:  - Spices and seasonings  - Lundberg pieces, ham strips, onion or garlic  - Tart flavors (orange or grapefruit juice,  lemonade, lemon wedges, vinegar)  - Sugar.    Page 2 of 2    If red meat tastes bitter:  - Marinate the meat in soy sauce, fruit juice,  wine or sweet and sour sauce.  - Try other protein sources such as chicken,  fish, eggs, ham, peanut butter, nuts, yogurt  or cheese.    Avoid coffee or chocolate if they taste bitter.    Avoid salty foods if salt bothers you.    After eating, chew on lemon drops, gum or mints  to get rid of any bad tastes.  When should I call my care team?  Call your care team if:    You still have problems after trying the steps  listed here.    You cannot eat your usual amount of food and  you begin to lose weight.    Patient to follow up as scheduled at next appt    Signature:  Elizabeth Ho RN

## 2022-06-09 ENCOUNTER — PATIENT OUTREACH (OUTPATIENT)
Dept: ONCOLOGY | Facility: CLINIC | Age: 72
End: 2022-06-09
Payer: MEDICARE

## 2022-06-09 ENCOUNTER — TELEPHONE (OUTPATIENT)
Dept: CARDIOLOGY | Facility: CLINIC | Age: 72
End: 2022-06-09
Payer: MEDICARE

## 2022-06-09 NOTE — TELEPHONE ENCOUNTER
[facility]    Phone Message    May a detailed message be left on voicemail: yes     Reason for Call: Other: PT called to r/s her appt due to covid Pt tested positive yesterday and her appt is next Friday 6/17  She was told to Isolate for 10 days due to Chemo  Can she be seen Virtually or should she reschedule?    Please have in clinic staff call pt back with update or to r/s    Action Taken: Other: Cardiology    Travel Screening: Not Applicable

## 2022-06-09 NOTE — PROGRESS NOTES
[facility]: Cancer Care Short Note                                    Discussion with Patient:                                                      Patient called Cancer Care Triage line and left message.  She said she has some information to share with us and requested a call back to [phone].    Called patient back. See assessment below.    Assessment:                                                      Patient reports scratchy throat on Tues evening.  Took home Covid test yesterday and it was positive.  She started taking Paxlovid last evening. She was warned it might cause insomnia. She said she didn't sleep last night so looking forward to a nap this morning.    Besides the scratchy throat, patient also reports dry cough.  She reports she still has no taste but states this was an issue after chemo, not new after Covid diagnosis. She said otherwise she is feeling good.    Patient reports still no taste but this was     She said her mother also had Covid and is currently hospitalized.    Intervention/Education provided during outreach:                                                       Encouraged patient to monitor her symptoms. If develops shortness of breath, CP, she should go to ER.  Encouraged her to push fluids, rest, Tylenol, as needed for aches and pains. Patient verbalized understanding.    Patient will follow-up with Dr. Koch after surgery.  Patient has to call Dr. Alcantara's office next week and was told she needs to quarantine until 6/17/22.    Signature:  Anais Galindo RN

## 2022-06-09 NOTE — TELEPHONE ENCOUNTER
Noted pt's need for for rescheduling. Sent request to  and confirmed follow up with Dr. Fernandez rescheduled. No further actions needed. -HENRY

## 2022-06-13 ENCOUNTER — TELEPHONE (OUTPATIENT)
Dept: ONCOLOGY | Facility: CLINIC | Age: 72
End: 2022-06-13
Payer: MEDICARE

## 2022-06-13 LAB — SCANNED LAB RESULT: NORMAL

## 2022-06-13 NOTE — TELEPHONE ENCOUNTER
Pt request we call her next week (~6/20) to schedule appt as she is gettng over covid and her mother is very ill

## 2022-06-20 ENCOUNTER — PATIENT OUTREACH (OUTPATIENT)
Dept: ONCOLOGY | Facility: CLINIC | Age: 72
End: 2022-06-20
Payer: MEDICARE

## 2022-06-20 NOTE — PROGRESS NOTES
"[facility]: Cancer Care Short Note                                    Discussion with Patient:                                                      Patient called Cancer Care.  She was recently diagnosed with Covid.  She said she was told she could leave the house starting on Sat and instructed to wear a mask.    Assessment:                                                      Patient reports \"got my taster back last week\".    Patient reports continued diarrhea.  She describes stools as some solid mixed with liquid.  She said \"like shredded beef with liquid in it\".  She said she has one stool per day.  Patient reports intermittent abd pain \"gas pains\". She said has pain before BM and is resolved after BM.  Patient reports was taking imodium but hasn't taken in at least a week.    Patient reports increased tingling in hands since chemo stopped.  She said her feet feel like she's walking on \"charred marshmallows\". Told patient neuropathy can linger after chemo and can take 6 mo-1yr to see how much residual neuropathy she will be left with.    Patient reports runny nose but thinks due to allergies.    Patient reports has dental cleaning on Wed and needs to reschedule Genetics appointment and appointment with Dr. Alcantara this week.    Intervention/Education provided during outreach:                                                       Since patient only having 1 diarrhea stool, told patient not to take imodium until I discuss with Dr. Koch.  Told patient don't want her to become constipated.  Patient verbalized understanding.    Instructed patient to make sure she is drinking plenty of fluids.  Patient verbalized understanding.    Offered acupuncture.  Patient said she had done in past for her back and didn't like the needles but willing to give it try, if it will help her hands.  She would like to think about it and see what Dr. Koch recommends first.    Message sent to Dr. Koch.    Update 6/21/22:  Discussed the " above with Dr. Koch.  Dr. Koch recommends patient trying acupuncture for neuropathy.  Will reassess at next visit with her.  Since patient only have 1 diarrhea stool per day, she recommends patient not take imodium.  She recommends increased fiber in to diet.     Called patient. Patient agreed to try acupuncture.  Patient reports had 1 good formed BM today. She's hoping she's turning a corner. Patient reports she has not made an appointment yet with Dr. Alcantara. She is waiting for Genetics to call her and her mom is going on Hospice so she is occupied by this and will need to delay scheduling these appointments.    Patient wondering if ok to visit her mother. Patient recently diagnosed with Covid. Told patient if she tested, likely would still test positive. Recommended when visit her mother, patient wears a mask and performs frequent handwashing.  Patient verbalized understanding.  Patient to follow up as scheduled at next appt    Signature:  Anais Galindo RN

## 2022-06-27 ENCOUNTER — PATIENT OUTREACH (OUTPATIENT)
Dept: ONCOLOGY | Facility: CLINIC | Age: 72
End: 2022-06-27

## 2022-06-27 NOTE — PROGRESS NOTES
[facility]: Cancer Care                                                                                        Patient called to:  1) wants to report she was at [facility] and obtained a temporary handicap parking tag to help her with distances due to  neuropathy in feet.   2) Does want to schedule with acupuncture for neuropathy.  3) she is ready to follow-up on genetics testing done. (Note: patient's mother passed away and services are 6/28/22)   4) after genetics, she will need appointment with Dr. Alcantara for Right breast mastectomy.   5) a few weeks after surgery, she needs to follow up with Dr. Koch for discussion of adjuvant endocrine therapy.    Patient was transferred to schedule for acupuncture.     Staff message sent to Silke Valdez, she will reach out to patient 6/29/22.  Signature:  Elizabeth Ho RN

## 2022-07-01 ENCOUNTER — PATIENT OUTREACH (OUTPATIENT)
Dept: ONCOLOGY | Facility: CLINIC | Age: 72
End: 2022-07-01

## 2022-07-01 NOTE — PROGRESS NOTES
[facility]: Cancer Care Short Note                                    Discussion with Patient:                                                      Patient called Cancer Care to report symptoms she has been having.  See assessment below.    Assessment:                                                      Patient reports diarrhea Mon evening. She reports she took 2 imodium as her mother's  was on Tues and she didn't want to miss it due to diarrhea.  Offered condolescences.    Patient reports was up from 2-4 a this morning with diarrhea and had 1 vomiting episode.  She is wondering if this could still be side effects of chemo or something she ate?    Patient reports she is feeling better this morning and going to try to eat something.     Patient last treated on 22.     Intervention/Education provided during outreach:                                                       Told patient diarrhea and vomiting likely not from chemo since last treated on 22.  Told patient to monitor her symptoms and if worsen over weekend to call On call Dr. Patient has acupuncture appointment on 22 so will update us, if continued symptoms.    Instructed patient to make sure drinking plenty of fluids and start with eating bland foods and advance as tolerated.  Patient verbalized understanding of plan.    Dr. Koch updated and agreed symptoms likely not from chemo and agreed with discussed plan.    Patient to follow up as scheduled at next appt:  Seeing Dr. Alcantara on 22.  Follow-up with Dr. Koch to be scheduled 1-2 weeks after surgery.    Signature:  Anais Galindo RN

## 2022-07-05 ENCOUNTER — PATIENT OUTREACH (OUTPATIENT)
Dept: ONCOLOGY | Facility: CLINIC | Age: 72
End: 2022-07-05

## 2022-07-05 ENCOUNTER — PROCEDURE ONLY VISIT (OUTPATIENT)
Dept: ONCOLOGY | Facility: CLINIC | Age: 72
End: 2022-07-05
Attending: INTERNAL MEDICINE
Payer: MEDICARE

## 2022-07-05 DIAGNOSIS — G62.0 CHEMOTHERAPY-INDUCED PERIPHERAL NEUROPATHY (H): ICD-10-CM

## 2022-07-05 DIAGNOSIS — T45.1X5A CHEMOTHERAPY-INDUCED PERIPHERAL NEUROPATHY (H): ICD-10-CM

## 2022-07-05 PROCEDURE — 97811 ACUP 1/> W/O ESTIM EA ADD 15: CPT | Mod: GA | Performed by: ACUPUNCTURIST

## 2022-07-05 PROCEDURE — 97810 ACUP 1/> WO ESTIM 1ST 15 MIN: CPT | Mod: GA | Performed by: ACUPUNCTURIST

## 2022-07-05 NOTE — PROGRESS NOTES
"[facility]: Cancer Care Short Note                                    Discussion with Patient:                                                      Patient called and left message requesting call back to [phone].    Called patient back. See assessment below.     Assessment:                                                      Patient reports was instructed to call Cancer Care by Nilda Acupuncturist, she saw today.     She was instructed to let us know that she has a lot of arthritis in L3 to help get acupuncture covered.  She said she Doctored with [facility] for it. She said she doesn't not have any pain now. Pain is intermittent \"depending on the weather\".    Patient also happy to report that her diarrhea has resolved.  She said stools are soft but formed.  No longer liquid.  She reports she has been feeling good.    Intervention/Education provided during outreach:                                                       Patient aware of her appointments next week with Genetics, Cardiology, Dr. Alcantara, and Acupuncture.      She said she is going to [place] on 7/31 for 3 days so doesn't plan to have surgery until Aug.   Told patient she will need follow-up appointment with Dr. Koch 1-2 weeks after surgery.  Patient verbalized understanding.       Discussed with Nilda Brewer, Acupuncture.  She said if patient has referral from PCP or [place], with diagnosis of chronic low back pain, Medicare will cover acupuncture.  Called patient with this information. She wrote it down and will reach out to PCP or [place] for acupuncture referral.    Patient to follow up as scheduled at next appt    Signature:  Anais Galindo RN  "

## 2022-07-05 NOTE — PROGRESS NOTES
ACUPUNCTURIST TREATMENT NOTE      Lisa Reyna, a 71 year old female, is here today for Initial exam. Patient is referred by No ref. provider found.    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 Explains that her feet feel like she is walking on marshmallows.  She explains that her symptoms affect her gait and balance because she can't feel the ground beneath her feet.  She reports that she had slight symptoms after her first chemo infusion but after her second is when she reports her symptoms at her feet became significant. She reports only mild tingling at her fingertips currently.  She denies pain. She does daily exercise on a stationary bike.  Pt. Reports history of chronic low back pain due to osteoarthritis at L3.  She receives her orthopedic care at [facility] for chronic neck pain, chronic low back pain, (L) hip pain and bilateral knee pain.  Pt. Wishes to avoid additional orthopedic surgery and would like to utilize acupuncture to address her chronic low back pain and chronic knee pain.    Past Medical History  Past Medical History Reviewed: Yes   has a past medical history of Arthritis, Asthma, Breast cancer (H) (2004), Coronary artery disease, Endometrial polyp (1992), GERD (gastroesophageal reflux disease), Heart attack (H), Heart murmur, History of measles, mumps, or rubella (as a child), HTN (hypertension), radiation therapy (2004), Hyperlipemia, Osteopenia, Other chronic pain, Ovarian cyst, Prediabetes, UTI (urinary tract infection), and Walking troubles.    Objective  Basic Exam Completed:   Physical Exam: Normal  Upper Extremity(ies): Normal  Lower Extremity (ies): Normal  Constitutional: Well Groomed and Normal Weight and Normal Appearance  Skin: Normal  No    TCM Exam Completed: Yes   Limbs/Back: Bilateral Upper Extremity, Bilateral Lower Extremity and Lower Back   Chronic low back pain.   Pt. Reports diagnosed osteoarthritis at L3.  Pt. Reports history of cervical discectomy location not specified.  Chronic knee pain (R) > (L).  Pt. Is followed by [facility] for orthopedic care.  CIPN at both feet up to ankles.  CIPN at fingertips of both hands.    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    Acupuncture 7/5/2022   Intervention Reason Neuropathy; Neuropathy 2   Neuropathy Location Feet, bilateral   Pre-session Neuropathy rating 8   Neuropathy 2 Location fingertips, bilateral   Pre-session Neuropathy 2 rating 2       TCM Diagnosis: Qi Deficiency;Blood Deficiency;Yin Deficiency;Qi Stagnation;Kidney Qi Deficiency;Spleen Qi Deficiency;Damp and Phlegm Accumulation      Treatment Principle: tonify and nourish, move qi and blood    TCM / Acupuncture Treatment  Acupuncture Points:       Initial insertions: LI 4, SI 3, St 36, St 40, Lauren 3, Ba Freddy       Second insertions: Sp 9, Ki 3, Sp 3                    Accessory Techniques 7/5/2022   Accessory Techniques TDP Heat Lamp   TDP Heat Lamp location used over feet          Assessment and Plan  Treatment Observations: Pt. rested comfortably during treatment session  Acupuncture Treatment Recommendations: 1x/wk for 4-6 tx then reassess       It is my recommendation that this patient seek advice from their Primary Care Provider about active symptoms not addressed during this visit. The risks and benefits of acupuncture were reviewed and the patient stated understanding. The patient's questions were answered to their satisfaction. Consent was provided for treatment. We thank you for the referral and opportunity to treat this patient.    Time Spent with Patient:   I spent a total of 30 minutes face-to-face with Lisa Reyna during today's office visit.     Nilda Brewer L.Ac.

## 2022-07-07 ENCOUNTER — MEDICAL CORRESPONDENCE (OUTPATIENT)
Dept: HEALTH INFORMATION MANAGEMENT | Facility: CLINIC | Age: 72
End: 2022-07-07

## 2022-07-11 ENCOUNTER — VIRTUAL VISIT (OUTPATIENT)
Dept: ONCOLOGY | Facility: CLINIC | Age: 72
End: 2022-07-11
Attending: GENETIC COUNSELOR, MS
Payer: MEDICARE

## 2022-07-11 DIAGNOSIS — Z80.0 FAMILY HISTORY OF COLON CANCER: ICD-10-CM

## 2022-07-11 DIAGNOSIS — C50.311 MALIGNANT NEOPLASM OF LOWER-INNER QUADRANT OF RIGHT BREAST OF FEMALE, ESTROGEN RECEPTOR POSITIVE (H): Primary | ICD-10-CM

## 2022-07-11 DIAGNOSIS — Z17.0 MALIGNANT NEOPLASM OF LOWER-INNER QUADRANT OF RIGHT BREAST OF FEMALE, ESTROGEN RECEPTOR POSITIVE (H): Primary | ICD-10-CM

## 2022-07-11 PROCEDURE — 999N000069 HC STATISTIC GENETIC COUNSELING, < 16 MIN: Mod: GT | Performed by: GENETIC COUNSELOR, MS

## 2022-07-11 NOTE — PROGRESS NOTES
"7/11/2022    Referring Provider: Tamar Koch MD    Presenting Information:  I spoke to Lisa by phone today to discuss her genetic testing results. Her blood was drawn on 5/31/2022 and Common Hereditary Cancers panel testing was ordered from [facility]. This testing was done because of Lisa's personal and family history of cancer.    Genetic Testing Result: NEGATIVE  Lisa is negative for mutations in APC, PETTY, AXIN2, BARD1, BMPR1A, BRCA1, BRCA2, BRIP1, CDH1, CDK4, CDKN2A, CHEK2, CTNNA1, DICER1, EPCAM, GREM1, HOXB13, KIT, MEN1, MLH1, MSH2, MSH3, MSH6, MUTYH, NBN, NF1, NTHL1, PALB2, PDGFRA, PMS2, POLD1, POLE, PTEN, RAD50, RAD51C, RAD51D, SDHA, SDHB, SDHC, SDHD, SMAD4, SMARCA4, STK11, TP53, TSC1, TSC2, and VHL. No mutations were found in any of the 47 genes analyzed. This test involved sequencing and deletion/duplication analysis of all genes with the exceptions of EPCAM (deletions/duplications only) and SDHA (sequencing only).    A copy of the test report can be found in the Laboratory tab, dated 5/31/2022, and named \"LABORATORY MISCELLANEOUS ORDER\". The report is scanned in as a linked document.    Interpretation:  We discussed several different interpretations of this negative test result.    1. One explanation may be that there is a different gene or combination of genes and environment that are associated with the cancers in this family.  2. It is possible that her other relatives with cancer history did have a mutation in one of the above genes, and she did not inherit it.  3. There is also a small possibility that there is a mutation in one of these genes, and the testing laboratory could not find it with their current testing methods.       Screening:  Based on this negative test result, it is important for Lisa and her relatives to refer back to the family history for appropriate cancer screening.      Lisa should follow her oncology team's recommendations for the treatment and follow-up for her breast " cancer history. We discussed that there is no genetic reason for her to need to pursue a more aggressive surgery.     Lisa s close female relatives remain at increased risk for breast cancer given their family history. Breast cancer screening is generally recommended to begin approximately 10 years younger than the earliest age of breast cancer diagnosis in the family, or at age 40, whichever comes first. Breast screening options should be discussed with an individual's primary care provider and a genetic counselor, to determine at what age to begin screening, what screening is appropriate, and if additional screening (such as breast MRI) is necessary based on personal/family history factors.      Other population cancer screening options, such as those recommended by the American Cancer Society and the National Comprehensive Cancer Network (NCCN), are also appropriate for Lisa and her family. These screening recommendations may change if there are changes to Lisa's personal and/or family history. Final screening recommendations should be made by each individual's managing physician.    Additional Testing Considerations:  No further genetic is recommended for Lisa or her family at this time. Lisa was encouraged to contact me should her personal or family history change as this may change genetic testing recommendations.     Summary:  We do not have an explanation for Lisa's personal and family history of cancer. While no genetic changes were identified, Lisa may still be at risk for certain cancers due to family history, environmental factors, or other genetic causes not identified by this test.  Because of that, it is important that she continue with cancer screening based on her personal and family history as discussed above.    Genetic testing is rapidly advancing, and new cancer susceptibility genes will most likely be identified in the future.  Therefore, I encouraged Lisa to contact me annually or if  there are changes in her personal or family history.  This may change how we assess her cancer risk, screening, and the testing we would offer.    Plan:  1. A copy of the test results will be mailed to Lisa.  2. She plans to follow-up with her oncology team.  3. She should contact me regularly, or sooner if her family history changes.    If Lisa has any further questions, I encouraged her to contact me at [phone].    Time spent on the phone: 10 minutes.    Shandra Fraser MS, [facility] [place]  Licensed, Certified Genetic Counselor  [facility]  [phone]  [email or web address]

## 2022-07-11 NOTE — Clinical Note
Please send copy of letter and attach scanned lab report from Lab Miscellaneous Order [id number] to patient's homes address.    Shandra Fraser MS, [facility] [place]  Licensed, Certified Genetic Counselor  [facility]  [phone]  [email or web address]

## 2022-07-11 NOTE — PROGRESS NOTES
Lisa is a 71 year old who is being evaluated via a billable video visit.      How would you like to obtain your AVS? Mail a copy  If the video visit is dropped, the invitation should be resent by: Send to e-mail at: [email or web address]  Will anyone else be joining your video visit?   Yes, sister Joan will join video visit    Franci Whitfield VF- unable to edit existing note, new note made.      Video-Visit Details  Type of service:  Video Visit  Originating Location (pt. Location): Home  Distant Location (provider location):  [facility]   Platform used for Video Visit: [employer]

## 2022-07-11 NOTE — LETTER
"    7/11/2022         RE: Lisa Reyna  [address]  [place] [address]        Dear Colleague,    Thank you for referring your patient, Lisa Reyna, to the [facility]. Please see a copy of my visit note below.    7/11/2022    Referring Provider: Tamar Koch MD    Presenting Information:  I spoke to Lisa by phone today to discuss her genetic testing results. Her blood was drawn on 5/31/2022 and Common Hereditary Cancers panel testing was ordered from [facility]. This testing was done because of Lisa's personal and family history of cancer.    Genetic Testing Result: NEGATIVE  Lisa is negative for mutations in APC, PETTY, AXIN2, BARD1, BMPR1A, BRCA1, BRCA2, BRIP1, CDH1, CDK4, CDKN2A, CHEK2, CTNNA1, DICER1, EPCAM, GREM1, HOXB13, KIT, MEN1, MLH1, MSH2, MSH3, MSH6, MUTYH, NBN, NF1, NTHL1, PALB2, PDGFRA, PMS2, POLD1, POLE, PTEN, RAD50, RAD51C, RAD51D, SDHA, SDHB, SDHC, SDHD, SMAD4, SMARCA4, STK11, TP53, TSC1, TSC2, and VHL. No mutations were found in any of the 47 genes analyzed. This test involved sequencing and deletion/duplication analysis of all genes with the exceptions of EPCAM (deletions/duplications only) and SDHA (sequencing only).    A copy of the test report can be found in the Laboratory tab, dated 5/31/2022, and named \"LABORATORY MISCELLANEOUS ORDER\". The report is scanned in as a linked document.    Interpretation:  We discussed several different interpretations of this negative test result.    1. One explanation may be that there is a different gene or combination of genes and environment that are associated with the cancers in this family.  2. It is possible that her other relatives with cancer history did have a mutation in one of the above genes, and she did not inherit it.  3. There is also a small possibility that there is a mutation in one of these genes, and the testing laboratory could not find it with their current testing methods.       Screening:  Based " on this negative test result, it is important for Lisa and her relatives to refer back to the family history for appropriate cancer screening.      Lisa should follow her oncology team's recommendations for the treatment and follow-up for her breast cancer history. We discussed that there is no genetic reason for her to need to pursue a more aggressive surgery.     Lisa s close female relatives remain at increased risk for breast cancer given their family history. Breast cancer screening is generally recommended to begin approximately 10 years younger than the earliest age of breast cancer diagnosis in the family, or at age 40, whichever comes first. Breast screening options should be discussed with an individual's primary care provider and a genetic counselor, to determine at what age to begin screening, what screening is appropriate, and if additional screening (such as breast MRI) is necessary based on personal/family history factors.      Other population cancer screening options, such as those recommended by the American Cancer Society and the National Comprehensive Cancer Network (NCCN), are also appropriate for Lisa and her family. These screening recommendations may change if there are changes to Lisa's personal and/or family history. Final screening recommendations should be made by each individual's managing physician.    Additional Testing Considerations:  No further genetic is recommended for Lisa or her family at this time. Lisa was encouraged to contact me should her personal or family history change as this may change genetic testing recommendations.     Summary:  We do not have an explanation for Lisa's personal and family history of cancer. While no genetic changes were identified, Lisa may still be at risk for certain cancers due to family history, environmental factors, or other genetic causes not identified by this test.  Because of that, it is important that she continue with cancer  screening based on her personal and family history as discussed above.    Genetic testing is rapidly advancing, and new cancer susceptibility genes will most likely be identified in the future.  Therefore, I encouraged Lisa to contact me annually or if there are changes in her personal or family history.  This may change how we assess her cancer risk, screening, and the testing we would offer.    Plan:  1. A copy of the test results will be mailed to Lisa.  2. She plans to follow-up with her oncology team.  3. She should contact me regularly, or sooner if her family history changes.    If Lisa has any further questions, I encouraged her to contact me at [phone].    Time spent on the phone: 10 minutes.    Shandra Fraser MS, [facility] [place] [facility] [place]  Licensed, Certified Genetic Counselor  [facility]  [phone]  [email or web address]            Lisa is a 71 year old who is being evaluated via a billable video visit.      How would you like to obtain your AVS? Mail a copy  If the video visit is dropped, the invitation should be resent by: Send to e-mail at: [email or web address]  Will anyone else be joining your video visit?   Yes, sister Joan will join video visit    Franci Whitfield VF- unable to edit existing note, new note made.          Again, thank you for allowing me to participate in the care of your patient.      Sincerely,    Shandra Lora GC

## 2022-07-11 NOTE — LETTER
"    Cancer Risk Management  Program [facility]  [facility]  [facility]  Mailing Address  Cancer Risk Management Program  [address]  [place] [address]    New patient appointments  [phone]  July 11, 2022    Lisa Reyna  [address]  [place] [address]      Dear Lisa,    It was a pleasure speaking with you on video on 7/11/2022.  Here is a copy of the progress note from our discussion. If you have any additional questions, please feel free to call.    Referring Provider: Tamar Koch MD    Presenting Information:  I spoke to Lisa by phone today to discuss her genetic testing results. Her blood was drawn on 5/31/2022 and Common Hereditary Cancers panel testing was ordered from [facility]. This testing was done because of Lisa's personal and family history of cancer.    Genetic Testing Result: NEGATIVE  Lisa is negative for mutations in APC, PETTY, AXIN2, BARD1, BMPR1A, BRCA1, BRCA2, BRIP1, CDH1, CDK4, CDKN2A, CHEK2, CTNNA1, DICER1, EPCAM, GREM1, HOXB13, KIT, MEN1, MLH1, MSH2, MSH3, MSH6, MUTYH, NBN, NF1, NTHL1, PALB2, PDGFRA, PMS2, POLD1, POLE, PTEN, RAD50, RAD51C, RAD51D, SDHA, SDHB, SDHC, SDHD, SMAD4, SMARCA4, STK11, TP53, TSC1, TSC2, and VHL. No mutations were found in any of the 47 genes analyzed. This test involved sequencing and deletion/duplication analysis of all genes with the exceptions of EPCAM (deletions/duplications only) and SDHA (sequencing only).    A copy of the test report can be found in the Laboratory tab, dated 5/31/2022, and named \"LABORATORY MISCELLANEOUS ORDER\". The report is scanned in as a linked document.    Interpretation:  We discussed several different interpretations of this negative test result.    1. One explanation may be that there is a different gene or " combination of genes and environment that are associated with the cancers in this family.  2. It is possible that her other relatives with cancer history did have a mutation in one of the above genes, and she did not inherit it.  3. There is also a small possibility that there is a mutation in one of these genes, and the testing laboratory could not find it with their current testing methods.       Screening:  Based on this negative test result, it is important for Lisa and her relatives to refer back to the family history for appropriate cancer screening.      Lisa should follow her oncology team's recommendations for the treatment and follow-up for her breast cancer history. We discussed that there is no genetic reason for her to need to pursue a more aggressive surgery.     Lisa s close female relatives remain at increased risk for breast cancer given their family history. Breast cancer screening is generally recommended to begin approximately 10 years younger than the earliest age of breast cancer diagnosis in the family, or at age 40, whichever comes first. Breast screening options should be discussed with an individual's primary care provider and a genetic counselor, to determine at what age to begin screening, what screening is appropriate, and if additional screening (such as breast MRI) is necessary based on personal/family history factors.      Other population cancer screening options, such as those recommended by the American Cancer Society and the National Comprehensive Cancer Network (NCCN), are also appropriate for Lisa and her family. These screening recommendations may change if there are changes to Lisa's personal and/or family history. Final screening recommendations should be made by each individual's managing physician.    Additional Testing Considerations:  No further genetic is recommended for Lisa or her family at this time. Lisa was encouraged to contact me should her personal or  family history change as this may change genetic testing recommendations.     Summary:  We do not have an explanation for Lisa's personal and family history of cancer. While no genetic changes were identified, Lisa may still be at risk for certain cancers due to family history, environmental factors, or other genetic causes not identified by this test.  Because of that, it is important that she continue with cancer screening based on her personal and family history as discussed above.    Genetic testing is rapidly advancing, and new cancer susceptibility genes will most likely be identified in the future.  Therefore, I encouraged Lisa to contact me annually or if there are changes in her personal or family history.  This may change how we assess her cancer risk, screening, and the testing we would offer.    Plan:  1. A copy of the test results will be mailed to Lisa.  2. She plans to follow-up with her oncology team.  3. She should contact me regularly, or sooner if her family history changes.    Shandra Fraser MS, [facility] [place]  Licensed, Certified Genetic Counselor  [facility]  [phone]  [email or web address]

## 2022-07-11 NOTE — PATIENT INSTRUCTIONS
Negative Genetic Test Result    Genetic Testing  You had a blood test that looked at the genetic information in one or more genes associated with increased cancer risk.  The testing looked for any harmful changes that would stop this particular gene from working like it should. If an individual does not have any harmful changes or variants of unknown significance found from their blood test, their genetic test result is reported as negative.       Results  The genetic test did not identify any pathogenic (harmful) changes in the genes that were tested. There are several possible explanations for a negative test result. Without knowing the gene mutation in your family, the cause of the cancer in you or your relatives is still unknown. Your genetic counselor can help interpret the result for you and your relatives. In this case, there are several reasons that may explain the negative test result:  There may be a gene mutation in the family that you did not inherit.   You may have a gene mutation in a different gene that was not included in the test, or has not yet been discovered.   The cancers in you or your family may be due to a combination of genetic factors and environment (multifactorial/familial).  The cancers in you or your family may be sporadic/random cancers.  There is very small chance that a mutation was not found by current testing methods.  As testing technology evolves over time, it may still be possible to identify a mutation in a gene that was not found on this test.    It is important to note which genes were included in your test. A list of these genes can be found on your test result.    Screening Recommendations  Due to this negative test result, cancer screening recommendations should be based on your personal and family history. This may include increased cancer screening for you and/or your family members. Your genetic counselor and health care provider can help make appropriate  recommendations.      Please call us if you have any questions or concerns.   Cancer Risk Management Program [phone] ([phone])  Luis Recinos, [place] [facility] [phone]  Yenni March, MS, [facility] [phone]  Erma Wallace, MS, [facility]  [phone]  Silva Malloy, MS, [facility]  [phone]  Sanjuana Almeida, MS, [facility]  [phone]  Billie Lechuga, MS, [facility] [phone]  Shandra Fraser, MS, [facility] [phone]

## 2022-07-12 DIAGNOSIS — C50.311 MALIGNANT NEOPLASM OF LOWER-INNER QUADRANT OF RIGHT BREAST OF FEMALE, ESTROGEN RECEPTOR POSITIVE (H): Primary | ICD-10-CM

## 2022-07-12 DIAGNOSIS — Z17.0 MALIGNANT NEOPLASM OF LOWER-INNER QUADRANT OF RIGHT BREAST OF FEMALE, ESTROGEN RECEPTOR POSITIVE (H): Primary | ICD-10-CM

## 2022-07-13 ENCOUNTER — TELEPHONE (OUTPATIENT)
Dept: CARDIOLOGY | Facility: CLINIC | Age: 72
End: 2022-07-13

## 2022-07-13 ENCOUNTER — OFFICE VISIT (OUTPATIENT)
Dept: CARDIOLOGY | Facility: CLINIC | Age: 72
End: 2022-07-13
Payer: MEDICARE

## 2022-07-13 VITALS
RESPIRATION RATE: 16 BRPM | SYSTOLIC BLOOD PRESSURE: 132 MMHG | DIASTOLIC BLOOD PRESSURE: 58 MMHG | BODY MASS INDEX: 30.73 KG/M2 | WEIGHT: 168 LBS | HEART RATE: 80 BPM | OXYGEN SATURATION: 100 %

## 2022-07-13 DIAGNOSIS — C50.311 MALIGNANT NEOPLASM OF LOWER-INNER QUADRANT OF RIGHT BREAST OF FEMALE, ESTROGEN RECEPTOR POSITIVE (H): ICD-10-CM

## 2022-07-13 DIAGNOSIS — E78.2 MIXED HYPERLIPIDEMIA: ICD-10-CM

## 2022-07-13 DIAGNOSIS — Z17.0 MALIGNANT NEOPLASM OF LOWER-INNER QUADRANT OF RIGHT BREAST OF FEMALE, ESTROGEN RECEPTOR POSITIVE (H): ICD-10-CM

## 2022-07-13 DIAGNOSIS — I25.10 CORONARY ARTERY DISEASE INVOLVING NATIVE CORONARY ARTERY OF NATIVE HEART WITHOUT ANGINA PECTORIS: Primary | ICD-10-CM

## 2022-07-13 DIAGNOSIS — I10 ESSENTIAL HYPERTENSION: ICD-10-CM

## 2022-07-13 PROCEDURE — 99214 OFFICE O/P EST MOD 30 MIN: CPT | Performed by: INTERNAL MEDICINE

## 2022-07-13 NOTE — LETTER
7/13/2022    Cayla Skelton NP  [address] [address]    RE: Lisa Reyna       Dear Colleague,     I had the pleasure of seeing Lisa Reyna in the [facility].            Assessment/Plan:   1. Coronary artery disease, non-ST elevation MI.  She has no chest pain.  She has mild dyspnea on exertion and fatigue.  She did not have a cardiac evaluation since 2014.  Stress cardiac MRI is requested for evaluation of myocardial ischemia, heart function and structure.  She is not a good candidate for exercise stress test since she has severe peripheral neuropathy and is using a walker. Continue aspirin, metoprolol, losartan, and Zocor.      2. Essential hypertension. Her blood pressure has been controlled well with metoprolol XL 50 mg daily, losartan 50 mg daily, hydrochlorothiazide 12.5 mg daily.        3. Hyperlipidemia. The patient has been on Zocor 80 mg nightly.  She has no side effects of Zocor.  We discussed the dosage of Zocor.  FDA did not recommend milligrams of Zocor.  Her LDL was controlled well, liver function normal, no muscle aching.       4.  Breast cancer status post chemotherapy: Follow-up with oncology clinic.    Thank you for the opportunity to be involved in the care of Lisa Reyna. If you have any questions, please feel free to contact me.  I will see the patient again in 12 months and as needed.    Much or all of the text in this note was generated through the use of Dragon Dictate voice-to-text software. Errors in spelling or words which seem out of context are unintentional. Sound alike errors, in particular, may have escaped editing.       History of Present Illness:   It is my pleasure to see Lisa Reyna at the [facility] clinic for routine cardiology follow up.  Lisa Reyna is a 71 year old female with a medical history of  coronary artery disease, myocardial infarction, mild to moderate disease in the LAD  per coronary angiogram in 7-2014, essential hypertension, hyperlipidemia, breast cancer s/p chemotherapy and obesity.     Lisa states that she has no chest pain.  She has mild shortness of breath on exertion.  She complains of weakness and fatigue.  She lost 26 pounds over last several months.  She had no palpitations, dizziness, orthopnea, PND.  She has very mild bilateral leg edema.  Her blood pressure and heart rate are controlled.  Her LDL was controlled.    Past Medical History:     Patient Active Problem List   Diagnosis     Chest pain     Acute myocardial infarction (H)     Hypertension     Hyperlipidemia     GERD (gastroesophageal reflux disease)     Angina pectoris (H)     Coronary artery disease     Obesity     Osteoarthritis of knee     Acute abdominal pain     Enteritis     Essential hypertension     Malignant neoplasm of lower-inner quadrant of right breast of female, estrogen receptor positive (H)     Encounter for antineoplastic chemotherapy     Chemotherapy-induced peripheral neuropathy (H)       Past Surgical History:     Past Surgical History:   Procedure Laterality Date     BIOPSY BREAST Left 2008    cyst removal     BIOPSY BREAST Right 2004     CERVICAL DISC SURGERY  6/25/2007    C6-7 sarah laminectomy with microdiskectomy     LUMPECTOMY BREAST Right 6/24/2004     LUMPECTOMY BREAST Right 2/11/2022    Procedure: Right Lumpectomy after Wire Localization; [place] Lymph Node Biopsy;  Surgeon: Mami Alcantara MD;  Location: [facility]     OOPHORECTOMY Bilateral 2011     TOTAL HIP ARTHROPLASTY Left 9/30/2009     TUBAL LIGATION  1/10/1992    with endometrial polyp removal     WRIST SURGERY         Family History:     Family History   Problem Relation Age of Onset     Colon Cancer Maternal Grandfather      Cancer Paternal Grandmother      Throat cancer Paternal Grandfather      Breast Cancer No family hx of         Social History:    reports that she has never smoked. She has never used smokeless  tobacco. She reports current alcohol use of about 5.0 standard drinks of alcohol per week. She reports that she does not use drugs.    Review of Systems:   12 systems are reviewed negative except for in HPI.    Meds:     Current Outpatient Medications:      albuterol (PROAIR HFA/PROVENTIL HFA/VENTOLIN HFA) 108 (90 Base) MCG/ACT inhaler, 2 puff as needed, Disp: , Rfl:      amoxicillin (AMOXIL) 500 MG tablet, [AMOXICILLIN (AMOXIL) 500 MG TABLET] TK 4 TS PO 1 HOUR BEFORE DENTAL APPOINTMENT., Disp: , Rfl:      ascorbic acid (VITAMIN C) 1000 MG tablet, [ASCORBIC ACID (VITAMIN C) 1000 MG TABLET] Take 1,000 mg by mouth as needed. , Disp: , Rfl:      aspirin 81 MG EC tablet, [ASPIRIN 81 MG EC TABLET] Take 81 mg by mouth daily., Disp: , Rfl:      calcium carbonate (OS-TAMI) 600 mg (1,500 mg) tablet, [CALCIUM CARBONATE (OS-TAMI) 600 MG (1,500 MG) TABLET] Take 600 mg by mouth 2 (two) times a day., Disp: , Rfl:      cholecalciferol, vitamin D3, (VITAMIN D3) 1,000 unit capsule, [CHOLECALCIFEROL, VITAMIN D3, (VITAMIN D3) 1,000 UNIT CAPSULE] Take 1,000 Units by mouth daily., Disp: , Rfl:      dexamethasone (DECADRON) 4 MG tablet, Take 2 tablets (8 mg) by mouth 2 times daily (with meals) Start evening AFTER Docetaxel dose and continue for 4 additional doses., Disp: 10 tablet, Rfl: 3     diclofenac sodium (VOLTAREN) 1 % Gel, [DICLOFENAC SODIUM (VOLTAREN) 1 % GEL] APPLY 4 GRAMS TO AFFECTED AREA(S) FOUR TIMES A DAY, Disp: , Rfl:      famotidine (PEPCID AC MAXIMUM STRENGTH) 20 MG tablet, [FAMOTIDINE (PEPCID AC MAXIMUM STRENGTH) 20 MG TABLET] Take 20 mg by mouth daily., Disp: , Rfl:      hydroCHLOROthiazide (HYDRODIURIL) 12.5 MG tablet, [HYDROCHLOROTHIAZIDE (HYDRODIURIL) 12.5 MG TABLET] Take 12.5 mg by mouth daily., Disp: , Rfl: 0     Lancets (ONETOUCH DELICA PLUS [id number]) MISC, USE TO TEST BLOOD SUGAR AS DIRECTED, Disp: , Rfl:      loratadine (CLARITIN) 10 mg tablet, [LORATADINE (CLARITIN) 10 MG TABLET] Take 10 mg by mouth daily as  needed for allergies., Disp: , Rfl:      losartan (COZAAR) 50 MG tablet, Take 50 mg by mouth daily, Disp: , Rfl:      metoprolol succinate ER (TOPROL-XL) 50 MG 24 hr tablet, Take 1 tablet (50 mg) by mouth daily, Disp: 90 tablet, Rfl: 2     nitroglycerin (NITROSTAT) 0.4 MG SL tablet, [NITROGLYCERIN (NITROSTAT) 0.4 MG SL TABLET] Place 1 tablet (0.4 mg total) under the tongue every 5 (five) minutes as needed for chest pain., Disp: 1 Bottle, Rfl: 2     OMEGA-3 FATTY ACIDS (FISH OIL CONCENTRATE ORAL), Take 1,200 mg by mouth 2 times daily, Disp: , Rfl:      ONETOUCH ULTRA BLUE TEST STRIP strips, [ONETOUCH ULTRA BLUE TEST STRIP STRIPS] USE TO TEST BLOOD SUGAR ONCE A DAY, Disp: , Rfl:      POLYETHYLENE GLYCOL 3350 (MIRALAX ORAL), Take 1 packet by mouth daily as needed, Disp: , Rfl:      prochlorperazine (COMPAZINE) 10 MG tablet, Take 1 tablet (10 mg) by mouth every 6 hours as needed (Nausea/Vomiting), Disp: 30 tablet, Rfl: 3     simvastatin (ZOCOR) 80 MG tablet, [SIMVASTATIN (ZOCOR) 80 MG TABLET] Take 80 mg by mouth daily., Disp: , Rfl:      lidocaine-prilocaine (EMLA) 2.5-2.5 % external cream, Apply topically once for 1 dose Apply to the port site about 30 minutes prior to access., Disp: 30 g, Rfl: 0    Allergies:   Vicodin es [hydrocodone-acetaminophen], Amlodipine, Bactrim ds [na benzoate-sulfamethoxazole-trimethoprim], Bactrim [sulfamethoxazole w/trimethoprim], Lisinopril, Morphine, Percocet [oxycodone-acetaminophen], and Pollen extract-tree extract [pollen extract]      Objective:      Physical Exam  76.2 kg (168 lb)     Body mass index is 30.73 kg/m .  /58 (BP Location: Left arm, Patient Position: Sitting, Cuff Size: Adult Large)   Pulse 80   Resp 16   Wt 76.2 kg (168 lb)   SpO2 100%   BMI 30.73 kg/m      General Appearance:   Awake, Alert, No acute distress.   HEENT:  Pupil equal and reactive to light. No scleral icterus; the mucous membranes were moist.   Neck: No cervical bruits. No JVD. No thyromegaly.      Chest: The spine was straight. The chest was symmetric.   Lungs:   Respirations unlabored; Lungs are clear to auscultation. No crackles. No wheezing.   Cardiovascular:   Regular rhythm and rate, normal first and second heart sounds with no murmurs. No rubs or gallops.    Abdomen:  Obese. Soft. No tenderness. Non-distended. Bowels sounds are present   Extremities: Equal tibial pulses. No leg edema.   Skin: No rashes or ulcers. Warm, Dry.   Musculoskeletal: No tenderness. No deformity.   Neurologic: Mood and affect are appropriate. No focal deficits.           Lab Review   Lab Results   Component Value Date     05/31/2022    CO2 26 05/31/2022    BUN 17 05/31/2022     Lab Results   Component Value Date    WBC 5.5 05/31/2022    HGB 9.8 05/31/2022    HCT 29.0 05/31/2022    MCV 90 05/31/2022     05/31/2022     Lab Results   Component Value Date    CHOL 132 01/28/2022    CHOL 144 02/24/2021    CHOL 152 02/26/2020     Lab Results   Component Value Date    HDL 46 (L) 01/28/2022    HDL 44 (L) 02/24/2021    HDL 47 (L) 02/26/2020     No components found for: LDLCALC  Lab Results   Component Value Date    TRIG 97 01/28/2022    TRIG 139 02/24/2021    TRIG 191 (H) 02/26/2020                   Thank you for allowing me to participate in the care of your patient.      Sincerely,     Latoya Fernandez MD     [facility]  cc:   Referred Self

## 2022-07-13 NOTE — PROGRESS NOTES
Assessment/Plan:   1. Coronary artery disease, non-ST elevation MI.  She has no chest pain.  She has mild dyspnea on exertion and fatigue.  She did not have a cardiac evaluation since 2014.  Stress cardiac MRI is requested for evaluation of myocardial ischemia, heart function and structure.  She is not a good candidate for exercise stress test since she has severe peripheral neuropathy and is using a walker. Continue aspirin, metoprolol, losartan, and Zocor.      2. Essential hypertension. Her blood pressure has been controlled well with metoprolol XL 50 mg daily, losartan 50 mg daily, hydrochlorothiazide 12.5 mg daily.        3. Hyperlipidemia. The patient has been on Zocor 80 mg nightly.  She has no side effects of Zocor.  We discussed the dosage of Zocor.  FDA did not recommend milligrams of Zocor.  Her LDL was controlled well, liver function normal, no muscle aching.       4.  Breast cancer status post chemotherapy: Follow-up with oncology clinic.    Thank you for the opportunity to be involved in the care of Lisa Reyna. If you have any questions, please feel free to contact me.  I will see the patient again in 12 months and as needed.    Much or all of the text in this note was generated through the use of Dragon Dictate voice-to-text software. Errors in spelling or words which seem out of context are unintentional. Sound alike errors, in particular, may have escaped editing.       History of Present Illness:   It is my pleasure to see Lisa Reyna at the [facility] for routine cardiology follow up.  Lisa Reyna is a 71 year old female with a medical history of  coronary artery disease, myocardial infarction, mild to moderate disease in the LAD per coronary angiogram in 7-2014, essential hypertension, hyperlipidemia, breast cancer s/p chemotherapy and obesity.     Lisa states that she has no chest pain.  She has mild shortness of breath on exertion.  She complains of  weakness and fatigue.  She lost 26 pounds over last several months.  She had no palpitations, dizziness, orthopnea, PND.  She has very mild bilateral leg edema.  Her blood pressure and heart rate are controlled.  Her LDL was controlled.    Past Medical History:     Patient Active Problem List   Diagnosis     Chest pain     Acute myocardial infarction (H)     Hypertension     Hyperlipidemia     GERD (gastroesophageal reflux disease)     Angina pectoris (H)     Coronary artery disease     Obesity     Osteoarthritis of knee     Acute abdominal pain     Enteritis     Essential hypertension     Malignant neoplasm of lower-inner quadrant of right breast of female, estrogen receptor positive (H)     Encounter for antineoplastic chemotherapy     Chemotherapy-induced peripheral neuropathy (H)       Past Surgical History:     Past Surgical History:   Procedure Laterality Date     BIOPSY BREAST Left 2008    cyst removal     BIOPSY BREAST Right 2004     CERVICAL DISC SURGERY  6/25/2007    C6-7 sarah laminectomy with microdiskectomy     LUMPECTOMY BREAST Right 6/24/2004     LUMPECTOMY BREAST Right 2/11/2022    Procedure: Right Lumpectomy after Wire Localization; [place] Lymph Node Biopsy;  Surgeon: Mami Alcantara MD;  Location: [facility]     OOPHORECTOMY Bilateral 2011     TOTAL HIP ARTHROPLASTY Left 9/30/2009     TUBAL LIGATION  1/10/1992    with endometrial polyp removal     WRIST SURGERY         Family History:     Family History   Problem Relation Age of Onset     Colon Cancer Maternal Grandfather      Cancer Paternal Grandmother      Throat cancer Paternal Grandfather      Breast Cancer No family hx of         Social History:    reports that she has never smoked. She has never used smokeless tobacco. She reports current alcohol use of about 5.0 standard drinks of alcohol per week. She reports that she does not use drugs.    Review of Systems:   12 systems are reviewed negative except for in HPI.    Meds:      Current Outpatient Medications:      albuterol (PROAIR HFA/PROVENTIL HFA/VENTOLIN HFA) 108 (90 Base) MCG/ACT inhaler, 2 puff as needed, Disp: , Rfl:      amoxicillin (AMOXIL) 500 MG tablet, [AMOXICILLIN (AMOXIL) 500 MG TABLET] TK 4 TS PO 1 HOUR BEFORE DENTAL APPOINTMENT., Disp: , Rfl:      ascorbic acid (VITAMIN C) 1000 MG tablet, [ASCORBIC ACID (VITAMIN C) 1000 MG TABLET] Take 1,000 mg by mouth as needed. , Disp: , Rfl:      aspirin 81 MG EC tablet, [ASPIRIN 81 MG EC TABLET] Take 81 mg by mouth daily., Disp: , Rfl:      calcium carbonate (OS-TAMI) 600 mg (1,500 mg) tablet, [CALCIUM CARBONATE (OS-TAMI) 600 MG (1,500 MG) TABLET] Take 600 mg by mouth 2 (two) times a day., Disp: , Rfl:      cholecalciferol, vitamin D3, (VITAMIN D3) 1,000 unit capsule, [CHOLECALCIFEROL, VITAMIN D3, (VITAMIN D3) 1,000 UNIT CAPSULE] Take 1,000 Units by mouth daily., Disp: , Rfl:      dexamethasone (DECADRON) 4 MG tablet, Take 2 tablets (8 mg) by mouth 2 times daily (with meals) Start evening AFTER Docetaxel dose and continue for 4 additional doses., Disp: 10 tablet, Rfl: 3     diclofenac sodium (VOLTAREN) 1 % Gel, [DICLOFENAC SODIUM (VOLTAREN) 1 % GEL] APPLY 4 GRAMS TO AFFECTED AREA(S) FOUR TIMES A DAY, Disp: , Rfl:      famotidine (PEPCID AC MAXIMUM STRENGTH) 20 MG tablet, [FAMOTIDINE (PEPCID AC MAXIMUM STRENGTH) 20 MG TABLET] Take 20 mg by mouth daily., Disp: , Rfl:      hydroCHLOROthiazide (HYDRODIURIL) 12.5 MG tablet, [HYDROCHLOROTHIAZIDE (HYDRODIURIL) 12.5 MG TABLET] Take 12.5 mg by mouth daily., Disp: , Rfl: 0     Lancets (ONETOUCH DELICA PLUS [id number]) MISC, USE TO TEST BLOOD SUGAR AS DIRECTED, Disp: , Rfl:      loratadine (CLARITIN) 10 mg tablet, [LORATADINE (CLARITIN) 10 MG TABLET] Take 10 mg by mouth daily as needed for allergies., Disp: , Rfl:      losartan (COZAAR) 50 MG tablet, Take 50 mg by mouth daily, Disp: , Rfl:      metoprolol succinate ER (TOPROL-XL) 50 MG 24 hr tablet, Take 1 tablet (50 mg) by mouth daily, Disp: 90  tablet, Rfl: 2     nitroglycerin (NITROSTAT) 0.4 MG SL tablet, [NITROGLYCERIN (NITROSTAT) 0.4 MG SL TABLET] Place 1 tablet (0.4 mg total) under the tongue every 5 (five) minutes as needed for chest pain., Disp: 1 Bottle, Rfl: 2     OMEGA-3 FATTY ACIDS (FISH OIL CONCENTRATE ORAL), Take 1,200 mg by mouth 2 times daily, Disp: , Rfl:      ONETOUCH ULTRA BLUE TEST STRIP strips, [ONETOUCH ULTRA BLUE TEST STRIP STRIPS] USE TO TEST BLOOD SUGAR ONCE A DAY, Disp: , Rfl:      POLYETHYLENE GLYCOL 3350 (MIRALAX ORAL), Take 1 packet by mouth daily as needed, Disp: , Rfl:      prochlorperazine (COMPAZINE) 10 MG tablet, Take 1 tablet (10 mg) by mouth every 6 hours as needed (Nausea/Vomiting), Disp: 30 tablet, Rfl: 3     simvastatin (ZOCOR) 80 MG tablet, [SIMVASTATIN (ZOCOR) 80 MG TABLET] Take 80 mg by mouth daily., Disp: , Rfl:      lidocaine-prilocaine (EMLA) 2.5-2.5 % external cream, Apply topically once for 1 dose Apply to the port site about 30 minutes prior to access., Disp: 30 g, Rfl: 0    Allergies:   Vicodin es [hydrocodone-acetaminophen], Amlodipine, Bactrim ds [na benzoate-sulfamethoxazole-trimethoprim], Bactrim [sulfamethoxazole w/trimethoprim], Lisinopril, Morphine, Percocet [oxycodone-acetaminophen], and Pollen extract-tree extract [pollen extract]      Objective:      Physical Exam  76.2 kg (168 lb)     Body mass index is 30.73 kg/m .  /58 (BP Location: Left arm, Patient Position: Sitting, Cuff Size: Adult Large)   Pulse 80   Resp 16   Wt 76.2 kg (168 lb)   SpO2 100%   BMI 30.73 kg/m      General Appearance:   Awake, Alert, No acute distress.   HEENT:  Pupil equal and reactive to light. No scleral icterus; the mucous membranes were moist.   Neck: No cervical bruits. No JVD. No thyromegaly.     Chest: The spine was straight. The chest was symmetric.   Lungs:   Respirations unlabored; Lungs are clear to auscultation. No crackles. No wheezing.   Cardiovascular:   Regular rhythm and rate, normal first and  second heart sounds with no murmurs. No rubs or gallops.    Abdomen:  Obese. Soft. No tenderness. Non-distended. Bowels sounds are present   Extremities: Equal tibial pulses. No leg edema.   Skin: No rashes or ulcers. Warm, Dry.   Musculoskeletal: No tenderness. No deformity.   Neurologic: Mood and affect are appropriate. No focal deficits.           Lab Review   Lab Results   Component Value Date     05/31/2022    CO2 26 05/31/2022    BUN 17 05/31/2022     Lab Results   Component Value Date    WBC 5.5 05/31/2022    HGB 9.8 05/31/2022    HCT 29.0 05/31/2022    MCV 90 05/31/2022     05/31/2022     Lab Results   Component Value Date    CHOL 132 01/28/2022    CHOL 144 02/24/2021    CHOL 152 02/26/2020     Lab Results   Component Value Date    HDL 46 (L) 01/28/2022    HDL 44 (L) 02/24/2021    HDL 47 (L) 02/26/2020     No components found for: LDLCALC  Lab Results   Component Value Date    TRIG 97 01/28/2022    TRIG 139 02/24/2021    TRIG 191 (H) 02/26/2020

## 2022-07-13 NOTE — TELEPHONE ENCOUNTER
Reason for Call:  Other prescription    Detailed comments: Patient had an appt today and forgot to request a small bottle of Nitro    Phone Number Patient can be reached at: Home number on file [phone] (home)    Best Time: any    Can we leave a detailed message on this number? YES    Call taken on 7/13/2022 at 3:26 PM by PAYTON VANG

## 2022-07-14 ENCOUNTER — OFFICE VISIT (OUTPATIENT)
Dept: SURGERY | Facility: CLINIC | Age: 72
End: 2022-07-14
Attending: NURSE PRACTITIONER
Payer: MEDICARE

## 2022-07-14 VITALS — HEIGHT: 62 IN | RESPIRATION RATE: 16 BRPM | BODY MASS INDEX: 30.91 KG/M2 | WEIGHT: 168 LBS

## 2022-07-14 DIAGNOSIS — C50.311 MALIGNANT NEOPLASM OF LOWER-INNER QUADRANT OF RIGHT BREAST OF FEMALE, ESTROGEN RECEPTOR POSITIVE (H): Primary | ICD-10-CM

## 2022-07-14 DIAGNOSIS — Z17.0 MALIGNANT NEOPLASM OF LOWER-INNER QUADRANT OF RIGHT BREAST OF FEMALE, ESTROGEN RECEPTOR POSITIVE (H): Primary | ICD-10-CM

## 2022-07-14 PROCEDURE — G0463 HOSPITAL OUTPT CLINIC VISIT: HCPCS

## 2022-07-14 PROCEDURE — 99214 OFFICE O/P EST MOD 30 MIN: CPT | Performed by: SPECIALIST

## 2022-07-14 NOTE — NURSING NOTE
"Lisa presents to [facility] for a follow up appointment for her recurrent right breast cancer with Dr. Alcantara .She is accompanied by her sister for consult.   She is following with Dr. Koch  for medical oncology and finished her chemotherapy on 5-31-22.   RN assessment and EMR UPDATE Resp 16   Ht 1.575 m (5' 2\")   Wt 76.2 kg (168 lb)   Breastfeeding No   BMI 30.73 kg/m     Patient met with Dr. Alcantara .  See dictation for details of visit and follow up plan.  Support provided, invited calls.  RN time 15 mins.  "

## 2022-07-14 NOTE — PROGRESS NOTES
Lisa comes in for continued follow-up of her recurrent right breast cancer.  This is a patient who was very averse to having a mastectomy when she came in with a recurrence.  However after the lumpectomy it was recommended that she have chemotherapy because of the Oncotype.  She is currently finishing up with her chemotherapy.  She is also had genetic testing done and comes in to discuss once again what is her best option from a surgical standpoint.    Past Medical History:   Diagnosis Date     Arthritis      Asthma     Seaonal usually in spring     Breast cancer (H) 2004    rt lumpect     Coronary artery disease      Endometrial polyp 1992    was protruding through cervix     GERD (gastroesophageal reflux disease)      Heart attack (H)      Heart murmur      History of measles, mumps, or rubella as a child    hx of measles, mumps and Frisian mealses. Rubella immune 5/1980     HTN (hypertension)      Hx of radiation therapy 2004     Hyperlipemia      Osteopenia      Other chronic pain      Ovarian cyst      Prediabetes      UTI (urinary tract infection)      Walking troubles        Current Outpatient Medications:      albuterol (PROAIR HFA/PROVENTIL HFA/VENTOLIN HFA) 108 (90 Base) MCG/ACT inhaler, 2 puff as needed, Disp: , Rfl:      amoxicillin (AMOXIL) 500 MG tablet, [AMOXICILLIN (AMOXIL) 500 MG TABLET] TK 4 TS PO 1 HOUR BEFORE DENTAL APPOINTMENT., Disp: , Rfl:      ascorbic acid (VITAMIN C) 1000 MG tablet, [ASCORBIC ACID (VITAMIN C) 1000 MG TABLET] Take 1,000 mg by mouth as needed. , Disp: , Rfl:      aspirin 81 MG EC tablet, [ASPIRIN 81 MG EC TABLET] Take 81 mg by mouth daily., Disp: , Rfl:      calcium carbonate (OS-TAMI) 600 mg (1,500 mg) tablet, [CALCIUM CARBONATE (OS-TAMI) 600 MG (1,500 MG) TABLET] Take 600 mg by mouth 2 (two) times a day., Disp: , Rfl:      cholecalciferol, vitamin D3, (VITAMIN D3) 1,000 unit capsule, [CHOLECALCIFEROL, VITAMIN D3, (VITAMIN D3) 1,000 UNIT CAPSULE] Take 1,000 Units by mouth  daily., Disp: , Rfl:      dexamethasone (DECADRON) 4 MG tablet, Take 2 tablets (8 mg) by mouth 2 times daily (with meals) Start evening AFTER Docetaxel dose and continue for 4 additional doses., Disp: 10 tablet, Rfl: 3     diclofenac sodium (VOLTAREN) 1 % Gel, [DICLOFENAC SODIUM (VOLTAREN) 1 % GEL] APPLY 4 GRAMS TO AFFECTED AREA(S) FOUR TIMES A DAY, Disp: , Rfl:      famotidine (PEPCID AC MAXIMUM STRENGTH) 20 MG tablet, [FAMOTIDINE (PEPCID AC MAXIMUM STRENGTH) 20 MG TABLET] Take 20 mg by mouth daily., Disp: , Rfl:      hydroCHLOROthiazide (HYDRODIURIL) 12.5 MG tablet, [HYDROCHLOROTHIAZIDE (HYDRODIURIL) 12.5 MG TABLET] Take 12.5 mg by mouth daily., Disp: , Rfl: 0     Lancets (ONETOUCH DELICA PLUS ATKECH52E) MISC, USE TO TEST BLOOD SUGAR AS DIRECTED, Disp: , Rfl:      lidocaine-prilocaine (EMLA) 2.5-2.5 % external cream, Apply topically once for 1 dose Apply to the port site about 30 minutes prior to access., Disp: 30 g, Rfl: 0     loratadine (CLARITIN) 10 mg tablet, [LORATADINE (CLARITIN) 10 MG TABLET] Take 10 mg by mouth daily as needed for allergies., Disp: , Rfl:      losartan (COZAAR) 50 MG tablet, Take 50 mg by mouth daily, Disp: , Rfl:      metoprolol succinate ER (TOPROL-XL) 50 MG 24 hr tablet, Take 1 tablet (50 mg) by mouth daily, Disp: 90 tablet, Rfl: 2     nitroglycerin (NITROSTAT) 0.4 MG SL tablet, [NITROGLYCERIN (NITROSTAT) 0.4 MG SL TABLET] Place 1 tablet (0.4 mg total) under the tongue every 5 (five) minutes as needed for chest pain., Disp: 1 Bottle, Rfl: 2     OMEGA-3 FATTY ACIDS (FISH OIL CONCENTRATE ORAL), Take 1,200 mg by mouth 2 times daily, Disp: , Rfl:      ONETOUCH ULTRA BLUE TEST STRIP strips, [ONETOUCH ULTRA BLUE TEST STRIP STRIPS] USE TO TEST BLOOD SUGAR ONCE A DAY, Disp: , Rfl:      POLYETHYLENE GLYCOL 3350 (MIRALAX ORAL), Take 1 packet by mouth daily as needed, Disp: , Rfl:      prochlorperazine (COMPAZINE) 10 MG tablet, Take 1 tablet (10 mg) by mouth every 6 hours as needed  (Nausea/Vomiting), Disp: 30 tablet, Rfl: 3     simvastatin (ZOCOR) 80 MG tablet, [SIMVASTATIN (ZOCOR) 80 MG TABLET] Take 80 mg by mouth daily., Disp: , Rfl:         Allergies   Allergen Reactions     Vicodin Es [Hydrocodone-Acetaminophen] Nausea and Vomiting     Does well with Tylenol #3     Amlodipine      Other reaction(s): leg swelling     Bactrim Ds [Na Benzoate-Sulfamethoxazole-Trimethoprim]      Other reaction(s): thrush     Bactrim [Sulfamethoxazole W/Trimethoprim] Other (See Comments)     Thrush     Lisinopril      Other reaction(s): cough     Morphine Nausea and Vomiting     Percocet [Oxycodone-Acetaminophen] Nausea and Vomiting     Pollen Extract-Tree Extract [Pollen Extract]      Sneezing, watery eyes, itchy     Social History     Tobacco Use     Smoking status: Never Smoker     Smokeless tobacco: Never Used   Substance Use Topics     Alcohol use: Yes     Alcohol/week: 5.0 standard drinks     Comment: Alcoholic Drinks/day: 5 drinks a week of wine/liquor/beer     Drug use: No         Physical exam:  There were no vitals taken for this visit.  General: Somewhat elderly appearing woman in no acute distress.  Lungs: Clear  CV: Regular  Breast:  Axilla: No axillary adenopathy    Impression: Recurrent right breast cancer.  I went over with her again the options of doing nothing further versus a mastectomy which is what we would tend to recommend given the fact that this was a recurrent breast cancer and we cannot do radiation again.  If it had been a low-grade tumor I would probably be okay with just watching her but the fact is this was a very high-grade tumor so is hard to say what her risk of local recurrence is.  The last time I saw her I suggested that perhaps we could do a reexcision lumpectomy but after thinking about this more, I really do not think that makes any sense.  I do not think it is going to change her recurrence risk and the only reason to do it would make us feel better but its not really  can change anything.  I think she either needs to do that very close follow-up or mastectomy.  We talked more about what a mastectomy means and the prognosis with it and how well-tolerated it is.  She is now leaning that way but she wants to take a little more time to think about it.  We talked about the option of doing a prophylactic mastectomy on the opposite side which in her case would be primarily done for symmetry.  I made sure she understood that that would not improve her prognosis.    Plan: She is going to take a little more time to think about it.  I told her that she should make a decision fairly soon.  If she opts to just watch it then we would want to get a mammogram on that right side as its been 6 months since her original diagnosis time.  If she chooses a mastectomy then we can proceed with that anytime.  Explained to her that this is now an outpatient surgery and very well-tolerated.  She is not interested in reconstruction.  She asked appropriate questions and she is going to think about it this week and get back to me.

## 2022-07-15 ENCOUNTER — PROCEDURE ONLY VISIT (OUTPATIENT)
Dept: ONCOLOGY | Facility: CLINIC | Age: 72
End: 2022-07-15
Attending: INTERNAL MEDICINE
Payer: MEDICARE

## 2022-07-15 DIAGNOSIS — G62.0 CHEMOTHERAPY-INDUCED PERIPHERAL NEUROPATHY (H): Primary | ICD-10-CM

## 2022-07-15 DIAGNOSIS — T45.1X5A CHEMOTHERAPY-INDUCED PERIPHERAL NEUROPATHY (H): Primary | ICD-10-CM

## 2022-07-15 PROCEDURE — 97811 ACUP 1/> W/O ESTIM EA ADD 15: CPT | Mod: GA | Performed by: ACUPUNCTURIST

## 2022-07-15 PROCEDURE — 97810 ACUP 1/> WO ESTIM 1ST 15 MIN: CPT | Mod: GA | Performed by: ACUPUNCTURIST

## 2022-07-15 NOTE — PROGRESS NOTES
ACUPUNCTURIST TREATMENT NOTE      Lisa Reyna, a 71 year old female, is here today for Follow - Up exam. Patient is referred by No ref. provider found.    HPI  Main Complaint: Chemotherapy-induced peripheral neuropathy (H)  Follow-up: Pt. Reports improvement in CIPN sx of hands (R) >(L), sx remain just at finger tips.  Pt. Reports sx at feet improved for two days following last acupuncture and today reports sx at 7/10    Past Medical History  Past Medical History Reviewed: Yes   has a past medical history of Arthritis, Asthma, Breast cancer (H) (2004), Coronary artery disease, Endometrial polyp (1992), GERD (gastroesophageal reflux disease), Heart attack (H), Heart murmur, History of measles, mumps, or rubella (as a child), HTN (hypertension), radiation therapy (2004), Hyperlipemia, Osteopenia, Other chronic pain, Ovarian cyst, Prediabetes, UTI (urinary tract infection), and Walking troubles.    Objective  Basic Exam Completed:   No    TCM Exam Completed: Yes   Limbs/Back: Bilateral Upper Extremity and Bilateral Lower Extremity    Tongue/Pulse Exam Completed: Yes           Patient Assessment  Patient Type: Oncology  Patient Complaint: CIPN at bilateral feet and bilateral fingertips    Acupuncture 7/5/2022 7/15/2022   Intervention Reason Neuropathy; Neuropathy 2 Neuropathy; Neuropathy 2   Neuropathy Location Feet, bilateral Feet, bilateral   Pre-session Neuropathy rating 8 7   Post-session Neuropathy rating - 6   Neuropathy 2 Location fingertips, bilateral fingertips, bilateral   Pre-session Neuropathy 2 rating 2 1       TCM Diagnosis: Qi Deficiency;Blood Deficiency;Yin Deficiency;Qi Stagnation;Kidney Qi Deficiency;Spleen Qi Deficiency;Damp and Phlegm Accumulation      Treatment Principle: tonify and nourish, move qi and blood    TCM / Acupuncture Treatment  Acupuncture Points:       Initial insertions: LI 11, (L) Pc 6, LI 4, St 36, Lauren 3, GB 41       Second insertions: Sp 9, Sp 7, Sp 6, Ki 3                     Accessory Techniques 7/5/2022 7/15/2022   Accessory Techniques TDP Heat Lamp TDP Heat Lamp   TDP Heat Lamp location used over feet over feet          Assessment and Plan  Treatment Observations: Pt. had aching discomfort at (L) hand and wrist after moving her hand with needles in situ.  I reassured her that the sensation will settle down and go away after removing the needles.  She reported feeling complete relief at end of tx session.  Acupuncture Treatment Recommendations: 1x/wk for 6 treatments then re-evaluate       It is my recommendation that this patient seek advice from their Primary Care Provider about active symptoms not addressed during this visit. The risks and benefits of acupuncture were reviewed and the patient stated understanding. The patient's questions were answered to their satisfaction. Consent was provided for treatment. We thank you for the referral and opportunity to treat this patient.    Time Spent with Patient:   I spent a total of 30 minutes face-to-face with Lisa Reyna during today's office visit.     Nilda Brewer L.Ac.

## 2022-07-18 DIAGNOSIS — I25.10 CORONARY ARTERY DISEASE INVOLVING NATIVE CORONARY ARTERY WITHOUT ANGINA PECTORIS: ICD-10-CM

## 2022-07-18 RX ORDER — NITROGLYCERIN 0.4 MG/1
TABLET SUBLINGUAL
Qty: 25 TABLET | Refills: 2 | Status: SHIPPED | OUTPATIENT
Start: 2022-07-18 | End: 2024-02-26

## 2022-07-19 ENCOUNTER — TELEPHONE (OUTPATIENT)
Dept: SURGERY | Facility: CLINIC | Age: 72
End: 2022-07-19

## 2022-07-19 NOTE — TELEPHONE ENCOUNTER
I called and spoke with Lisa regarding the message she left regarding her plan for a mammo and revisit a surgical plan in the fall. She is tired of appts and procedures and just wants to enjoy the rest of her summer. I told her DSO would put the order in and some one from scheduling would call her. She thanked me for the call. WESLEY Conway, OCN, CBCN

## 2022-07-20 ENCOUNTER — PROCEDURE ONLY VISIT (OUTPATIENT)
Dept: ONCOLOGY | Facility: CLINIC | Age: 72
End: 2022-07-20
Attending: INTERNAL MEDICINE
Payer: MEDICARE

## 2022-07-20 VITALS
BODY MASS INDEX: 31.18 KG/M2 | HEART RATE: 82 BPM | SYSTOLIC BLOOD PRESSURE: 175 MMHG | RESPIRATION RATE: 16 BRPM | TEMPERATURE: 98.5 F | DIASTOLIC BLOOD PRESSURE: 88 MMHG | OXYGEN SATURATION: 100 % | WEIGHT: 170.5 LBS

## 2022-07-20 DIAGNOSIS — Z17.0 MALIGNANT NEOPLASM OF LOWER-INNER QUADRANT OF RIGHT BREAST OF FEMALE, ESTROGEN RECEPTOR POSITIVE (H): Primary | ICD-10-CM

## 2022-07-20 DIAGNOSIS — C50.311 MALIGNANT NEOPLASM OF LOWER-INNER QUADRANT OF RIGHT BREAST OF FEMALE, ESTROGEN RECEPTOR POSITIVE (H): Primary | ICD-10-CM

## 2022-07-20 DIAGNOSIS — Z78.0 MENOPAUSE: ICD-10-CM

## 2022-07-20 DIAGNOSIS — T45.1X5A CHEMOTHERAPY-INDUCED PERIPHERAL NEUROPATHY (H): Primary | ICD-10-CM

## 2022-07-20 DIAGNOSIS — G62.0 CHEMOTHERAPY-INDUCED PERIPHERAL NEUROPATHY (H): Primary | ICD-10-CM

## 2022-07-20 PROCEDURE — 99214 OFFICE O/P EST MOD 30 MIN: CPT | Performed by: INTERNAL MEDICINE

## 2022-07-20 PROCEDURE — 97810 ACUP 1/> WO ESTIM 1ST 15 MIN: CPT | Mod: GA | Performed by: ACUPUNCTURIST

## 2022-07-20 PROCEDURE — 97811 ACUP 1/> W/O ESTIM EA ADD 15: CPT | Mod: GA | Performed by: ACUPUNCTURIST

## 2022-07-20 PROCEDURE — G0463 HOSPITAL OUTPT CLINIC VISIT: HCPCS

## 2022-07-20 RX ORDER — LETROZOLE 2.5 MG/1
2.5 TABLET, FILM COATED ORAL DAILY
Qty: 30 TABLET | Refills: 1 | Status: SHIPPED | OUTPATIENT
Start: 2022-07-20 | End: 2022-09-09

## 2022-07-20 ASSESSMENT — PAIN SCALES - GENERAL: PAINLEVEL: NO PAIN (0)

## 2022-07-20 NOTE — PROGRESS NOTES
[facility] Hematology and Oncology Progress Note    Patient: Lisa Reyna  MRN: [id number]  Date of Service: Jul 20, 2022         Reason for Visit    Chief Complaint   Patient presents with     Oncology Clinic Visit     Malignant neoplasm of the right breast of female.        Assessment and Plan    Cancer Staging  Malignant neoplasm of lower-inner quadrant of right breast of female, estrogen receptor positive (H)  Staging form: Breast, AJCC 8th Edition  - Pathologic stage from 2/11/2022: Stage IA (pT1c, pN0(sn), cM0, G2, ER+, SD+, HER2-, Oncotype DX score: 30) - Signed by Tamar Koch MD on 3/13/2022  - Clinical: No stage assigned - Unsigned      ECOG Performance    0 - Independent     Pain  Pain Score: No Pain (0)    #.  Invasive ductal carcinoma of LIQ of right breast, stage IA, ER+SD+HER2-. Oncotype 30/19%, >15%.  #.  Grade 1 neuropathy of both feet, exacerbated by chemotherapy on pre-existing neuropathy of both feet, stable  #.  Normocytic anemia, post chemotherapy  #.  Low bone density by DEXA scan in 2019  #.  Bilateral pedal edema     Today, I discussed about adjuvant endocrine therapy.  She has recovered from chemotherapy side effects fairly well at this point.  I discussed about aromatase inhibitors, schedule and side effects.  I offered letrozole.  I gave her handout to review.  She agreed to start letrozole.  I sent prescription to her pharmacy.      She decided against mastectomy, but interested enhanced breast cancer screening by every 6 months mammogram.      I advised her to obtain DEXA scan prior to next visit.  I advised her to continue with calcium twice a day and vitamin D 1000 units daily.  I also advised her to do weightbearing exercises.  She is currently limited due to activity due to bilateral pedal edema.  She will continue diuretics.  I advised her to wrap with Ace wrap since she does not tolerate compressive stockings.  We will need to consider bisphosphonate therapy depending  on her bone density scan result.     Will plan to repeat CBC, CMP in next visit.      Follow-up with me in 3 months to assess toxicity and follow-up labs for anemia.    #.  Genetic counseling   Genetic test was negative for hereditary breast and ovarian cancer genes.    Encounter Diagnoses:    Problem List Items Addressed This Visit        Oncology Diagnoses    Malignant neoplasm of lower-inner quadrant of right breast of female, estrogen receptor positive (H) - Primary    Relevant Medications    letrozole (FEMARA) 2.5 MG tablet    Other Relevant Orders    CBC with Platelets & Differential    Comprehensive metabolic panel      Other Visit Diagnoses     Menopause        Relevant Orders    DX Hip/Pelvis/Spine             CC: Cayla Skelton, NP   ______________________________________________________________________________  Diagnosis  6/24/2004- She had prior history of right breast DCIS    1/18/2022- screening mammogram detected     Invasive ductal carcinoma with focal micropapillary pattern.  Grade 2.  12 mm.     Margins were negative but close at 0.5 mm from the nearest junction of the inferior and medial margin, 0.5 mm from medial margin and 2 mm from inferior margin.     There is associated DCIS with EIC negative, nuclear grade intermediate, solid and focal cribriform pattern of 10%.  DCIS margins were uninvolved at 3 mm from nearest inferior margin, 5 mm from medial margins.     1 sentinel lymph node was negative for metastatic carcinoma.    ER positive, greater than 10% of the cell, 61-70%, AZ positive (21-30%), HER-2 negative by IHC (1+).     Oncotype DX recurrence score 30/19% / >15%.    5/26/2022- genetic counseling and testing completed.  No pathogenic mutation found.    Treatment to date  6/2004- post right breast lumpectomy (Dr. Jones) on 6/24/2004, post adjuvant radiation (Dr. Caleb Elena) followed by adjuvant tamoxifen for 5 years (Dr. Noé Fernandes).    2/11/2022- right breast  lumpectomy and right sentinel lymph node biopsy (Dr. Alcantara).   3/25/2022-5/31/2022- completed 4 cycles of adjuvant TC  7/20/2022- initiate letrozole    History of Present Illness    Ms. Lisa Reyna presented today accompanied by her sister.    She has bilateral ankle swelling attributed to humidity.  She is taking Lasix for that.  She does not tolerate compressive stocking.  She decided to hold off on meniscectomy and decided and hence breast cancer screening with every 6 months mammogram.  She does not have any breast concerns.  She does not have unusual headache, or bone pain.    Review of systems  Apart from describing in HPI, the remainder of comprehensive ROS was negative.    Past History    Past Medical History:   Diagnosis Date     Arthritis      Asthma     Seaonal usually in spring     Breast cancer (H) 2004    rt lumpect     Coronary artery disease      Endometrial polyp 1992    was protruding through cervix     GERD (gastroesophageal reflux disease)      Heart attack (H)      Heart murmur      History of measles, mumps, or rubella as a child    hx of measles, mumps and Slovenian mealses. Rubella immune 5/1980     HTN (hypertension)      Hx of radiation therapy 2004     Hyperlipemia      Osteopenia      Other chronic pain      Ovarian cyst      Prediabetes      UTI (urinary tract infection)      Walking troubles        Past Surgical History:   Procedure Laterality Date     BIOPSY BREAST Left 2008    cyst removal     BIOPSY BREAST Right 2004     CERVICAL DISC SURGERY  6/25/2007    C6-7 sarah laminectomy with microdiskectomy     LUMPECTOMY BREAST Right 6/24/2004     LUMPECTOMY BREAST Right 2/11/2022    Procedure: Right Lumpectomy after Wire Localization; [place] Lymph Node Biopsy;  Surgeon: Mami Alcantara MD;  Location: [facility]     OOPHORECTOMY Bilateral 2011     TOTAL HIP ARTHROPLASTY Left 9/30/2009     TUBAL LIGATION  1/10/1992    with endometrial polyp removal     WRIST SURGERY         Physical  Exam    BP (!) 175/88 (BP Location: Left arm, Patient Position: Sitting, Cuff Size: Adult Large)   Pulse 82   Temp 98.5  F (36.9  C) (Oral)   Resp 16   Wt 77.3 kg (170 lb 8 oz)   SpO2 100%   BMI 31.18 kg/m      General: alert, awake, not in acute distress  HEENT: Head: Normal, normocephalic, atraumatic.  Eye: Normal external eye, conjunctiva, lids cornea, ALENA.  Neck / Thyroid: Supple, no masses, nodes, nodules or enlargement.  Lymphatics: No abnormally enlarged lymph nodes.  Extremities: normal strength, tone, and muscle mass.  Bilateral pedal edema, 2+.  Skin: normal. no rash or abnormalities  CNS: non focal.    Lab Results    No results found for this or any previous visit (from the past 168 hour(s)).    Imaging    No results found.    30 minutes spent on the date of the encounter doing chart review, history and exam, documentation and further activities as noted above.    Signed by: Tamar Koch MD

## 2022-07-20 NOTE — LETTER
"    7/20/2022         RE: Lisa Reyna  [address]  [facility] [address]        Dear Colleague,    Thank you for referring your patient, Lisa Reyna, to the [facility], [place]. Please see a copy of my visit note below.    Oncology Rooming Note    July 20, 2022 2:36 PM   Lisa Reyna is a 71 year old female who presents for:    Chief Complaint   Patient presents with     Oncology Clinic Visit     Malignant neoplasm of the right breast of female.      Initial Vitals: BP (!) 175/88 (BP Location: Left arm, Patient Position: Sitting, Cuff Size: Adult Large)   Pulse 82   Temp 98.5  F (36.9  C) (Oral)   Resp 16   Wt 77.3 kg (170 lb 8 oz)   SpO2 100%   BMI 31.18 kg/m   Estimated body mass index is 31.18 kg/m  as calculated from the following:    Height as of 7/14/22: 1.575 m (5' 2\").    Weight as of this encounter: 77.3 kg (170 lb 8 oz). Body surface area is 1.84 meters squared.  No Pain (0) Comment: Data Unavailable   No LMP recorded. Patient is postmenopausal.  Allergies reviewed: Yes  Medications reviewed: Yes    Medications: Medication refills not needed today.  Pharmacy name entered into [employer]: [facility], [place], [place] [address] [facility] [address] [place] [address]    Clinical concerns: Treatment option.       Maribel Xiao LPN                [facility] Hematology and Oncology Progress Note    Patient: Lisa Reyna  MRN: [id number]  Date of Service: Jul 20, 2022         Reason for Visit    Chief Complaint   Patient presents with     Oncology Clinic Visit     Malignant neoplasm of the right breast of female.        Assessment and Plan    Cancer Staging  Malignant neoplasm of lower-inner quadrant of right breast of female, estrogen receptor positive (H)  Staging form: Breast, AJCC 8th Edition  - Pathologic stage from 2/11/2022: Stage IA (pT1c, pN0(sn), cM0, G2, ER+, AZ+, HER2-, Oncotype DX score: 30) - Signed by Tamar Koch MD on 3/13/2022  - Clinical: No stage " assigned - Unsigned      ECOG Performance    0 - Independent     Pain  Pain Score: No Pain (0)    #.  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 Oncotype 30/19%, >15%.  #.  Grade 1 neuropathy of both feet, exacerbated by chemotherapy on pre-existing neuropathy of both feet, stable  #.  Normocytic anemia, post chemotherapy  #.  Low bone density by DEXA scan in 2019  #.  Bilateral pedal edema     Today, I discussed about adjuvant endocrine therapy.  She has recovered from chemotherapy side effects fairly well at this point.  I discussed about aromatase inhibitors, schedule and side effects.  I offered letrozole.  I gave her handout to review.  She agreed to start letrozole.  I sent prescription to her pharmacy.      She decided against mastectomy, but interested enhanced breast cancer screening by every 6 months mammogram.      I advised her to obtain DEXA scan prior to next visit.  I advised her to continue with calcium twice a day and vitamin D 1000 units daily.  I also advised her to do weightbearing exercises.  She is currently limited due to activity due to bilateral pedal edema.  She will continue diuretics.  I advised her to wrap with Ace wrap since she does not tolerate compressive stockings.  We will need to consider bisphosphonate therapy depending on her bone density scan result.     Will plan to repeat CBC, CMP in next visit.      Follow-up with me in 3 months to assess toxicity and follow-up labs for anemia.    #.  Genetic counseling   Genetic test was negative for hereditary breast and ovarian cancer genes.    Encounter Diagnoses:    Problem List Items Addressed This Visit        Oncology Diagnoses    Malignant neoplasm of lower-inner quadrant of right breast of female, estrogen receptor positive (H) - Primary    Relevant Medications    letrozole (FEMARA) 2.5 MG tablet    Other Relevant Orders    CBC with Platelets & Differential    Comprehensive metabolic panel      Other  Visit Diagnoses     Menopause        Relevant Orders    DX Hip/Pelvis/Spine             CC: Cayla Skelton, NP   ______________________________________________________________________________  Diagnosis  6/24/2004- She had prior history of right breast DCIS    1/18/2022- screening mammogram detected     Invasive ductal carcinoma with focal micropapillary pattern.  Grade 2.  12 mm.     Margins were negative but close at 0.5 mm from the nearest junction of the inferior and medial margin, 0.5 mm from medial margin and 2 mm from inferior margin.     There is associated DCIS with EIC negative, nuclear grade intermediate, solid and focal cribriform pattern of 10%.  DCIS margins were uninvolved at 3 mm from nearest inferior margin, 5 mm from medial margins.     1 sentinel lymph node was negative for metastatic carcinoma.    ER positive, greater than 10% of the cell, 61-70%, VA positive (21-30%), HER-2 negative by IHC (1+).     Oncotype DX recurrence score 30/19% / >15%.    5/26/2022- genetic counseling and testing completed.  No pathogenic mutation found.    Treatment to date  6/2004- post right breast lumpectomy (Dr. Jones) on 6/24/2004, post adjuvant radiation (Dr. Caleb Elena) followed by adjuvant tamoxifen for 5 years (Dr. Noé Fernandes).    2/11/2022- right breast lumpectomy and right sentinel lymph node biopsy (Dr. Alcantara).   3/25/2022-5/31/2022- completed 4 cycles of adjuvant TC  7/20/2022- initiate letrozole    History of Present Illness    Ms. Lisa Reyna presented today accompanied by her sister.    She has bilateral ankle swelling attributed to humidity.  She is taking Lasix for that.  She does not tolerate compressive stocking.  She decided to hold off on meniscectomy and decided and hence breast cancer screening with every 6 months mammogram.  She does not have any breast concerns.  She does not have unusual headache, or bone pain.    Review of systems  Apart from describing in HPI, the  remainder of comprehensive ROS was negative.    Past History    Past Medical History:   Diagnosis Date     Arthritis      Asthma     Seaonal usually in spring     Breast cancer (H) 2004    rt lumpect     Coronary artery disease      Endometrial polyp 1992    was protruding through cervix     GERD (gastroesophageal reflux disease)      Heart attack (H)      Heart murmur      History of measles, mumps, or rubella as a child    hx of measles, mumps and Georgian mealses. Rubella immune 5/1980     HTN (hypertension)      Hx of radiation therapy 2004     Hyperlipemia      Osteopenia      Other chronic pain      Ovarian cyst      Prediabetes      UTI (urinary tract infection)      Walking troubles        Past Surgical History:   Procedure Laterality Date     BIOPSY BREAST Left 2008    cyst removal     BIOPSY BREAST Right 2004     CERVICAL DISC SURGERY  6/25/2007    C6-7 sarah laminectomy with microdiskectomy     LUMPECTOMY BREAST Right 6/24/2004     LUMPECTOMY BREAST Right 2/11/2022    Procedure: Right Lumpectomy after Wire Localization; Wilmer Lymph Node Biopsy;  Surgeon: Mami Alcantara MD;  Location: [facility]     OOPHORECTOMY Bilateral 2011     TOTAL HIP ARTHROPLASTY Left 9/30/2009     TUBAL LIGATION  1/10/1992    with endometrial polyp removal     WRIST SURGERY         Physical Exam    BP (!) 175/88 (BP Location: Left arm, Patient Position: Sitting, Cuff Size: Adult Large)   Pulse 82   Temp 98.5  F (36.9  C) (Oral)   Resp 16   Wt 77.3 kg (170 lb 8 oz)   SpO2 100%   BMI 31.18 kg/m      General: alert, awake, not in acute distress  HEENT: Head: Normal, normocephalic, atraumatic.  Eye: Normal external eye, conjunctiva, lids cornea, ALENA.  Neck / Thyroid: Supple, no masses, nodes, nodules or enlargement.  Lymphatics: No abnormally enlarged lymph nodes.  Extremities: normal strength, tone, and muscle mass.  Bilateral pedal edema, 2+.  Skin: normal. no rash or abnormalities  CNS: non focal.    Lab Results    No  results found for this or any previous visit (from the past 168 hour(s)).    Imaging    No results found.    30 minutes spent on the date of the encounter doing chart review, history and exam, documentation and further activities as noted above.    Signed by: Tamar Koch MD      Again, thank you for allowing me to participate in the care of your patient.        Sincerely,        Tamar Koch MD

## 2022-07-20 NOTE — PROGRESS NOTES
"Oncology Rooming Note    July 20, 2022 2:36 PM   Lisa Reyna is a 71 year old female who presents for:    Chief Complaint   Patient presents with     Oncology Clinic Visit     Malignant neoplasm of the right breast of female.      Initial Vitals: BP (!) 175/88 (BP Location: Left arm, Patient Position: Sitting, Cuff Size: Adult Large)   Pulse 82   Temp 98.5  F (36.9  C) (Oral)   Resp 16   Wt 77.3 kg (170 lb 8 oz)   SpO2 100%   BMI 31.18 kg/m   Estimated body mass index is 31.18 kg/m  as calculated from the following:    Height as of 7/14/22: 1.575 m (5' 2\").    Weight as of this encounter: 77.3 kg (170 lb 8 oz). Body surface area is 1.84 meters squared.  No Pain (0) Comment: Data Unavailable   No LMP recorded. Patient is postmenopausal.  Allergies reviewed: Yes  Medications reviewed: Yes    Medications: Medication refills not needed today.  Pharmacy name entered into [facility]: [address] [place] [address]    Clinical concerns: Treatment option.       Maribel Xiao LPN              "

## 2022-07-21 ENCOUNTER — TELEPHONE (OUTPATIENT)
Dept: ONCOLOGY | Facility: HOSPITAL | Age: 72
End: 2022-07-21

## 2022-07-21 NOTE — CONFIDENTIAL NOTE
Distress screening follow-up:    I contacted Lisa for an emotional support call following her appointment yesterday, 2022 with Dr. Koch.  She is a breast cancer patient and was in for a follow-up visit.  She had elevations in her distress screening and the question related to depression.  (See distress screening below).      I was able to talk with Lisa directly and she indicated that the reason why she was feeling a bit depressed as her mother  3 weeks ago.  She lived with Lisa for 17 years and was 89 years old when she .  She was functioning and doing well up until a month before she .  Lisa indicates that she feels that she is going through the normal grieving process.  She has good support from her sister who also is grieving with her.  She has a strong jeff and talked about how she believes her mom has been reunited with her dad, who  28 years ago.  She also is really I did with all of her family members who have gone before her.  She shared the image in her mind of the wonderful party they were having together and have not.  She processed her thoughts and feelings about her grief.    I discussed the case with our , ARACELIS Arenas.    Plan: Lisa has my contact information and will call me if she is in need of further support or assistance.    Yesy Aguiar MA, LP, [facility]        Oncology Distress Screening    Row Name [phone]       How concerned do you feel regarding the following common issues people with cancer face. Please answer with a number from 0-10 where 0=not concerned and 10=very concerned. PT response of >=8  will result in outreach from Support Team.   1. How concerned are you about your ability to eat?  0       2. How concerned are you about unintended weight loss or your current weight?  0       3. How concerned are you about feeling depressed or very sad?  8 Abnormal        4. How concerned are you about feeling anxious or very scared?  0        5. Do you struggle with the loss of meaning and rabia in your life?  Not at all       6. How concerned are you about work and home life issues that may be affected by your cancer?  0       7. How concerned are you about knowing what resources are available to help you?  0       8. Do you currently have what you would describe as Shinto or spiritual struggles?

## 2022-07-25 ENCOUNTER — ANCILLARY PROCEDURE (OUTPATIENT)
Dept: MAMMOGRAPHY | Facility: CLINIC | Age: 72
End: 2022-07-25
Attending: SPECIALIST
Payer: MEDICARE

## 2022-07-25 DIAGNOSIS — Z17.0 MALIGNANT NEOPLASM OF LOWER-INNER QUADRANT OF RIGHT BREAST OF FEMALE, ESTROGEN RECEPTOR POSITIVE (H): ICD-10-CM

## 2022-07-25 DIAGNOSIS — C50.311 MALIGNANT NEOPLASM OF LOWER-INNER QUADRANT OF RIGHT BREAST OF FEMALE, ESTROGEN RECEPTOR POSITIVE (H): ICD-10-CM

## 2022-07-25 PROCEDURE — 77061 BREAST TOMOSYNTHESIS UNI: CPT | Mod: RT

## 2022-07-26 NOTE — PROGRESS NOTES
ACUPUNCTURIST TREATMENT NOTE      Lisa Reyna, a 71 year old female, is here today for Follow - Up exam.     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; Chronic low back pain    Acupuncture 7/15/2022 7/20/2022   Intervention Reason Neuropathy; Neuropathy 2 Neuropathy; Neuropathy 2   Neuropathy Location Feet, bilateral Feet, bilateral   Pre-session Neuropathy rating 7 6   Post-session Neuropathy rating 6 -   Neuropathy 2 Location fingertips, bilateral fingertips, left   Pre-session Neuropathy 2 rating 1 3       TCM Diagnosis:     Qi Deficiency;Blood Deficiency;Yin Deficiency;Qi Stagnation;Kidney Qi Deficiency;Spleen Qi Deficiency;Damp and Phlegm Accumulation;  Treatment Principle:   tonify and nourish, move qi and blood    TCM / Acupuncture Treatment  Acupuncture Points: Initial points:  LI 10, TW 5, St 36, ST 40, GB 41, Lauren 3  Second insertions:  (L) (SI 4, TW 6); Sp 9, Sp 7, Sp 6                                    Accessory Techniques 7/15/2022 7/20/2022   Accessory Techniques TDP Heat Lamp TDP Heat Lamp   TDP Heat Lamp location used over feet over  feet          Assessment and Plan  Treatment Observations:  Pt. Rested comfortably during tx session.  Acupuncture Treatment Recommendations:  Treatment once per week       It is my recommendation that this patient seek advice from their Primary Care Provider about active symptoms not addressed during this visit. The risks and benefits of acupuncture were reviewed and the patient stated understanding. The patient's questions were answered to their satisfaction. Consent was provided for treatment. We thank you for the referral and opportunity to treat this patient.    Time Spent with Patient:   I spent a total of 30 minutes face-to-face with Lisa Reyna during today's office visit.     Nilda Brewer L.Ac.

## 2022-08-08 ENCOUNTER — PROCEDURE ONLY VISIT (OUTPATIENT)
Dept: ONCOLOGY | Facility: CLINIC | Age: 72
End: 2022-08-08
Attending: INTERNAL MEDICINE
Payer: MEDICARE

## 2022-08-08 DIAGNOSIS — M54.50 CHRONIC BILATERAL LOW BACK PAIN WITHOUT SCIATICA: ICD-10-CM

## 2022-08-08 DIAGNOSIS — G62.0 CHEMOTHERAPY-INDUCED PERIPHERAL NEUROPATHY (H): Primary | ICD-10-CM

## 2022-08-08 DIAGNOSIS — G89.29 CHRONIC BILATERAL LOW BACK PAIN WITHOUT SCIATICA: ICD-10-CM

## 2022-08-08 DIAGNOSIS — T45.1X5A CHEMOTHERAPY-INDUCED PERIPHERAL NEUROPATHY (H): Primary | ICD-10-CM

## 2022-08-08 DIAGNOSIS — M54.59 OTHER LOW BACK PAIN: ICD-10-CM

## 2022-08-08 PROCEDURE — 97810 ACUP 1/> WO ESTIM 1ST 15 MIN: CPT

## 2022-08-08 PROCEDURE — 97811 ACUP 1/> W/O ESTIM EA ADD 15: CPT

## 2022-08-08 NOTE — PROGRESS NOTES
ACUPUNCTURIST TREATMENT NOTE      Lisa Reyna, a 71 year old female, is here today for Follow - Up exam. Patient is referred by No ref. provider found.    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    Objective  Basic Exam Completed:   Musculoskeletal: Normal    TCM Exam Completed: No    Tongue/Pulse Exam Completed: No    Patient Assessment  Patient Type: Oncology  Patient Complaint: Chronic low back pain, neuropathy of (L) hand and feet    Acupuncture 7/20/2022 8/8/2022   Intervention Reason Neuropathy; Neuropathy 2 Pain; Neuropathy; Neuropathy 2   Pain Location - Low back   Pre-session Pain Rating - 0   Post-session Pain Rating - 0   Neuropathy Location Feet, bilateral (L) hand   Pre-session Neuropathy rating 6 3   Post-session Neuropathy rating - 0   Neuropathy 2 Location fingertips, left Feet   Pre-session Neuropathy 2 rating 3 5   Post-session Neuropathy 2 rating - 3       TCM Diagnosis:        Treatment Principle:      TCM / Acupuncture Treatment  Acupuncture Points:       Initial insertions: Li 10, (L) P 6, (L) Tb 4, (L) Li 5, (L) Baxie       Second insertions: Paula Duong 1            Number of needles inserted: 18  Number of needles removed: 18    Accessory Techniques 7/20/2022 8/8/2022   Accessory Techniques TDP Heat Lamp TDP Heat Lamp   TDP Heat Lamp location used over feet Feet          Assessment and Plan  Treatment Observations:    Acupuncture Treatment  Recommendations:         It is my recommendation that this patient seek advice from their Primary Care Provider about active symptoms not addressed during this visit. The risks and benefits of acupuncture were reviewed and the patient stated understanding. The patient's questions were answered to their satisfaction. Consent was provided for treatment. We thank you for the referral and opportunity to treat this patient.    Time Spent with Patient:   I spent a total of 30 minutes face-to-face with Lisa Reyna during today's office visit.     Woody Nielsen

## 2022-08-12 ENCOUNTER — ANCILLARY PROCEDURE (OUTPATIENT)
Dept: BONE DENSITY | Facility: CLINIC | Age: 72
End: 2022-08-12
Attending: INTERNAL MEDICINE
Payer: MEDICARE

## 2022-08-12 DIAGNOSIS — Z78.0 MENOPAUSE: ICD-10-CM

## 2022-08-12 PROCEDURE — 77080 DXA BONE DENSITY AXIAL: CPT | Mod: TC | Performed by: RADIOLOGY

## 2022-08-15 ENCOUNTER — PROCEDURE ONLY VISIT (OUTPATIENT)
Dept: ONCOLOGY | Facility: CLINIC | Age: 72
End: 2022-08-15
Attending: INTERNAL MEDICINE
Payer: MEDICARE

## 2022-08-15 DIAGNOSIS — G89.29 CHRONIC BILATERAL LOW BACK PAIN WITHOUT SCIATICA: Primary | ICD-10-CM

## 2022-08-15 DIAGNOSIS — G62.0 CHEMOTHERAPY-INDUCED PERIPHERAL NEUROPATHY (H): ICD-10-CM

## 2022-08-15 DIAGNOSIS — M54.59 OTHER LOW BACK PAIN: ICD-10-CM

## 2022-08-15 DIAGNOSIS — M54.50 CHRONIC BILATERAL LOW BACK PAIN WITHOUT SCIATICA: Primary | ICD-10-CM

## 2022-08-15 DIAGNOSIS — T45.1X5A CHEMOTHERAPY-INDUCED PERIPHERAL NEUROPATHY (H): ICD-10-CM

## 2022-08-19 ENCOUNTER — PATIENT OUTREACH (OUTPATIENT)
Dept: ONCOLOGY | Facility: CLINIC | Age: 72
End: 2022-08-19

## 2022-08-22 ENCOUNTER — PROCEDURE ONLY VISIT (OUTPATIENT)
Dept: ONCOLOGY | Facility: CLINIC | Age: 72
End: 2022-08-22
Attending: INTERNAL MEDICINE
Payer: MEDICARE

## 2022-08-26 ENCOUNTER — TELEPHONE (OUTPATIENT)
Dept: CARDIOLOGY | Facility: CLINIC | Age: 72
End: 2022-08-26

## 2022-08-26 NOTE — PROGRESS NOTES
ACUPUNCTURIST TREATMENT NOTE      Lisa Reyna, a 71 year old female, is here today for Follow - Up exam. Patient is referred by No ref. provider found.    HPI  Main Complaint: Low back pain  Secondary Complaints: Neuropathy of (L) hand and bilateral feet    Past Medical History  Past Medical History Reviewed: Yes   has a past medical history of Arthritis, Asthma, Breast cancer (H) (2004), Coronary artery disease, Endometrial polyp (1992), GERD (gastroesophageal reflux disease), Heart attack (H), Heart murmur, History of measles, mumps, or rubella (as a child), HTN (hypertension), radiation therapy (2004), Hyperlipemia, Osteopenia, Other chronic pain, Ovarian cyst, Prediabetes, UTI (urinary tract infection), and Walking troubles.    Objective  Basic Exam Completed:   Lower Extremity (ies): Normal    TCM Exam Completed: Yes   Limbs/Back: Left Upper Extremity    Tongue/Pulse Exam Completed: No    Patient Assessment  Patient Type:    Patient Complaint:      Acupuncture 8/15/2022 8/22/2022   Intervention Reason Pain; Neuropathy; Neuropathy 2 Pain; Neuropathy; Neuropathy 2   Pain Location Low back Low back   Pre-session Pain Rating 0 0   Post-session Pain Rating 0 0   Neuropathy Location (L) hand (L) hand   Pre-session Neuropathy rating 2 0   Post-session Neuropathy rating 1 0   Neuropathy 2 Location Feet Feet   Pre-session Neuropathy 2 rating 3 3   Post-session Neuropathy 2 rating 2 3       TCM Diagnosis:        Treatment Principle:      TCM / Acupuncture Treatment  Acupuncture Points:                                    Accessory Techniques 8/15/2022 8/22/2022   Accessory Techniques TDP Heat Lamp TDP Heat Lamp   TDP Heat Lamp location used Feet Feet          Assessment and Plan  Treatment Observations:    Acupuncture Treatment Recommendations:         It is my recommendation that this patient seek advice from their Primary Care Provider about active symptoms not addressed during this visit. The risks and benefits of  acupuncture were reviewed and the patient stated understanding. The patient's questions were answered to their satisfaction. Consent was provided for treatment. We thank you for the referral and opportunity to treat this patient.    Time Spent with Patient:   I spent a total of 30 minutes face-to-face with Lisa Reyna during today's office visit.     Woody Nielsen

## 2022-08-29 ENCOUNTER — TELEPHONE (OUTPATIENT)
Dept: ONCOLOGY | Facility: CLINIC | Age: 72
End: 2022-08-29

## 2022-08-29 NOTE — TELEPHONE ENCOUNTER
Patient calls with a couple of questions. First she is wondering if she can take Tylenol with Letrozole. Second she is wondering about recent Dexa scan that was done.    Patient is advised that she can take Tylenol with her medication. She is also given result of scan that bone mineral density measurements are within normal limits. Patient verbalizes understanding and denies further questions or concerns.    Rabia Silvestre RN

## 2022-08-30 ENCOUNTER — PROCEDURE ONLY VISIT (OUTPATIENT)
Dept: ONCOLOGY | Facility: CLINIC | Age: 72
End: 2022-08-30
Attending: INTERNAL MEDICINE
Payer: MEDICARE

## 2022-08-30 DIAGNOSIS — T45.1X5A CHEMOTHERAPY-INDUCED PERIPHERAL NEUROPATHY (H): ICD-10-CM

## 2022-08-30 DIAGNOSIS — M54.50 CHRONIC BILATERAL LOW BACK PAIN WITHOUT SCIATICA: Primary | ICD-10-CM

## 2022-08-30 DIAGNOSIS — M54.59 OTHER LOW BACK PAIN: ICD-10-CM

## 2022-08-30 DIAGNOSIS — G62.0 CHEMOTHERAPY-INDUCED PERIPHERAL NEUROPATHY (H): ICD-10-CM

## 2022-08-30 DIAGNOSIS — G89.29 CHRONIC BILATERAL LOW BACK PAIN WITHOUT SCIATICA: Primary | ICD-10-CM

## 2022-08-30 PROCEDURE — 97811 ACUP 1/> W/O ESTIM EA ADD 15: CPT | Mod: GA | Performed by: ACUPUNCTURIST

## 2022-08-30 PROCEDURE — 97810 ACUP 1/> WO ESTIM 1ST 15 MIN: CPT | Mod: GA | Performed by: ACUPUNCTURIST

## 2022-08-30 NOTE — PROGRESS NOTES
ACUPUNCTURIST TREATMENT NOTE      Lisa Reyna, a 71 year old female, is here today for Follow - Up exam.     HPI  Main Complaint: Chronic low back pain; CIPN at bilateral feet and (L) hand; swelling in lower legs and feet and stiffness at ankle joint    Pt. Reports that the acupuncture has significantly helped improve her chronic low back pain.  She denies any degree of low back pain at time of treatment today.  She continues to report chemotherapy induced peripheral neuropathy symptoms at her hands and feet.  She explains that the sx at her (L) hand are moderate compared to mild sx at her (R) hand.  She reports the neuropathy at her feet is moderate and denies any recent change.       Past Medical History  Past Medical History Reviewed: Yes   has a past medical history of Arthritis, Asthma, Breast cancer (H) (2004), Coronary artery disease, Endometrial polyp (1992), GERD (gastroesophageal reflux disease), Heart attack (H), Heart murmur, History of measles, mumps, or rubella (as a child), HTN (hypertension), radiation therapy (2004), Hyperlipemia, Osteopenia, Other chronic pain, Ovarian cyst, Prediabetes, UTI (urinary tract infection), and Walking troubles.    Objective  Basic Exam Completed:   No    TCM Exam Completed: Yes   Limbs/Back: Bilateral Upper Extremity, Bilateral Lower Extremity and Lower Back    Tongue/Pulse Exam Completed: Yes           Patient Assessment  Patient Type: Oncology  Patient Complaint: Chronic low back pain; CIPN at bilateral feet and (L) hand; swelling in lower legs and feet and stiffness at ankle joint    Acupuncture 8/22/2022 8/30/2022   Intervention Reason Pain; Neuropathy; Neuropathy 2 Pain; Neuropathy; Neuropathy 2   Pain Location Low back Low back   Pre-session Pain Rating 0 0   Post-session Pain Rating 0 0   Neuropathy Location (L) hand (L) hand   Pre-session Neuropathy rating 0 2   Post-session Neuropathy rating 0 0   Neuropathy 2 Location Feet Feet   Pre-session Neuropathy 2  rating 3 3   Post-session Neuropathy 2 rating 3 2       TCM Diagnosis: Qi Deficiency;Blood Deficiency;Yin Deficiency;Qi Stagnation;Kidney Qi Deficiency;Spleen Qi Deficiency;Damp and Phlegm Accumulation      Treatment Principle: tonify and nourish, move qi and blood    TCM / Acupuncture Treatment  Acupuncture Points:       Initial insertions: LI 10, (L) Ling Gu, (L) Da Karla, BL 60, BL 62, BL 65       Second insertions: (L) (LI 5, TW 4, TW 5); St 36, St 40, Sp 9, St 41, Ki 3, Lauren 3, Ba Freddy                    Accessory Techniques 8/22/2022 8/30/2022   Accessory Techniques TDP Heat Lamp TDP Heat Lamp   TDP Heat Lamp location used Feet feet          Assessment and Plan  Treatment Observations: Patient rested comfortably  Acupuncture Treatment Recommendations:  At the end of the treatment today Pt. Informed me that she has significant (L) sciatic pain and she would like to address this along with her low back pain at next treatment.          It is my recommendation that this patient seek advice from their Primary Care Provider about active symptoms not addressed during this visit. The risks and benefits of acupuncture were reviewed and the patient stated understanding. The patient's questions were answered to their satisfaction. Consent was provided for treatment. We thank you for the referral and opportunity to treat this patient.    Time Spent with Patient:   I spent a total of 30 minutes face-to-face with Lisa Reyna during today's office visit.     Nilda Brewer L.Ac.

## 2022-08-31 ENCOUNTER — HOSPITAL ENCOUNTER (OUTPATIENT)
Dept: MRI IMAGING | Facility: HOSPITAL | Age: 72
Discharge: HOME | End: 2022-08-31
Attending: INTERNAL MEDICINE
Payer: MEDICARE

## 2022-08-31 ENCOUNTER — TELEPHONE (OUTPATIENT)
Dept: CARDIOLOGY | Facility: CLINIC | Age: 72
End: 2022-08-31

## 2022-08-31 VITALS — SYSTOLIC BLOOD PRESSURE: 166 MMHG | HEART RATE: 105 BPM | DIASTOLIC BLOOD PRESSURE: 63 MMHG | OXYGEN SATURATION: 98 %

## 2022-08-31 DIAGNOSIS — I25.10 CORONARY ARTERY DISEASE INVOLVING NATIVE CORONARY ARTERY OF NATIVE HEART WITHOUT ANGINA PECTORIS: ICD-10-CM

## 2022-08-31 LAB
ATRIAL RATE - MUSE: 79 BPM
ATRIAL RATE - MUSE: 85 BPM
DIASTOLIC BLOOD PRESSURE - MUSE: NORMAL MMHG
DIASTOLIC BLOOD PRESSURE - MUSE: NORMAL MMHG
INTERPRETATION ECG - MUSE: NORMAL
INTERPRETATION ECG - MUSE: NORMAL
P AXIS - MUSE: 61 DEGREES
P AXIS - MUSE: 68 DEGREES
PR INTERVAL - MUSE: 206 MS
PR INTERVAL - MUSE: 220 MS
QRS DURATION - MUSE: 82 MS
QRS DURATION - MUSE: 82 MS
QT - MUSE: 384 MS
QT - MUSE: 386 MS
QTC - MUSE: 440 MS
QTC - MUSE: 459 MS
R AXIS - MUSE: 52 DEGREES
R AXIS - MUSE: 54 DEGREES
SYSTOLIC BLOOD PRESSURE - MUSE: NORMAL MMHG
SYSTOLIC BLOOD PRESSURE - MUSE: NORMAL MMHG
T AXIS - MUSE: 55 DEGREES
T AXIS - MUSE: 65 DEGREES
VENTRICULAR RATE- MUSE: 79 BPM
VENTRICULAR RATE- MUSE: 85 BPM

## 2022-08-31 PROCEDURE — 250N000011 HC RX IP 250 OP 636: Performed by: INTERNAL MEDICINE

## 2022-08-31 PROCEDURE — G1010 CDSM STANSON: HCPCS | Performed by: INTERNAL MEDICINE

## 2022-08-31 PROCEDURE — 93016 CV STRESS TEST SUPVJ ONLY: CPT | Performed by: INTERNAL MEDICINE

## 2022-08-31 PROCEDURE — 255N000002 HC RX 255 OP 636: Performed by: INTERNAL MEDICINE

## 2022-08-31 PROCEDURE — 93010 ELECTROCARDIOGRAM REPORT: CPT | Mod: HOP | Performed by: INTERNAL MEDICINE

## 2022-08-31 PROCEDURE — A9585 GADOBUTROL INJECTION: HCPCS | Performed by: INTERNAL MEDICINE

## 2022-08-31 PROCEDURE — 75563 CARD MRI W/STRESS IMG & DYE: CPT | Mod: 26 | Performed by: INTERNAL MEDICINE

## 2022-08-31 PROCEDURE — 93005 ELECTROCARDIOGRAM TRACING: CPT

## 2022-08-31 PROCEDURE — 93018 CV STRESS TEST I&R ONLY: CPT | Performed by: INTERNAL MEDICINE

## 2022-08-31 PROCEDURE — 93010 ELECTROCARDIOGRAM REPORT: CPT | Mod: HOP | Performed by: GENERAL ACUTE CARE HOSPITAL

## 2022-08-31 PROCEDURE — 999N000122 MR MYOCARDIUM  OVERREAD

## 2022-08-31 PROCEDURE — G1010 CDSM STANSON: HCPCS

## 2022-08-31 RX ORDER — CAFFEINE 200 MG
200 TABLET ORAL
Status: DISCONTINUED | OUTPATIENT
Start: 2022-08-31 | End: 2022-08-31 | Stop reason: HOSPADM

## 2022-08-31 RX ORDER — AMINOPHYLLINE 25 MG/ML
50 INJECTION, SOLUTION INTRAVENOUS
Status: DISCONTINUED | OUTPATIENT
Start: 2022-08-31 | End: 2022-08-31 | Stop reason: HOSPADM

## 2022-08-31 RX ORDER — GADOBUTROL 604.72 MG/ML
16 INJECTION INTRAVENOUS ONCE
Status: COMPLETED | OUTPATIENT
Start: 2022-08-31 | End: 2022-08-31

## 2022-08-31 RX ORDER — REGADENOSON 0.08 MG/ML
0.4 INJECTION, SOLUTION INTRAVENOUS ONCE
Status: COMPLETED | OUTPATIENT
Start: 2022-08-31 | End: 2022-08-31

## 2022-08-31 RX ORDER — ALBUTEROL SULFATE 0.83 MG/ML
2.5 SOLUTION RESPIRATORY (INHALATION)
Status: DISCONTINUED | OUTPATIENT
Start: 2022-08-31 | End: 2022-08-31 | Stop reason: HOSPADM

## 2022-08-31 RX ORDER — CAFFEINE CITRATE 20 MG/ML
60 SOLUTION INTRAVENOUS
Status: DISCONTINUED | OUTPATIENT
Start: 2022-08-31 | End: 2022-08-31 | Stop reason: HOSPADM

## 2022-08-31 RX ADMIN — GADOBUTROL 16 ML: 604.72 INJECTION INTRAVENOUS at 12:44

## 2022-08-31 RX ADMIN — REGADENOSON 0.4 MG: 0.08 INJECTION, SOLUTION INTRAVENOUS at 10:53

## 2022-08-31 NOTE — TELEPHONE ENCOUNTER
Called patient to review recent elevated blood pressure reading.  Per [place] Community Measures guidelines, patients blood pressure is out of parameters and recheck blood pressure is recommended.    Last Blood Pressure: 175/88  Last Heart Rate: 82  Date: 7/20/22  Location: Other Specialty    Today's Blood Pressure: see stress test results   Today's Heart Rate: as above   Location: [facility]    Patient reported blood pressure updated in Epic. Blood pressure continues to fall outside of the [place] Community Measures guidelines.  Message sent to primary cardiology team for further review.     Sent to nurse to review   KALYAN Pichardo

## 2022-09-01 NOTE — TELEPHONE ENCOUNTER
Noted. One blood pressure reading from 07/20 is not sufficient    On 07/13 office visit bp was 132/58 with hr of 80. Will contact patient to continue to monitor bp's.

## 2022-09-02 NOTE — TELEPHONE ENCOUNTER
The below bp measure from     07/13/22: 132/58    07/20 oncology visit from Angelscarlet Xiao LPN: 175/88 with pulse of 82. Which is indicated on the quality measure.    08/31 Vitals during stress test: 170/73, 161/74, 169/66, 165/63, 159/66, 159/63, 162/63 and 166/63    Previously left detailed message for patient to continue to monitor bp readings.

## 2022-09-02 NOTE — TELEPHONE ENCOUNTER
---- Message -----  From: Leah Bravo RMA  Sent: 9/2/2022   8:00 AM CDT  To: Michelle Win  Subject: blood pressure                                   The readings were from her stress test you can look for more in her encounter for  the day.   I  did not speak to the patient the BP readings were from 8/31/22 .    ----- Message -----  From: Michelle Win  Sent: 9/1/2022  12:20 PM CDT  To: KALYAN Pichardo    ----- Message from Michelle Win sent at 9/1/2022 12:20 PM CDT -----  Julio César Lynn,    When you spoke to the patient today were there other readings as the one that was indicated was from 07/20? Thank you-HENRY

## 2022-09-06 ENCOUNTER — PROCEDURE ONLY VISIT (OUTPATIENT)
Dept: ONCOLOGY | Facility: CLINIC | Age: 72
End: 2022-09-06
Attending: INTERNAL MEDICINE
Payer: MEDICARE

## 2022-09-06 DIAGNOSIS — M54.59 OTHER LOW BACK PAIN: ICD-10-CM

## 2022-09-06 DIAGNOSIS — M54.50 CHRONIC BILATERAL LOW BACK PAIN WITHOUT SCIATICA: Primary | ICD-10-CM

## 2022-09-06 DIAGNOSIS — G62.0 CHEMOTHERAPY-INDUCED PERIPHERAL NEUROPATHY (H): ICD-10-CM

## 2022-09-06 DIAGNOSIS — T45.1X5A CHEMOTHERAPY-INDUCED PERIPHERAL NEUROPATHY (H): ICD-10-CM

## 2022-09-06 DIAGNOSIS — G89.29 CHRONIC BILATERAL LOW BACK PAIN WITHOUT SCIATICA: Primary | ICD-10-CM

## 2022-09-06 PROCEDURE — 97811 ACUP 1/> W/O ESTIM EA ADD 15: CPT | Performed by: ACUPUNCTURIST

## 2022-09-06 PROCEDURE — 97810 ACUP 1/> WO ESTIM 1ST 15 MIN: CPT | Performed by: ACUPUNCTURIST

## 2022-09-06 NOTE — PROGRESS NOTES
ACUPUNCTURIST TREATMENT NOTE      Lisa Reyna, a 71 year old female, is here today for Follow - Up exam. Patient is referred by No ref. provider found.    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  She reports mild pain and lateral side of (L) wrist and mild neuropathy sx at left hand and very slight symptoms at fingertips of (R) hand.  Pt. Reports swelling in lower legs, ankles and feet are improved.  Pt. Reports no change in neuropathy sx at bilateral feet.    Past Medical History  Past Medical History Reviewed: Yes   has a past medical history of Arthritis, Asthma, Breast cancer (H) (2004), Coronary artery disease, Endometrial polyp (1992), GERD (gastroesophageal reflux disease), Heart attack (H), Heart murmur, History of measles, mumps, or rubella (as a child), HTN (hypertension), radiation therapy (2004), Hyperlipemia, Osteopenia, Other chronic pain, Ovarian cyst, Prediabetes, UTI (urinary tract infection), and Walking troubles.    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 (L) >(R); swelling in lower legs and feet and stiffness at ankle joint    Acupuncture 8/30/2022 9/6/2022   Intervention Reason Pain; Neuropathy; Neuropathy 2 Pain; Neuropathy; Neuropathy 2   Pain Location Low back Low back   Pre-session Pain Rating 0 0   Post-session Pain Rating 0 0   Neuropathy Location (L) hand (L) hand   Pre-session Neuropathy rating 2 2   Post-session Neuropathy rating 0 0   Neuropathy 2 Location Feet Feet, bilateral   Pre-session Neuropathy 2 rating 3 3   Post-session  Neuropathy 2 rating 2 2       TCM Diagnosis: Qi Deficiency;Blood Deficiency;Yin Deficiency;Qi Stagnation;Kidney Qi Deficiency;Spleen Qi Deficiency;Damp and Phlegm Accumulation      Treatment Principle: tonify and nourish, move qi and blood    TCM / Acupuncture Treatment  Acupuncture Points:       Initial insertions: LI 10, (R) Jimenez Tong Fatuma, (R) TW 5, (L) GB 41       Second insertions: GB 34, (L) (BL 60, GB 40), Das Everton, Earth and Man Emperors                    Accessory Techniques 8/30/2022 9/6/2022   Accessory Techniques TDP Heat Lamp TDP Heat Lamp   TDP Heat Lamp location used feet over feet          Assessment and Plan  Treatment Observations: Pt. rested comfortably during tx session  Acupuncture Treatment Recommendations:         It is my recommendation that this patient seek advice from their Primary Care Provider about active symptoms not addressed during this visit. The risks and benefits of acupuncture were reviewed and the patient stated understanding. The patient's questions were answered to their satisfaction. Consent was provided for treatment. We thank you for the referral and opportunity to treat this patient.    Time Spent with Patient:   I spent a total of 30 minutes face-to-face with Lisa Reyna during today's office visit.     Nilda Brewer L.Ac.

## 2022-09-09 DIAGNOSIS — C50.311 MALIGNANT NEOPLASM OF LOWER-INNER QUADRANT OF RIGHT BREAST OF FEMALE, ESTROGEN RECEPTOR POSITIVE (H): ICD-10-CM

## 2022-09-09 DIAGNOSIS — Z17.0 MALIGNANT NEOPLASM OF LOWER-INNER QUADRANT OF RIGHT BREAST OF FEMALE, ESTROGEN RECEPTOR POSITIVE (H): ICD-10-CM

## 2022-09-09 RX ORDER — LETROZOLE 2.5 MG/1
TABLET, FILM COATED ORAL
Qty: 30 TABLET | Refills: 1 | Status: SHIPPED | OUTPATIENT
Start: 2022-09-09 | End: 2022-10-18

## 2022-09-09 NOTE — TELEPHONE ENCOUNTER
Last Written Prescription Date:  7/20/22  Last Fill Quantity: 30,  # refills: 1   Last office visit provider:  7/20/22, upcoming appointment 10/18/22    Requested Prescriptions   Pending Prescriptions Disp Refills     letrozole (FEMARA) 2.5 MG tablet [Pharmacy Med Name: LETROZOLE 2.5MG TABS] 30 tablet 1     Sig: TAKE ONE TABLET BY MOUTH EVERY DAY       There is no refill protocol information for this order          Rabia Silvestre RN 09/09/22 7:58 AM

## 2022-09-13 ENCOUNTER — HOSPITAL ENCOUNTER (EMERGENCY)
Facility: CLINIC | Age: 72
Discharge: HOME | End: 2022-09-13
Attending: STUDENT IN AN ORGANIZED HEALTH CARE EDUCATION/TRAINING PROGRAM | Admitting: STUDENT IN AN ORGANIZED HEALTH CARE EDUCATION/TRAINING PROGRAM
Payer: MEDICARE

## 2022-09-13 ENCOUNTER — APPOINTMENT (OUTPATIENT)
Dept: RADIOLOGY | Facility: CLINIC | Age: 72
End: 2022-09-13
Attending: STUDENT IN AN ORGANIZED HEALTH CARE EDUCATION/TRAINING PROGRAM
Payer: MEDICARE

## 2022-09-13 VITALS
OXYGEN SATURATION: 98 % | HEIGHT: 62 IN | SYSTOLIC BLOOD PRESSURE: 183 MMHG | HEART RATE: 82 BPM | BODY MASS INDEX: 29.44 KG/M2 | WEIGHT: 160 LBS | TEMPERATURE: 97.4 F | DIASTOLIC BLOOD PRESSURE: 77 MMHG | RESPIRATION RATE: 26 BRPM

## 2022-09-13 DIAGNOSIS — T50.Z95A ADVERSE EFFECT OF VACCINE, INITIAL ENCOUNTER: ICD-10-CM

## 2022-09-13 LAB
ALBUMIN SERPL-MCNC: 3.8 G/DL (ref 3.5–5)
ALP SERPL-CCNC: 62 U/L (ref 45–120)
ALT SERPL W P-5'-P-CCNC: <9 U/L (ref 0–45)
ANION GAP SERPL CALCULATED.3IONS-SCNC: 10 MMOL/L (ref 5–18)
AST SERPL W P-5'-P-CCNC: 11 U/L (ref 0–40)
ATRIAL RATE - MUSE: 83 BPM
BASOPHILS # BLD AUTO: 0.1 10E3/UL (ref 0–0.2)
BASOPHILS NFR BLD AUTO: 2 %
BILIRUB SERPL-MCNC: 0.5 MG/DL (ref 0–1)
BNP SERPL-MCNC: 13 PG/ML (ref 0–127)
BUN SERPL-MCNC: 15 MG/DL (ref 8–28)
CALCIUM SERPL-MCNC: 10 MG/DL (ref 8.5–10.5)
CHLORIDE BLD-SCNC: 101 MMOL/L (ref 98–107)
CO2 SERPL-SCNC: 27 MMOL/L (ref 22–31)
CREAT SERPL-MCNC: 1.21 MG/DL (ref 0.6–1.1)
D DIMER PPP FEU-MCNC: 0.37 UG/ML FEU (ref 0–0.5)
DIASTOLIC BLOOD PRESSURE - MUSE: NORMAL MMHG
EOSINOPHIL # BLD AUTO: 0.4 10E3/UL (ref 0–0.7)
EOSINOPHIL NFR BLD AUTO: 7 %
ERYTHROCYTE [DISTWIDTH] IN BLOOD BY AUTOMATED COUNT: 12 % (ref 10–15)
GFR SERPL CREATININE-BSD FRML MDRD: 47 ML/MIN/1.73M2
GLUCOSE BLD-MCNC: 104 MG/DL (ref 70–125)
HCT VFR BLD AUTO: 37.5 % (ref 35–47)
HGB BLD-MCNC: 12.7 G/DL (ref 11.7–15.7)
IMM GRANULOCYTES # BLD: 0 10E3/UL
IMM GRANULOCYTES NFR BLD: 0 %
INTERPRETATION ECG - MUSE: NORMAL
LYMPHOCYTES # BLD AUTO: 0.8 10E3/UL (ref 0.8–5.3)
LYMPHOCYTES NFR BLD AUTO: 14 %
MCH RBC QN AUTO: 30.7 PG (ref 26.5–33)
MCHC RBC AUTO-ENTMCNC: 33.9 G/DL (ref 31.5–36.5)
MCV RBC AUTO: 91 FL (ref 78–100)
MONOCYTES # BLD AUTO: 0.6 10E3/UL (ref 0–1.3)
MONOCYTES NFR BLD AUTO: 10 %
NEUTROPHILS # BLD AUTO: 3.8 10E3/UL (ref 1.6–8.3)
NEUTROPHILS NFR BLD AUTO: 67 %
NRBC # BLD AUTO: 0 10E3/UL
NRBC BLD AUTO-RTO: 0 /100
P AXIS - MUSE: 70 DEGREES
PLATELET # BLD AUTO: 269 10E3/UL (ref 150–450)
POTASSIUM BLD-SCNC: 3.8 MMOL/L (ref 3.5–5)
PR INTERVAL - MUSE: 236 MS
PROT SERPL-MCNC: 7.1 G/DL (ref 6–8)
QRS DURATION - MUSE: 76 MS
QT - MUSE: 368 MS
QTC - MUSE: 432 MS
R AXIS - MUSE: 48 DEGREES
RBC # BLD AUTO: 4.14 10E6/UL (ref 3.8–5.2)
SODIUM SERPL-SCNC: 138 MMOL/L (ref 136–145)
SYSTOLIC BLOOD PRESSURE - MUSE: NORMAL MMHG
T AXIS - MUSE: 71 DEGREES
TROPONIN I SERPL-MCNC: <0.01 NG/ML (ref 0–0.29)
VENTRICULAR RATE- MUSE: 83 BPM
WBC # BLD AUTO: 5.7 10E3/UL (ref 4–11)

## 2022-09-13 PROCEDURE — 84484 ASSAY OF TROPONIN QUANT: CPT | Performed by: PHYSICIAN ASSISTANT

## 2022-09-13 PROCEDURE — 93005 ELECTROCARDIOGRAM TRACING: CPT | Performed by: PHYSICIAN ASSISTANT

## 2022-09-13 PROCEDURE — 71046 X-RAY EXAM CHEST 2 VIEWS: CPT

## 2022-09-13 PROCEDURE — 36415 COLL VENOUS BLD VENIPUNCTURE: CPT | Performed by: PHYSICIAN ASSISTANT

## 2022-09-13 PROCEDURE — 85379 FIBRIN DEGRADATION QUANT: CPT | Performed by: PHYSICIAN ASSISTANT

## 2022-09-13 PROCEDURE — 83880 ASSAY OF NATRIURETIC PEPTIDE: CPT | Performed by: PHYSICIAN ASSISTANT

## 2022-09-13 PROCEDURE — 85025 COMPLETE CBC W/AUTO DIFF WBC: CPT | Performed by: PHYSICIAN ASSISTANT

## 2022-09-13 PROCEDURE — 99285 EMERGENCY DEPT VISIT HI MDM: CPT | Mod: 25

## 2022-09-13 PROCEDURE — 80053 COMPREHEN METABOLIC PANEL: CPT | Performed by: PHYSICIAN ASSISTANT

## 2022-09-13 ASSESSMENT — ACTIVITIES OF DAILY LIVING (ADL): ADLS_ACUITY_SCORE: 35

## 2022-09-13 NOTE — ED PROVIDER NOTES
Emergency Department Encounter         FINAL IMPRESSION:  Vaccine reaction        ED COURSE AND MEDICAL DECISION MAKING       ED Course as of 09/13/22 1931 Tualvaro Sep 13, 2022   7071 Patient is a 71-year-old family history of breast cancer status post last round of chemotherapy end of May, here after she received her COVID booster yesterday and now is having chest discomfort and shortness of breath.  No fevers, chills, nausea or vomiting.  No leg swelling.  No abdominal pain.  No fevers.  No rash.  Arrival here she states she feels fine.  Has no symptoms at rest.  Mild shortness of breath with exertion.  Plan for cardiopulmonary evaluation including D-dimer and reevaluate.  Physical examination otherwise is unremarkable including clear lung sounds bilaterally.  No leg swelling.   1830 Labs normal.  D-dimer negative.  Plan for chest x-ray and discharged home   Suspected reaction to the vaccine.  Patient states she has been doing well otherwise and just prior to the vaccine.      5:33 PM I met with the patient to gather history and to perform my initial exam. I discussed the plan for care while in the Emergency Department.   7:31 PM I discussed the plan for discharge with the patient, and patient is agreeable.  We discussed supportive cares at home and reasons for return to the ER including new or worsening symptoms - all questions and concerns addressed.  Patient to be discharged by RN.       EKG      At the conclusion of the encounter I discussed the results of all the tests and the disposition. The questions were answered. The patient or family acknowledged understanding and was agreeable with the care plan.          MEDICATIONS GIVEN IN THE EMERGENCY DEPARTMENT:  Medications - No data to display    NEW PRESCRIPTIONS STARTED AT TODAY'S ED VISIT:  New Prescriptions    No medications on file       HPI     Patient information obtained from: Patient    Use of Interpretor: N/A     Lisa Reyna is a 71 year old female  with a pertinent history of hypertension, hyperlipidemia, CAD, ovarian cysts, heart attack, and breast cancer who presents to this ED by walk-in for evaluation of shortness of breath.    Patient endorses getting her omicron booster yesterday. Patient states last night she slept upright with pillows so she wouldn't be short of breath. When she woke up she felt ok, but then the shortness of breath came back. Currently it isn't too bad. Patient also notes she was stressed today. She was packing up her moms close since she just passed away.     Of note, patient has had breast cancer twice, and had her last chemotherapy session 5/31/2022. Patient denies being on blood thinners. Patient denies history of blood clots. Patient denies fever, vomiting, abdominal pain, diarrhea, leg swelling, chest pain, or any other complaints at this time.     REVIEW OF SYSTEMS:  Review of Systems   Constitutional: Negative for fever, malaise  HEENT: Negative runny nose, sore throat, ear pain, neck pain  Respiratory: Negative for cough, congestion. Positive for shortness of breath.   Cardiovascular: Negative for chest pain, leg edema  Gastrointestinal: Negative for abdominal distention, abdominal pain, constipation, vomiting, nausea, diarrhea  Genitourinary: Negative for dysuria and hematuria.   Integument: Negative for rash, skin breakdown  Neurological: Negative for paresthesias, weakness, headache.  Musculoskeletal: Negative for joint pain, joint swelling      All other systems reviewed and are negative.          MEDICAL HISTORY     Past Medical History:   Diagnosis Date     Arthritis      Asthma      Breast cancer (H) 2004     Coronary artery disease      Endometrial polyp 1992     GERD (gastroesophageal reflux disease)      Heart attack (H)      Heart murmur      History of measles, mumps, or rubella as a child     HTN (hypertension)      Hx of radiation therapy 2004     Hyperlipemia      Osteopenia      Other chronic pain      Ovarian  cyst      Prediabetes      UTI (urinary tract infection)      Walking troubles        Past Surgical History:   Procedure Laterality Date     BIOPSY BREAST Left 2008    cyst removal     BIOPSY BREAST Right 2004     CERVICAL DISC SURGERY  6/25/2007    C6-7 sarah laminectomy with microdiskectomy     LUMPECTOMY BREAST Right 6/24/2004     LUMPECTOMY BREAST Right 2/11/2022    Procedure: Right Lumpectomy after Wire Localization; Taylor Lymph Node Biopsy;  Surgeon: Mami Alcantara MD;  Location: [facility]     OOPHORECTOMY Bilateral 2011     TOTAL HIP ARTHROPLASTY Left 9/30/2009     TUBAL LIGATION  1/10/1992    with endometrial polyp removal     WRIST SURGERY         Social History     Tobacco Use     Smoking status: Never Smoker     Smokeless tobacco: Never Used   Substance Use Topics     Alcohol use: Yes     Alcohol/week: 5.0 standard drinks     Comment: Alcoholic Drinks/day: 5 drinks a week of wine/liquor/beer     Drug use: No       letrozole (FEMARA) 2.5 MG tablet  albuterol (PROAIR HFA/PROVENTIL HFA/VENTOLIN HFA) 108 (90 Base) MCG/ACT inhaler  amoxicillin (AMOXIL) 500 MG tablet  ascorbic acid (VITAMIN C) 1000 MG tablet  aspirin 81 MG EC tablet  calcium carbonate (OS-TAMI) 600 mg (1,500 mg) tablet  cholecalciferol, vitamin D3, (VITAMIN D3) 1,000 unit capsule  diclofenac sodium (VOLTAREN) 1 % Gel  famotidine (PEPCID AC MAXIMUM STRENGTH) 20 MG tablet  hydroCHLOROthiazide (HYDRODIURIL) 12.5 MG tablet  Lancets (ONETOUCH DELICA PLUS [id number]) MISC  lidocaine-prilocaine (EMLA) 2.5-2.5 % external cream  loratadine (CLARITIN) 10 mg tablet  losartan (COZAAR) 50 MG tablet  metoprolol succinate ER (TOPROL-XL) 50 MG 24 hr tablet  nitroGLYcerin (NITROSTAT) 0.4 MG sublingual tablet  OMEGA-3 FATTY ACIDS (FISH OIL CONCENTRATE ORAL)  ONETOUCH ULTRA BLUE TEST STRIP strips  POLYETHYLENE GLYCOL 3350 (MIRALAX ORAL)  simvastatin (ZOCOR) 80 MG tablet            PHYSICAL EXAM     BP (!) 195/95   Pulse 81   Temp 97.4  F (36.3  C)  "(Temporal)   Resp 30   Ht 1.575 m (5' 2\")   Wt 72.6 kg (160 lb)   SpO2 100%   BMI 29.26 kg/m        PHYSICAL EXAM:     General: Patient appears well, nontoxic, comfortable  HEENT: Moist mucous membranes, no tongue swelling.  No head trauma.  No midline neck pain.  Cardiovascular: Normal rate, normal rhythm, no extremity edema.  No appreciable murmur.  Respiratory: No signs of respiratory distress, lungs are clear to auscultation bilaterally with no wheezes rhonchi or rales.  Abdominal: Soft, nontender, nondistended, no palpable masses, no guarding, no rebound  Musculoskeletal: Full range of motion of joints, no deformities appreciated.  Neurological: Alert and oriented, grossly neurologically intact.  Psychological: Normal affect and mood.  Integument: No rashes appreciated          RESULTS       Labs Ordered and Resulted from Time of ED Arrival to Time of ED Departure   COMPREHENSIVE METABOLIC PANEL - Abnormal       Result Value    Sodium 138      Potassium 3.8      Chloride 101      Carbon Dioxide (CO2) 27      Anion Gap 10      Urea Nitrogen 15      Creatinine 1.21 (*)     Calcium 10.0      Glucose 104      Alkaline Phosphatase 62      AST 11      ALT <9      Protein Total 7.1      Albumin 3.8      Bilirubin Total 0.5      GFR Estimate 47 (*)    TROPONIN I - Normal    Troponin I <0.01     D DIMER QUANTITATIVE - Normal    D-Dimer Quantitative 0.37     B-TYPE NATRIURETIC PEPTIDE ([facility] ONLY) - Normal    BNP 13     CBC WITH PLATELETS AND DIFFERENTIAL    WBC Count 5.7      RBC Count 4.14      Hemoglobin 12.7      Hematocrit 37.5      MCV 91      MCH 30.7      MCHC 33.9      RDW 12.0      Platelet Count 269      % Neutrophils 67      % Lymphocytes 14      % Monocytes 10      % Eosinophils 7      % Basophils 2      % Immature Granulocytes 0      NRBCs per 100 WBC 0      Absolute Neutrophils 3.8      Absolute Lymphocytes 0.8      Absolute Monocytes 0.6      Absolute Eosinophils 0.4      Absolute Basophils 0.1   "    Absolute Immature Granulocytes 0.0      Absolute NRBCs 0.0         Chest XR,  PA & LAT   Final Result   IMPRESSION:       No focal airspace disease. No pleural effusion or pneumothorax.      The cardiomediastinal silhouette is unremarkable.      Multilevel degenerative changes of the spine.                PROCEDURES:  Procedures:  Procedures       I, Sugar Ramachandran am serving as a scribe to document services personally performed by Rayray Hernandez DO, based on my observations and the provider's statements to me.  I, Rayray Hernandez DO, attest that Sugar Ramachandran is acting in a scribe capacity, has observed my performance of the services and has documented them in accordance with my direction.    Rayray Hernandez DO  Emergency Medicine  [facility] EMERGENCY ROOM     Rayray Hernandez DO  09/13/22 1948

## 2022-09-13 NOTE — ED TRIAGE NOTES
Pt had new Covid [employer] booster and presents with concern for new shortness of breath. Denies any other concerns     Triage Assessment     Row Name 09/13/22 7415       Triage Assessment (Adult)    Airway WDL WDL       Respiratory WDL    Respiratory WDL rhythm/pattern    Rhythm/Pattern, Respiratory shortness of breath       Skin Circulation/Temperature WDL    Skin Circulation/Temperature WDL WDL       Cardiac WDL    Cardiac WDL WDL       Peripheral/Neurovascular WDL    Peripheral Neurovascular WDL WDL       Cognitive/Neuro/Behavioral WDL    Cognitive/Neuro/Behavioral WDL WDL

## 2022-09-13 NOTE — ED NOTES
Pt asked to take her home meds for 4 pm. MD stated she may take her statin and calcium and letrozol    XIAO OWEN RN

## 2022-09-19 ENCOUNTER — PROCEDURE ONLY VISIT (OUTPATIENT)
Dept: ONCOLOGY | Facility: CLINIC | Age: 72
End: 2022-09-19
Attending: INTERNAL MEDICINE
Payer: MEDICARE

## 2022-09-19 DIAGNOSIS — T45.1X5A CHEMOTHERAPY-INDUCED PERIPHERAL NEUROPATHY (H): ICD-10-CM

## 2022-09-19 DIAGNOSIS — G62.0 CHEMOTHERAPY-INDUCED PERIPHERAL NEUROPATHY (H): ICD-10-CM

## 2022-09-19 DIAGNOSIS — M54.59 OTHER LOW BACK PAIN: Primary | ICD-10-CM

## 2022-09-19 PROCEDURE — 97810 ACUP 1/> WO ESTIM 1ST 15 MIN: CPT

## 2022-09-19 PROCEDURE — 97811 ACUP 1/> W/O ESTIM EA ADD 15: CPT

## 2022-09-19 NOTE — PROGRESS NOTES
ACUPUNCTURIST TREATMENT NOTE      Lisa Reyna, a 71 year old female, is here today for Follow - Up exam. Patient is referred by No ref. provider found.    HPI  Main Complaint: Chronic low back pain  Secondary Complaints: Neuropathy of (L) hand and bilateral feet    Past Medical History  Past Medical History Reviewed: No   has a past medical history of Arthritis, Asthma, Breast cancer (H) (2004), Coronary artery disease, Endometrial polyp (1992), GERD (gastroesophageal reflux disease), Heart attack (H), Heart murmur, History of measles, mumps, or rubella (as a child), HTN (hypertension), radiation therapy (2004), Hyperlipemia, Osteopenia, Other chronic pain, Ovarian cyst, Prediabetes, UTI (urinary tract infection), and Walking troubles.    Objective  Basic Exam Completed:   No    TCM Exam Completed: Yes   Limbs/Back: Left Upper Extremity and Lower Back    Tongue/Pulse Exam Completed: No    Patient Assessment  Patient Type: Oncology  Patient Complaint: Chronic low back pain, neuropathy of (L) hand and feet    Acupuncture 9/6/2022 9/19/2022   Intervention Reason Pain; Neuropathy; Neuropathy 2 Pain; Neuropathy; Neuropathy 2   Pain Location Low back Low back   Pre-session Pain Rating 0 0   Post-session Pain Rating 0 0   Neuropathy Location (L) hand (L) hand   Pre-session Neuropathy rating 2 0   Post-session Neuropathy rating 0 0   Neuropathy 2 Location Feet, bilateral Feet, bilateral   Pre-session Neuropathy 2 rating 3 3   Post-session Neuropathy 2 rating 2 2       TCM Diagnosis: Qi Deficiency;Blood Deficiency;Yin Deficiency;Qi Stagnation;Kidney Qi Deficiency;Spleen Qi Deficiency;Damp and Phlegm Accumulation      Treatment Principle: tonify and nourish, move qi and blood    TCM / Acupuncture Treatment  Acupuncture Points:       Initial insertions: Li 10, (L) P7 (R) Ling gu, (R) Si 3       Second insertions: Horacio cardenas, Kid 1, (L) Bl 60, 62, 65            Number of needles inserted: 18  Number of needles removed:  18    Accessory Techniques 9/6/2022 9/19/2022   Accessory Techniques TDP Heat Lamp TDP Heat Lamp   TDP Heat Lamp location used over feet over feet          Assessment and Plan  Treatment Observations:    Acupuncture Treatment Recommendations:         It is my recommendation that this patient seek advice from their Primary Care Provider about active symptoms not addressed during this visit. The risks and benefits of acupuncture were reviewed and the patient stated understanding. The patient's questions were answered to their satisfaction. Consent was provided for treatment. We thank you for the referral and opportunity to treat this patient.    Time Spent with Patient:   I spent a total of 30 minutes face-to-face with Lisa Reyna during today's office visit.     Woody Nielsen

## 2022-09-27 ENCOUNTER — LAB REQUISITION (OUTPATIENT)
Dept: LAB | Facility: CLINIC | Age: 72
End: 2022-09-27
Payer: MEDICARE

## 2022-09-27 DIAGNOSIS — E78.1 PURE HYPERGLYCERIDEMIA: ICD-10-CM

## 2022-09-27 DIAGNOSIS — E11.9 TYPE 2 DIABETES MELLITUS WITHOUT COMPLICATIONS (H): ICD-10-CM

## 2022-09-27 LAB
ALBUMIN SERPL BCG-MCNC: 4.6 G/DL (ref 3.5–5.2)
ALP SERPL-CCNC: 66 U/L (ref 35–104)
ALT SERPL W P-5'-P-CCNC: 9 U/L (ref 10–35)
ANION GAP SERPL CALCULATED.3IONS-SCNC: 10 MMOL/L (ref 7–15)
AST SERPL W P-5'-P-CCNC: 14 U/L (ref 10–35)
BILIRUB SERPL-MCNC: 0.5 MG/DL
BUN SERPL-MCNC: 28.1 MG/DL (ref 8–23)
CALCIUM SERPL-MCNC: 9.6 MG/DL (ref 8.8–10.2)
CHLORIDE SERPL-SCNC: 100 MMOL/L (ref 98–107)
CHOLEST SERPL-MCNC: 169 MG/DL
CREAT SERPL-MCNC: 1.09 MG/DL (ref 0.51–0.95)
DEPRECATED HCO3 PLAS-SCNC: 28 MMOL/L (ref 22–29)
GFR SERPL CREATININE-BSD FRML MDRD: 54 ML/MIN/1.73M2
GLUCOSE SERPL-MCNC: 104 MG/DL (ref 70–99)
HDLC SERPL-MCNC: 61 MG/DL
LDLC SERPL CALC-MCNC: 91 MG/DL
NONHDLC SERPL-MCNC: 108 MG/DL
POTASSIUM SERPL-SCNC: 4.3 MMOL/L (ref 3.4–5.3)
PROT SERPL-MCNC: 6.8 G/DL (ref 6.4–8.3)
SODIUM SERPL-SCNC: 138 MMOL/L (ref 136–145)
TRIGL SERPL-MCNC: 87 MG/DL

## 2022-09-27 PROCEDURE — 80053 COMPREHEN METABOLIC PANEL: CPT | Mod: ORL | Performed by: NURSE PRACTITIONER

## 2022-09-27 PROCEDURE — 80061 LIPID PANEL: CPT | Mod: ORL | Performed by: NURSE PRACTITIONER

## 2022-09-28 ENCOUNTER — PROCEDURE ONLY VISIT (OUTPATIENT)
Dept: ONCOLOGY | Facility: CLINIC | Age: 72
End: 2022-09-28
Attending: INTERNAL MEDICINE
Payer: MEDICARE

## 2022-09-28 DIAGNOSIS — M54.50 CHRONIC BILATERAL LOW BACK PAIN WITHOUT SCIATICA: ICD-10-CM

## 2022-09-28 DIAGNOSIS — G62.0 CHEMOTHERAPY-INDUCED PERIPHERAL NEUROPATHY (H): ICD-10-CM

## 2022-09-28 DIAGNOSIS — T45.1X5A CHEMOTHERAPY-INDUCED PERIPHERAL NEUROPATHY (H): ICD-10-CM

## 2022-09-28 DIAGNOSIS — G89.29 CHRONIC BILATERAL LOW BACK PAIN WITHOUT SCIATICA: ICD-10-CM

## 2022-09-28 DIAGNOSIS — M54.59 OTHER LOW BACK PAIN: Primary | ICD-10-CM

## 2022-09-28 PROCEDURE — 97811 ACUP 1/> W/O ESTIM EA ADD 15: CPT | Performed by: ACUPUNCTURIST

## 2022-09-28 PROCEDURE — 97810 ACUP 1/> WO ESTIM 1ST 15 MIN: CPT | Performed by: ACUPUNCTURIST

## 2022-09-28 NOTE — PROGRESS NOTES
ACUPUNCTURIST TREATMENT NOTE      Lisa Reyna, a 71 year old female, is here today for Follow - Up exam. Patient is referred by No ref. provider found.    HPI  Main Complaint: Chronic low back pain, neuropathy of (L) hand and feet  Follow-up: Patient reports that she got an injection on Sept 16th and her back and hip pain are improved.  She reports mild improvement in her (L) wrist and bilateral feet.    Past Medical History  Past Medical History Reviewed: Yes   has a past medical history of Arthritis, Asthma, Breast cancer (H) (2004), Coronary artery disease, Endometrial polyp (1992), GERD (gastroesophageal reflux disease), Heart attack (H), Heart murmur, History of measles, mumps, or rubella (as a child), HTN (hypertension), radiation therapy (2004), Hyperlipemia, Osteopenia, Other chronic pain, Ovarian cyst, Prediabetes, UTI (urinary tract infection), and Walking troubles.    Objective  Basic Exam Completed:   No    TCM Exam Completed: Yes   Limbs/Back: Bilateral Lower Extremity and Lower Back    Tongue/Pulse Exam Completed: Yes           Patient Assessment  Patient Type: Oncology  Patient Complaint: Chronic low back pain, neuropathy of (L) hand and feet    Acupuncture 9/19/2022 9/28/2022   Intervention Reason Pain; Neuropathy; Neuropathy 2 Pain; Neuropathy; Neuropathy 2   Pain Location Low back low back   Pre-session Pain Rating 0 0   Post-session Pain Rating 0 0   Neuropathy Location (L) hand (L) hand   Pre-session Neuropathy rating 0 2   Post-session Neuropathy rating 0 0   Neuropathy 2 Location Feet, bilateral Feet, bilateral   Pre-session Neuropathy 2 rating 3 2   Post-session Neuropathy 2 rating 2 1       TCM Diagnosis: Qi Deficiency;Blood Deficiency;Yin Deficiency;Qi Stagnation;Kidney Qi Deficiency;Spleen Qi Deficiency;Damp and Phlegm Accumulation      Treatment Principle: tonify and nourish, move qi and blood    TCM / Acupuncture Treatment  Acupuncture Points:       Initial insertions: (L) wrist zones  4,5,6; Letty Cervantes, Da Karla, TW 5, LI 10       Second insertions: St 36, Das [place], Earth and Man Emperor, GB 41, Ba Freddy                    Accessory Techniques 9/19/2022 9/28/2022   Accessory Techniques TDP Heat Lamp TDP Heat Lamp   TDP Heat Lamp location used over feet over feet          Assessment and Plan  Treatment Observations: Pt. rested comfortably  Acupuncture Treatment Recommendations:         It is my recommendation that this patient seek advice from their Primary Care Provider about active symptoms not addressed during this visit. The risks and benefits of acupuncture were reviewed and the patient stated understanding. The patient's questions were answered to their satisfaction. Consent was provided for treatment. We thank you for the referral and opportunity to treat this patient.    Time Spent with Patient:   I spent a total of 30 minutes face-to-face with Lisa Reyna during today's office visit.     Nilda Brewer L.Ac.

## 2022-10-10 ENCOUNTER — PROCEDURE ONLY VISIT (OUTPATIENT)
Dept: ONCOLOGY | Facility: CLINIC | Age: 72
End: 2022-10-10
Attending: INTERNAL MEDICINE
Payer: MEDICARE

## 2022-10-10 DIAGNOSIS — G62.0 CHEMOTHERAPY-INDUCED PERIPHERAL NEUROPATHY (H): ICD-10-CM

## 2022-10-10 DIAGNOSIS — M54.59 OTHER LOW BACK PAIN: Primary | ICD-10-CM

## 2022-10-10 DIAGNOSIS — T45.1X5A CHEMOTHERAPY-INDUCED PERIPHERAL NEUROPATHY (H): ICD-10-CM

## 2022-10-10 PROCEDURE — 97811 ACUP 1/> W/O ESTIM EA ADD 15: CPT

## 2022-10-10 PROCEDURE — 97810 ACUP 1/> WO ESTIM 1ST 15 MIN: CPT

## 2022-10-13 ENCOUNTER — TELEPHONE (OUTPATIENT)
Dept: ONCOLOGY | Facility: CLINIC | Age: 72
End: 2022-10-13

## 2022-10-13 NOTE — TELEPHONE ENCOUNTER
Patient calls regarding Letrozole medication. She has 5 days left of her prescription and does have 1 refill. She is wondering if the next RX can be for a 90 day supply. Patient is tolerating medication fine, denies any concerns. She has an upcoming appointment on Tuesday Oct 18th.     Patient will get her last 30 day refill and discuss the 90 day supply at upcoming appointment. She denies further questions.    Rabia Silvestre RN

## 2022-10-14 ENCOUNTER — TELEPHONE (OUTPATIENT)
Dept: CARDIOLOGY | Facility: CLINIC | Age: 72
End: 2022-10-14

## 2022-10-18 ENCOUNTER — LAB (OUTPATIENT)
Dept: INFUSION THERAPY | Facility: CLINIC | Age: 72
End: 2022-10-18
Attending: INTERNAL MEDICINE
Payer: MEDICARE

## 2022-10-18 ENCOUNTER — ONCOLOGY VISIT (OUTPATIENT)
Dept: ONCOLOGY | Facility: CLINIC | Age: 72
End: 2022-10-18
Attending: INTERNAL MEDICINE
Payer: MEDICARE

## 2022-10-18 VITALS
WEIGHT: 167.5 LBS | HEART RATE: 71 BPM | BODY MASS INDEX: 30.82 KG/M2 | RESPIRATION RATE: 16 BRPM | DIASTOLIC BLOOD PRESSURE: 72 MMHG | SYSTOLIC BLOOD PRESSURE: 128 MMHG | OXYGEN SATURATION: 98 % | HEIGHT: 62 IN

## 2022-10-18 DIAGNOSIS — C50.311 MALIGNANT NEOPLASM OF LOWER-INNER QUADRANT OF RIGHT BREAST OF FEMALE, ESTROGEN RECEPTOR POSITIVE (H): ICD-10-CM

## 2022-10-18 DIAGNOSIS — M54.59 OTHER LOW BACK PAIN: ICD-10-CM

## 2022-10-18 DIAGNOSIS — G62.0 CHEMOTHERAPY-INDUCED PERIPHERAL NEUROPATHY (H): ICD-10-CM

## 2022-10-18 DIAGNOSIS — T45.1X5A CHEMOTHERAPY-INDUCED PERIPHERAL NEUROPATHY (H): ICD-10-CM

## 2022-10-18 DIAGNOSIS — M54.50 CHRONIC BILATERAL LOW BACK PAIN WITHOUT SCIATICA: Primary | ICD-10-CM

## 2022-10-18 DIAGNOSIS — G89.29 CHRONIC BILATERAL LOW BACK PAIN WITHOUT SCIATICA: Primary | ICD-10-CM

## 2022-10-18 DIAGNOSIS — Z17.0 MALIGNANT NEOPLASM OF LOWER-INNER QUADRANT OF RIGHT BREAST OF FEMALE, ESTROGEN RECEPTOR POSITIVE (H): ICD-10-CM

## 2022-10-18 LAB
ALBUMIN SERPL-MCNC: 4.1 G/DL (ref 3.5–5)
ALP SERPL-CCNC: 63 U/L (ref 45–120)
ALT SERPL W P-5'-P-CCNC: 14 U/L (ref 0–45)
ANION GAP SERPL CALCULATED.3IONS-SCNC: 11 MMOL/L (ref 5–18)
AST SERPL W P-5'-P-CCNC: 14 U/L (ref 0–40)
BASOPHILS # BLD AUTO: 0.1 10E3/UL (ref 0–0.2)
BASOPHILS NFR BLD AUTO: 1 %
BILIRUB SERPL-MCNC: 0.9 MG/DL (ref 0–1)
BUN SERPL-MCNC: 20 MG/DL (ref 8–28)
CALCIUM SERPL-MCNC: 9.6 MG/DL (ref 8.5–10.5)
CHLORIDE BLD-SCNC: 100 MMOL/L (ref 98–107)
CO2 SERPL-SCNC: 26 MMOL/L (ref 22–31)
CREAT SERPL-MCNC: 1.01 MG/DL (ref 0.6–1.1)
EOSINOPHIL # BLD AUTO: 0.2 10E3/UL (ref 0–0.7)
EOSINOPHIL NFR BLD AUTO: 4 %
ERYTHROCYTE [DISTWIDTH] IN BLOOD BY AUTOMATED COUNT: 13.2 % (ref 10–15)
GFR SERPL CREATININE-BSD FRML MDRD: 59 ML/MIN/1.73M2
GLUCOSE BLD-MCNC: 70 MG/DL (ref 70–125)
HCT VFR BLD AUTO: 41.3 % (ref 35–47)
HGB BLD-MCNC: 13.2 G/DL (ref 11.7–15.7)
IMM GRANULOCYTES # BLD: 0 10E3/UL
IMM GRANULOCYTES NFR BLD: 0 %
LYMPHOCYTES # BLD AUTO: 0.9 10E3/UL (ref 0.8–5.3)
LYMPHOCYTES NFR BLD AUTO: 18 %
MCH RBC QN AUTO: 30.3 PG (ref 26.5–33)
MCHC RBC AUTO-ENTMCNC: 32 G/DL (ref 31.5–36.5)
MCV RBC AUTO: 95 FL (ref 78–100)
MONOCYTES # BLD AUTO: 0.5 10E3/UL (ref 0–1.3)
MONOCYTES NFR BLD AUTO: 10 %
NEUTROPHILS # BLD AUTO: 3.3 10E3/UL (ref 1.6–8.3)
NEUTROPHILS NFR BLD AUTO: 67 %
NRBC # BLD AUTO: 0 10E3/UL
NRBC BLD AUTO-RTO: 0 /100
PLATELET # BLD AUTO: 281 10E3/UL (ref 150–450)
POTASSIUM BLD-SCNC: 3.4 MMOL/L (ref 3.5–5)
PROT SERPL-MCNC: 7.3 G/DL (ref 6–8)
RBC # BLD AUTO: 4.35 10E6/UL (ref 3.8–5.2)
SODIUM SERPL-SCNC: 137 MMOL/L (ref 136–145)
WBC # BLD AUTO: 4.9 10E3/UL (ref 4–11)

## 2022-10-18 PROCEDURE — 80053 COMPREHEN METABOLIC PANEL: CPT

## 2022-10-18 PROCEDURE — 97811 ACUP 1/> W/O ESTIM EA ADD 15: CPT | Performed by: ACUPUNCTURIST

## 2022-10-18 PROCEDURE — 99214 OFFICE O/P EST MOD 30 MIN: CPT | Performed by: INTERNAL MEDICINE

## 2022-10-18 PROCEDURE — 97810 ACUP 1/> WO ESTIM 1ST 15 MIN: CPT | Performed by: ACUPUNCTURIST

## 2022-10-18 PROCEDURE — 85025 COMPLETE CBC W/AUTO DIFF WBC: CPT

## 2022-10-18 PROCEDURE — G0463 HOSPITAL OUTPT CLINIC VISIT: HCPCS

## 2022-10-18 PROCEDURE — 36415 COLL VENOUS BLD VENIPUNCTURE: CPT

## 2022-10-18 RX ORDER — LETROZOLE 2.5 MG/1
1 TABLET, FILM COATED ORAL DAILY
Qty: 90 TABLET | Refills: 1 | Status: SHIPPED | OUTPATIENT
Start: 2022-10-18 | End: 2023-05-08

## 2022-10-18 ASSESSMENT — PAIN SCALES - GENERAL: PAINLEVEL: NO PAIN (0)

## 2022-10-18 NOTE — PROGRESS NOTES
[facility] Hematology and Oncology Progress Note    Patient: Lisa Reyna  MRN: [id number]  Date of Service: Oct 18, 2022         Reason for Visit    Chief Complaint   Patient presents with     Oncology Clinic Visit     Malignant neoplasm of lower-inner quadrant of right breast of female, estrogen receptor positive       Assessment and Plan     Cancer Staging   Malignant neoplasm of lower-inner quadrant of right breast of female, estrogen receptor positive (H)  Staging form: Breast, AJCC 8th Edition  - Pathologic stage from 2/11/2022: Stage IA (pT1c, pN0(sn), cM0, G2, ER+, [place]+, HER2-, Oncotype DX score: 30) - Signed by Tamar Koch MD on 3/13/2022  - Clinical: No stage assigned - Unsigned      ECOG Performance    0 - Independent     Pain  Pain Score: No Pain (0)    #.  Invasive ductal carcinoma of LIQ of right breast, stage IA, ER+[place]+HER2-. Oncotype 30/19%, >15%.  #.  Grade 1 neuropathy of both feet, exacerbated by chemotherapy on pre-existing neuropathy of both feet, stable     She tolerates letrozole. Will continue. Refilled today.   Labs reviewed.  Hemogram, complete metabolic profile are normal and hemoglobin is completely normalized at this point.   She decided against mastectomy, but interested enhanced breast cancer screening by every 6 months mammogram.  Last mammogram from 7/2022 was benign.   Reviewed DEXA scan and it showed normal bone density.  I advised her to continue calcium and vitamin D for bone health as well as weightbearing exercises.  We will plan to repeat bone density scan in about 2 years.  I think we can hold off starting bisphosphonate at this point and she agree with that.      Follow-up with me in 4 months to assess toxicity and follow-up labs for anemia.    #.  Genetic counseling   Genetic test was negative for hereditary breast and ovarian cancer genes.    Encounter Diagnoses:    Problem List Items Addressed This Visit        Oncology Diagnoses    Malignant neoplasm of  lower-inner quadrant of right breast of female, estrogen receptor positive (H)    Relevant Medications    letrozole (FEMARA) 2.5 MG tablet          CC: Cayla Skelton, NP   ______________________________________________________________________________  Diagnosis  6/24/2004- She had prior history of right breast DCIS    1/18/2022- screening mammogram detected     Invasive ductal carcinoma with focal micropapillary pattern.  Grade 2.  12 mm.     Margins were negative but close at 0.5 mm from the nearest junction of the inferior and medial margin, 0.5 mm from medial margin and 2 mm from inferior margin.     There is associated DCIS with EIC negative, nuclear grade intermediate, solid and focal cribriform pattern of 10%.  DCIS margins were uninvolved at 3 mm from nearest inferior margin, 5 mm from medial margins.     1 sentinel lymph node was negative for metastatic carcinoma.    ER positive, greater than 10% of the cell, 61-70%, UT positive (21-30%), HER-2 negative by IHC (1+).     Oncotype DX recurrence score 30/19% / >15%.    5/26/2022- genetic counseling and testing completed.  No pathogenic mutation found.    Treatment to date  6/2004- post right breast lumpectomy (Dr. Jones) on 6/24/2004, post adjuvant radiation (Dr. Caleb Elena) followed by adjuvant tamoxifen for 5 years (Dr. Noé Fernandes).    2/11/2022- right breast lumpectomy and right sentinel lymph node biopsy (Dr. Alcantara).   3/25/2022-5/31/2022- completed 4 cycles of adjuvant TC  7/20/2022- initiated letrozole    History of Present Illness    Ms. Lisa Reyna presented today unaccompanied.  She tolerates anastrozole okay.  She decided against bilateral mastectomy.  She does not have any concern about her breasts.  No bone pain.  Appetite is good.  Energy level is good.    Review of systems  Apart from describing in HPI, the remainder of comprehensive ROS was negative.    Past History    Past Medical History:   Diagnosis Date      "Arthritis      Asthma     Seaonal usually in spring     Breast cancer (H) 2004    rt lumpect     Coronary artery disease      Endometrial polyp 1992    was protruding through cervix     GERD (gastroesophageal reflux disease)      Heart attack (H)      Heart murmur      History of measles, mumps, or rubella as a child    hx of measles, mumps and Trinidadian mealses. Rubella immune 5/1980     HTN (hypertension)      Hx of radiation therapy 2004     Hyperlipemia      Osteopenia      Other chronic pain      Ovarian cyst      Prediabetes      UTI (urinary tract infection)      Walking troubles        Past Surgical History:   Procedure Laterality Date     BIOPSY BREAST Left 2008    cyst removal     BIOPSY BREAST Right 2004     CERVICAL DISC SURGERY  6/25/2007    C6-7 sarah laminectomy with microdiskectomy     LUMPECTOMY BREAST Right 6/24/2004     LUMPECTOMY BREAST Right 2/11/2022    Procedure: Right Lumpectomy after Wire Localization; [place] Lymph Node Biopsy;  Surgeon: Mami Alcantara MD;  Location: [facility]     OOPHORECTOMY Bilateral 2011     TOTAL HIP ARTHROPLASTY Left 9/30/2009     TUBAL LIGATION  1/10/1992    with endometrial polyp removal     WRIST SURGERY         Physical Exam    /72   Pulse 71   Resp 16   Ht 1.575 m (5' 2\")   Wt 76 kg (167 lb 8 oz)   SpO2 98%   BMI 30.64 kg/m      General: alert, awake, not in acute distress  HEENT: Head: Normal, normocephalic, atraumatic.  Eye: Normal external eye, conjunctiva, lids cornea, ALENA.  Pharynx: Normal buccal mucosa. Normal pharynx.  Neck / Thyroid: Supple, no masses, nodes, nodules or enlargement.  Lymphatics: No abnormally enlarged lymph nodes.  Chest: Normal chest wall and respirations. Clear to auscultation.  Breasts: unremarkable.  Heart: S1 S2 RRR.   Abdomen: abdomen is soft without significant tenderness, masses, organomegaly or guarding  Extremities: normal strength, tone, and muscle mass  Skin: normal. no rash or abnormalities  CNS: non " focal.    Lab Results    Recent Results (from the past 168 hour(s))   Comprehensive metabolic panel   Result Value Ref Range    Sodium 137 136 - 145 mmol/L    Potassium 3.4 (L) 3.5 - 5.0 mmol/L    Chloride 100 98 - 107 mmol/L    Carbon Dioxide (CO2) 26 22 - 31 mmol/L    Anion Gap 11 5 - 18 mmol/L    Urea Nitrogen 20 8 - 28 mg/dL    Creatinine 1.01 0.60 - 1.10 mg/dL    Calcium 9.6 8.5 - 10.5 mg/dL    Glucose 70 70 - 125 mg/dL    Alkaline Phosphatase 63 45 - 120 U/L    AST 14 0 - 40 U/L    ALT 14 0 - 45 U/L    Protein Total 7.3 6.0 - 8.0 g/dL    Albumin 4.1 3.5 - 5.0 g/dL    Bilirubin Total 0.9 0.0 - 1.0 mg/dL    GFR Estimate 59 (L) >60 mL/min/1.73m2   CBC with platelets and differential   Result Value Ref Range    WBC Count 4.9 4.0 - 11.0 10e3/uL    RBC Count 4.35 3.80 - 5.20 10e6/uL    Hemoglobin 13.2 11.7 - 15.7 g/dL    Hematocrit 41.3 35.0 - 47.0 %    MCV 95 78 - 100 fL    MCH 30.3 26.5 - 33.0 pg    MCHC 32.0 31.5 - 36.5 g/dL    RDW 13.2 10.0 - 15.0 %    Platelet Count 281 150 - 450 10e3/uL    % Neutrophils 67 %    % Lymphocytes 18 %    % Monocytes 10 %    % Eosinophils 4 %    % Basophils 1 %    % Immature Granulocytes 0 %    NRBCs per 100 WBC 0 <1 /100    Absolute Neutrophils 3.3 1.6 - 8.3 10e3/uL    Absolute Lymphocytes 0.9 0.8 - 5.3 10e3/uL    Absolute Monocytes 0.5 0.0 - 1.3 10e3/uL    Absolute Eosinophils 0.2 0.0 - 0.7 10e3/uL    Absolute Basophils 0.1 0.0 - 0.2 10e3/uL    Absolute Immature Granulocytes 0.0 <=0.4 10e3/uL    Absolute NRBCs 0.0 10e3/uL       Imaging    No results found.    30 minutes spent on the date of the encounter doing chart review, history and exam, documentation and further activities as noted above.    Signed by: Tamar Koch MD

## 2022-10-18 NOTE — LETTER
"    10/18/2022         RE: Lisa Reyna  [address] [facility] [place] [address]        Dear Colleague,    Thank you for referring your patient, Lisa Reyna, to the [facility] CANC[facility]. Please see a copy of my visit note below.    Oncology Rooming Note    October 18, 2022 10:42 AM   Lisa Reyna is a 71 year old female who presents for:    Chief Complaint   Patient presents with     Oncology Clinic Visit     Malignant neoplasm of lower-inner quadrant of right breast of female, estrogen receptor positive     Initial Vitals: /72   Pulse 71   Resp 16   Ht 1.575 m (5' 2\")   Wt 76 kg (167 lb 8 oz)   SpO2 98%   BMI 30.64 kg/m   Estimated body mass index is 30.64 kg/m  as calculated from the following:    Height as of this encounter: 1.575 m (5' 2\").    Weight as of this encounter: 76 kg (167 lb 8 oz). Body surface area is 1.82 meters squared.  No Pain (0) Comment: Data Unavailable   No LMP recorded. Patient is postmenopausal.  Allergies reviewed: Yes  Medications reviewed: Yes    Medications: Medication refills not needed today.  Pharmacy name entered into [facility]: [facility], [place], [place] [address] [facility] [address] [place] [address]    Clinical concerns:  3 month follow up      Sarahy Castellanos              [facility] Hematology and Oncology Progress Note    Patient: Lisa Reyna  MRN: [id number]  Date of Service: Oct 18, 2022         Reason for Visit    Chief Complaint   Patient presents with     Oncology Clinic Visit     Malignant neoplasm of lower-inner quadrant of right breast of female, estrogen receptor positive       Assessment and Plan     Cancer Staging   Malignant neoplasm of lower-inner quadrant of right breast of female, estrogen receptor positive (H)  Staging form: Breast, AJCC 8th Edition  - Pathologic stage from 2/11/2022: Stage IA (pT1c, pN0(sn), cM0, G2, ER+, [place]+, HER2-, Oncotype DX score: 30) - Signed by Tamar Koch MD on 3/13/2022  - Clinical: No " stage assigned - Unsigned      ECOG Performance    0 - Independent     Pain  Pain Score: No Pain (0)    #.  Invasive ductal carcinoma of LIQ of right breast, stage IA, ER+[place]+HER2-. Oncotype 30/19%, >15%.  #.  Grade 1 neuropathy of both feet, exacerbated by chemotherapy on pre-existing neuropathy of both feet, stable     She tolerates letrozole. Will continue. Refilled today.   Labs reviewed.  Hemogram, complete metabolic profile are normal and hemoglobin is completely normalized at this point.   She decided against mastectomy, but interested enhanced breast cancer screening by every 6 months mammogram.  Last mammogram from 7/2022 was benign.   Reviewed DEXA scan and it showed normal bone density.  I advised her to continue calcium and vitamin D for bone health as well as weightbearing exercises.  We will plan to repeat bone density scan in about 2 years.  I think we can hold off starting bisphosphonate at this point and she agree with that.      Follow-up with me in 4 months to assess toxicity and follow-up labs for anemia.    #.  Genetic counseling   Genetic test was negative for hereditary breast and ovarian cancer genes.    Encounter Diagnoses:    Problem List Items Addressed This Visit        Oncology Diagnoses    Malignant neoplasm of lower-inner quadrant of right breast of female, estrogen receptor positive (H)    Relevant Medications    letrozole (FEMARA) 2.5 MG tablet          CC: Cayla Skelton, KERMIT   ______________________________________________________________________________  Diagnosis  6/24/2004- She had prior history of right breast DCIS    1/18/2022- screening mammogram detected     Invasive ductal carcinoma with focal micropapillary pattern.  Grade 2.  12 mm.     Margins were negative but close at 0.5 mm from the nearest junction of the inferior and medial margin, 0.5 mm from medial margin and 2 mm from inferior margin.     There is associated DCIS with EIC negative, nuclear grade intermediate, solid  and focal cribriform pattern of 10%.  DCIS margins were uninvolved at 3 mm from nearest inferior margin, 5 mm from medial margins.     1 sentinel lymph node was negative for metastatic carcinoma.    ER positive, greater than 10% of the cell, 61-70%, AR positive (21-30%), HER-2 negative by IHC (1+).     Oncotype DX recurrence score 30/19% / >15%.    5/26/2022- genetic counseling and testing completed.  No pathogenic mutation found.    Treatment to date  6/2004- post right breast lumpectomy (Dr. Jones) on 6/24/2004, post adjuvant radiation (Dr. Caleb Elena) followed by adjuvant tamoxifen for 5 years (Dr. Noé Fernandes).    2/11/2022- right breast lumpectomy and right sentinel lymph node biopsy (Dr. Alcantara).   3/25/2022-5/31/2022- completed 4 cycles of adjuvant TC  7/20/2022- initiated letrozole    History of Present Illness    Ms. Lisa Reyna presented today unaccompanied.  She tolerates anastrozole okay.  She decided against bilateral mastectomy.  She does not have any concern about her breasts.  No bone pain.  Appetite is good.  Energy level is good.    Review of systems  Apart from describing in HPI, the remainder of comprehensive ROS was negative.    Past History    Past Medical History:   Diagnosis Date     Arthritis      Asthma     Seaonal usually in spring     Breast cancer (H) 2004    rt lumpect     Coronary artery disease      Endometrial polyp 1992    was protruding through cervix     GERD (gastroesophageal reflux disease)      Heart attack (H)      Heart murmur      History of measles, mumps, or rubella as a child    hx of measles, mumps and Pashto mealses. Rubella immune 5/1980     HTN (hypertension)      Hx of radiation therapy 2004     Hyperlipemia      Osteopenia      Other chronic pain      Ovarian cyst      Prediabetes      UTI (urinary tract infection)      Walking troubles        Past Surgical History:   Procedure Laterality Date     BIOPSY BREAST Left 2008    cyst removal      "BIOPSY BREAST Right 2004     CERVICAL DISC SURGERY  6/25/2007    C6-7 sarah laminectomy with microdiskectomy     LUMPECTOMY BREAST Right 6/24/2004     LUMPECTOMY BREAST Right 2/11/2022    Procedure: Right Lumpectomy after Wire Localization; [place] Lymph Node Biopsy;  Surgeon: Mami Alcantara MD;  Location: [facility]     OOPHORECTOMY Bilateral 2011     TOTAL HIP ARTHROPLASTY Left 9/30/2009     TUBAL LIGATION  1/10/1992    with endometrial polyp removal     WRIST SURGERY         Physical Exam    /72   Pulse 71   Resp 16   Ht 1.575 m (5' 2\")   Wt 76 kg (167 lb 8 oz)   SpO2 98%   BMI 30.64 kg/m      General: alert, awake, not in acute distress  HEENT: Head: Normal, normocephalic, atraumatic.  Eye: Normal external eye, conjunctiva, lids cornea, ALENA.  Pharynx: Normal buccal mucosa. Normal pharynx.  Neck / Thyroid: Supple, no masses, nodes, nodules or enlargement.  Lymphatics: No abnormally enlarged lymph nodes.  Chest: Normal chest wall and respirations. Clear to auscultation.  Breasts: unremarkable.  Heart: S1 S2 RRR.   Abdomen: abdomen is soft without significant tenderness, masses, organomegaly or guarding  Extremities: normal strength, tone, and muscle mass  Skin: normal. no rash or abnormalities  CNS: non focal.    Lab Results    Recent Results (from the past 168 hour(s))   Comprehensive metabolic panel   Result Value Ref Range    Sodium 137 136 - 145 mmol/L    Potassium 3.4 (L) 3.5 - 5.0 mmol/L    Chloride 100 98 - 107 mmol/L    Carbon Dioxide (CO2) 26 22 - 31 mmol/L    Anion Gap 11 5 - 18 mmol/L    Urea Nitrogen 20 8 - 28 mg/dL    Creatinine 1.01 0.60 - 1.10 mg/dL    Calcium 9.6 8.5 - 10.5 mg/dL    Glucose 70 70 - 125 mg/dL    Alkaline Phosphatase 63 45 - 120 U/L    AST 14 0 - 40 U/L    ALT 14 0 - 45 U/L    Protein Total 7.3 6.0 - 8.0 g/dL    Albumin 4.1 3.5 - 5.0 g/dL    Bilirubin Total 0.9 0.0 - 1.0 mg/dL    GFR Estimate 59 (L) >60 mL/min/1.73m2   CBC with platelets and differential   Result " Value Ref Range    WBC Count 4.9 4.0 - 11.0 10e3/uL    RBC Count 4.35 3.80 - 5.20 10e6/uL    Hemoglobin 13.2 11.7 - 15.7 g/dL    Hematocrit 41.3 35.0 - 47.0 %    MCV 95 78 - 100 fL    MCH 30.3 26.5 - 33.0 pg    MCHC 32.0 31.5 - 36.5 g/dL    RDW 13.2 10.0 - 15.0 %    Platelet Count 281 150 - 450 10e3/uL    % Neutrophils 67 %    % Lymphocytes 18 %    % Monocytes 10 %    % Eosinophils 4 %    % Basophils 1 %    % Immature Granulocytes 0 %    NRBCs per 100 WBC 0 <1 /100    Absolute Neutrophils 3.3 1.6 - 8.3 10e3/uL    Absolute Lymphocytes 0.9 0.8 - 5.3 10e3/uL    Absolute Monocytes 0.5 0.0 - 1.3 10e3/uL    Absolute Eosinophils 0.2 0.0 - 0.7 10e3/uL    Absolute Basophils 0.1 0.0 - 0.2 10e3/uL    Absolute Immature Granulocytes 0.0 <=0.4 10e3/uL    Absolute NRBCs 0.0 10e3/uL       Imaging    No results found.    30 minutes spent on the date of the encounter doing chart review, history and exam, documentation and further activities as noted above.    Signed by: Tamar Koch MD      Again, thank you for allowing me to participate in the care of your patient.        Sincerely,        Tamar Koch MD

## 2022-10-18 NOTE — PROGRESS NOTES
"Oncology Rooming Note    October 18, 2022 10:42 AM   Lisa Reyna is a 71 year old female who presents for:    Chief Complaint   Patient presents with     Oncology Clinic Visit     Malignant neoplasm of lower-inner quadrant of right breast of female, estrogen receptor positive     Initial Vitals: /72   Pulse 71   Resp 16   Ht 1.575 m (5' 2\")   Wt 76 kg (167 lb 8 oz)   SpO2 98%   BMI 30.64 kg/m   Estimated body mass index is 30.64 kg/m  as calculated from the following:    Height as of this encounter: 1.575 m (5' 2\").    Weight as of this encounter: 76 kg (167 lb 8 oz). Body surface area is 1.82 meters squared.  No Pain (0) Comment: Data Unavailable   No LMP recorded. Patient is postmenopausal.  Allergies reviewed: Yes  Medications reviewed: Yes    Medications: Medication refills not needed today.  Pharmacy name entered into [facility]: [address] - [address] [place] [address]    Clinical concerns:  3 month follow up      Sarahy Castellanos            "

## 2022-10-18 NOTE — PROGRESS NOTES
ACUPUNCTURIST TREATMENT NOTE      Lisa Reyna, a 71 year old female, is here today for Follow - Up exam.    HPI  Main Complaint: Chronic low back pain; pain at (L) wrist, CIPN at both feet  Follow-up: Patient reports that the change in the weather affects her pain and she reports that her low back pain and her pain at (L) wrist has been worse.      Past Medical History  Past Medical History Reviewed: Yes   has a past medical history of Arthritis, Asthma, Breast cancer (H) (2004), Coronary artery disease, Endometrial polyp (1992), GERD (gastroesophageal reflux disease), Heart attack (H), Heart murmur, History of measles, mumps, or rubella (as a child), HTN (hypertension), radiation therapy (2004), Hyperlipemia, Osteopenia, Other chronic pain, Ovarian cyst, Prediabetes, UTI (urinary tract infection), and Walking troubles.    Objective  Basic Exam Completed:   No    TCM Exam Completed: Yes   Limbs/Back: Left Upper Extremity, Bilateral Lower Extremity and Lower Back    Tongue/Pulse Exam Completed: Yes           Patient Assessment  Patient Type: Oncology  Patient Complaint: Chronic low back pain; pain at (L) wrist, CIPN at both feet    Acupuncture 10/10/2022 10/18/2022   Intervention Reason Pain; Neuropathy 2; Neuropathy Pain; Pain 2; Neuropathy   Pain Location Low back Low back   Pre-session Pain Rating 0 2   Post-session Pain Rating 0 0   Neuropathy Location (L) hand (L) hand   Pre-session Neuropathy rating 2 3   Post-session Neuropathy rating 2 0   Neuropathy 2 Location Feet Feet   Pre-session Neuropathy 2 rating 3 3   Post-session Neuropathy 2 rating 2 1       TCM Diagnosis: Qi Deficiency;Blood Deficiency;Yin Deficiency;Qi Stagnation;Kidney Qi Deficiency;Spleen Qi Deficiency;Damp and Phlegm Accumulation      Treatment Principle: tonify and nourish, move qi and blood    TCM / Acupuncture Treatment  Acupuncture Points:       Initial insertions: (L) LI 5, (L) TW 4, (L) wrist zones 4,5,6       Second insertions: St 36,  Asael Guillermo Emperor, Lauren 3, Ki 1            Number of needles inserted: 15  Number of needles removed: 15    Accessory Techniques 10/10/2022 10/18/2022   Accessory Techniques TDP Heat Lamp TDP Heat Lamp   TDP Heat Lamp location used over feet over feet          Assessment and Plan  Treatment Observations: Pt. rested comfortably  Acupuncture Treatment Recommendations:         It is my recommendation that this patient seek advice from their Primary Care Provider about active symptoms not addressed during this visit. The risks and benefits of acupuncture were reviewed and the patient stated understanding. The patient's questions were answered to their satisfaction. Consent was provided for treatment. We thank you for the referral and opportunity to treat this patient.    Time Spent with Patient:   I spent a total of 30 minutes face-to-face with Lisa Reyna during today's office visit.     Nilda Brewer L.Ac.

## 2022-11-01 DIAGNOSIS — I10 ESSENTIAL HYPERTENSION: ICD-10-CM

## 2022-11-01 RX ORDER — METOPROLOL SUCCINATE 50 MG/1
TABLET, EXTENDED RELEASE ORAL
Qty: 90 TABLET | Refills: 2 | Status: SHIPPED | OUTPATIENT
Start: 2022-11-01 | End: 2023-07-27

## 2022-11-02 ENCOUNTER — PROCEDURE ONLY VISIT (OUTPATIENT)
Dept: ONCOLOGY | Facility: CLINIC | Age: 72
End: 2022-11-02
Attending: INTERNAL MEDICINE
Payer: MEDICARE

## 2022-11-02 DIAGNOSIS — G89.29 CHRONIC BILATERAL LOW BACK PAIN WITHOUT SCIATICA: Primary | ICD-10-CM

## 2022-11-02 DIAGNOSIS — M54.59 OTHER LOW BACK PAIN: ICD-10-CM

## 2022-11-02 DIAGNOSIS — M54.50 CHRONIC BILATERAL LOW BACK PAIN WITHOUT SCIATICA: Primary | ICD-10-CM

## 2022-11-02 DIAGNOSIS — T45.1X5A CHEMOTHERAPY-INDUCED PERIPHERAL NEUROPATHY (H): ICD-10-CM

## 2022-11-02 DIAGNOSIS — G62.0 CHEMOTHERAPY-INDUCED PERIPHERAL NEUROPATHY (H): ICD-10-CM

## 2022-11-02 PROCEDURE — 97810 ACUP 1/> WO ESTIM 1ST 15 MIN: CPT | Performed by: ACUPUNCTURIST

## 2022-11-02 NOTE — PROGRESS NOTES
ACUPUNCTURIST TREATMENT NOTE      Lisa Reyna, a 71 year old female, is here today for Follow - Up exam.    HPI  Main Complaint: Chronic low back pain, CIPN at both feet  Secondary Complaints: pain at (L) wrist    Follow-up: Patient reports improved low back pain.  Patient reports improved neuropathy in feet.  Patient reports that she received a cortisone injection at (L) wrist since last acupuncture, she denies wrist pain today.    Past Medical History  Past Medical History Reviewed: Yes   has a past medical history of Arthritis, Asthma, Breast cancer (H) (2004), Coronary artery disease, Endometrial polyp (1992), GERD (gastroesophageal reflux disease), Heart attack (H), Heart murmur, History of measles, mumps, or rubella (as a child), HTN (hypertension), radiation therapy (2004), Hyperlipemia, Osteopenia, Other chronic pain, Ovarian cyst, Prediabetes, UTI (urinary tract infection), and Walking troubles.    Objective  Basic Exam Completed:   No    TCM Exam Completed: Yes   Limbs/Back: Left Upper Extremity, Bilateral Lower Extremity and Lower Back    Tongue/Pulse Exam Completed: Yes           Patient Assessment  Patient Type: Oncology  Patient Complaint: Chronic low back pain; pain at (L) wrist, CIPN at both feet    Acupuncture 10/18/2022 11/2/2022   Intervention Reason Pain; Pain 2; Neuropathy Pain; Pain 2; Neuropathy   Pain Location Low back Low back   Pre-session Pain Rating 2 1   Post-session Pain Rating 0 0   Neuropathy Location (L) hand (L) hand   Pre-session Neuropathy rating 3 0   Post-session Neuropathy rating 0 0   Neuropathy 2 Location Feet Feet   Pre-session Neuropathy 2 rating 3 2   Post-session Neuropathy 2 rating 1 0       TCM Diagnosis: Qi Deficiency;Blood Deficiency;Yin Deficiency;Qi Stagnation;Kidney Qi Deficiency;Spleen Qi Deficiency;Damp and Phlegm Accumulation      Treatment Principle: tonify and nourish, move qi and blood    TCM / Acupuncture Treatment  Acupuncture Points:       Initial  insertions: LI 4, Sp 9, Sp 6, Ki 1, Lauren 3, Ba Freddy                    Number of needles inserted: 16  Number of needles removed: 16    Accessory Techniques 10/18/2022 11/2/2022   Accessory Techniques TDP Heat Lamp TDP Heat Lamp   TDP Heat Lamp location used over feet over feet          Assessment and Plan  Treatment Observations: Pt. rested comfortably  Acupuncture Treatment Recommendations:         It is my recommendation that this patient seek advice from their Primary Care Provider about active symptoms not addressed during this visit. The risks and benefits of acupuncture were reviewed and the patient stated understanding. The patient's questions were answered to their satisfaction. Consent was provided for treatment. We thank you for the referral and opportunity to treat this patient.    Time Spent with Patient:   I spent a total of 20 minutes face-to-face with Lisa Reyna during today's office visit.     Nilda Brewer L.Ac.

## 2022-11-08 ENCOUNTER — PROCEDURE ONLY VISIT (OUTPATIENT)
Dept: ONCOLOGY | Facility: CLINIC | Age: 72
End: 2022-11-08
Attending: INTERNAL MEDICINE
Payer: MEDICARE

## 2022-11-08 DIAGNOSIS — M54.59 OTHER LOW BACK PAIN: ICD-10-CM

## 2022-11-08 DIAGNOSIS — G89.29 CHRONIC BILATERAL LOW BACK PAIN WITHOUT SCIATICA: Primary | ICD-10-CM

## 2022-11-08 DIAGNOSIS — M54.50 CHRONIC BILATERAL LOW BACK PAIN WITHOUT SCIATICA: Primary | ICD-10-CM

## 2022-11-08 PROCEDURE — 97811 ACUP 1/> W/O ESTIM EA ADD 15: CPT | Mod: GY | Performed by: ACUPUNCTURIST

## 2022-11-08 PROCEDURE — 97810 ACUP 1/> WO ESTIM 1ST 15 MIN: CPT | Mod: GY | Performed by: ACUPUNCTURIST

## 2022-11-08 NOTE — PROGRESS NOTES
ACUPUNCTURIST TREATMENT NOTE      Lisa Reyna, a 71 year old female, is here today for Follow - Up exam. Patient is referred by No ref. provider found.    HPI  Main Complaint: Chronic low back pain, much improved  Secondary Complaints: Chemotherapy induced neuropathy of feet    Past Medical History  Past Medical History Reviewed: No   has a past medical history of Arthritis, Asthma, Breast cancer (H) (2004), Coronary artery disease, Endometrial polyp (1992), GERD (gastroesophageal reflux disease), Heart attack (H), Heart murmur, History of measles, mumps, or rubella (as a child), HTN (hypertension), radiation therapy (2004), Hyperlipemia, Osteopenia, Other chronic pain, Ovarian cyst, Prediabetes, UTI (urinary tract infection), and Walking troubles.    Objective  Basic Exam Completed:   Constitutional: Well Groomed and Normal Weight and Normal Appearance    TCM Exam Completed: Yes   Limbs/Back: Bilateral Lower Extremity    Tongue/Pulse Exam Completed: No    Patient Assessment  Patient Type: Pain  Patient Complaint: Chronic low back pain, chemo induced neuropathy of feet    Acupuncture 11/2/2022 11/8/2022   Intervention Reason Pain; Pain 2; Neuropathy Pain; Pain 2; Neuropathy; Neuropathy 2   Pain Location Low back Low back   Pre-session Pain Rating 1 0   Post-session Pain Rating 0 0   Pain 2 Location - (L) wrist   Pre-session Pain 2 Rating - 0   Post-session Pain 2 Rating - 0   Neuropathy Location (L) hand Hands   Pre-session Neuropathy rating 0 0   Post-session Neuropathy rating 0 0   Neuropathy 2 Location Feet Feet   Pre-session Neuropathy 2 rating 2 3   Post-session Neuropathy 2 rating 0 1       TCM Diagnosis: Qi Deficiency;Blood Deficiency;Yin Deficiency;Qi Stagnation;Kidney Qi Deficiency;Spleen Qi Deficiency;Damp and Phlegm Accumulation      Treatment Principle: tonify and nourish, move qi and blood    TCM / Acupuncture Treatment  Acupuncture Points:       Initial insertions: (R) Si 3, (R) Ling gu, (L) Bl 62,  63, St 40, Sp 3, 6       Second insertions: Kid 1, Gb 41, Bafeng            Number of needles inserted: 22  Number of needles removed: 22    Accessory Techniques 11/2/2022 11/8/2022   Accessory Techniques TDP Heat Lamp TDP Heat Lamp   TDP Heat Lamp location used over feet over feet          Assessment and Plan  Treatment Observations:    Acupuncture Treatment Recommendations:         It is my recommendation that this patient seek advice from their Primary Care Provider about active symptoms not addressed during this visit. The risks and benefits of acupuncture were reviewed and the patient stated understanding. The patient's questions were answered to their satisfaction. Consent was provided for treatment. We thank you for the referral and opportunity to treat this patient.    Time Spent with Patient:   I spent a total of 30 minutes face-to-face with Lisa Reyna during today's office visit.     Woody Nielsen

## 2022-11-14 DIAGNOSIS — Z17.0 MALIGNANT NEOPLASM OF LOWER-INNER QUADRANT OF RIGHT BREAST OF FEMALE, ESTROGEN RECEPTOR POSITIVE (H): ICD-10-CM

## 2022-11-14 DIAGNOSIS — C50.311 MALIGNANT NEOPLASM OF LOWER-INNER QUADRANT OF RIGHT BREAST OF FEMALE, ESTROGEN RECEPTOR POSITIVE (H): ICD-10-CM

## 2022-11-14 RX ORDER — LETROZOLE 2.5 MG/1
TABLET, FILM COATED ORAL
Qty: 30 TABLET | Refills: 1 | OUTPATIENT
Start: 2022-11-14

## 2022-11-14 NOTE — TELEPHONE ENCOUNTER
Refusing RX refill request, should have refill on file.    Last Written Prescription Date:  10/18/22  Last Fill Quantity: 90,  # refills: 1   Last office visit provider:  10/18/22 with Dr. Koch     Requested Prescriptions   Pending Prescriptions Disp Refills     letrozole (FEMARA) 2.5 MG tablet [Pharmacy Med Name: LETROZOLE 2.5MG TABS] 30 tablet 1     Sig: TAKE ONE TABLET BY MOUTH EVERY DAY       There is no refill protocol information for this order          Rabia Silvestre RN 11/14/22 9:11 AM

## 2022-11-21 ENCOUNTER — PROCEDURE ONLY VISIT (OUTPATIENT)
Dept: ONCOLOGY | Facility: CLINIC | Age: 72
End: 2022-11-21
Attending: INTERNAL MEDICINE
Payer: MEDICARE

## 2022-11-21 DIAGNOSIS — T45.1X5A CHEMOTHERAPY-INDUCED PERIPHERAL NEUROPATHY (H): ICD-10-CM

## 2022-11-21 DIAGNOSIS — M54.50 CHRONIC BILATERAL LOW BACK PAIN WITHOUT SCIATICA: Primary | ICD-10-CM

## 2022-11-21 DIAGNOSIS — M54.59 OTHER LOW BACK PAIN: ICD-10-CM

## 2022-11-21 DIAGNOSIS — G89.29 CHRONIC BILATERAL LOW BACK PAIN WITHOUT SCIATICA: Primary | ICD-10-CM

## 2022-11-21 DIAGNOSIS — G62.0 CHEMOTHERAPY-INDUCED PERIPHERAL NEUROPATHY (H): ICD-10-CM

## 2022-11-21 PROCEDURE — 97810 ACUP 1/> WO ESTIM 1ST 15 MIN: CPT | Mod: GA | Performed by: ACUPUNCTURIST

## 2022-11-21 PROCEDURE — 97811 ACUP 1/> W/O ESTIM EA ADD 15: CPT | Mod: GA | Performed by: ACUPUNCTURIST

## 2022-11-21 NOTE — PROGRESS NOTES
ACUPUNCTURIST TREATMENT NOTE      Lisa Reyna, a 71 year old female, is here today for Follow - Up exam. Patient is referred by No ref. provider found.    HPI  Main Complaint: Chronic low back pain. Had pain this morning but none at time of treatment.  Secondary Complaints: Chemotherapy induced neuropathy of feet    Past Medical History  Past Medical History Reviewed: No   has a past medical history of Arthritis, Asthma, Breast cancer (H) (2004), Coronary artery disease, Endometrial polyp (1992), GERD (gastroesophageal reflux disease), Heart attack (H), Heart murmur, History of measles, mumps, or rubella (as a child), HTN (hypertension), radiation therapy (2004), Hyperlipemia, Osteopenia, Other chronic pain, Ovarian cyst, Prediabetes, UTI (urinary tract infection), and Walking troubles.    Objective  Basic Exam Completed:   Lower Extremity (ies): Normal  Constitutional: Normal Weight and Normal Appearance    TCM Exam Completed: Yes   Limbs/Back: Bilateral Lower Extremity    Tongue/Pulse Exam Completed: No    Patient Assessment  Patient Type: Oncology  Patient Complaint: Chronic low back pain, neuropathy of feet    Acupuncture 11/8/2022 11/21/2022   Intervention Reason Pain; Pain 2; Neuropathy; Neuropathy 2 Pain; Neuropathy   Pain Location Low back Low back   Pre-session Pain Rating 0 0   Post-session Pain Rating 0 0   Pain 2 Location (L) wrist -   Pre-session Pain 2 Rating 0 -   Post-session Pain 2 Rating 0 -   Neuropathy Location Hands -   Pre-session Neuropathy rating 0 -   Post-session Neuropathy rating 0 -   Neuropathy 2 Location Feet Feet   Pre-session Neuropathy 2 rating 3 5   Post-session Neuropathy 2 rating 1 3       TCM Diagnosis: Qi Deficiency;Blood Deficiency;Yin Deficiency;Qi Stagnation;Kidney Qi Deficiency;Spleen Qi Deficiency;Damp and Phlegm Accumulation      Treatment Principle: tonify and nourish, move qi and blood    TCM / Acupuncture Treatment  Acupuncture Points:       Initial insertions: (L) Si  3, (L) Bl 60, 62, Kid 1       Second insertions: Bafeng            Number of needles inserted: 13  Number of needles removed: 13    Accessory Techniques 11/8/2022 11/21/2022   Accessory Techniques TDP Heat Lamp TDP Heat Lamp   TDP Heat Lamp location used over feet over feet          Assessment and Plan  Treatment Observations:    Acupuncture Treatment Recommendations:         It is my recommendation that this patient seek advice from their Primary Care Provider about active symptoms not addressed during this visit. The risks and benefits of acupuncture were reviewed and the patient stated understanding. The patient's questions were answered to their satisfaction. Consent was provided for treatment. We thank you for the referral and opportunity to treat this patient.    Time Spent with Patient:   I spent a total of 30 minutes face-to-face with Lisa Reyna during today's office visit.     Woody Nielsen

## 2022-12-05 ENCOUNTER — PROCEDURE ONLY VISIT (OUTPATIENT)
Dept: ONCOLOGY | Facility: CLINIC | Age: 72
End: 2022-12-05
Attending: INTERNAL MEDICINE
Payer: MEDICARE

## 2022-12-05 DIAGNOSIS — M54.50 CHRONIC BILATERAL LOW BACK PAIN WITHOUT SCIATICA: Primary | ICD-10-CM

## 2022-12-05 DIAGNOSIS — G89.29 CHRONIC BILATERAL LOW BACK PAIN WITHOUT SCIATICA: Primary | ICD-10-CM

## 2022-12-05 DIAGNOSIS — M54.59 OTHER LOW BACK PAIN: ICD-10-CM

## 2022-12-05 PROCEDURE — 97810 ACUP 1/> WO ESTIM 1ST 15 MIN: CPT | Mod: GA

## 2022-12-05 PROCEDURE — 97811 ACUP 1/> W/O ESTIM EA ADD 15: CPT | Mod: GA

## 2022-12-05 NOTE — PROGRESS NOTES
ACUPUNCTURIST TREATMENT NOTE      Lisa Reyna, a 71 year old female, is here today for Follow - Up exam. Patient is referred by No ref. provider found.    HPI  Main Complaint: Chronic low back pain  Secondary Complaints: Chemotherapy induced neuropathy of feet, (L) wrist pain    Past Medical History  Past Medical History Reviewed: No   has a past medical history of Arthritis, Asthma, Breast cancer (H) (2004), Coronary artery disease, Endometrial polyp (1992), GERD (gastroesophageal reflux disease), Heart attack (H), Heart murmur, History of measles, mumps, or rubella (as a child), HTN (hypertension), radiation therapy (2004), Hyperlipemia, Osteopenia, Other chronic pain, Ovarian cyst, Prediabetes, UTI (urinary tract infection), and Walking troubles.    Objective  Basic Exam Completed:   Upper Extremity(ies): Normal    TCM Exam Completed: Yes   Limbs/Back: Bilateral Lower Extremity    Tongue/Pulse Exam Completed: No    Patient Assessment  Patient Type: Oncology  Patient Complaint: Chronic low back pain, (L) wrist pain, neuropaty of feet    Acupuncture 11/21/2022 12/5/2022   Intervention Reason Pain; Neuropathy Pain; Pain 2; Neuropathy   Pain Location Low back Low back   Pre-session Pain Rating 0 3   Post-session Pain Rating 0 0   Pain 2 Location - (L) wrist   Pre-session Pain 2 Rating - 3   Post-session Pain 2 Rating - 0   Neuropathy Location - Feet   Pre-session Neuropathy rating - 4   Post-session Neuropathy rating - 2   Neuropathy 2 Location Feet -   Pre-session Neuropathy 2 rating 5 -   Post-session Neuropathy 2 rating 3 -       TCM Diagnosis: Qi Deficiency;Blood Deficiency;Yin Deficiency;Qi Stagnation;Kidney Qi Deficiency;Spleen Qi Deficiency;Damp and Phlegm Accumulation      Treatment Principle: tonify and nourish, move qi and blood    TCM / Acupuncture Treatment  Acupuncture Points:       Initial insertions: (L) Si 3, 5, 6, (L) Ling gu, (R) Bl 60, 62, 65       Second insertions: Paula Duong 1             Number of needles inserted: 17  Number of needles removed: 17    Accessory Techniques 11/21/2022 12/5/2022   Accessory Techniques TDP Heat Lamp TDP Heat Lamp   TDP Heat Lamp location used over feet over feet          Assessment and Plan  Treatment Observations:    Acupuncture Treatment Recommendations:         It is my recommendation that this patient seek advice from their Primary Care Provider about active symptoms not addressed during this visit. The risks and benefits of acupuncture were reviewed and the patient stated understanding. The patient's questions were answered to their satisfaction. Consent was provided for treatment. We thank you for the referral and opportunity to treat this patient.    Time Spent with Patient:   I spent a total of 30 minutes face-to-face with Lisa Reyna during today's office visit.     Woody Nielsen

## 2022-12-19 ENCOUNTER — PROCEDURE ONLY VISIT (OUTPATIENT)
Dept: ONCOLOGY | Facility: CLINIC | Age: 72
End: 2022-12-19
Attending: INTERNAL MEDICINE
Payer: MEDICARE

## 2022-12-19 DIAGNOSIS — T45.1X5A CHEMOTHERAPY-INDUCED PERIPHERAL NEUROPATHY (H): ICD-10-CM

## 2022-12-19 DIAGNOSIS — M54.59 OTHER LOW BACK PAIN: Primary | ICD-10-CM

## 2022-12-19 DIAGNOSIS — G62.0 CHEMOTHERAPY-INDUCED PERIPHERAL NEUROPATHY (H): ICD-10-CM

## 2022-12-19 PROCEDURE — 97810 ACUP 1/> WO ESTIM 1ST 15 MIN: CPT

## 2022-12-19 PROCEDURE — 97811 ACUP 1/> W/O ESTIM EA ADD 15: CPT

## 2022-12-19 NOTE — PROGRESS NOTES
ACUPUNCTURIST TREATMENT NOTE      Lisa Reyna, a 71 year old female, is here today for Follow - Up exam. Patient is referred by No ref. provider found.    HPI  Main Complaint: Chronic low back pain. Has been feeling better since last visit but still bothersome on occasion. No pain today.  Secondary Complaints: Chemotherapy induced neuropathy of feet.    Past Medical History  Past Medical History Reviewed: No   has a past medical history of Arthritis, Asthma, Breast cancer (H) (2004), Coronary artery disease, Endometrial polyp (1992), GERD (gastroesophageal reflux disease), Heart attack (H), Heart murmur, History of measles, mumps, or rubella (as a child), HTN (hypertension), radiation therapy (2004), Hyperlipemia, Osteopenia, Other chronic pain, Ovarian cyst, Prediabetes, UTI (urinary tract infection), and Walking troubles.    Objective  Basic Exam Completed:   Constitutional: Well Groomed and Normal Weight and Normal Appearance    TCM Exam Completed: Yes   Limbs/Back: Bilateral Lower Extremity    Tongue/Pulse Exam Completed: No    Patient Assessment  Patient Type: Oncology  Patient Complaint: Chronic low back pain, neuropathy of feet    Acupuncture 12/5/2022 12/19/2022   Intervention Reason Pain; Pain 2; Neuropathy Pain; Neuropathy   Pain Location Low back Low back   Pre-session Pain Rating 3 0   Post-session Pain Rating 0 0   Pain 2 Location (L) wrist -   Pre-session Pain 2 Rating 3 -   Post-session Pain 2 Rating 0 -   Neuropathy Location Feet Feet   Pre-session Neuropathy rating 4 6   Post-session Neuropathy rating 2 3   Neuropathy 2 Location - -   Pre-session Neuropathy 2 rating - -   Post-session Neuropathy 2 rating - -       TCM Diagnosis: Qi Deficiency;Blood Deficiency;Yin Deficiency;Qi Stagnation;Kidney Qi Deficiency;Spleen Qi Deficiency;Damp and Phlegm Accumulation      Treatment Principle: tonify and nourish, move qi and blood    TCM / Acupuncture Treatment  Acupuncture Points:       Initial insertions:  (L) Ling gu, (L) Si 3, (R) Bl 60, 62, Bl 65       Second insertions: Kid 1, 3, Bafeng            Number of needles inserted: 18  Number of needles removed: 18    Accessory Techniques 12/5/2022 12/19/2022   Accessory Techniques TDP Heat Lamp TDP Heat Lamp   TDP Heat Lamp location used over feet over feet          Assessment and Plan  Treatment Observations:    Acupuncture Treatment Recommendations:         It is my recommendation that this patient seek advice from their Primary Care Provider about active symptoms not addressed during this visit. The risks and benefits of acupuncture were reviewed and the patient stated understanding. The patient's questions were answered to their satisfaction. Consent was provided for treatment. We thank you for the referral and opportunity to treat this patient.    Time Spent with Patient:   I spent a total of 30 minutes face-to-face with Lisa Reyna during today's office visit.     Woody Nielsen

## 2023-01-02 ENCOUNTER — PROCEDURE ONLY VISIT (OUTPATIENT)
Dept: ONCOLOGY | Facility: CLINIC | Age: 73
End: 2023-01-02
Attending: INTERNAL MEDICINE
Payer: COMMERCIAL

## 2023-01-02 DIAGNOSIS — T45.1X5A CHEMOTHERAPY-INDUCED PERIPHERAL NEUROPATHY (H): ICD-10-CM

## 2023-01-02 DIAGNOSIS — M54.59 OTHER LOW BACK PAIN: Primary | ICD-10-CM

## 2023-01-02 DIAGNOSIS — G62.0 CHEMOTHERAPY-INDUCED PERIPHERAL NEUROPATHY (H): ICD-10-CM

## 2023-01-02 PROCEDURE — 97811 ACUP 1/> W/O ESTIM EA ADD 15: CPT

## 2023-01-02 PROCEDURE — 97810 ACUP 1/> WO ESTIM 1ST 15 MIN: CPT

## 2023-01-02 NOTE — PROGRESS NOTES
ACUPUNCTURIST TREATMENT NOTE      Lisa Reyna, a 72 year old female, is here today for Follow - Up exam. Patient is referred by No ref. provider found.    HPI  Main Complaint: Chronic low back pain  Secondary Complaints: Chemotherapy induced neuropathy of feet    Past Medical History  Past Medical History Reviewed: No   has a past medical history of Arthritis, Asthma, Breast cancer (H) (2004), Coronary artery disease, Endometrial polyp (1992), GERD (gastroesophageal reflux disease), Heart attack (H), Heart murmur, History of measles, mumps, or rubella (as a child), HTN (hypertension), radiation therapy (2004), Hyperlipemia, Osteopenia, Other chronic pain, Ovarian cyst, Prediabetes, UTI (urinary tract infection), and Walking troubles.    Objective  Basic Exam Completed:   Constitutional: Well Groomed and Normal Weight and Normal Appearance    TCM Exam Completed: Yes   Limbs/Back: Bilateral Lower Extremity    Tongue/Pulse Exam Completed: No    Patient Assessment  Patient Type: Oncology  Patient Complaint: Chronic low back pain, neuropathy of feet    Acupuncture 12/19/2022 1/2/2023   Intervention Reason Pain; Neuropathy Pain; Neuropathy   Pain Location Low back Low back   Pre-session Pain Rating 0 1   Post-session Pain Rating 0 0   Pain 2 Location - -   Pre-session Pain 2 Rating - -   Post-session Pain 2 Rating - -   Neuropathy Location Feet Feet   Pre-session Neuropathy rating 6 4   Post-session Neuropathy rating 3 2   Neuropathy 2 Location - -   Pre-session Neuropathy 2 rating - -   Post-session Neuropathy 2 rating - -       TCM Diagnosis: Qi Deficiency;Blood Deficiency;Yin Deficiency;Qi Stagnation;Kidney Qi Deficiency;Spleen Qi Deficiency;Damp and Phlegm Accumulation      Treatment Principle: tonify and nourish, move qi and blood    TCM / Acupuncture Treatment  Acupuncture Points:       Initial insertions: (L) Ling gu, (L) Si 3, (R) Bl 60, 62, Bl 65       Second insertions: Kid 1, Bafeng            Number of  needles inserted: 15  Number of needles removed: 15    Accessory Techniques 12/19/2022 1/2/2023   Accessory Techniques TDP Heat Lamp TDP Heat Lamp   TDP Heat Lamp location used over feet over feet          Assessment and Plan  Treatment Observations:    Acupuncture Treatment Recommendations:         It is my recommendation that this patient seek advice from their Primary Care Provider about active symptoms not addressed during this visit. The risks and benefits of acupuncture were reviewed and the patient stated understanding. The patient's questions were answered to their satisfaction. Consent was provided for treatment. We thank you for the referral and opportunity to treat this patient.    Time Spent with Patient:   I spent a total of 30 minutes face-to-face with Lisa Reyna during today's office visit.     Woody Nielsen

## 2023-01-09 ENCOUNTER — PROCEDURE ONLY VISIT (OUTPATIENT)
Dept: ONCOLOGY | Facility: CLINIC | Age: 73
End: 2023-01-09
Attending: INTERNAL MEDICINE
Payer: COMMERCIAL

## 2023-01-09 DIAGNOSIS — M54.59 OTHER LOW BACK PAIN: Primary | ICD-10-CM

## 2023-01-09 PROCEDURE — 97810 ACUP 1/> WO ESTIM 1ST 15 MIN: CPT

## 2023-01-09 PROCEDURE — 97811 ACUP 1/> W/O ESTIM EA ADD 15: CPT

## 2023-01-09 NOTE — PROGRESS NOTES
ACUPUNCTURIST TREATMENT NOTE      Lisa Reyna, a 72 year old female, is here today for Follow - Up exam. Patient is referred by No ref. provider found.    HPI  Main Complaint: Chronic low back pain  Secondary Complaints: Chemotherapy induced neuropathy of feet    Past Medical History  Past Medical History Reviewed: No   has a past medical history of Arthritis, Asthma, Breast cancer (H) (2004), Coronary artery disease, Endometrial polyp (1992), GERD (gastroesophageal reflux disease), Heart attack (H), Heart murmur, History of measles, mumps, or rubella (as a child), HTN (hypertension), radiation therapy (2004), Hyperlipemia, Osteopenia, Other chronic pain, Ovarian cyst, Prediabetes, UTI (urinary tract infection), and Walking troubles.    Objective  Basic Exam Completed:   Constitutional: Well Groomed and Normal Weight and Normal Appearance    TCM Exam Completed: Yes   Limbs/Back: Bilateral Lower Extremity    Tongue/Pulse Exam Completed: No    Patient Assessment  Patient Type: Oncology  Patient Complaint: Chronic low back pain, neuropathy of feet    Acupuncture 1/2/2023 1/9/2023   Intervention Reason Pain; Neuropathy Pain; Neuropathy   Pain Location Low back Low back   Pre-session Pain Rating 1 3   Post-session Pain Rating 0 0   Pain 2 Location - -   Pre-session Pain 2 Rating - -   Post-session Pain 2 Rating - -   Neuropathy Location Feet Feet   Pre-session Neuropathy rating 4 3   Post-session Neuropathy rating 2 2   Neuropathy 2 Location - -   Pre-session Neuropathy 2 rating - -   Post-session Neuropathy 2 rating - -       TCM Diagnosis: Qi Deficiency;Blood Deficiency;Yin Deficiency;Qi Stagnation;Kidney Qi Deficiency;Spleen Qi Deficiency;Damp and Phlegm Accumulation      Treatment Principle: tonify and nourish, move qi and blood    TCM / Acupuncture Treatment  Acupuncture Points:       Initial insertions: (L) Ling gu, (L) Si 3, (L) Bl 60, 62, 65, Kid 1, 3       Second insertions: Sp 2, 3, Gb 41, Bafeng             Number of needles inserted: 23  Number of needles removed: 23    Accessory Techniques 1/2/2023 1/9/2023   Accessory Techniques TDP Heat Lamp TDP Heat Lamp   TDP Heat Lamp location used over feet over feet          Assessment and Plan  Treatment Observations:    Acupuncture Treatment Recommendations:         It is my recommendation that this patient seek advice from their Primary Care Provider about active symptoms not addressed during this visit. The risks and benefits of acupuncture were reviewed and the patient stated understanding. The patient's questions were answered to their satisfaction. Consent was provided for treatment. We thank you for the referral and opportunity to treat this patient.    Time Spent with Patient:   I spent a total of 30 minutes face-to-face with Lisa Reyna during today's office visit.     Woody Nielsen

## 2023-01-23 ENCOUNTER — PROCEDURE ONLY VISIT (OUTPATIENT)
Dept: ONCOLOGY | Facility: CLINIC | Age: 73
End: 2023-01-23
Attending: INTERNAL MEDICINE
Payer: COMMERCIAL

## 2023-01-23 DIAGNOSIS — M54.59 OTHER LOW BACK PAIN: Primary | ICD-10-CM

## 2023-01-23 PROCEDURE — 97810 ACUP 1/> WO ESTIM 1ST 15 MIN: CPT

## 2023-01-23 PROCEDURE — 97811 ACUP 1/> W/O ESTIM EA ADD 15: CPT

## 2023-01-23 NOTE — PROGRESS NOTES
ACUPUNCTURIST TREATMENT NOTE      Lisa Reyna, a 72 year old female, is here today for Follow - Up exam. Patient is referred by No ref. provider found.    HPI  Main Complaint: Chronic low back pain  Secondary Complaints: Chemotherapy induced neuropathy of feet    Past Medical History  Past Medical History Reviewed: No   has a past medical history of Arthritis, Asthma, Breast cancer (H) (2004), Coronary artery disease, Endometrial polyp (1992), GERD (gastroesophageal reflux disease), Heart attack (H), Heart murmur, History of measles, mumps, or rubella (as a child), HTN (hypertension), radiation therapy (2004), Hyperlipemia, Osteopenia, Other chronic pain, Ovarian cyst, Prediabetes, UTI (urinary tract infection), and Walking troubles.    Objective  Basic Exam Completed:   Lower Extremity (ies): Normal  Constitutional: Normal Weight and Normal Appearance    TCM Exam Completed: Yes   Limbs/Back: Lower Back    Tongue/Pulse Exam Completed: No    Patient Assessment  Patient Type: Oncology  Patient Complaint: Chronic low back pain, neuropathy of feet    Acupuncture 1/9/2023 1/23/2023   Intervention Reason Pain; Neuropathy Pain; Neuropathy   Pain Location Low back Low back   Pre-session Pain Rating 3 3   Post-session Pain Rating 0 0   Pain 2 Location - -   Pre-session Pain 2 Rating - -   Post-session Pain 2 Rating - -   Neuropathy Location Feet Feet   Pre-session Neuropathy rating 3 4   Post-session Neuropathy rating 2 2   Neuropathy 2 Location - -   Pre-session Neuropathy 2 rating - -   Post-session Neuropathy 2 rating - -       TCM Diagnosis: Qi Deficiency;Blood Deficiency;Yin Deficiency;Qi Stagnation;Kidney Qi Deficiency;Spleen Qi Deficiency;Damp and Phlegm Accumulation      Treatment Principle: tonify and nourish, move qi and blood    TCM / Acupuncture Treatment  Acupuncture Points:       Initial insertions: (L) Ling gu, (L) Si 3, (L) Bl 60, 62, 65, Kid 1       Second insertions: Sp 2, Gb 41, Bafeng            Number  of needles inserted: 19  Number of needles removed: 19    Accessory Techniques 1/9/2023 1/23/2023   Accessory Techniques TDP Heat Lamp TDP Heat Lamp   TDP Heat Lamp location used over feet over feet          Assessment and Plan  Treatment Observations:    Acupuncture Treatment Recommendations:         It is my recommendation that this patient seek advice from their Primary Care Provider about active symptoms not addressed during this visit. The risks and benefits of acupuncture were reviewed and the patient stated understanding. The patient's questions were answered to their satisfaction. Consent was provided for treatment. We thank you for the referral and opportunity to treat this patient.    Time Spent with Patient:   I spent a total of 30 minutes face-to-face with Lisa Reyna during today's office visit.     Woody Nielsen

## 2023-01-25 ENCOUNTER — LAB REQUISITION (OUTPATIENT)
Dept: LAB | Facility: CLINIC | Age: 73
End: 2023-01-25
Payer: COMMERCIAL

## 2023-01-25 DIAGNOSIS — E11.22 TYPE 2 DIABETES MELLITUS WITH DIABETIC CHRONIC KIDNEY DISEASE (H): ICD-10-CM

## 2023-01-25 LAB
ANION GAP SERPL CALCULATED.3IONS-SCNC: 15 MMOL/L (ref 7–15)
BUN SERPL-MCNC: 25 MG/DL (ref 8–23)
CALCIUM SERPL-MCNC: 10.1 MG/DL (ref 8.8–10.2)
CHLORIDE SERPL-SCNC: 99 MMOL/L (ref 98–107)
CREAT SERPL-MCNC: 1.03 MG/DL (ref 0.51–0.95)
DEPRECATED HCO3 PLAS-SCNC: 23 MMOL/L (ref 22–29)
GFR SERPL CREATININE-BSD FRML MDRD: 57 ML/MIN/1.73M2
GLUCOSE SERPL-MCNC: 104 MG/DL (ref 70–99)
POTASSIUM SERPL-SCNC: 3.9 MMOL/L (ref 3.4–5.3)
SODIUM SERPL-SCNC: 137 MMOL/L (ref 136–145)

## 2023-01-25 PROCEDURE — 80048 BASIC METABOLIC PNL TOTAL CA: CPT | Mod: ORL | Performed by: NURSE PRACTITIONER

## 2023-01-30 ENCOUNTER — ANCILLARY PROCEDURE (OUTPATIENT)
Dept: MAMMOGRAPHY | Facility: CLINIC | Age: 73
End: 2023-01-30
Attending: SPECIALIST
Payer: COMMERCIAL

## 2023-01-30 ENCOUNTER — PROCEDURE ONLY VISIT (OUTPATIENT)
Dept: ONCOLOGY | Facility: CLINIC | Age: 73
End: 2023-01-30
Attending: INTERNAL MEDICINE
Payer: COMMERCIAL

## 2023-01-30 DIAGNOSIS — C50.311 MALIGNANT NEOPLASM OF LOWER-INNER QUADRANT OF RIGHT BREAST OF FEMALE, ESTROGEN RECEPTOR POSITIVE (H): ICD-10-CM

## 2023-01-30 DIAGNOSIS — M54.59 OTHER LOW BACK PAIN: Primary | ICD-10-CM

## 2023-01-30 DIAGNOSIS — Z17.0 MALIGNANT NEOPLASM OF LOWER-INNER QUADRANT OF RIGHT BREAST OF FEMALE, ESTROGEN RECEPTOR POSITIVE (H): ICD-10-CM

## 2023-01-30 PROCEDURE — 77062 BREAST TOMOSYNTHESIS BI: CPT

## 2023-01-30 PROCEDURE — 97811 ACUP 1/> W/O ESTIM EA ADD 15: CPT

## 2023-01-30 PROCEDURE — 97810 ACUP 1/> WO ESTIM 1ST 15 MIN: CPT

## 2023-01-30 NOTE — PROGRESS NOTES
ACUPUNCTURIST TREATMENT NOTE      Lisa Reyna, a 72 year old female, is here today for Follow - Up exam. Patient is referred by No ref. provider found.    HPI  Main Complaint: Chronic low back pain. No pain today at time of treatment.  Secondary Complaints: Chemotherapy induced neuropathy of feet. Feet have improved to the point where Lisa only really feels the neuropathy symptoms in her great toes.    Past Medical History  Past Medical History Reviewed: No   has a past medical history of Arthritis, Asthma, Breast cancer (H) (2004), Coronary artery disease, Endometrial polyp (1992), GERD (gastroesophageal reflux disease), Heart attack (H), Heart murmur, History of measles, mumps, or rubella (as a child), HTN (hypertension), radiation therapy (2004), Hyperlipemia, Osteopenia, Other chronic pain, Ovarian cyst, Prediabetes, UTI (urinary tract infection), and Walking troubles.    Objective  Basic Exam Completed:   Lower Extremity (ies): Normal    TCM Exam Completed: Yes   Limbs/Back: Lower Back    Tongue/Pulse Exam Completed: No    Patient Assessment  Patient Type: Oncology  Patient Complaint: Chronic low back pain, neuropathy of great toes    Acupuncture 1/23/2023 1/30/2023   Intervention Reason Pain; Neuropathy Pain; Neuropathy   Pain Location Low back Low back   Pre-session Pain Rating 3 0   Post-session Pain Rating 0 0   Pain 2 Location - -   Pre-session Pain 2 Rating - -   Post-session Pain 2 Rating - -   Neuropathy Location Feet Feet   Pre-session Neuropathy rating 4 4   Post-session Neuropathy rating 2 2   Neuropathy 2 Location - -   Pre-session Neuropathy 2 rating - -   Post-session Neuropathy 2 rating - -       TCM Diagnosis: Qi Deficiency;Blood Deficiency;Yin Deficiency;Qi Stagnation;Kidney Qi Deficiency;Spleen Qi Deficiency;Damp and Phlegm Accumulation      Treatment Principle: tonify and nourish, move qi and blood    TCM / Acupuncture Treatment  Acupuncture Points:       Initial insertions: (ENMANUEL) Letty driver  (L) Si 3, (L) Bl 60, 62, 65, Kid 1, 3, Sp 3, 6       Second insertions: Bafeng, Gb 41            Number of needles inserted: 23  Number of needles removed: 23    Accessory Techniques 1/23/2023 1/30/2023   Accessory Techniques TDP Heat Lamp TDP Heat Lamp   TDP Heat Lamp location used over feet over feet          Assessment and Plan  Treatment Observations: Feet have improved. Great toes are main concern for neuropathy now.  Acupuncture Treatment Recommendations:         It is my recommendation that this patient seek advice from their Primary Care Provider about active symptoms not addressed during this visit. The risks and benefits of acupuncture were reviewed and the patient stated understanding. The patient's questions were answered to their satisfaction. Consent was provided for treatment. We thank you for the referral and opportunity to treat this patient.    Time Spent with Patient:   I spent a total of 30 minutes face-to-face with Lisa Reyna during today's office visit.     Woody Nielsen

## 2023-02-06 ENCOUNTER — PROCEDURE ONLY VISIT (OUTPATIENT)
Dept: ONCOLOGY | Facility: CLINIC | Age: 73
End: 2023-02-06
Attending: INTERNAL MEDICINE
Payer: COMMERCIAL

## 2023-02-06 DIAGNOSIS — M54.59 OTHER LOW BACK PAIN: Primary | ICD-10-CM

## 2023-02-06 DIAGNOSIS — T45.1X5A CHEMOTHERAPY-INDUCED PERIPHERAL NEUROPATHY (H): ICD-10-CM

## 2023-02-06 DIAGNOSIS — G62.0 CHEMOTHERAPY-INDUCED PERIPHERAL NEUROPATHY (H): ICD-10-CM

## 2023-02-06 PROCEDURE — 97810 ACUP 1/> WO ESTIM 1ST 15 MIN: CPT

## 2023-02-06 PROCEDURE — 97811 ACUP 1/> W/O ESTIM EA ADD 15: CPT

## 2023-02-06 NOTE — PROGRESS NOTES
ACUPUNCTURIST TREATMENT NOTE      Lisa Reyna, a 72 year old female, is here today for Follow - Up exam. Patient is referred by No ref. provider found.    HPI  Main Complaint: Chronic low back pain. Feels good today with no pain at time of treatment.  Secondary Complaints: Chemotherapy induced neuropathy of great toes. Feet are feeling much better in general. Neuropathy sensations are only really being felt in the great toes now.    Past Medical History  Past Medical History Reviewed: No   has a past medical history of Arthritis, Asthma, Breast cancer (H) (2004), Coronary artery disease, Endometrial polyp (1992), GERD (gastroesophageal reflux disease), Heart attack (H), Heart murmur, History of measles, mumps, or rubella (as a child), HTN (hypertension), radiation therapy (2004), Hyperlipemia, Osteopenia, Other chronic pain, Ovarian cyst, Prediabetes, UTI (urinary tract infection), and Walking troubles.    Objective  Basic Exam Completed:   Lower Extremity (ies): Normal    TCM Exam Completed: Yes   Limbs/Back: Lower Back    Tongue/Pulse Exam Completed: No    Patient Assessment  Patient Type: Oncology  Patient Complaint: Chronic low back pain, neuropathy of great toes    Acupuncture 1/30/2023 2/6/2023   Intervention Reason Pain; Neuropathy Pain; Neuropathy   Pain Location Low back Low back   Pre-session Pain Rating 0 0   Post-session Pain Rating 0 0   Pain 2 Location - -   Pre-session Pain 2 Rating - -   Post-session Pain 2 Rating - -   Neuropathy Location Feet Great toes   Pre-session Neuropathy rating 4 3   Post-session Neuropathy rating 2 2   Neuropathy 2 Location - -   Pre-session Neuropathy 2 rating - -   Post-session Neuropathy 2 rating - -       TCM Diagnosis: Qi Deficiency;Blood Deficiency;Yin Deficiency;Qi Stagnation;Kidney Qi Deficiency;Spleen Qi Deficiency;Damp and Phlegm Accumulation      Treatment Principle: tonify and nourish, move qi and blood    TCM / Acupuncture Treatment  Acupuncture Points:        Initial insertions: Bl 60, 63, Kid 1       Second insertions: Lauren 2, 3, Sp 1, 2, 3            Number of needles inserted: 16  Number of needles removed: 16    Accessory Techniques 1/30/2023 2/6/2023   Accessory Techniques TDP Heat Lamp TDP Heat Lamp   TDP Heat Lamp location used over feet over feet          Assessment and Plan  Treatment Observations: No pain in low back today  Acupuncture Treatment Recommendations:         It is my recommendation that this patient seek advice from their Primary Care Provider about active symptoms not addressed during this visit. The risks and benefits of acupuncture were reviewed and the patient stated understanding. The patient's questions were answered to their satisfaction. Consent was provided for treatment. We thank you for the referral and opportunity to treat this patient.    Time Spent with Patient:   I spent a total of 30 minutes face-to-face with Lisa Reyna during today's office visit.     Woody Nielsen

## 2023-02-17 ENCOUNTER — ONCOLOGY VISIT (OUTPATIENT)
Dept: ONCOLOGY | Facility: CLINIC | Age: 73
End: 2023-02-17
Attending: INTERNAL MEDICINE
Payer: COMMERCIAL

## 2023-02-17 VITALS
HEART RATE: 86 BPM | WEIGHT: 176.3 LBS | RESPIRATION RATE: 18 BRPM | SYSTOLIC BLOOD PRESSURE: 160 MMHG | BODY MASS INDEX: 32.25 KG/M2 | DIASTOLIC BLOOD PRESSURE: 80 MMHG | OXYGEN SATURATION: 99 % | TEMPERATURE: 98.2 F

## 2023-02-17 DIAGNOSIS — Z79.811 LONG TERM (CURRENT) USE OF AROMATASE INHIBITORS: Primary | ICD-10-CM

## 2023-02-17 DIAGNOSIS — G62.0 CHEMOTHERAPY-INDUCED PERIPHERAL NEUROPATHY (H): ICD-10-CM

## 2023-02-17 DIAGNOSIS — T45.1X5A CHEMOTHERAPY-INDUCED PERIPHERAL NEUROPATHY (H): ICD-10-CM

## 2023-02-17 DIAGNOSIS — Z17.0 MALIGNANT NEOPLASM OF LOWER-INNER QUADRANT OF RIGHT BREAST OF FEMALE, ESTROGEN RECEPTOR POSITIVE (H): ICD-10-CM

## 2023-02-17 DIAGNOSIS — Z12.31 ENCOUNTER FOR SCREENING MAMMOGRAM FOR BREAST CANCER: ICD-10-CM

## 2023-02-17 DIAGNOSIS — C50.311 MALIGNANT NEOPLASM OF LOWER-INNER QUADRANT OF RIGHT BREAST OF FEMALE, ESTROGEN RECEPTOR POSITIVE (H): ICD-10-CM

## 2023-02-17 PROCEDURE — G0463 HOSPITAL OUTPT CLINIC VISIT: HCPCS | Performed by: INTERNAL MEDICINE

## 2023-02-17 PROCEDURE — 99214 OFFICE O/P EST MOD 30 MIN: CPT | Performed by: INTERNAL MEDICINE

## 2023-02-17 ASSESSMENT — PAIN SCALES - GENERAL: PAINLEVEL: NO PAIN (0)

## 2023-02-17 NOTE — LETTER
"    2/17/2023         RE: Lisa Reyna  [address]  [facility] [address]        Dear Colleague,    Thank you for referring your patient, Lisa Reyna, to the [facility] ([facility] Please see a copy of my visit note below.    Oncology Rooming Note    February 17, 2023 9:37 AM   Lisa Reyna is a 72 year old female who presents for:    Chief Complaint   Patient presents with     Oncology Clinic Visit     Malignant neoplasm of lower-inner quadrant of right breast of female, estrogen receptor positive (H)     Initial Vitals: BP (!) 160/80   Pulse 86   Temp 98.2  F (36.8  C)   Resp 18   Wt 80 kg (176 lb 4.8 oz)   SpO2 99%   BMI 32.25 kg/m   Estimated body mass index is 32.25 kg/m  as calculated from the following:    Height as of 10/18/22: 1.575 m (5' 2\").    Weight as of this encounter: 80 kg (176 lb 4.8 oz). Body surface area is 1.87 meters squared.  No Pain (0) Comment: Data Unavailable   No LMP recorded. Patient is postmenopausal.  Allergies reviewed: Yes  Medications reviewed: Yes    Medications: Medication refills not needed today.  Pharmacy name entered into [facility]: [facility], [place] [id number] [facility] [address] [place] [address]    Clinical concerns: None       Yamile Hernandez LPN              [facility] Hematology and Oncology Progress Note    Patient: Lisa Reyna  MRN: [id number]  Date of Service: Feb 17, 2023         Reason for Visit    Chief Complaint   Patient presents with     Oncology Clinic Visit     Malignant neoplasm of lower-inner quadrant of right breast of female, estrogen receptor positive (H)       Assessment and Plan     Cancer Staging   Malignant neoplasm of lower-inner quadrant of right breast of female, estrogen receptor positive (H)  Staging form: Breast, AJCC 8th Edition  - Pathologic stage from 2/11/2022: Stage IA (pT1c, pN0(sn), cM0, G2, ER+, MT+, HER2-, Oncotype DX score: 30) - Signed by Tamar Koch MD on 3/13/2022  - Clinical: " No stage assigned - Unsigned      ECOG Performance    0 - Independent     Pain  Pain Score: No Pain (0)    #.  History of invasive ductal carcinoma of LIQ of right breast, stage IA, ER+KY+HER2-. Oncotype 30/19%, >15%.  #.  Grade 1 neuropathy of both feet, exacerbated by chemotherapy on pre-existing neuropathy of both feet, improving with acupuncture.     Clinically well from breast cancer standpoint.  Exam is unremarkable.     She is currently on every 6 months mammogram.  I requested to mammogram for July 2023.     She has good tolerance to letrozole.  She will continue letrozole.   Follow-up clinical exam in 6 months.    #.  Bone health   She will continue adequate intake of calcium 1200 mg and vitamin D 2000 units daily.  I also advised her to add weightbearing exercises.    Will obtain follow-up DEXA scan in 8/2024.     Encounter Diagnoses:    Problem List Items Addressed This Visit        Oncology Diagnoses    Malignant neoplasm of lower-inner quadrant of right breast of female, estrogen receptor positive (H)    Relevant Orders    MA Screen Bilateral w/Justen       Other    Chemotherapy-induced peripheral neuropathy (H)    Long term (current) use of aromatase inhibitors - Primary   Other Visit Diagnoses     Encounter for screening mammogram for breast cancer        Relevant Orders    MA Screen Bilateral w/Justen             CC: Cayla Skelton, KERMIT   ______________________________________________________________________________  Diagnosis  6/24/2004- She had prior history of right breast DCIS    1/18/2022- screening mammogram detected     Invasive ductal carcinoma with focal micropapillary pattern.  Grade 2.  12 mm.     Margins were negative but close at 0.5 mm from the nearest junction of the inferior and medial margin, 0.5 mm from medial margin and 2 mm from inferior margin.     There is associated DCIS with EIC negative, nuclear grade intermediate, solid and focal cribriform pattern of 10%.  DCIS margins were  uninvolved at 3 mm from nearest inferior margin, 5 mm from medial margins.     1 sentinel lymph node was negative for metastatic carcinoma.    ER positive, greater than 10% of the cell, 61-70%, CO positive (21-30%), HER-2 negative by IHC (1+).     Oncotype DX recurrence score 30/19% / >15%.    5/26/2022- genetic counseling and testing completed.  No pathogenic mutation found.    Treatment to date  6/2004- post right breast lumpectomy (Dr. Jones) on 6/24/2004, post adjuvant radiation (Dr. Caleb Elena) followed by adjuvant tamoxifen for 5 years (Dr. Noé Fernandes).    2/11/2022- right breast lumpectomy and right sentinel lymph node biopsy (Dr. Alcantara).   3/25/2022-5/31/2022- completed 4 cycles of adjuvant TC  7/20/2022- initiated letrozole    History of Present Illness    Ms. Lisa Reyna presented today unaccompanied.  She had to call a week ago and she is still recovering from it.  She tested COVID multiple times and that was negative.  She had cataract surgery last Thursday and she is going to have the right eye cataract surgery next week.  She is hoping that she is feeling better by then.  She reported she has positive benefit from acupuncture for her neuropathy.  She takes letrozole regularly.  No intolerable side effects.  She takes calcium 1200 mg and vitamin D 2000 units daily.    Review of systems  Apart from describing in HPI, the remainder of comprehensive ROS was negative.    Past History    Past Medical History:   Diagnosis Date     Arthritis      Asthma     Seaonal usually in spring     Breast cancer (H) 2004    rt lumpect     Coronary artery disease      Endometrial polyp 1992    was protruding through cervix     GERD (gastroesophageal reflux disease)      Heart attack (H)      Heart murmur      History of measles, mumps, or rubella as a child    hx of measles, mumps and [place] mealses. Rubella immune 5/1980     HTN (hypertension)      Hx of radiation therapy 2004     Hyperlipemia       Osteopenia      Other chronic pain      Ovarian cyst      Prediabetes      UTI (urinary tract infection)      Walking troubles        Past Surgical History:   Procedure Laterality Date     BIOPSY BREAST Left 2008    cyst removal     BIOPSY BREAST Right 2004     CERVICAL DISC SURGERY  6/25/2007    C6-7 sarah laminectomy with microdiskectomy     LUMPECTOMY BREAST Right 6/24/2004     LUMPECTOMY BREAST Right 2/11/2022    Procedure: Right Lumpectomy after Wire Localization; [place] Lymph Node Biopsy;  Surgeon: Mami Alcantara MD;  Location: [facility]     OOPHORECTOMY Bilateral 2011     TOTAL HIP ARTHROPLASTY Left 9/30/2009     TUBAL LIGATION  1/10/1992    with endometrial polyp removal     WRIST SURGERY         Physical Exam    BP (!) 160/80   Pulse 86   Temp 98.2  F (36.8  C)   Resp 18   Wt 80 kg (176 lb 4.8 oz)   SpO2 99%   BMI 32.25 kg/m      General: alert, awake, not in acute distress  HEENT: Head: Normal, normocephalic, atraumatic.  Eye: Normal external eye, conjunctiva, lids cornea, ALENA.  Pharynx: Normal buccal mucosa. Normal pharynx.  Neck / Thyroid: Supple, no masses, nodes, nodules or enlargement.  Lymphatics: No abnormally enlarged lymph nodes.  Chest: Normal chest wall and respirations. Clear to auscultation.  Breasts: No palpable discrete abnormalities.  Heart: S1 S2 RRR.   Abdomen: abdomen is soft without significant tenderness, masses, organomegaly or guarding  Extremities: normal strength, tone, and muscle mass  Skin: normal. no rash or abnormalities  CNS: non focal.    Lab Results    No results found for this or any previous visit (from the past 168 hour(s)).    Imaging    MA Diagnostic Bilateral w/Justen    Result Date: 1/30/2023  EXAM: MA DIAGNOSTIC BILATERAL W/ JUSTEN LOCATION: [facility] DATE/TIME: 1/30/2023 8:32 AM INDICATION: Follow up breast cancer COMPARISON: Prior mammograms dating back to 11/25/2020 MAMMOGRAPHIC FINDINGS: Bilateral full-field digital  diagnostic mammograms performed. The breasts are heterogeneously dense, which may obscure small masses. Images evaluated with the assistance of CAD. Breast tomosynthesis was used in interpretation. Conservation therapy changes in the right breast. No suspicious finding in either breast.     IMPRESSION: ACR BI-RADS Category 2: Benign. Return to annual screening schedule is recommended.     30 minutes spent on the date of the encounter doing chart review, history and exam, documentation and further activities as noted above.    Signed by: Tamar Koch MD      Again, thank you for allowing me to participate in the care of your patient.        Sincerely,        Tamar Koch MD

## 2023-02-17 NOTE — PROGRESS NOTES
"Oncology Rooming Note    February 17, 2023 9:37 AM   Lisa Reyna is a 72 year old female who presents for:    Chief Complaint   Patient presents with     Oncology Clinic Visit     Malignant neoplasm of lower-inner quadrant of right breast of female, estrogen receptor positive (H)     Initial Vitals: BP (!) 160/80   Pulse 86   Temp 98.2  F (36.8  C)   Resp 18   Wt 80 kg (176 lb 4.8 oz)   SpO2 99%   BMI 32.25 kg/m   Estimated body mass index is 32.25 kg/m  as calculated from the following:    Height as of 10/18/22: 1.575 m (5' 2\").    Weight as of this encounter: 80 kg (176 lb 4.8 oz). Body surface area is 1.87 meters squared.  No Pain (0) Comment: Data Unavailable   No LMP recorded. Patient is postmenopausal.  Allergies reviewed: Yes  Medications reviewed: Yes    Medications: Medication refills not needed today.  Pharmacy name entered into [facility]: [place] [address] [place] [address] [address] [place] [address]    Clinical concerns: None       Yamile Hernandez LPN            "

## 2023-02-17 NOTE — PROGRESS NOTES
[facility] Hematology and Oncology Progress Note    Patient: Lisa Reyna  MRN: [id number]  Date of Service: Feb 17, 2023         Reason for Visit    Chief Complaint   Patient presents with     Oncology Clinic Visit     Malignant neoplasm of lower-inner quadrant of right breast of female, estrogen receptor positive (H)       Assessment and Plan     Cancer Staging   Malignant neoplasm of lower-inner quadrant of right breast of female, estrogen receptor positive (H)  Staging form: Breast, AJCC 8th Edition  - Pathologic stage from 2/11/2022: Stage IA (pT1c, pN0(sn), cM0, G2, ER+, FL+, HER2-, Oncotype DX score: 30) - Signed by Tamar Koch MD on 3/13/2022  - Clinical: No stage assigned - Unsigned      ECOG Performance    0 - Independent     Pain  Pain Score: No Pain (0)    #.  History of invasive ductal carcinoma of LIQ of right breast, stage IA, ER+FL+HER2-. Oncotype 30/19%, >15%.  #.  Grade 1 neuropathy of both feet, exacerbated by chemotherapy on pre-existing neuropathy of both feet, improving with acupuncture.     Clinically well from breast cancer standpoint.  Exam is unremarkable.     She is currently on every 6 months mammogram.  I requested to mammogram for July 2023.     She has good tolerance to letrozole.  She will continue letrozole.   Follow-up clinical exam in 6 months.    #.  Bone health   She will continue adequate intake of calcium 1200 mg and vitamin D 2000 units daily.  I also advised her to add weightbearing exercises.    Will obtain follow-up DEXA scan in 8/2024.     Encounter Diagnoses:    Problem List Items Addressed This Visit        Oncology Diagnoses    Malignant neoplasm of lower-inner quadrant of right breast of female, estrogen receptor positive (H)    Relevant Orders    MA Screen Bilateral w/Justen       Other    Chemotherapy-induced peripheral neuropathy (H)    Long term (current) use of aromatase inhibitors - Primary   Other Visit Diagnoses     Encounter for screening mammogram  for breast cancer        Relevant Orders    MA Screen Bilateral w/Justen             CC: Cayla Skelton, NP   ______________________________________________________________________________  Diagnosis  6/24/2004- She had prior history of right breast DCIS    1/18/2022- screening mammogram detected     Invasive ductal carcinoma with focal micropapillary pattern.  Grade 2.  12 mm.     Margins were negative but close at 0.5 mm from the nearest junction of the inferior and medial margin, 0.5 mm from medial margin and 2 mm from inferior margin.     There is associated DCIS with EIC negative, nuclear grade intermediate, solid and focal cribriform pattern of 10%.  DCIS margins were uninvolved at 3 mm from nearest inferior margin, 5 mm from medial margins.     1 sentinel lymph node was negative for metastatic carcinoma.    ER positive, greater than 10% of the cell, 61-70%, NV positive (21-30%), HER-2 negative by IHC (1+).     Oncotype DX recurrence score 30/19% / >15%.    5/26/2022- genetic counseling and testing completed.  No pathogenic mutation found.    Treatment to date  6/2004- post right breast lumpectomy (Dr. Jones) on 6/24/2004, post adjuvant radiation (Dr. Caleb Elena) followed by adjuvant tamoxifen for 5 years (Dr. Noé Fernandes).    2/11/2022- right breast lumpectomy and right sentinel lymph node biopsy (Dr. Alcantara).   3/25/2022-5/31/2022- completed 4 cycles of adjuvant TC  7/20/2022- initiated letrozole    History of Present Illness    Ms. Lisa Reyna presented today unaccompanied.  She had to call a week ago and she is still recovering from it.  She tested COVID multiple times and that was negative.  She had cataract surgery last Thursday and she is going to have the right eye cataract surgery next week.  She is hoping that she is feeling better by then.  She reported she has positive benefit from acupuncture for her neuropathy.  She takes letrozole regularly.  No intolerable side  effects.  She takes calcium 1200 mg and vitamin D 2000 units daily.    Review of systems  Apart from describing in HPI, the remainder of comprehensive ROS was negative.    Past History    Past Medical History:   Diagnosis Date     Arthritis      Asthma     Seaonal usually in spring     Breast cancer (H) 2004    rt lumpect     Coronary artery disease      Endometrial polyp 1992    was protruding through cervix     GERD (gastroesophageal reflux disease)      Heart attack (H)      Heart murmur      History of measles, mumps, or rubella as a child    hx of measles, mumps and [place] mealses. Rubella immune 5/1980     HTN (hypertension)      Hx of radiation therapy 2004     Hyperlipemia      Osteopenia      Other chronic pain      Ovarian cyst      Prediabetes      UTI (urinary tract infection)      Walking troubles        Past Surgical History:   Procedure Laterality Date     BIOPSY BREAST Left 2008    cyst removal     BIOPSY BREAST Right 2004     CERVICAL DISC SURGERY  6/25/2007    C6-7 sarah laminectomy with microdiskectomy     LUMPECTOMY BREAST Right 6/24/2004     LUMPECTOMY BREAST Right 2/11/2022    Procedure: Right Lumpectomy after Wire Localization; [place] Lymph Node Biopsy;  Surgeon: Mami Alcantara MD;  Location: [facility]     OOPHORECTOMY Bilateral 2011     TOTAL HIP ARTHROPLASTY Left 9/30/2009     TUBAL LIGATION  1/10/1992    with endometrial polyp removal     WRIST SURGERY         Physical Exam    BP (!) 160/80   Pulse 86   Temp 98.2  F (36.8  C)   Resp 18   Wt 80 kg (176 lb 4.8 oz)   SpO2 99%   BMI 32.25 kg/m      General: alert, awake, not in acute distress  HEENT: Head: Normal, normocephalic, atraumatic.  Eye: Normal external eye, conjunctiva, lids cornea, ALENA.  Pharynx: Normal buccal mucosa. Normal pharynx.  Neck / Thyroid: Supple, no masses, nodes, nodules or enlargement.  Lymphatics: No abnormally enlarged lymph nodes.  Chest: Normal chest wall and respirations. Clear to  auscultation.  Breasts: No palpable discrete abnormalities.  Heart: S1 S2 RRR.   Abdomen: abdomen is soft without significant tenderness, masses, organomegaly or guarding  Extremities: normal strength, tone, and muscle mass  Skin: normal. no rash or abnormalities  CNS: non focal.    Lab Results    No results found for this or any previous visit (from the past 168 hour(s)).    Imaging    MA Diagnostic Bilateral w/Angela    Result Date: 1/30/2023  EXAM: MA DIAGNOSTIC BILATERAL W/ ANGELA LOCATION: [facility] DATE/TIME: 1/30/2023 8:32 AM INDICATION: Follow up breast cancer COMPARISON: Prior mammograms dating back to 11/25/2020 MAMMOGRAPHIC FINDINGS: Bilateral full-field digital diagnostic mammograms performed. The breasts are heterogeneously dense, which may obscure small masses. Images evaluated with the assistance of CAD. Breast tomosynthesis was used in interpretation. Conservation therapy changes in the right breast. No suspicious finding in either breast.     IMPRESSION: ACR BI-RADS Category 2: Benign. Return to annual screening schedule is recommended.     30 minutes spent on the date of the encounter doing chart review, history and exam, documentation and further activities as noted above.    Signed by: Tamar Koch MD

## 2023-02-19 PROBLEM — Z79.811 LONG TERM (CURRENT) USE OF AROMATASE INHIBITORS: Status: ACTIVE | Noted: 2023-02-19

## 2023-02-19 PROBLEM — Z51.11 ENCOUNTER FOR ANTINEOPLASTIC CHEMOTHERAPY: Status: RESOLVED | Noted: 2022-03-11 | Resolved: 2023-02-19

## 2023-02-20 ENCOUNTER — PROCEDURE ONLY VISIT (OUTPATIENT)
Dept: ONCOLOGY | Facility: CLINIC | Age: 73
End: 2023-02-20
Attending: INTERNAL MEDICINE
Payer: COMMERCIAL

## 2023-02-20 DIAGNOSIS — M54.59 OTHER LOW BACK PAIN: Primary | ICD-10-CM

## 2023-02-20 PROCEDURE — 97810 ACUP 1/> WO ESTIM 1ST 15 MIN: CPT

## 2023-02-20 PROCEDURE — 97811 ACUP 1/> W/O ESTIM EA ADD 15: CPT

## 2023-02-20 NOTE — PROGRESS NOTES
ACUPUNCTURIST TREATMENT NOTE      Lisa Reyna, a 72 year old female, is here today for Follow - Up exam. Patient is referred by No ref. provider found.    HPI  Main Complaint: Chronic low back pain.  Secondary Complaints: Chemotherapy induced neuropathy of great toes. (L) wrist pain.    Past Medical History  Past Medical History Reviewed: No   has a past medical history of Arthritis, Asthma, Breast cancer (H) (2004), Coronary artery disease, Endometrial polyp (1992), GERD (gastroesophageal reflux disease), Heart attack (H), Heart murmur, History of measles, mumps, or rubella (as a child), HTN (hypertension), radiation therapy (2004), Hyperlipemia, Osteopenia, Other chronic pain, Ovarian cyst, Prediabetes, UTI (urinary tract infection), and Walking troubles.    Objective  Basic Exam Completed:   Lower Extremity (ies): Normal    TCM Exam Completed: Yes   Limbs/Back: Lower Back    Tongue/Pulse Exam Completed: No    Patient Assessment  Patient Type: Oncology  Patient Complaint: Chronic low back pain, neuropathy of great toes    Acupuncture 2/6/2023 2/20/2023   Intervention Reason Pain; Neuropathy Pain; Neuropathy; Pain 2   Pain Location Low back Low back   Pre-session Pain Rating 0 1   Post-session Pain Rating 0 0   Pain 2 Location - (L) wrist   Pre-session Pain 2 Rating - 5   Post-session Pain 2 Rating - 2   Neuropathy Location Great toes Great toes   Pre-session Neuropathy rating 3 3   Post-session Neuropathy rating 2 1   Neuropathy 2 Location - -   Pre-session Neuropathy 2 rating - -   Post-session Neuropathy 2 rating - -       TCM Diagnosis: Qi Deficiency;Blood Deficiency;Yin Deficiency;Qi Stagnation;Kidney Qi Deficiency;Spleen Qi Deficiency;Damp and Phlegm Accumulation      Treatment Principle: tonify and nourish, move qi and blood    TCM / Acupuncture Treatment  Acupuncture Points:       Initial insertions: (L) Si 3, 4, (L) Ling gu, (L) Li 5, (R) Bl 60, 63, 65       Second insertions: Kid 1, Sp 2, 3, Lauren 2             Number of needles inserted: 15  Number of needles removed: 15    Accessory Techniques 2/6/2023 2/20/2023   Accessory Techniques TDP Heat Lamp TDP Heat Lamp   TDP Heat Lamp location used over feet over feet          Assessment and Plan  Treatment Observations:    Acupuncture Treatment Recommendations:         It is my recommendation that this patient seek advice from their Primary Care Provider about active symptoms not addressed during this visit. The risks and benefits of acupuncture were reviewed and the patient stated understanding. The patient's questions were answered to their satisfaction. Consent was provided for treatment. We thank you for the referral and opportunity to treat this patient.    Time Spent with Patient:   I spent a total of 30 minutes face-to-face with Lisa Reyna during today's office visit.     Woody Nielsen

## 2023-02-24 ENCOUNTER — PATIENT OUTREACH (OUTPATIENT)
Dept: ONCOLOGY | Facility: CLINIC | Age: 73
End: 2023-02-24
Payer: COMMERCIAL

## 2023-02-24 NOTE — PROGRESS NOTES
[facility]: Cancer Care                                                                                          Received a phone call back from Lisa. We reviewed the information on the treatment summary together. She states she is doing well and recovering from her cancer treatments. We reviewed resources available to her, specifically the annual survivorship conference coming up in April and the thrive series. She appreciated this information and agrees to call back if she wants any other information or resources.     Radha Bolanos RN BSN  Cancer Survivorship Coordinator

## 2023-02-24 NOTE — PROGRESS NOTES
[facility]: Cancer Care                                                                                          Attempted to call patient today to discuss the cancer treatment summary that was prepared and sent via [employer]. Left a generic voice message requesting a call back at my direct phone number: [phone].    Radha Bolanos RN BSN  Cancer Survivorship Coordinator

## 2023-03-17 ENCOUNTER — TELEPHONE (OUTPATIENT)
Dept: CARDIOLOGY | Facility: CLINIC | Age: 73
End: 2023-03-17
Payer: COMMERCIAL

## 2023-03-17 NOTE — TELEPHONE ENCOUNTER
[place] reviewed.  Patients recent blood pressure is outside of guideline parameters.  Called pt to review, no answer.  Left voicemail message asking patient to check their blood pressure using a home blood pressure cuff or by going to a [facility].  Patient instructed to then call [phone] ([facility]) and leave a message with their name, date of birth, and blood pressure reading that was completed within the last 24 hours and where it was completed.  Will await call back for further review.  Bri Jennings MA

## 2023-03-20 ENCOUNTER — PROCEDURE ONLY VISIT (OUTPATIENT)
Dept: ONCOLOGY | Facility: CLINIC | Age: 73
End: 2023-03-20
Payer: COMMERCIAL

## 2023-03-20 DIAGNOSIS — T45.1X5A CHEMOTHERAPY-INDUCED PERIPHERAL NEUROPATHY (H): ICD-10-CM

## 2023-03-20 DIAGNOSIS — G62.0 CHEMOTHERAPY-INDUCED PERIPHERAL NEUROPATHY (H): ICD-10-CM

## 2023-03-20 DIAGNOSIS — M54.59 OTHER LOW BACK PAIN: Primary | ICD-10-CM

## 2023-03-20 PROCEDURE — 97810 ACUP 1/> WO ESTIM 1ST 15 MIN: CPT

## 2023-03-20 NOTE — PROGRESS NOTES
ACUPUNCTURIST TREATMENT NOTE      Lisa Reyna, a 72 year old female, is here today for Follow - Up exam. Patient is referred by No ref. provider found.    HPI  Main Complaint: Chronic low back pain  Secondary Complaints: Chemotherapy induced neuropathy of feet, acute bilateral plantar fascitis    Past Medical History  Past Medical History Reviewed: No   has a past medical history of Arthritis, Asthma, Breast cancer (H) (2004), Coronary artery disease, Endometrial polyp (1992), GERD (gastroesophageal reflux disease), Heart attack (H), Heart murmur, History of measles, mumps, or rubella (as a child), HTN (hypertension), radiation therapy (2004), Hyperlipemia, Osteopenia, Other chronic pain, Ovarian cyst, Prediabetes, UTI (urinary tract infection), and Walking troubles.    Objective  Basic Exam Completed:   Lower Extremity (ies): Normal    TCM Exam Completed: Yes   Limbs/Back: Lower Back    Tongue/Pulse Exam Completed: No    Patient Assessment  Patient Type: Oncology  Patient Complaint: Chronic low back pain, bilateral plantar fascitis, neuropathy of great toes    Acupuncture 2/20/2023 3/20/2023   Intervention Reason Pain; Neuropathy; Pain 2 Pain; Pain 2; Neuropathy   Pain Location Low back Low back   Pre-session Pain Rating 1 0   Post-session Pain Rating 0 0   Pain 2 Location (L) wrist Feet   Pre-session Pain 2 Rating 5 3   Post-session Pain 2 Rating 2 0   Neuropathy Location Great toes Feet   Pre-session Neuropathy rating 3 2   Post-session Neuropathy rating 1 0   Neuropathy 2 Location - -   Pre-session Neuropathy 2 rating - -   Post-session Neuropathy 2 rating - -       TCM Diagnosis:        Treatment Principle:      TCM / Acupuncture Treatment  Acupuncture Points:       Initial insertions: (L) Si 3, 4, 5, (L) Ling gu, (R) Bl 65, Bl 60, 62, Sp 1, 2, 3, Lauren 2, 3                    Number of needles inserted: 19  Number of needles removed: 19    Accessory Techniques 2/20/2023 3/20/2023   Accessory Techniques TDP Heat  Lamp TDP Heat Lamp   TDP Heat Lamp location used over feet over feet          Assessment and Plan  Treatment Observations: No pain of low back at time of treatment.  Acupuncture Treatment Recommendations:         It is my recommendation that this patient seek advice from their Primary Care Provider about active symptoms not addressed during this visit. The risks and benefits of acupuncture were reviewed and the patient stated understanding. The patient's questions were answered to their satisfaction. Consent was provided for treatment. We thank you for the referral and opportunity to treat this patient.    Time Spent with Patient:   I spent a total of 15 minutes face-to-face with Lisa Reyna during today's office visit.     Woody Nielsen

## 2023-03-22 NOTE — TELEPHONE ENCOUNTER
Patient returned call and left voicemail message with update blood pressure reading.      Last Blood Pressure: 160/80  Last Heart Rate: 86  Date: 2/17/23  Location: Other Specialty    Today's Blood Pressure: 117/51  Today's Heart Rate: n/a   Location: Home BP    Patient reported blood pressure updated in Epic. Blood pressure [place] Community Measures [place].  Patient will follow up as previously advised.   KALYAN Pichardo

## 2023-04-10 ENCOUNTER — PROCEDURE ONLY VISIT (OUTPATIENT)
Dept: ONCOLOGY | Facility: CLINIC | Age: 73
End: 2023-04-10
Attending: NURSE PRACTITIONER
Payer: COMMERCIAL

## 2023-04-10 DIAGNOSIS — T45.1X5A CHEMOTHERAPY-INDUCED PERIPHERAL NEUROPATHY (H): ICD-10-CM

## 2023-04-10 DIAGNOSIS — G62.0 CHEMOTHERAPY-INDUCED PERIPHERAL NEUROPATHY (H): ICD-10-CM

## 2023-04-10 DIAGNOSIS — M54.59 OTHER LOW BACK PAIN: Primary | ICD-10-CM

## 2023-04-10 PROCEDURE — 97811 ACUP 1/> W/O ESTIM EA ADD 15: CPT

## 2023-04-10 PROCEDURE — 97810 ACUP 1/> WO ESTIM 1ST 15 MIN: CPT

## 2023-04-10 NOTE — PROGRESS NOTES
ACUPUNCTURIST TREATMENT NOTE      Lisa Reyna, a 72 year old female, is here today for Follow - Up exam. Patient is referred by No ref. provider found.    HPI  Main Complaint: Chronic low back pain, better today but has been more bothersome with the weather change. No pain at time of treatment.  Secondary Complaints: Chemotherapy induced neuropathy of feet.    Past Medical History  Past Medical History Reviewed: No   has a past medical history of Arthritis, Asthma, Breast cancer (H) (2004), Coronary artery disease, Endometrial polyp (1992), GERD (gastroesophageal reflux disease), Heart attack (H), Heart murmur, History of measles, mumps, or rubella (as a child), HTN (hypertension), radiation therapy (2004), Hyperlipemia, Osteopenia, Other chronic pain, Ovarian cyst, Prediabetes, UTI (urinary tract infection), and Walking troubles.    Objective  Basic Exam Completed:   Lower Extremity (ies): Normal    TCM Exam Completed: Yes   Limbs/Back: Lower Back    Tongue/Pulse Exam Completed: No    Patient Assessment  Patient Type: Oncology  Patient Complaint: Chronic low back pain, neuropathy of feet    Acupuncture 3/20/2023 4/10/2023   Intervention Reason Pain; Pain 2; Neuropathy Pain; Neuropathy   Pain Location Low back Low back   Pre-session Pain Rating 0 0   Post-session Pain Rating 0 0   Pain 2 Location Feet Feet   Pre-session Pain 2 Rating 3 3   Post-session Pain 2 Rating 0 2   Neuropathy Location Feet -   Pre-session Neuropathy rating 2 -   Post-session Neuropathy rating 0 -   Neuropathy 2 Location - -   Pre-session Neuropathy 2 rating - -   Post-session Neuropathy 2 rating - -       TCM Diagnosis: Qi Deficiency;Blood Deficiency;Yin Deficiency;Qi Stagnation;Kidney Qi Deficiency;Spleen Qi Deficiency;Damp and Phlegm Accumulation      Treatment Principle: tonify and nourish, move qi and blood    TCM / Acupuncture Treatment  Acupuncture Points:       Initial insertions: (L) Si 3, (L) Ling gu, (R) Bl 65, Bl 60, 62, Sp 2, 3,  Lauren 3       Second insertions: Kid 1, [place]            Number of needles inserted: 21  Number of needles removed: 21    Accessory Techniques 3/20/2023 4/10/2023   Accessory Techniques TDP Heat Lamp TDP Heat Lamp   TDP Heat Lamp location used over feet over feet          Assessment and Plan  Treatment Observations:    Acupuncture Treatment Recommendations:         It is my recommendation that this patient seek advice from their Primary Care Provider about active symptoms not addressed during this visit. The risks and benefits of acupuncture were reviewed and the patient stated understanding. The patient's questions were answered to their satisfaction. Consent was provided for treatment. We thank you for the referral and opportunity to treat this patient.    Time Spent with Patient:   I spent a total of 30 minutes face-to-face with Lisa Reyna during today's office visit.     Woody Nielsen

## 2023-04-11 NOTE — PROGRESS NOTES
"[facility]: Cancer Care Short Note                                    Discussion with Patient:                                                      Called patient to see how tolerating letrozole.  Left message for patient to return my call.    Patient returned my call. See assessment below.     Assessment:                                                      Patient reports she has been feeling good since starting letrozole.  She said she gets occasional aches and pain \"but I'm 71 years old\".  Patient reports had 2 water diarrhea stools this morning.  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  She said she took imodium this morning and hasn't had any more diarrhea.    Intervention/Education provided during outreach:                                                       Instructed patient monitor her pain and diarrhea and if worsens to contact Cancer Care.  Patient verbalized understanding.    Patient to follow up as scheduled at next appt    Signature:  Anais Galindo RN  " Yes

## 2023-04-17 ENCOUNTER — PROCEDURE ONLY VISIT (OUTPATIENT)
Dept: ONCOLOGY | Facility: CLINIC | Age: 73
End: 2023-04-17
Attending: INTERNAL MEDICINE
Payer: COMMERCIAL

## 2023-04-17 DIAGNOSIS — G62.0 CHEMOTHERAPY-INDUCED PERIPHERAL NEUROPATHY (H): ICD-10-CM

## 2023-04-17 DIAGNOSIS — M54.59 OTHER LOW BACK PAIN: Primary | ICD-10-CM

## 2023-04-17 DIAGNOSIS — T45.1X5A CHEMOTHERAPY-INDUCED PERIPHERAL NEUROPATHY (H): ICD-10-CM

## 2023-04-17 PROCEDURE — 97811 ACUP 1/> W/O ESTIM EA ADD 15: CPT

## 2023-04-17 PROCEDURE — 97810 ACUP 1/> WO ESTIM 1ST 15 MIN: CPT

## 2023-04-17 NOTE — PROGRESS NOTES
ACUPUNCTURIST TREATMENT NOTE      Lisa Reyna, a 72 year old female, is here today for Follow - Up exam. Patient is referred by No ref. provider found.    HPI  Main Complaint: Chronic low back pain  Secondary Complaints: Chemotherapy induced neuropathy of feet    Past Medical History  Past Medical History Reviewed: No   has a past medical history of Arthritis, Asthma, Breast cancer (H) (2004), Coronary artery disease, Endometrial polyp (1992), GERD (gastroesophageal reflux disease), Heart attack (H), Heart murmur, History of measles, mumps, or rubella (as a child), HTN (hypertension), radiation therapy (2004), Hyperlipemia, Osteopenia, Other chronic pain, Ovarian cyst, Prediabetes, UTI (urinary tract infection), and Walking troubles.    Objective  Basic Exam Completed:   Lower Extremity (ies): Normal    TCM Exam Completed: Yes   Limbs/Back: Lower Back    Tongue/Pulse Exam Completed: No    Patient Assessment  Patient Type: Oncology  Patient Complaint: Chronic low back pain, neuropathy of feet    Acupuncture 4/10/2023 4/17/2023   Intervention Reason Pain; Neuropathy Pain; Neuropathy   Pain Location Low back Low back   Pre-session Pain Rating 0 1   Post-session Pain Rating 0 0   Pain 2 Location Feet -   Pre-session Pain 2 Rating 3 -   Post-session Pain 2 Rating 2 -   Neuropathy Location - Feet   Pre-session Neuropathy rating - 3   Post-session Neuropathy rating - 2   Neuropathy 2 Location - -   Pre-session Neuropathy 2 rating - -   Post-session Neuropathy 2 rating - -       TCM Diagnosis: Qi Deficiency;Blood Deficiency;Yin Deficiency;Qi Stagnation;Kidney Qi Deficiency;Spleen Qi Deficiency;Damp and Phlegm Accumulation      Treatment Principle: tonify and nourish, move qi and blood    TCM / Acupuncture Treatment  Acupuncture Points:       Initial insertions: (L) Si 3, 4, 5, (L) Ling gu, (R) Bl 60, 62, 65       Second insertions: Kid 1, Lauren 2, 3, Sp 2, 3, St 44            Number of needles inserted: 19  Number of needles  removed: 19    Accessory Techniques 4/10/2023 4/17/2023   Accessory Techniques TDP Heat Lamp TDP Heat Lamp   TDP Heat Lamp location used over feet over feet          Assessment and Plan  Treatment Observations:    Acupuncture Treatment Recommendations:         It is my recommendation that this patient seek advice from their Primary Care Provider about active symptoms not addressed during this visit. The risks and benefits of acupuncture were reviewed and the patient stated understanding. The patient's questions were answered to their satisfaction. Consent was provided for treatment. We thank you for the referral and opportunity to treat this patient.    Time Spent with Patient:   I spent a total of 30 minutes face-to-face with Lisa Reyna during today's office visit.     Woody Nielsen

## 2023-04-24 ENCOUNTER — PROCEDURE ONLY VISIT (OUTPATIENT)
Dept: ONCOLOGY | Facility: CLINIC | Age: 73
End: 2023-04-24
Attending: NURSE PRACTITIONER
Payer: COMMERCIAL

## 2023-04-24 DIAGNOSIS — G62.0 CHEMOTHERAPY-INDUCED PERIPHERAL NEUROPATHY (H): ICD-10-CM

## 2023-04-24 DIAGNOSIS — T45.1X5A CHEMOTHERAPY-INDUCED PERIPHERAL NEUROPATHY (H): ICD-10-CM

## 2023-04-24 DIAGNOSIS — M54.59 OTHER LOW BACK PAIN: Primary | ICD-10-CM

## 2023-04-24 PROCEDURE — 97811 ACUP 1/> W/O ESTIM EA ADD 15: CPT

## 2023-04-24 PROCEDURE — 97810 ACUP 1/> WO ESTIM 1ST 15 MIN: CPT

## 2023-04-24 NOTE — PROGRESS NOTES
ACUPUNCTURIST TREATMENT NOTE      Lisa Reyna, a 72 year old female, is here today for Follow - Up exam. Patient is referred by No ref. provider found.    HPI  Main Complaint: Chronic low back pain  Secondary Complaints: Chemotherapy induced neuropathy of feet    Past Medical History  Past Medical History Reviewed: No   has a past medical history of Arthritis, Asthma, Breast cancer (H) (2004), Coronary artery disease, Endometrial polyp (1992), GERD (gastroesophageal reflux disease), Heart attack (H), Heart murmur, History of measles, mumps, or rubella (as a child), HTN (hypertension), radiation therapy (2004), Hyperlipemia, Osteopenia, Other chronic pain, Ovarian cyst, Prediabetes, UTI (urinary tract infection), and Walking troubles.    Objective  Basic Exam Completed:   Lower Extremity (ies): Normal    TCM Exam Completed: Yes   Limbs/Back: Lower Back    Tongue/Pulse Exam Completed: No    Patient Assessment  Patient Type: Oncology  Patient Complaint: Chronic low back pain, neuropathy of feet    Acupuncture 4/17/2023 4/24/2023   Intervention Reason Pain; Neuropathy Pain; Neuropathy   Pain Location Low back Low back   Pre-session Pain Rating 1 1   Post-session Pain Rating 0 0   Pain 2 Location - -   Pre-session Pain 2 Rating - -   Post-session Pain 2 Rating - -   Neuropathy Location Feet Feet   Pre-session Neuropathy rating 3 3   Post-session Neuropathy rating 2 1   Neuropathy 2 Location - -   Pre-session Neuropathy 2 rating - -   Post-session Neuropathy 2 rating - -       TCM Diagnosis: Qi Deficiency;Blood Deficiency;Yin Deficiency;Qi Stagnation;Kidney Qi Deficiency;Spleen Qi Deficiency;Damp and Phlegm Accumulation      Treatment Principle: tonify and nourish, move qi and blood    TCM / Acupuncture Treatment  Acupuncture Points:       Initial insertions: (L) Si 3, 4, 5, (L) Ling gu, (R) Bl 60, 62, 65       Second insertions: Kid 1, 2, Lauren 2, 3, Sp 2, 3, St 44            Number of needles inserted: 21  Number of  needles removed: 21    Accessory Techniques 4/17/2023 4/24/2023   Accessory Techniques TDP Heat Lamp TDP Heat Lamp   TDP Heat Lamp location used over feet Feet          Assessment and Plan  Treatment Observations:    Acupuncture Treatment Recommendations:         It is my recommendation that this patient seek advice from their Primary Care Provider about active symptoms not addressed during this visit. The risks and benefits of acupuncture were reviewed and the patient stated understanding. The patient's questions were answered to their satisfaction. Consent was provided for treatment. We thank you for the referral and opportunity to treat this patient.    Time Spent with Patient:   I spent a total of 30 minutes face-to-face with Lisa Reyna during today's office visit.     Woody Nielsen

## 2023-05-08 ENCOUNTER — PROCEDURE ONLY VISIT (OUTPATIENT)
Dept: ONCOLOGY | Facility: CLINIC | Age: 73
End: 2023-05-08
Attending: INTERNAL MEDICINE
Payer: COMMERCIAL

## 2023-05-08 DIAGNOSIS — G62.0 CHEMOTHERAPY-INDUCED PERIPHERAL NEUROPATHY (H): ICD-10-CM

## 2023-05-08 DIAGNOSIS — C50.311 MALIGNANT NEOPLASM OF LOWER-INNER QUADRANT OF RIGHT BREAST OF FEMALE, ESTROGEN RECEPTOR POSITIVE (H): ICD-10-CM

## 2023-05-08 DIAGNOSIS — M54.59 OTHER LOW BACK PAIN: Primary | ICD-10-CM

## 2023-05-08 DIAGNOSIS — Z17.0 MALIGNANT NEOPLASM OF LOWER-INNER QUADRANT OF RIGHT BREAST OF FEMALE, ESTROGEN RECEPTOR POSITIVE (H): ICD-10-CM

## 2023-05-08 DIAGNOSIS — T45.1X5A CHEMOTHERAPY-INDUCED PERIPHERAL NEUROPATHY (H): ICD-10-CM

## 2023-05-08 PROCEDURE — 97811 ACUP 1/> W/O ESTIM EA ADD 15: CPT

## 2023-05-08 PROCEDURE — 97810 ACUP 1/> WO ESTIM 1ST 15 MIN: CPT

## 2023-05-08 RX ORDER — LETROZOLE 2.5 MG/1
TABLET, FILM COATED ORAL
Qty: 90 TABLET | Refills: 1 | Status: SHIPPED | OUTPATIENT
Start: 2023-05-08 | End: 2023-08-16

## 2023-05-08 NOTE — PROGRESS NOTES
ACUPUNCTURIST TREATMENT NOTE      Lisa Reyna, a 72 year old female, is here today for Follow - Up exam. Patient is referred by No ref. provider found.    HPI  Main Complaint: Chronic pain of low back and (L) wrist  Secondary Complaints: Chemotherapy induced neuropathy of feet, mainly in great toes and balls of feet.    Past Medical History  Past Medical History Reviewed: No   has a past medical history of Arthritis, Asthma, Breast cancer (H) (2004), Coronary artery disease, Endometrial polyp (1992), GERD (gastroesophageal reflux disease), Heart attack (H), Heart murmur, History of measles, mumps, or rubella (as a child), HTN (hypertension), radiation therapy (2004), Hyperlipemia, Osteopenia, Other chronic pain, Ovarian cyst, Prediabetes, UTI (urinary tract infection), and Walking troubles.    Objective  Basic Exam Completed:   Back: Normal    TCM Exam Completed: Yes   Limbs/Back: Bilateral Lower Extremity    Tongue/Pulse Exam Completed: No    Patient Assessment  Patient Type: Oncology  Patient Complaint: Chronic pIN OF LOW BACK AND LEFT WRIST, NEUROPATHY OF FEET    Acupuncture 4/24/2023 5/8/2023   Intervention Reason Pain; Neuropathy Pain; Pain 2; Neuropathy   Pain Location Low back Low back   Pre-session Pain Rating 1 2   Post-session Pain Rating 0 0   Pain 2 Location - (L) wrist   Pre-session Pain 2 Rating - 5   Post-session Pain 2 Rating - 0   Neuropathy Location Feet Feet   Pre-session Neuropathy rating 3 3   Post-session Neuropathy rating 1 3   Neuropathy 2 Location - -   Pre-session Neuropathy 2 rating - -   Post-session Neuropathy 2 rating - -       TCM Diagnosis: Qi Deficiency;Blood Deficiency;Yin Deficiency;Qi Stagnation;Kidney Qi Deficiency;Spleen Qi Deficiency;Damp and Phlegm Accumulation      Treatment Principle: tonify and nourish, move qi and blood    TCM / Acupuncture Treatment  Acupuncture Points:       Initial insertions: (L) Si 3, 4, 5, 6, (L) Ling gu, (R) Bl 60, 62, 65       Second insertions:  Sp 1, 2, 3, Lauren 2, Kid 1, St 44            Number of needles inserted: 20  Number of needles removed: 20    Accessory Techniques 4/24/2023 5/8/2023   Accessory Techniques TDP Heat Lamp TDP Heat Lamp   TDP Heat Lamp location used Feet Feet          Assessment and Plan  Treatment Observations:    Acupuncture Treatment Recommendations:         It is my recommendation that this patient seek advice from their Primary Care Provider about active symptoms not addressed during this visit. The risks and benefits of acupuncture were reviewed and the patient stated understanding. The patient's questions were answered to their satisfaction. Consent was provided for treatment. We thank you for the referral and opportunity to treat this patient.    Time Spent with Patient:   I spent a total of 30 minutes face-to-face with Lisa Reyna during today's office visit.     Woody Nielsen

## 2023-05-08 NOTE — TELEPHONE ENCOUNTER
[facility]: Cancer Care                                                                                          Refill request received from [facility] via [facility] for letrozole.  Last refilled by Dr. Koch on 10/18/22.  Quantity #90. 1 refill.    Last seen by Dr. Koch on 2/17/23.  Patient on 6 month recall list.    Message sent to Dr. Koch.    Signature:  Anais Galindo RN

## 2023-05-15 ENCOUNTER — PROCEDURE ONLY VISIT (OUTPATIENT)
Dept: ONCOLOGY | Facility: CLINIC | Age: 73
End: 2023-05-15
Attending: INTERNAL MEDICINE
Payer: COMMERCIAL

## 2023-05-15 DIAGNOSIS — T45.1X5A CHEMOTHERAPY-INDUCED PERIPHERAL NEUROPATHY (H): ICD-10-CM

## 2023-05-15 DIAGNOSIS — M54.59 OTHER LOW BACK PAIN: Primary | ICD-10-CM

## 2023-05-15 DIAGNOSIS — G62.0 CHEMOTHERAPY-INDUCED PERIPHERAL NEUROPATHY (H): ICD-10-CM

## 2023-05-15 PROCEDURE — 97810 ACUP 1/> WO ESTIM 1ST 15 MIN: CPT

## 2023-05-15 PROCEDURE — 97811 ACUP 1/> W/O ESTIM EA ADD 15: CPT

## 2023-05-15 NOTE — PROGRESS NOTES
ACUPUNCTURIST TREATMENT NOTE      Lisa Reyna, a 72 year old female, is here today for Follow - Up exam. Patient is referred by No ref. provider found.    HPI  Main Complaint: Chronic pain of low back and (L) wrist  Secondary Complaints: Chemotherapy induced neuropathy of great toes.    Past Medical History  Past Medical History Reviewed: No   has a past medical history of Arthritis, Asthma, Breast cancer (H) (2004), Coronary artery disease, Endometrial polyp (1992), GERD (gastroesophageal reflux disease), Heart attack (H), Heart murmur, History of measles, mumps, or rubella (as a child), HTN (hypertension), radiation therapy (2004), Hyperlipemia, Osteopenia, Other chronic pain, Ovarian cyst, Prediabetes, UTI (urinary tract infection), and Walking troubles.    Objective  Basic Exam Completed:   Lower Extremity (ies): Normal    TCM Exam Completed: Yes   Limbs/Back: Lower Back    Tongue/Pulse Exam Completed: No    Patient Assessment  Patient Type: Oncology  Patient Complaint: Chronic pain of low back and (L) wrist, neuropathy of feet    Acupuncture 5/8/2023 5/15/2023   Intervention Reason Pain; Pain 2; Neuropathy Pain; Pain 2; Neuropathy   Pain Location Low back Low back   Pre-session Pain Rating 2 0   Post-session Pain Rating 0 0   Pain 2 Location (L) wrist (L) wrist   Pre-session Pain 2 Rating 5 4   Post-session Pain 2 Rating 0 0   Neuropathy Location Feet Feet   Pre-session Neuropathy rating 3 3   Post-session Neuropathy rating 3 2   Neuropathy 2 Location - -   Pre-session Neuropathy 2 rating - -   Post-session Neuropathy 2 rating - -       TCM Diagnosis: Qi Deficiency;Blood Deficiency;Yin Deficiency;Qi Stagnation;Kidney Qi Deficiency;Spleen Qi Deficiency;Damp and Phlegm Accumulation      Treatment Principle: tonify and nourish, move qi and blood    TCM / Acupuncture Treatment  Acupuncture Points:       Initial insertions: (L) Si 3, 4, 5, 6, (L) Ling gu, (R) Bl 60, 62, 65       Second insertions: Sp 1, 2, 3, Lauren  2, Kid 1            Number of needles inserted: 18  Number of needles removed: 18    Accessory Techniques 5/8/2023 5/15/2023   Accessory Techniques TDP Heat Lamp TDP Heat Lamp   TDP Heat Lamp location used Feet Feet          Assessment and Plan  Treatment Observations:    Acupuncture Treatment Recommendations:         It is my recommendation that this patient seek advice from their Primary Care Provider about active symptoms not addressed during this visit. The risks and benefits of acupuncture were reviewed and the patient stated understanding. The patient's questions were answered to their satisfaction. Consent was provided for treatment. We thank you for the referral and opportunity to treat this patient.    Time Spent with Patient:   I spent a total of 30 minutes face-to-face with Lisa Reyna during today's office visit.     Woody Nielsen

## 2023-05-19 VITALS — DIASTOLIC BLOOD PRESSURE: 57 MMHG | SYSTOLIC BLOOD PRESSURE: 118 MMHG

## 2023-05-19 NOTE — TELEPHONE ENCOUNTER
Patient returned call and left voicemail message with update blood pressure reading.      Last Blood Pressure: 160/80  Last Heart Rate: 86   Date: 02/17/23  Location: Other Specialty    Today's Blood Pressure: 118/57  Today's Heart Rate: n/a  Location: Home BP    Patient reported blood pressure updated in Epic. Blood pressure falls [place] Community Me[place].  Patient will follow up as previously advised.

## 2023-05-22 ENCOUNTER — PROCEDURE ONLY VISIT (OUTPATIENT)
Dept: ONCOLOGY | Facility: CLINIC | Age: 73
End: 2023-05-22
Attending: INTERNAL MEDICINE
Payer: COMMERCIAL

## 2023-05-22 DIAGNOSIS — G62.0 CHEMOTHERAPY-INDUCED PERIPHERAL NEUROPATHY (H): ICD-10-CM

## 2023-05-22 DIAGNOSIS — M54.59 OTHER LOW BACK PAIN: Primary | ICD-10-CM

## 2023-05-22 DIAGNOSIS — T45.1X5A CHEMOTHERAPY-INDUCED PERIPHERAL NEUROPATHY (H): ICD-10-CM

## 2023-05-22 PROCEDURE — 97810 ACUP 1/> WO ESTIM 1ST 15 MIN: CPT

## 2023-05-22 PROCEDURE — 97811 ACUP 1/> W/O ESTIM EA ADD 15: CPT

## 2023-05-22 NOTE — PROGRESS NOTES
ACUPUNCTURIST TREATMENT NOTE      Lisa Reyna, a 72 year old female, is here today for Follow - Up exam. Patient is referred by No ref. provider found.    HPI  Main Complaint: Chronic pain of low back and (L) wrist. Acute pain of posterior (R) hip.  Secondary Complaints: Chemotherapy induced neuropathy of feet, mainly in great toes    Past Medical History  Past Medical History Reviewed: No   has a past medical history of Arthritis, Asthma, Breast cancer (H) (2004), Coronary artery disease, Endometrial polyp (1992), GERD (gastroesophageal reflux disease), Heart attack (H), Heart murmur, History of measles, mumps, or rubella (as a child), HTN (hypertension), radiation therapy (2004), Hyperlipemia, Osteopenia, Other chronic pain, Ovarian cyst, Prediabetes, UTI (urinary tract infection), and Walking troubles.    Objective  Basic Exam Completed:   Upper Extremity(ies): Normal    TCM Exam Completed: Yes   Limbs/Back: Right Lower Extremity and Lower Back    Tongue/Pulse Exam Completed: No    Patient Assessment  Patient Type: Oncology  Patient Complaint: Chronic pain of low back, (L) wrist pain, (R) hip pain, neuropathy of feet    Acupuncture 5/15/2023 5/22/2023   Intervention Reason Pain; Pain 2; Neuropathy Pain; Pain 2; Pain 3; Neuropathy   Pain Location Low back Low back   Pre-session Pain Rating 0 3   Post-session Pain Rating 0 0   Pain 2 Location (L) wrist (L) wrist   Pre-session Pain 2 Rating 4 4   Post-session Pain 2 Rating 0 0   Pain 3 Location - (R) hip   Pre-session Pain 3 Rating - 3   Post-session Pain 3 Rating - 0   Neuropathy Location Feet Feet   Pre-session Neuropathy rating 3 3   Post-session Neuropathy rating 2 1   Neuropathy 2 Location - -   Pre-session Neuropathy 2 rating - -   Post-session Neuropathy 2 rating - -       TCM Diagnosis: Qi Deficiency;Blood Deficiency;Yin Deficiency;Qi Stagnation;Kidney Qi Deficiency;Spleen Qi Deficiency;Damp and Phlegm Accumulation      Treatment Principle: tonify and  nourish, move qi and blood    TCM / Acupuncture Treatment  Acupuncture Points:       Initial insertions: (L) Si 3, 4, 5, 6, (L) Ling gu, (R) Bl 60, 62, 63, 65, (R) Gb 36       Second insertions: Sp 1, 2, 3, Lauren 2, 3, Kid 1            Number of needles inserted: 22  Number of needles removed: 22    Accessory Techniques 5/15/2023 5/22/2023   Accessory Techniques TDP Heat Lamp TDP Heat Lamp   TDP Heat Lamp location used Feet Feet          Assessment and Plan  Treatment Observations:    Acupuncture Treatment Recommendations:         It is my recommendation that this patient seek advice from their Primary Care Provider about active symptoms not addressed during this visit. The risks and benefits of acupuncture were reviewed and the patient stated understanding. The patient's questions were answered to their satisfaction. Consent was provided for treatment. We thank you for the referral and opportunity to treat this patient.    Time Spent with Patient:   I spent a total of 30 minutes face-to-face with Lisa Reyna during today's office visit.     Woody Nielsen

## 2023-05-24 ENCOUNTER — LAB REQUISITION (OUTPATIENT)
Dept: LAB | Facility: CLINIC | Age: 73
End: 2023-05-24
Payer: COMMERCIAL

## 2023-05-24 DIAGNOSIS — E11.22 TYPE 2 DIABETES MELLITUS WITH DIABETIC CHRONIC KIDNEY DISEASE (H): ICD-10-CM

## 2023-05-24 DIAGNOSIS — E78.1 PURE HYPERGLYCERIDEMIA: ICD-10-CM

## 2023-05-24 LAB
ALBUMIN SERPL BCG-MCNC: 4.7 G/DL (ref 3.5–5.2)
ALP SERPL-CCNC: 65 U/L (ref 35–104)
ALT SERPL W P-5'-P-CCNC: 10 U/L (ref 10–35)
ANION GAP SERPL CALCULATED.3IONS-SCNC: 13 MMOL/L (ref 7–15)
AST SERPL W P-5'-P-CCNC: 14 U/L (ref 10–35)
BILIRUB SERPL-MCNC: 0.6 MG/DL
BUN SERPL-MCNC: 35.9 MG/DL (ref 8–23)
CALCIUM SERPL-MCNC: 9.6 MG/DL (ref 8.8–10.2)
CHLORIDE SERPL-SCNC: 101 MMOL/L (ref 98–107)
CHOLEST SERPL-MCNC: 167 MG/DL
CREAT SERPL-MCNC: 1.28 MG/DL (ref 0.51–0.95)
DEPRECATED HCO3 PLAS-SCNC: 26 MMOL/L (ref 22–29)
GFR SERPL CREATININE-BSD FRML MDRD: 44 ML/MIN/1.73M2
GLUCOSE SERPL-MCNC: 102 MG/DL (ref 70–99)
HBA1C MFR BLD: 5.5 % (ref 4.2–6.1)
HDLC SERPL-MCNC: 68 MG/DL
LDLC SERPL CALC-MCNC: 82 MG/DL
NONHDLC SERPL-MCNC: 99 MG/DL
POTASSIUM SERPL-SCNC: 4 MMOL/L (ref 3.4–5.3)
PROT SERPL-MCNC: 7.1 G/DL (ref 6.4–8.3)
SODIUM SERPL-SCNC: 140 MMOL/L (ref 136–145)
TRIGL SERPL-MCNC: 84 MG/DL

## 2023-05-24 PROCEDURE — 80061 LIPID PANEL: CPT | Mod: ORL | Performed by: NURSE PRACTITIONER

## 2023-05-24 PROCEDURE — 80053 COMPREHEN METABOLIC PANEL: CPT | Mod: ORL | Performed by: NURSE PRACTITIONER

## 2023-06-05 ENCOUNTER — PROCEDURE ONLY VISIT (OUTPATIENT)
Dept: ONCOLOGY | Facility: CLINIC | Age: 73
End: 2023-06-05
Attending: INTERNAL MEDICINE
Payer: COMMERCIAL

## 2023-06-05 DIAGNOSIS — G62.0 CHEMOTHERAPY-INDUCED PERIPHERAL NEUROPATHY (H): ICD-10-CM

## 2023-06-05 DIAGNOSIS — M54.59 OTHER LOW BACK PAIN: Primary | ICD-10-CM

## 2023-06-05 DIAGNOSIS — T45.1X5A CHEMOTHERAPY-INDUCED PERIPHERAL NEUROPATHY (H): ICD-10-CM

## 2023-06-05 PROCEDURE — 97811 ACUP 1/> W/O ESTIM EA ADD 15: CPT

## 2023-06-05 PROCEDURE — 97810 ACUP 1/> WO ESTIM 1ST 15 MIN: CPT

## 2023-06-05 NOTE — PROGRESS NOTES
ACUPUNCTURIST TREATMENT NOTE      Lisa Reyna, a 72 year old female, is here today for Follow - Up exam. Patient is referred by No ref. provider found.    HPI  Main Complaint: Chronic pain of low back. (L) wrist pain  Secondary Complaints: Chemotherapy induced neuropathy of feet manifesting today as numbness of the balls of the feet and great toes.    Past Medical History  Past Medical History Reviewed: No   has a past medical history of Arthritis, Asthma, Breast cancer (H) (2004), Coronary artery disease, Endometrial polyp (1992), GERD (gastroesophageal reflux disease), Heart attack (H), Heart murmur, History of measles, mumps, or rubella (as a child), HTN (hypertension), radiation therapy (2004), Hyperlipemia, Osteopenia, Other chronic pain, Ovarian cyst, Prediabetes, UTI (urinary tract infection), and Walking troubles.    Objective  Basic Exam Completed:   Constitutional: Well Groomed and Normal Weight and Normal Appearance    TCM Exam Completed: Yes   Limbs/Back: Lower Back    Tongue/Pulse Exam Completed: No    Patient Assessment  Patient Type: Oncology  Patient Complaint: Chronic pain of low back, (L) wrist pain, neuropathy of feet    Acupuncture 5/22/2023 6/5/2023   Intervention Reason Pain; Pain 2; Pain 3; Neuropathy Pain; Pain 2; Neuropathy   Pain Location Low back Low back   Pre-session Pain Rating 3 1   Post-session Pain Rating 0 0   Pain 2 Location (L) wrist (L) wrist   Pre-session Pain 2 Rating 4 4   Post-session Pain 2 Rating 0 0   Pain 3 Location (R) hip -   Pre-session Pain 3 Rating 3 -   Post-session Pain 3 Rating 0 -   Neuropathy Location Feet Feet   Pre-session Neuropathy rating 3 6   Post-session Neuropathy rating 1 3   Neuropathy 2 Location - -   Pre-session Neuropathy 2 rating - -   Post-session Neuropathy 2 rating - -       TCM Diagnosis: Qi Deficiency;Blood Deficiency;Yin Deficiency;Qi Stagnation;Kidney Qi Deficiency;Spleen Qi Deficiency;Damp and Phlegm Accumulation      Treatment Principle:  tonify and nourish, move qi and blood    TCM / Acupuncture Treatment  Acupuncture Points:       Initial insertions: (L) Si 3, 4, 5, 6, (L) Ling gu, (R) Bl 60, 62, 63, 65, foot irasema x2, great toe irasema x2       Second insertions: Sp 1, 2, 3, Lauren 2, 3, Kid 1            Number of needles inserted: 29  Number of needles removed: 29    Accessory Techniques 5/22/2023 6/5/2023   Accessory Techniques TDP Heat Lamp TDP Heat Lamp   TDP Heat Lamp location used Feet Feet          Assessment and Plan  Treatment Observations:    Acupuncture Treatment Recommendations:         It is my recommendation that this patient seek advice from their Primary Care Provider about active symptoms not addressed during this visit. The risks and benefits of acupuncture were reviewed and the patient stated understanding. The patient's questions were answered to their satisfaction. Consent was provided for treatment. We thank you for the referral and opportunity to treat this patient.    Time Spent with Patient:   I spent a total of 30 minutes face-to-face with Lisa Reyna during today's office visit.     Woody Nielsen

## 2023-06-12 ENCOUNTER — PROCEDURE ONLY VISIT (OUTPATIENT)
Dept: ONCOLOGY | Facility: CLINIC | Age: 73
End: 2023-06-12
Attending: INTERNAL MEDICINE
Payer: COMMERCIAL

## 2023-06-12 DIAGNOSIS — M54.59 OTHER LOW BACK PAIN: Primary | ICD-10-CM

## 2023-06-12 DIAGNOSIS — T45.1X5A CHEMOTHERAPY-INDUCED PERIPHERAL NEUROPATHY (H): ICD-10-CM

## 2023-06-12 DIAGNOSIS — G62.0 CHEMOTHERAPY-INDUCED PERIPHERAL NEUROPATHY (H): ICD-10-CM

## 2023-06-12 PROCEDURE — 97810 ACUP 1/> WO ESTIM 1ST 15 MIN: CPT

## 2023-06-12 NOTE — PROGRESS NOTES
ACUPUNCTURIST TREATMENT NOTE      Lisa Reyna, a 72 year old female, is here today for Follow - Up exam. Patient is referred by No ref. provider found.    HPI  Main Complaint: Chronic low back pain. (R) wrist pain.  Secondary Complaints: Chemotherapy induced neuropathy of feet, fatigue.    Past Medical History  Past Medical History Reviewed: No   has a past medical history of Arthritis, Asthma, Breast cancer (H) (2004), Coronary artery disease, Endometrial polyp (1992), GERD (gastroesophageal reflux disease), Heart attack (H), Heart murmur, History of measles, mumps, or rubella (as a child), HTN (hypertension), radiation therapy (2004), Hyperlipemia, Osteopenia, Other chronic pain, Ovarian cyst, Prediabetes, UTI (urinary tract infection), and Walking troubles.    Objective    TCM Exam Completed: Yes  Limbs/Back: Lower Back    Tongue/Pulse Exam Completed: No    Patient Assessment  Patient Type: Oncology  Patient Complaint: Chronic pain of low back, (L) wrist pain, neuropathy of feet    Acupuncture 6/5/2023 6/12/2023   Intervention Reason Pain; Pain 2; Neuropathy Pain; Neuropathy; Fatigue; Pain 2   Pain Location Low back Low back   Pre-session Pain Rating 1 0   Post-session Pain Rating 0 0   Pain 2 Location (L) wrist (L) wrist   Pre-session Pain 2 Rating 4 0   Post-session Pain 2 Rating 0 0   Pain 3 Location - -   Pre-session Pain 3 Rating - -   Post-session Pain 3 Rating - -   Neuropathy Location Feet Feet   Pre-session Neuropathy rating 6 3   Post-session Neuropathy rating 3 1   Neuropathy 2 Location - -   Pre-session Neuropathy 2 rating - -   Post-session Neuropathy 2 rating - -       TCM Diagnosis: Qi Deficiency;Blood Deficiency;Yin Deficiency;Qi Stagnation;Kidney Qi Deficiency;Spleen Qi Deficiency;Damp and Phlegm Accumulation      Treatment Principle: tonify and nourish, move qi and blood    TCM / Acupuncture Treatment  Acupuncture Points:       Initial insertions: (L) Si 3, (L) Ling gu, (R) Bl 60, 62, 63, 65,  Kid 3, Lauren 2, 3       Second insertions: Sp 1, 2, 3, Great toe irasema x2            Number of needles inserted: 22  Number of needles removed: 22    Accessory Techniques 6/5/2023 6/12/2023   Accessory Techniques TDP Heat Lamp TDP Heat Lamp   TDP Heat Lamp location used Feet Feet          Assessment and Plan  Treatment Observations:    Acupuncture Treatment Recommendations:         It is my recommendation that this patient seek advice from their Primary Care Provider about active symptoms not addressed during this visit. The risks and benefits of acupuncture were reviewed and the patient stated understanding. The patient's questions were answered to their satisfaction. Consent was provided for treatment. We thank you for the referral and opportunity to treat this patient.    Time Spent with Patient:   I spent a total of 15 minutes face-to-face with Lisa Reyna during today's office visit.     Woody Nielsen

## 2023-06-14 ENCOUNTER — LAB REQUISITION (OUTPATIENT)
Dept: LAB | Facility: CLINIC | Age: 73
End: 2023-06-14
Payer: COMMERCIAL

## 2023-06-14 DIAGNOSIS — R39.15 URGENCY OF URINATION: ICD-10-CM

## 2023-06-14 PROCEDURE — 87086 URINE CULTURE/COLONY COUNT: CPT | Mod: ORL | Performed by: FAMILY MEDICINE

## 2023-06-17 LAB — BACTERIA UR CULT: NORMAL

## 2023-06-19 ENCOUNTER — PROCEDURE ONLY VISIT (OUTPATIENT)
Dept: ONCOLOGY | Facility: CLINIC | Age: 73
End: 2023-06-19
Attending: INTERNAL MEDICINE
Payer: COMMERCIAL

## 2023-06-19 DIAGNOSIS — G62.0 CHEMOTHERAPY-INDUCED PERIPHERAL NEUROPATHY (H): ICD-10-CM

## 2023-06-19 DIAGNOSIS — M54.59 OTHER LOW BACK PAIN: Primary | ICD-10-CM

## 2023-06-19 DIAGNOSIS — T45.1X5A CHEMOTHERAPY-INDUCED PERIPHERAL NEUROPATHY (H): ICD-10-CM

## 2023-06-19 PROCEDURE — 97811 ACUP 1/> W/O ESTIM EA ADD 15: CPT

## 2023-06-19 PROCEDURE — 97810 ACUP 1/> WO ESTIM 1ST 15 MIN: CPT

## 2023-06-19 NOTE — PROGRESS NOTES
ACUPUNCTURIST TREATMENT NOTE      Lisa Reyna, a 72 year old female, is here today for Follow - Up exam. Patient is referred by No ref. provider found.    HPI  Main Complaint: Chronic pain of low back. Worse today with humid weather.  Secondary Complaints: Chemotherapy induced neuropathy of feet.    Past Medical History  Past Medical History Reviewed: No   has a past medical history of Arthritis, Asthma, Breast cancer (H) (2004), Coronary artery disease, Endometrial polyp (1992), GERD (gastroesophageal reflux disease), Heart attack (H), Heart murmur, History of measles, mumps, or rubella (as a child), HTN (hypertension), radiation therapy (2004), Hyperlipemia, Osteopenia, Other chronic pain, Ovarian cyst, Prediabetes, UTI (urinary tract infection), and Walking troubles.    Objective    TCM Exam Completed: Yes  Limbs/Back: Bilateral Lower Extremity and Lower Back    Tongue/Pulse Exam Completed: No    Patient Assessment  Patient Type: Oncology  Patient Complaint: Chronic pain of low back, neuropathy of feet    Acupuncture 6/12/2023 6/19/2023   Intervention Reason Pain; Neuropathy; Fatigue; Pain 2 Pain; Neuropathy   Pain Location Low back Low back   Pre-session Pain Rating 0 3   Post-session Pain Rating 0 0   Pain 2 Location (L) wrist -   Pre-session Pain 2 Rating 0 -   Post-session Pain 2 Rating 0 -   Pain 3 Location - -   Pre-session Pain 3 Rating - -   Post-session Pain 3 Rating - -   Neuropathy Location Feet Feet   Pre-session Neuropathy rating 3 4   Post-session Neuropathy rating 1 2   Neuropathy 2 Location - -   Pre-session Neuropathy 2 rating - -   Post-session Neuropathy 2 rating - -       TCM Diagnosis: Qi Deficiency;Blood Deficiency;Yin Deficiency;Qi Stagnation;Kidney Qi Deficiency;Spleen Qi Deficiency;Damp and Phlegm Accumulation      Treatment Principle: tonify and nourish, move qi and blood    TCM / Acupuncture Treatment  Acupuncture Points:       Initial insertions: (L) Si 3, (L) Ling gu, (R) Bl 60, 62,  65, Kid 1, Lauren 2, 3       Second insertions: Sp 1, 2, 3, St 44, Great toe irasema x2            Number of needles inserted: 23  Number of needles removed: 23    Accessory Techniques 6/12/2023 6/19/2023   Accessory Techniques TDP Heat Lamp TDP Heat Lamp   TDP Heat Lamp location used Feet Feet          Assessment and Plan  Treatment Observations:    Acupuncture Treatment Recommendations:         It is my recommendation that this patient seek advice from their Primary Care Provider about active symptoms not addressed during this visit. The risks and benefits of acupuncture were reviewed and the patient stated understanding. The patient's questions were answered to their satisfaction. Consent was provided for treatment. We thank you for the referral and opportunity to treat this patient.    Time Spent with Patient:   I spent a total of 30 minutes face-to-face with Lisa Reyna during today's office visit.     Woody Nielsen

## 2023-07-03 ENCOUNTER — PROCEDURE ONLY VISIT (OUTPATIENT)
Dept: ONCOLOGY | Facility: CLINIC | Age: 73
End: 2023-07-03
Attending: INTERNAL MEDICINE
Payer: COMMERCIAL

## 2023-07-03 DIAGNOSIS — M54.59 OTHER LOW BACK PAIN: Primary | ICD-10-CM

## 2023-07-03 PROCEDURE — 97810 ACUP 1/> WO ESTIM 1ST 15 MIN: CPT

## 2023-07-03 NOTE — PROGRESS NOTES
ACUPUNCTURIST TREATMENT NOTE      Lisa Reyna, a 72 year old female, is here today for Follow - Up exam. Patient is referred by No ref. provider found.    HPI  Main Complaint: Chronic pain of low back and (R) knee.  Secondary Complaints: Chemotherapy induced neuropathy of feet.    Past Medical History  Past Medical History Reviewed: No   has a past medical history of Arthritis, Asthma, Breast cancer (H) (2004), Coronary artery disease, Endometrial polyp (1992), GERD (gastroesophageal reflux disease), Heart attack (H), Heart murmur, History of measles, mumps, or rubella (as a child), HTN (hypertension), radiation therapy (2004), Hyperlipemia, Osteopenia, Other chronic pain, Ovarian cyst, Prediabetes, UTI (urinary tract infection), and Walking troubles.    Objective    TCM Exam Completed: Yes  Limbs/Back: Right Lower Extremity and Lower Back    Tongue/Pulse Exam Completed: No    Patient Assessment  Patient Type: Oncology  Patient Complaint: Chronic pain of low back, right knee, neuropathy of feet    Acupuncture 6/19/2023 7/3/2023   Intervention Reason Pain; Neuropathy Pain; Pain 2; Neuropathy   Pain Location Low back Low back   Pre-session Pain Rating 3 4   Post-session Pain Rating 0 0   Pain 2 Location - (R) knee   Pre-session Pain 2 Rating - 5   Post-session Pain 2 Rating - 0   Pain 3 Location - -   Pre-session Pain 3 Rating - -   Post-session Pain 3 Rating - -   Neuropathy Location Feet Feet   Pre-session Neuropathy rating 4 4   Post-session Neuropathy rating 2 1   Neuropathy 2 Location - -   Pre-session Neuropathy 2 rating - -   Post-session Neuropathy 2 rating - -       TCM Diagnosis: Qi Deficiency;Blood Deficiency;Yin Deficiency;Qi Stagnation;Kidney Qi Deficiency;Spleen Qi Deficiency;Damp and Phlegm Accumulation      Treatment Principle: tonify and nourish, move qi and blood    TCM / Acupuncture Treatment  Acupuncture Points:       Initial insertions: (L) Si 3, (L) Ling gu, (R) Bl 60, 62, 65, Kid 1, Lauren 2, 3        Second insertions: Sp 1, 2, 3, St 44, (R) St 35, 36, (R) Gb 36            Number of needles inserted: 22  Number of needles removed: 22    Accessory Techniques 6/19/2023 7/3/2023   Accessory Techniques TDP Heat Lamp TDP Heat Lamp   TDP Heat Lamp location used Feet Feet          Assessment and Plan  Treatment Observations:    Acupuncture Treatment Recommendations:         It is my recommendation that this patient seek advice from their Primary Care Provider about active symptoms not addressed during this visit. The risks and benefits of acupuncture were reviewed and the patient stated understanding. The patient's questions were answered to their satisfaction. Consent was provided for treatment. We thank you for the referral and opportunity to treat this patient.    Time Spent with Patient:   I spent a total of 15 minutes face-to-face with Lisa Reyna during today's office visit.     Woody Nielsen

## 2023-07-13 ENCOUNTER — TELEPHONE (OUTPATIENT)
Dept: ONCOLOGY | Facility: CLINIC | Age: 73
End: 2023-07-13
Payer: COMMERCIAL

## 2023-07-13 NOTE — TELEPHONE ENCOUNTER
Patient calls regarding follow up and mammogram. She believes that she is supposed to get the mammogram in July. Verified per last visit with Dr. Koch in February that mammogram is due in July and follow up with Dr. Koch in August. Patient is transferred to scheduling for further assistance.    Rabia Silvestre RN

## 2023-07-18 ENCOUNTER — TELEPHONE (OUTPATIENT)
Dept: ONCOLOGY | Facility: CLINIC | Age: 73
End: 2023-07-18
Payer: COMMERCIAL

## 2023-07-18 NOTE — TELEPHONE ENCOUNTER
I phoned Lisa back and advised that I can see an order with an expected date of 7/31/2023. She asked if they would call her to make the appt and I advised her to call them if she would like a particular day or time. She expressed appreciation. I wished her well. WESLEY Mccarthy, OCN, CBCN

## 2023-07-19 ENCOUNTER — TELEPHONE (OUTPATIENT)
Dept: SURGERY | Facility: CLINIC | Age: 73
End: 2023-07-19
Payer: COMMERCIAL

## 2023-07-19 DIAGNOSIS — C50.919 BREAST CANCER (H): ICD-10-CM

## 2023-07-19 DIAGNOSIS — Z17.0 MALIGNANT NEOPLASM OF LOWER-INNER QUADRANT OF RIGHT BREAST OF FEMALE, ESTROGEN RECEPTOR POSITIVE (H): Primary | ICD-10-CM

## 2023-07-19 DIAGNOSIS — C50.311 MALIGNANT NEOPLASM OF LOWER-INNER QUADRANT OF RIGHT BREAST OF FEMALE, ESTROGEN RECEPTOR POSITIVE (H): Primary | ICD-10-CM

## 2023-07-19 NOTE — TELEPHONE ENCOUNTER
I called Lisa and identified her using name and . I spoke with Lisa yesterday and she was asking for an order for her mammogram as she was recommended to have them every 6 mos due to right breast cancer  with close margins. I clarified with Dr. Koch that she wanted screening not diagnostic. I then phoned Lisa and let her know that [facility] does want screening mammos and that she should check with her insurance that they are covered every 6 mos as they are usually annual and a day. I gave her the number for central scheduling and told her to listen to the prompts as there is one specific to mammo scheduling. She accepted all the information I gave her and ended the call. WESLEY Mccarthy, OCN, CBCN

## 2023-07-24 ENCOUNTER — PROCEDURE ONLY VISIT (OUTPATIENT)
Dept: ONCOLOGY | Facility: CLINIC | Age: 73
End: 2023-07-24
Attending: INTERNAL MEDICINE
Payer: COMMERCIAL

## 2023-07-24 DIAGNOSIS — G62.0 CHEMOTHERAPY-INDUCED PERIPHERAL NEUROPATHY (H): ICD-10-CM

## 2023-07-24 DIAGNOSIS — T45.1X5A CHEMOTHERAPY-INDUCED PERIPHERAL NEUROPATHY (H): ICD-10-CM

## 2023-07-24 DIAGNOSIS — M54.59 OTHER LOW BACK PAIN: Primary | ICD-10-CM

## 2023-07-24 PROCEDURE — 97811 ACUP 1/> W/O ESTIM EA ADD 15: CPT

## 2023-07-24 PROCEDURE — 97810 ACUP 1/> WO ESTIM 1ST 15 MIN: CPT

## 2023-07-24 NOTE — PROGRESS NOTES
ACUPUNCTURIST TREATMENT NOTE      Lisa Reyna, a 72 year old female, is here today for Follow - Up exam. Patient is referred by No ref. provider found.    HPI  Main Complaint: Chronic pain of low back, radiating into lateral/anterior hip and lateral thigh  Secondary Complaints: Chemotherapy induced neuropathy of feet, worst today at balls of feet and great toes.    Past Medical History  Past Medical History Reviewed: No   has a past medical history of Arthritis, Asthma, Breast cancer (H) (2004), Coronary artery disease, Endometrial polyp (1992), GERD (gastroesophageal reflux disease), Heart attack (H), Heart murmur, History of measles, mumps, or rubella (as a child), HTN (hypertension), radiation therapy (2004), Hyperlipemia, Osteopenia, Other chronic pain, Ovarian cyst, Prediabetes, UTI (urinary tract infection), and Walking troubles.    Objective    TCM Exam Completed: Yes  Limbs/Back: Bilateral Lower Extremity and Lower Back    Tongue/Pulse Exam Completed: No    Patient Assessment  Patient Type: Oncology  Patient Complaint: Chronic pain of low back, neuropathy of feet        TCM Diagnosis: Qi Deficiency;Blood Deficiency;Yin Deficiency;Qi Stagnation;Kidney Qi Deficiency;Spleen Qi Deficiency;Damp and Phlegm Accumulation      Treatment Principle: tonify and nourish, move qi and blood    TCM / Acupuncture Treatment  Acupuncture Points:       Initial insertions: (L) Si 3, (L) Ling gu, (R) Bl 60, 62, 65, Kid 1, Sp 1, 2, 3       Second insertions: Bafeng, Great toe irasema x2, (R) Gb 36            Number of needles inserted: 26  Number of needles removed: 26           Assessment and Plan  Treatment Observations:    Acupuncture Treatment Recommendations:         It is my recommendation that this patient seek advice from their Primary Care Provider about active symptoms not addressed during this visit. The risks and benefits of acupuncture were reviewed and the patient stated understanding. The patient's questions were  answered to their satisfaction. Consent was provided for treatment. We thank you for the referral and opportunity to treat this patient.    Time Spent with Patient:   I spent a total of 30 minutes face-to-face with Lisa Reyna during today's office visit.     Woody Nielsen

## 2023-07-27 DIAGNOSIS — I10 ESSENTIAL HYPERTENSION: ICD-10-CM

## 2023-07-27 RX ORDER — METOPROLOL SUCCINATE 50 MG/1
50 TABLET, EXTENDED RELEASE ORAL DAILY
Qty: 90 TABLET | Refills: 0 | Status: SHIPPED | OUTPATIENT
Start: 2023-07-27 | End: 2023-11-03

## 2023-07-28 ENCOUNTER — ANCILLARY PROCEDURE (OUTPATIENT)
Dept: MAMMOGRAPHY | Facility: CLINIC | Age: 73
End: 2023-07-28
Attending: INTERNAL MEDICINE
Payer: COMMERCIAL

## 2023-07-28 DIAGNOSIS — Z17.0 MALIGNANT NEOPLASM OF LOWER-INNER QUADRANT OF RIGHT BREAST OF FEMALE, ESTROGEN RECEPTOR POSITIVE (H): ICD-10-CM

## 2023-07-28 DIAGNOSIS — C50.311 MALIGNANT NEOPLASM OF LOWER-INNER QUADRANT OF RIGHT BREAST OF FEMALE, ESTROGEN RECEPTOR POSITIVE (H): ICD-10-CM

## 2023-07-28 DIAGNOSIS — C50.919 BREAST CANCER (H): ICD-10-CM

## 2023-07-28 PROCEDURE — 77061 BREAST TOMOSYNTHESIS UNI: CPT | Mod: RT

## 2023-07-31 ENCOUNTER — PROCEDURE ONLY VISIT (OUTPATIENT)
Dept: ONCOLOGY | Facility: CLINIC | Age: 73
End: 2023-07-31
Attending: INTERNAL MEDICINE
Payer: COMMERCIAL

## 2023-07-31 DIAGNOSIS — T45.1X5A CHEMOTHERAPY-INDUCED PERIPHERAL NEUROPATHY (H): ICD-10-CM

## 2023-07-31 DIAGNOSIS — M54.59 OTHER LOW BACK PAIN: Primary | ICD-10-CM

## 2023-07-31 DIAGNOSIS — G62.0 CHEMOTHERAPY-INDUCED PERIPHERAL NEUROPATHY (H): ICD-10-CM

## 2023-07-31 PROCEDURE — 97811 ACUP 1/> W/O ESTIM EA ADD 15: CPT

## 2023-07-31 PROCEDURE — 97810 ACUP 1/> WO ESTIM 1ST 15 MIN: CPT

## 2023-07-31 NOTE — PROGRESS NOTES
ACUPUNCTURIST TREATMENT NOTE      Lisa Reyna, a 72 year old female, is here today for Follow - Up exam. Patient is referred by No ref. provider found.    HPI  Main Complaint: Chronic low back pain. No pain at time of treatment today.  Secondary Complaints: Chemotherapy induced neuropathy of feet. Symptoms worse today.    Past Medical History  Past Medical History Reviewed: No   has a past medical history of Arthritis, Asthma, Breast cancer (H) (2004), Coronary artery disease, Endometrial polyp (1992), GERD (gastroesophageal reflux disease), Heart attack (H), Heart murmur, History of measles, mumps, or rubella (as a child), HTN (hypertension), radiation therapy (2004), Hyperlipemia, Osteopenia, Other chronic pain, Ovarian cyst, Prediabetes, UTI (urinary tract infection), and Walking troubles.    Objective    TCM Exam Completed: Yes  Limbs/Back: Bilateral Lower Extremity and Lower Back    Tongue/Pulse Exam Completed: No    Patient Assessment  Patient Type: Oncology  Patient Complaint: Chronic pain of low back, neuropathy of feet        TCM Diagnosis: Qi Deficiency;Blood Deficiency;Yin Deficiency;Qi Stagnation;Kidney Qi Deficiency;Spleen Qi Deficiency;Damp and Phlegm Accumulation      Treatment Principle: tonify and nourish, move qi and blood    TCM / Acupuncture Treatment  Acupuncture Points:       Initial insertions: Bl 60, 63, Bafeng       Second insertions: Sp 1, 2, 3, Kid 2, 3, Lauren 3            Number of needles inserted: 24  Number of needles removed: 24           Assessment and Plan  Treatment Observations:    Acupuncture Treatment Recommendations:         It is my recommendation that this patient seek advice from their Primary Care Provider about active symptoms not addressed during this visit. The risks and benefits of acupuncture were reviewed and the patient stated understanding. The patient's questions were answered to their satisfaction. Consent was provided for treatment. We thank you for the referral  and opportunity to treat this patient.    Time Spent with Patient:   I spent a total of 30 minutes face-to-face with Lisa Reyna during today's office visit.     Woody Nielsen

## 2023-08-14 ENCOUNTER — PROCEDURE ONLY VISIT (OUTPATIENT)
Dept: ONCOLOGY | Facility: CLINIC | Age: 73
End: 2023-08-14
Attending: INTERNAL MEDICINE
Payer: COMMERCIAL

## 2023-08-14 DIAGNOSIS — G62.0 CHEMOTHERAPY-INDUCED PERIPHERAL NEUROPATHY (H): ICD-10-CM

## 2023-08-14 DIAGNOSIS — M54.59 OTHER LOW BACK PAIN: Primary | ICD-10-CM

## 2023-08-14 DIAGNOSIS — T45.1X5A CHEMOTHERAPY-INDUCED PERIPHERAL NEUROPATHY (H): ICD-10-CM

## 2023-08-14 PROCEDURE — 97810 ACUP 1/> WO ESTIM 1ST 15 MIN: CPT | Performed by: ACUPUNCTURIST

## 2023-08-14 PROCEDURE — 97811 ACUP 1/> W/O ESTIM EA ADD 15: CPT | Performed by: ACUPUNCTURIST

## 2023-08-16 ENCOUNTER — ONCOLOGY VISIT (OUTPATIENT)
Dept: ONCOLOGY | Facility: CLINIC | Age: 73
End: 2023-08-16
Attending: INTERNAL MEDICINE
Payer: COMMERCIAL

## 2023-08-16 VITALS
SYSTOLIC BLOOD PRESSURE: 152 MMHG | HEART RATE: 67 BPM | WEIGHT: 189 LBS | DIASTOLIC BLOOD PRESSURE: 70 MMHG | BODY MASS INDEX: 34.78 KG/M2 | RESPIRATION RATE: 18 BRPM | OXYGEN SATURATION: 98 % | HEIGHT: 62 IN

## 2023-08-16 DIAGNOSIS — C50.311 MALIGNANT NEOPLASM OF LOWER-INNER QUADRANT OF RIGHT BREAST OF FEMALE, ESTROGEN RECEPTOR POSITIVE (H): ICD-10-CM

## 2023-08-16 DIAGNOSIS — Z17.0 MALIGNANT NEOPLASM OF LOWER-INNER QUADRANT OF RIGHT BREAST OF FEMALE, ESTROGEN RECEPTOR POSITIVE (H): ICD-10-CM

## 2023-08-16 PROCEDURE — G0463 HOSPITAL OUTPT CLINIC VISIT: HCPCS | Performed by: INTERNAL MEDICINE

## 2023-08-16 PROCEDURE — 99214 OFFICE O/P EST MOD 30 MIN: CPT | Performed by: INTERNAL MEDICINE

## 2023-08-16 RX ORDER — LETROZOLE 2.5 MG/1
1 TABLET, FILM COATED ORAL DAILY
Qty: 90 TABLET | Refills: 3 | Status: SHIPPED | OUTPATIENT
Start: 2023-08-16

## 2023-08-16 ASSESSMENT — PAIN SCALES - GENERAL: PAINLEVEL: NO PAIN (0)

## 2023-08-16 NOTE — PROGRESS NOTES
[facility] Hematology and Oncology Progress Note    Patient: Lisa Reyna  MRN: [id number]  Date of Service: Aug 16, 2023         Reason for Visit    Chief Complaint   Patient presents with    Oncology Clinic Visit     Malignant neoplasm of lower-inner quadrant of right breast of female, estrogen receptor positive       Assessment and Plan     Cancer Staging   Malignant neoplasm of lower-inner quadrant of right breast of female, estrogen receptor positive (H)  Staging form: Breast, AJCC 8th Edition  - Pathologic stage from 2/11/2022: Stage IA (pT1c, pN0(sn), cM0, G2, ER+, WV+, HER2-, Oncotype DX score: 30) - Signed by Tamar Koch MD on 3/13/2022  - Clinical: No stage assigned - Unsigned      ECOG Performance    0 - Independent     Pain  Pain Score: No Pain (0)    #.  History of invasive ductal carcinoma of LIQ of right breast, stage IA, ER+WV+HER2-. Oncotype 30/19%, >15%.  #.  Grade 1 neuropathy of both feet, exacerbated by chemotherapy on pre-existing neuropathy of both feet, improving with acupuncture.     Clinically well from breast cancer standpoint.  Exam is unremarkable.  Diagnostic mammogram of the right breast from a couple weeks ago was benign.  She will have a screening bilateral mammogram in January 2024.      She has good tolerance to letrozole.  She will continue letrozole.   Follow-up clinical exam in 6 months.    #.  Bone health   She will continue adequate intake of calcium 1200 mg and vitamin D 2000 units daily.  I also encouraged weightbearing exercises.    Will obtain follow-up DEXA scan in 8/2024.     Encounter Diagnoses:    Problem List Items Addressed This Visit          Oncology Diagnoses    Malignant neoplasm of lower-inner quadrant of right breast of female, estrogen receptor positive (H)    Relevant Medications    letrozole (FEMARA) 2.5 MG tablet            CC: Cayla Skelton NP    ______________________________________________________________________________  Diagnosis  6/24/2004- She had prior history of right breast DCIS    1/18/2022- screening mammogram detected     Invasive ductal carcinoma with focal micropapillary pattern.  Grade 2.  12 mm.     Margins were negative but close at 0.5 mm from the nearest junction of the inferior and medial margin, 0.5 mm from medial margin and 2 mm from inferior margin.     There is associated DCIS with EIC negative, nuclear grade intermediate, solid and focal cribriform pattern of 10%.  DCIS margins were uninvolved at 3 mm from nearest inferior margin, 5 mm from medial margins.     1 sentinel lymph node was negative for metastatic carcinoma.    ER positive, greater than 10% of the cell, 61-70%, VA positive (21-30%), HER-2 negative by IHC (1+).     Oncotype DX recurrence score 30/19% / >15%.    5/26/2022- genetic counseling and testing completed.  No pathogenic mutation found.    Treatment to date  6/2004- post right breast lumpectomy (Dr. Jones) on 6/24/2004, post adjuvant radiation (Dr. Caleb Elena) followed by adjuvant tamoxifen for 5 years (Dr. Noé Fernandes).    2/11/2022- right breast lumpectomy and right sentinel lymph node biopsy (Dr. Alcantara).   3/25/2022-5/31/2022- completed 4 cycles of adjuvant TC  7/14/202- discussed with Dr. Alcantara regarding clinical observation versus mastectomy.  She chose to observe.  Radiation therapy was omitted due to prior radiation.  7/20/2022- initiated letrozole    History of Present Illness    Ms. Lisa Reyna presented today unaccompanied.  She has left foot edema.  She does not have pain in her feet, however she has persistent neuropathy and she needed to use a cane.  She continues acupuncture once a week and she felt it is very helpful.  She does bicycling about 15 minutes every day and plan to add more time on it.  She does not have any concern about her breasts.  No unusual headaches.  No  "unusual bone pain.  She takes letrozole regularly.  No intolerable side effects.  She takes calcium 1200 mg and vitamin D 2000 units daily.    Review of systems  Apart from describing in HPI, the remainder of comprehensive ROS was negative.    Past History    Past Medical History:   Diagnosis Date    Arthritis     Asthma     Seaonal usually in spring    Breast cancer (H) 2004    rt lumpect    Coronary artery disease     Endometrial polyp 1992    was protruding through cervix    GERD (gastroesophageal reflux disease)     Heart attack (H)     Heart murmur     History of measles, mumps, or rubella as a child    hx of measles, mumps and [place] mealses. Rubella immune 5/1980    HTN (hypertension)     Hx of radiation therapy 2004    Hyperlipemia     Osteopenia     Other chronic pain     Ovarian cyst     Prediabetes     UTI (urinary tract infection)     Walking troubles        Past Surgical History:   Procedure Laterality Date    BIOPSY BREAST Left 2008    cyst removal    BIOPSY BREAST Right 2004    CERVICAL DISC SURGERY  6/25/2007    C6-7 sarah laminectomy with microdiskectomy    LUMPECTOMY BREAST Right 6/24/2004    LUMPECTOMY BREAST Right 2/11/2022    Procedure: Right Lumpectomy after Wire Localization; [place] Lymph Node Biopsy;  Surgeon: Mami Alcantara MD;  Location: [facility]    OOPHORECTOMY Bilateral 2011    TOTAL HIP ARTHROPLASTY Left 9/30/2009    TUBAL LIGATION  1/10/1992    with endometrial polyp removal    WRIST SURGERY         Physical Exam    BP (!) 152/70   Pulse 67   Resp 18   Ht 1.575 m (5' 2\")   Wt 85.7 kg (189 lb)   SpO2 98%   BMI 34.57 kg/m      General: alert, awake, not in acute distress  HEENT: Head: Normal, normocephalic, atraumatic.  Eye: Normal external eye, conjunctiva, lids cornea, ALENA.  Pharynx: Normal buccal mucosa. Normal pharynx.  Neck / Thyroid: Supple, no masses, nodes, nodules or enlargement.  Lymphatics: No abnormally enlarged lymph nodes.  Chest: Normal chest wall and " respirations. Clear to auscultation.  Breasts: Unremarkable.  No discrete palpable abnormalities.    Heart: S1 S2 RRR.   Abdomen: abdomen is soft without significant tenderness, masses, organomegaly or guarding  Extremities: normal strength, tone, and muscle mass  Skin: normal. no rash or abnormalities  CNS: non focal.    Lab Results    No results found for this or any previous visit (from the past 168 hour(s)).    Imaging    No results found.    30 minutes spent on the date of the encounter doing chart review, history and exam, documentation, communication of the treatment plan with the care team and further activities as noted above.    Signed by: Tamar Koch MD

## 2023-08-16 NOTE — PROGRESS NOTES
"Oncology Rooming Note    August 16, 2023 10:06 AM   Lisa Reyna is a 72 year old female who presents for:    Chief Complaint   Patient presents with    Oncology Clinic Visit     Malignant neoplasm of lower-inner quadrant of right breast of female, estrogen receptor positive     Initial Vitals: BP (!) 152/70   Pulse 67   Resp 18   Ht 1.575 m (5' 2\")   Wt 85.7 kg (189 lb)   SpO2 98%   BMI 34.57 kg/m   Estimated body mass index is 34.57 kg/m  as calculated from the following:    Height as of this encounter: 1.575 m (5' 2\").    Weight as of this encounter: 85.7 kg (189 lb). Body surface area is 1.94 meters squared.  No Pain (0) Comment: Data Unavailable   No LMP recorded. Patient is postmenopausal.  Allergies reviewed: Yes  Medications reviewed: Yes    Medications: Medication refills not needed today.  Pharmacy name entered into [facility]: [address] [place] [address]    Clinical concerns:  6 month labs and mammo      Sarahy Castellanos            "

## 2023-08-16 NOTE — LETTER
"    8/16/2023         RE: Lisa Reyna  [address] [place] [address]        Dear Colleague,    Thank you for referring your patient, Lisa Reyna, to the [facility]. Please see a copy of my visit note below.    Oncology Rooming Note    August 16, 2023 10:06 AM   Lisa Reyna is a 72 year old female who presents for:    Chief Complaint   Patient presents with     Oncology Clinic Visit     Malignant neoplasm of lower-inner quadrant of right breast of female, estrogen receptor positive     Initial Vitals: BP (!) 152/70   Pulse 67   Resp 18   Ht 1.575 m (5' 2\")   Wt 85.7 kg (189 lb)   SpO2 98%   BMI 34.57 kg/m   Estimated body mass index is 34.57 kg/m  as calculated from the following:    Height as of this encounter: 1.575 m (5' 2\").    Weight as of this encounter: 85.7 kg (189 lb). Body surface area is 1.94 meters squared.  No Pain (0) Comment: Data Unavailable   No LMP recorded. Patient is postmenopausal.  Allergies reviewed: Yes  Medications reviewed: Yes    Medications: Medication refills not needed today.  Pharmacy name entered into [facility]: [facility], [place], [place] [address] [facility] [address] [place] [address]    Clinical concerns:  6 month labs and mammo      Sarahy Castellanos              [facility] Hematology and Oncology Progress Note    Patient: Lisa Reyna  MRN: [id number]  Date of Service: Aug 16, 2023         Reason for Visit    Chief Complaint   Patient presents with     Oncology Clinic Visit     Malignant neoplasm of lower-inner quadrant of right breast of female, estrogen receptor positive       Assessment and Plan     Cancer Staging   Malignant neoplasm of lower-inner quadrant of right breast of female, estrogen receptor positive (H)  Staging form: Breast, AJCC 8th Edition  - Pathologic stage from 2/11/2022: Stage IA (pT1c, pN0(sn), cM0, G2, ER+, AL+, HER2-, Oncotype DX score: 30) - Signed by Tamar Koch MD on 3/13/2022  - Clinical: No " stage assigned - Unsigned      ECOG Performance    0 - Independent     Pain  Pain Score: No Pain (0)    #.  History of invasive ductal carcinoma of LIQ of right breast, stage IA, ER+UT+HER2-. Oncotype 30/19%, >15%.  #.  Grade 1 neuropathy of both feet, exacerbated by chemotherapy on pre-existing neuropathy of both feet, improving with acupuncture.     Clinically well from breast cancer standpoint.  Exam is unremarkable.  Diagnostic mammogram of the right breast from a couple weeks ago was benign.  She will have a screening bilateral mammogram in January 2024.      She has good tolerance to letrozole.  She will continue letrozole.   Follow-up clinical exam in 6 months.    #.  Bone health   She will continue adequate intake of calcium 1200 mg and vitamin D 2000 units daily.  I also encouraged weightbearing exercises.    Will obtain follow-up DEXA scan in 8/2024.     Encounter Diagnoses:    Problem List Items Addressed This Visit          Oncology Diagnoses    Malignant neoplasm of lower-inner quadrant of right breast of female, estrogen receptor positive (H)    Relevant Medications    letrozole (FEMARA) 2.5 MG tablet            CC: Cayla Skelton, KERMIT   ______________________________________________________________________________  Diagnosis  6/24/2004- She had prior history of right breast DCIS    1/18/2022- screening mammogram detected     Invasive ductal carcinoma with focal micropapillary pattern.  Grade 2.  12 mm.     Margins were negative but close at 0.5 mm from the nearest junction of the inferior and medial margin, 0.5 mm from medial margin and 2 mm from inferior margin.     There is associated DCIS with EIC negative, nuclear grade intermediate, solid and focal cribriform pattern of 10%.  DCIS margins were uninvolved at 3 mm from nearest inferior margin, 5 mm from medial margins.     1 sentinel lymph node was negative for metastatic carcinoma.    ER positive, greater than 10% of the cell, 61-70%, UT positive  (21-30%), HER-2 negative by IHC (1+).     Oncotype DX recurrence score 30/19% / >15%.    5/26/2022- genetic counseling and testing completed.  No pathogenic mutation found.    Treatment to date  6/2004- post right breast lumpectomy (Dr. Jones) on 6/24/2004, post adjuvant radiation (Dr. Caleb Elena) followed by adjuvant tamoxifen for 5 years (Dr. Noé Fernandes).    2/11/2022- right breast lumpectomy and right sentinel lymph node biopsy (Dr. Alcantara).   3/25/2022-5/31/2022- completed 4 cycles of adjuvant TC  7/14/202- discussed with Dr. Alcantara regarding clinical observation versus mastectomy.  She chose to observe.  Radiation therapy was omitted due to prior radiation.  7/20/2022- initiated letrozole    History of Present Illness    Ms. Lisa Reyna presented today unaccompanied.  She has left foot edema.  She does not have pain in her feet, however she has persistent neuropathy and she needed to use a cane.  She continues acupuncture once a week and she felt it is very helpful.  She does bicycling about 15 minutes every day and plan to add more time on it.  She does not have any concern about her breasts.  No unusual headaches.  No unusual bone pain.  She takes letrozole regularly.  No intolerable side effects.  She takes calcium 1200 mg and vitamin D 2000 units daily.    Review of systems  Apart from describing in HPI, the remainder of comprehensive ROS was negative.    Past History    Past Medical History:   Diagnosis Date     Arthritis      Asthma     Seaonal usually in spring     Breast cancer (H) 2004    rt lumpect     Coronary artery disease      Endometrial polyp 1992    was protruding through cervix     GERD (gastroesophageal reflux disease)      Heart attack (H)      Heart murmur      History of measles, mumps, or rubella as a child    hx of measles, mumps and [place] mealses. Rubella immune 5/1980     HTN (hypertension)      Hx of radiation therapy 2004     Hyperlipemia      Osteopenia       "Other chronic pain      Ovarian cyst      Prediabetes      UTI (urinary tract infection)      Walking troubles        Past Surgical History:   Procedure Laterality Date     BIOPSY BREAST Left 2008    cyst removal     BIOPSY BREAST Right 2004     CERVICAL DISC SURGERY  6/25/2007    C6-7 sarah laminectomy with microdiskectomy     LUMPECTOMY BREAST Right 6/24/2004     LUMPECTOMY BREAST Right 2/11/2022    Procedure: Right Lumpectomy after Wire Localization; [place] Lymph Node Biopsy;  Surgeon: Mami Alcantara MD;  Location: [facility]     OOPHORECTOMY Bilateral 2011     TOTAL HIP ARTHROPLASTY Left 9/30/2009     TUBAL LIGATION  1/10/1992    with endometrial polyp removal     WRIST SURGERY         Physical Exam    BP (!) 152/70   Pulse 67   Resp 18   Ht 1.575 m (5' 2\")   Wt 85.7 kg (189 lb)   SpO2 98%   BMI 34.57 kg/m      General: alert, awake, not in acute distress  HEENT: Head: Normal, normocephalic, atraumatic.  Eye: Normal external eye, conjunctiva, lids cornea, ALENA.  Pharynx: Normal buccal mucosa. Normal pharynx.  Neck / Thyroid: Supple, no masses, nodes, nodules or enlargement.  Lymphatics: No abnormally enlarged lymph nodes.  Chest: Normal chest wall and respirations. Clear to auscultation.  Breasts: Unremarkable.  No discrete palpable abnormalities.    Heart: S1 S2 RRR.   Abdomen: abdomen is soft without significant tenderness, masses, organomegaly or guarding  Extremities: normal strength, tone, and muscle mass  Skin: normal. no rash or abnormalities  CNS: non focal.    Lab Results    No results found for this or any previous visit (from the past 168 hour(s)).    Imaging    No results found.    30 minutes spent on the date of the encounter doing chart review, history and exam, documentation, communication of the treatment plan with the care team and further activities as noted above.    Signed by: Tamar Koch MD      Again, thank you for allowing me to participate in the care of your patient.  "       Sincerely,        Tamar Koch MD

## 2023-08-18 NOTE — PROGRESS NOTES
ACUPUNCTURIST TREATMENT NOTE      Lisa Reyna, a 72 year old female, is here today for Follow - Up exam. Patient is referred by No ref. provider found.    HPI  Main Complaint: Chronic low back pain.  Secondary Complaints: Chemotherapy induced neuropathy of bilateral feet, mainly in the balls of feet and toes.    Past Medical History  Past Medical History Reviewed: No   has a past medical history of Arthritis, Asthma, Breast cancer (H) (2004), Coronary artery disease, Endometrial polyp (1992), GERD (gastroesophageal reflux disease), Heart attack (H), Heart murmur, History of measles, mumps, or rubella (as a child), HTN (hypertension), radiation therapy (2004), Hyperlipemia, Osteopenia, Other chronic pain, Ovarian cyst, Prediabetes, UTI (urinary tract infection), and Walking troubles.    Objective    TCM Exam Completed: Yes  Limbs/Back: Bilateral Lower Extremity and Lower Back    Tongue/Pulse Exam Completed: No    Patient Assessment  Patient Type:    Patient Complaint:          TCM Diagnosis:        Treatment Principle:      TCM / Acupuncture Treatment  Acupuncture Points:                                           Assessment and Plan  Treatment Observations:    Acupuncture Treatment Recommendations:         It is my recommendation that this patient seek advice from their Primary Care Provider about active symptoms not addressed during this visit. The risks and benefits of acupuncture were reviewed and the patient stated understanding. The patient's questions were answered to their satisfaction. Consent was provided for treatment. We thank you for the referral and opportunity to treat this patient.    Time Spent with Patient:   I spent a total of 30 minutes face-to-face with Lisa Reyna during today's office visit.     Woody Nielsen

## 2023-08-21 ENCOUNTER — PROCEDURE ONLY VISIT (OUTPATIENT)
Dept: ONCOLOGY | Facility: CLINIC | Age: 73
End: 2023-08-21
Attending: INTERNAL MEDICINE
Payer: COMMERCIAL

## 2023-08-21 DIAGNOSIS — M54.59 OTHER LOW BACK PAIN: Primary | ICD-10-CM

## 2023-08-21 DIAGNOSIS — G62.0 CHEMOTHERAPY-INDUCED PERIPHERAL NEUROPATHY (H): ICD-10-CM

## 2023-08-21 DIAGNOSIS — T45.1X5A CHEMOTHERAPY-INDUCED PERIPHERAL NEUROPATHY (H): ICD-10-CM

## 2023-08-21 PROCEDURE — 97810 ACUP 1/> WO ESTIM 1ST 15 MIN: CPT

## 2023-08-21 NOTE — PROGRESS NOTES
ACUPUNCTURIST TREATMENT NOTE      Lisa Reyna, a 72 year old female, is here today for Follow - Up exam. Patient is referred by No ref. provider found.    HPI  Main Complaint: Chronic low back pain.  Secondary Complaints: Pain of (L) wrist. Chemotherapy induced neuropathy of feet.    Past Medical History  Past Medical History Reviewed: No   has a past medical history of Arthritis, Asthma, Breast cancer (H) (2004), Coronary artery disease, Endometrial polyp (1992), GERD (gastroesophageal reflux disease), Heart attack (H), Heart murmur, History of measles, mumps, or rubella (as a child), HTN (hypertension), radiation therapy (2004), Hyperlipemia, Osteopenia, Other chronic pain, Ovarian cyst, Prediabetes, UTI (urinary tract infection), and Walking troubles.    Objective    TCM Exam Completed: Yes  Limbs/Back: Bilateral Lower Extremity and Lower Back    Tongue/Pulse Exam Completed: No    Patient Assessment  Patient Type: Oncology  Patient Complaint: Chronic pain of low back, (L) wrist pain, neuropathy of feet        TCM Diagnosis: Qi Deficiency;Blood Deficiency;Yin Deficiency;Qi Stagnation;Kidney Qi Deficiency;Spleen Qi Deficiency;Damp and Phlegm Accumulation      Treatment Principle: tonify and nourish, move qi and blood    TCM / Acupuncture Treatment  Acupuncture Points:       Initial insertions: (L) Si 3, 4, 5, (L) Ling gu, (L) Bl 60, 62, 63, 65, 66       Second insertions: Kid 1, Sp 1, 2, 3, Bafeng            Number of needles inserted: 25  Number of needles removed: 25           Assessment and Plan  Treatment Observations:    Acupuncture Treatment Recommendations:         It is my recommendation that this patient seek advice from their Primary Care Provider about active symptoms not addressed during this visit. The risks and benefits of acupuncture were reviewed and the patient stated understanding. The patient's questions were answered to their satisfaction. Consent was provided for treatment. We thank you for  the referral and opportunity to treat this patient.    Time Spent with Patient:   I spent a total of 15 minutes face-to-face with Lisa Reyna during today's office visit.     Woody Nielsen

## 2023-09-19 ENCOUNTER — PROCEDURE ONLY VISIT (OUTPATIENT)
Dept: ONCOLOGY | Facility: CLINIC | Age: 73
End: 2023-09-19
Payer: COMMERCIAL

## 2023-09-19 DIAGNOSIS — M54.50 CHRONIC BILATERAL LOW BACK PAIN WITHOUT SCIATICA: Primary | ICD-10-CM

## 2023-09-19 DIAGNOSIS — T45.1X5A CHEMOTHERAPY-INDUCED PERIPHERAL NEUROPATHY (H): ICD-10-CM

## 2023-09-19 DIAGNOSIS — G62.0 CHEMOTHERAPY-INDUCED PERIPHERAL NEUROPATHY (H): ICD-10-CM

## 2023-09-19 DIAGNOSIS — M54.59 OTHER LOW BACK PAIN: ICD-10-CM

## 2023-09-19 DIAGNOSIS — G89.29 CHRONIC BILATERAL LOW BACK PAIN WITHOUT SCIATICA: Primary | ICD-10-CM

## 2023-09-19 PROCEDURE — 97810 ACUP 1/> WO ESTIM 1ST 15 MIN: CPT

## 2023-09-19 NOTE — PROGRESS NOTES
ACUPUNCTURIST TREATMENT NOTE      Lisa Reyna, a 72 year old female, is here today for Follow - Up exam.     HPI  Main Complaint: Chronic low back pain; chronic pain at (L) wrist; neuropathy at feet    Follow-up: Patient reports that she has been out of town so she has not been able to come for acupuncture and reports that she has been suffering with increased low back pain and neuropathy at both her feet.  She reports that she had surgery on her (L) wrist to repair torn tendons and since surgery her pain has improved and now experiences pain flare ups with humid weather.      Past Medical History  Past Medical History Reviewed: Yes   has a past medical history of Arthritis, Asthma, Breast cancer (H) (2004), Coronary artery disease, Endometrial polyp (1992), GERD (gastroesophageal reflux disease), Heart attack (H), Heart murmur, History of measles, mumps, or rubella (as a child), HTN (hypertension), radiation therapy (2004), Hyperlipemia, Osteopenia, Other chronic pain, Ovarian cyst, Prediabetes, UTI (urinary tract infection), and Walking troubles.    Objective    TCM Exam Completed: Yes  Limbs/Back: Left Upper Extremity, Bilateral Lower Extremity, and Lower Back              Patient Assessment  Patient Type: Oncology  Patient Complaint: Chronic low back pain; chronic pain at (L) wrist; neuropathy at feet        8/21/2023     1:49 PM 9/19/2023     9:00 AM   Acupuncture   Intervention Reason Pain,Pain 2,Neuropathy Pain,Pain 2,Neuropathy   Pain Location Low back Low back   Pre-session Pain Rating 3 3   Post-session Pain Rating 0 0   Pain 2 Location (L) wrist (L) wrist   Pre-session Pain 2 Rating 4 2   Post-session Pain 2 Rating 0 0   Neuropathy Location Feet Feet   Pre-session Neuropathy rating 5 5   Post-session Neuropathy rating 3 3       TCM Diagnosis: Qi Deficiency;Blood Deficiency;Yin Deficiency;Qi Stagnation;Kidney Qi Deficiency;Spleen Qi Deficiency;Damp and Phlegm Accumulation      Treatment Principle: unblock  channels, move and tonify qi and blood    TCM / Acupuncture Treatment  Acupuncture Points:       Initial insertions: (L) (SI 3, TW 4, LI 4); Lauren 3, Ki 1, Sp 1, Sp 3, GB 41, BL 62, Ki 3, BL 60                    Number of needles inserted: 19  Number of needles removed: 19           Assessment and Plan  Treatment Observations:    Acupuncture Treatment Recommendations:         It is my recommendation that this patient seek advice from their Primary Care Provider about active symptoms not addressed during this visit. The risks and benefits of acupuncture were reviewed and the patient stated understanding. The patient's questions were answered to their satisfaction. Consent was provided for treatment. We thank you for the referral and opportunity to treat this patient.    Time Spent with Patient:   I spent a total of 15 minutes face-to-face with Lisa Reyna during today's office visit.     Nilda Brewer L.Ac.

## 2023-09-26 ENCOUNTER — PROCEDURE ONLY VISIT (OUTPATIENT)
Dept: ONCOLOGY | Facility: CLINIC | Age: 73
End: 2023-09-26
Attending: INTERNAL MEDICINE
Payer: COMMERCIAL

## 2023-09-26 DIAGNOSIS — T45.1X5A CHEMOTHERAPY-INDUCED PERIPHERAL NEUROPATHY (H): ICD-10-CM

## 2023-09-26 DIAGNOSIS — G89.29 CHRONIC BILATERAL LOW BACK PAIN WITHOUT SCIATICA: Primary | ICD-10-CM

## 2023-09-26 DIAGNOSIS — G62.0 CHEMOTHERAPY-INDUCED PERIPHERAL NEUROPATHY (H): ICD-10-CM

## 2023-09-26 DIAGNOSIS — M54.59 OTHER LOW BACK PAIN: ICD-10-CM

## 2023-09-26 DIAGNOSIS — M54.50 CHRONIC BILATERAL LOW BACK PAIN WITHOUT SCIATICA: Primary | ICD-10-CM

## 2023-09-26 PROCEDURE — 97810 ACUP 1/> WO ESTIM 1ST 15 MIN: CPT | Mod: GA

## 2023-09-26 NOTE — PROGRESS NOTES
ACUPUNCTURIST TREATMENT NOTE      Lisa Reyna, a 72 year old female, is here today for Follow - Up exam.    HPI  Main Complaint: Chronic low back pain; chronic pain at (L) wrist; neuropathy at feet    Follow-up: Patient reports that she did a lot of walking outside this weekend.  She reports that the combination of extra walking and damp weather is exacerbating her low back pain.  She also reports increased pain at her (L) wrist.  She reports the neuropathy symptoms at her feet feel worse this week, she is attributing this to the fact that she is walking differently because her back is painful.      Past Medical History  Past Medical History Reviewed: Yes   has a past medical history of Arthritis, Asthma, Breast cancer (H) (2004), Coronary artery disease, Endometrial polyp (1992), GERD (gastroesophageal reflux disease), Heart attack (H), Heart murmur, History of measles, mumps, or rubella (as a child), HTN (hypertension), radiation therapy (2004), Hyperlipemia, Osteopenia, Other chronic pain, Ovarian cyst, Prediabetes, UTI (urinary tract infection), and Walking troubles.    Objective    TCM Exam Completed: Yes  Limbs/Back: Bilateral Lower Extremity and Lower Back        Patient Assessment  Patient Type: Oncology  Patient Complaint: Chronic low back pain; chronic pain at (L) wrist; neuropathy at feet        9/19/2023     9:00 AM 9/26/2023     9:00 AM   Acupuncture   Intervention Reason Pain,Pain 2,Neuropathy Pain,Pain 2,Neuropathy   Pain Location Low back Low back   Pre-session Pain Rating 3 4   Post-session Pain Rating 0 0   Pain 2 Location (L) wrist (L) wrist   Pre-session Pain 2 Rating 2 3   Post-session Pain 2 Rating 0 0   Neuropathy Location Feet Feet   Pre-session Neuropathy rating 5 6   Post-session Neuropathy rating 3 5       TCM Diagnosis: Qi Deficiency;Blood Deficiency;Yin Deficiency;Qi Stagnation;Kidney Qi Deficiency;Spleen Qi Deficiency;Damp and Phlegm Accumulation      Treatment Principle: unblock  channels, move and tonify qi and blood    TCM / Acupuncture Treatment  Acupuncture Points:       Initial insertions: (L) (Ling Gu, Da Karla, SI 3, SI 4, TW 4); BL 60, Ki 3, Lauren 3, Sp 1, Sp 3                    Number of needles inserted: 15  Number of needles removed: 15           Assessment and Plan  Treatment Observations:    Acupuncture Treatment Recommendations:         It is my recommendation that this patient seek advice from their Primary Care Provider about active symptoms not addressed during this visit. The risks and benefits of acupuncture were reviewed and the patient stated understanding. The patient's questions were answered to their satisfaction. Consent was provided for treatment. We thank you for the referral and opportunity to treat this patient.    Time Spent with Patient:   I spent a total of 20 minutes face-to-face with Lisa Reyna during today's office visit.     Nilda Brewer L.Ac.

## 2023-09-27 ENCOUNTER — LAB REQUISITION (OUTPATIENT)
Dept: LAB | Facility: CLINIC | Age: 73
End: 2023-09-27
Payer: COMMERCIAL

## 2023-09-27 DIAGNOSIS — E11.22 TYPE 2 DIABETES MELLITUS WITH DIABETIC CHRONIC KIDNEY DISEASE (H): ICD-10-CM

## 2023-09-27 LAB
ANION GAP SERPL CALCULATED.3IONS-SCNC: 13 MMOL/L (ref 7–15)
BUN SERPL-MCNC: 18.5 MG/DL (ref 8–23)
CALCIUM SERPL-MCNC: 9.8 MG/DL (ref 8.8–10.2)
CHLORIDE SERPL-SCNC: 101 MMOL/L (ref 98–107)
CREAT SERPL-MCNC: 1.12 MG/DL (ref 0.51–0.95)
DEPRECATED HCO3 PLAS-SCNC: 24 MMOL/L (ref 22–29)
EGFRCR SERPLBLD CKD-EPI 2021: 52 ML/MIN/1.73M2
GLUCOSE SERPL-MCNC: 113 MG/DL (ref 70–99)
HBA1C MFR BLD: 5.5 % (ref 4.2–6.1)
POTASSIUM SERPL-SCNC: 4 MMOL/L (ref 3.4–5.3)
SODIUM SERPL-SCNC: 138 MMOL/L (ref 135–145)

## 2023-09-27 PROCEDURE — 80048 BASIC METABOLIC PNL TOTAL CA: CPT | Mod: ORL | Performed by: NURSE PRACTITIONER

## 2023-10-02 ENCOUNTER — PROCEDURE ONLY VISIT (OUTPATIENT)
Dept: ONCOLOGY | Facility: CLINIC | Age: 73
End: 2023-10-02
Attending: INTERNAL MEDICINE
Payer: COMMERCIAL

## 2023-10-02 DIAGNOSIS — G62.0 CHEMOTHERAPY-INDUCED PERIPHERAL NEUROPATHY (H): ICD-10-CM

## 2023-10-02 DIAGNOSIS — T45.1X5A CHEMOTHERAPY-INDUCED PERIPHERAL NEUROPATHY (H): ICD-10-CM

## 2023-10-02 DIAGNOSIS — M54.59 OTHER LOW BACK PAIN: Primary | ICD-10-CM

## 2023-10-02 PROCEDURE — 97811 ACUP 1/> W/O ESTIM EA ADD 15: CPT

## 2023-10-02 PROCEDURE — 97810 ACUP 1/> WO ESTIM 1ST 15 MIN: CPT | Performed by: ACUPUNCTURIST

## 2023-10-02 PROCEDURE — 97811 ACUP 1/> W/O ESTIM EA ADD 15: CPT | Performed by: ACUPUNCTURIST

## 2023-10-02 PROCEDURE — 97810 ACUP 1/> WO ESTIM 1ST 15 MIN: CPT

## 2023-10-02 NOTE — PROGRESS NOTES
ACUPUNCTURIST TREATMENT NOTE      Lisa Reyna, a 72 year old female, is here today for Follow - Up exam. Patient is referred by No ref. provider found.    HPI  Main Complaint: Chronic pain of low back  Secondary Complaints: Chronic pain of (L) wrist and chemotherapy induced neuropathy of feet.    Past Medical History  Past Medical History Reviewed: No   has a past medical history of Arthritis, Asthma, Breast cancer (H) (2004), Coronary artery disease, Endometrial polyp (1992), GERD (gastroesophageal reflux disease), Heart attack (H), Heart murmur, History of measles, mumps, or rubella (as a child), HTN (hypertension), radiation therapy (2004), Hyperlipemia, Osteopenia, Other chronic pain, Ovarian cyst, Prediabetes, UTI (urinary tract infection), and Walking troubles.    Objective    TCM Exam Completed: Yes  Limbs/Back: Left Upper Extremity, Bilateral Lower Extremity, and Lower Back    Tongue/Pulse Exam Completed: No    Patient Assessment  Patient Type: Oncology  Patient Complaint: Chronic low back pain; chronic pain at (L) wrist; neuropathy at feet        9/26/2023     9:00 AM 10/2/2023     1:38 PM   Acupuncture   Intervention Reason Pain,Pain 2,Neuropathy Pain,Pain 2,Neuropathy   Pain Location Low back Low back   Pre-session Pain Rating 4 0   Post-session Pain Rating 0 0   Pain 2 Location (L) wrist (L) wrist   Pre-session Pain 2 Rating 3 4   Post-session Pain 2 Rating 0    Neuropathy Location Feet Feet   Pre-session Neuropathy rating 6 3   Post-session Neuropathy rating 5        TCM Diagnosis: Qi Deficiency;Blood Deficiency;Yin Deficiency;Qi Stagnation;Kidney Qi Deficiency;Spleen Qi Deficiency;Damp and Phlegm Accumulation      Treatment Principle: unblock channels, move and tonify qi and blood    TCM / Acupuncture Treatment  Acupuncture Points:       Initial insertions: (L) Si 3, 4, 5, 6, (L) Ling gu, (R) Bl 60, 62       Second insertions: Sp 1, 2, 3, Lauren 2, 3, Great toe irasema x1            Number of needles  inserted: 19  Number of needles removed: 19           Assessment and Plan  Treatment Observations:    Acupuncture Treatment Recommendations:         It is my recommendation that this patient seek advice from their Primary Care Provider about active symptoms not addressed during this visit. The risks and benefits of acupuncture were reviewed and the patient stated understanding. The patient's questions were answered to their satisfaction. Consent was provided for treatment. We thank you for the referral and opportunity to treat this patient.    Time Spent with Patient:   I spent a total of 30 minutes face-to-face with Lisa Reyna during today's office visit.     Woody Nielsen

## 2023-10-16 ENCOUNTER — PROCEDURE ONLY VISIT (OUTPATIENT)
Dept: ONCOLOGY | Facility: CLINIC | Age: 73
End: 2023-10-16
Attending: INTERNAL MEDICINE
Payer: COMMERCIAL

## 2023-10-16 DIAGNOSIS — G62.0 CHEMOTHERAPY-INDUCED PERIPHERAL NEUROPATHY (H): ICD-10-CM

## 2023-10-16 DIAGNOSIS — T45.1X5A CHEMOTHERAPY-INDUCED PERIPHERAL NEUROPATHY (H): ICD-10-CM

## 2023-10-16 DIAGNOSIS — M54.59 OTHER LOW BACK PAIN: Primary | ICD-10-CM

## 2023-10-16 PROCEDURE — 97810 ACUP 1/> WO ESTIM 1ST 15 MIN: CPT | Mod: GA | Performed by: ACUPUNCTURIST

## 2023-10-16 NOTE — PROGRESS NOTES
ACUPUNCTURIST TREATMENT NOTE      Lisa Reyna, a 72 year old female, is here today for Follow - Up exam. Patient is referred by No ref. provider found.    HPI  Main Complaint: Chronic low back pain.  Secondary Complaints: Chronic pain of (L) wrist, chemotherapy induced neuropathy of feet, mainly in great toes.    Past Medical History  Past Medical History Reviewed: No   has a past medical history of Arthritis, Asthma, Breast cancer (H) (2004), Coronary artery disease, Endometrial polyp (1992), GERD (gastroesophageal reflux disease), Heart attack (H), Heart murmur, History of measles, mumps, or rubella (as a child), HTN (hypertension), radiation therapy (2004), Hyperlipemia, Osteopenia, Other chronic pain, Ovarian cyst, Prediabetes, UTI (urinary tract infection), and Walking troubles.    Objective    TCM Exam Completed: Yes  Limbs/Back: Left Upper Extremity, Bilateral Lower Extremity, and Lower Back    Tongue/Pulse Exam Completed: No    Patient Assessment  Patient Type: Oncology  Patient Complaint: Chronic low back pain; chronic pain at (L) wrist; neuropathy at feet        10/10/2023    10:00 AM 10/16/2023     1:55 PM   Acupuncture   Intervention Reason Pain,Pain 2,Neuropathy Pain,Pain 2,Neuropathy   Pain Location Low back Low back   Pre-session Pain Rating 4 3   Post-session Pain Rating 0 0   Pain 2 Location (L) wrist (L) wrist   Pre-session Pain 2 Rating 4 5   Post-session Pain 2 Rating 0 2   Neuropathy Location Feet Feet   Pre-session Neuropathy rating 2 3   Post-session Neuropathy rating 1 1   Patient complaint: Chronic low back pain; chronic pain at (L) wrist; neuropathy at feet Chronic low back pain; chronic pain at (L) wrist; neuropathy at feet   Patient Type Oncology Oncology   Initial insertions (L) Si 3, 4, 5, 6, (L) Ling gu, (R) Bl 60, 62 (L) Si 3, 4, 5, 6, (L) Ling gu, (R) Bl 60, 62, 65, Sp 2, 3, Lauren 2, 3, Great toe irasema x2   Number of needles inserted  20   Number of needles removed  20   Accessory  Techniques TDP Heat Lamp TDP Heat Lamp   TDP Heat Lamp location used Feet Feet   TCM Diagnosis Qi Deficiency,Blood Deficiency,Yin Deficiency,Qi Stagnation,Kidney Qi Deficiency,Spleen Qi Deficiency,Damp and Phlegm Accumulation Qi Deficiency,Blood Deficiency,Yin Deficiency,Qi Stagnation,Kidney Qi Deficiency,Spleen Qi Deficiency,Damp and Phlegm Accumulation   Treatment Principle unblock channels, move and tonify qi and blood unblock channels, move and tonify qi and blood       TCM Diagnosis: Qi Deficiency;Blood Deficiency;Yin Deficiency;Qi Stagnation;Kidney Qi Deficiency;Spleen Qi Deficiency;Damp and Phlegm Accumulation      Treatment Principle: unblock channels, move and tonify qi and blood    TCM / Acupuncture Treatment  Acupuncture Points:       Initial insertions: (L) Si 3, 4, 5, 6, (L) Ling gu, (R) Bl 60, 62, 65, Sp 2, 3, Lauren 2, 3, Great toe irasema x2                    Number of needles inserted: 20  Number of needles removed: 20           Assessment and Plan  Treatment Observations:    Acupuncture Treatment Recommendations:         It is my recommendation that this patient seek advice from their Primary Care Provider about active symptoms not addressed during this visit. The risks and benefits of acupuncture were reviewed and the patient stated understanding. The patient's questions were answered to their satisfaction. Consent was provided for treatment. We thank you for the referral and opportunity to treat this patient.    Time Spent with Patient:   I spent a total of 15 minutes face-to-face with Lisa Reyna during today's office visit.     Woody Nielsen

## 2023-10-23 ENCOUNTER — PROCEDURE ONLY VISIT (OUTPATIENT)
Dept: ONCOLOGY | Facility: CLINIC | Age: 73
End: 2023-10-23
Attending: INTERNAL MEDICINE
Payer: COMMERCIAL

## 2023-10-23 DIAGNOSIS — T45.1X5A CHEMOTHERAPY-INDUCED PERIPHERAL NEUROPATHY (H): ICD-10-CM

## 2023-10-23 DIAGNOSIS — G62.0 CHEMOTHERAPY-INDUCED PERIPHERAL NEUROPATHY (H): ICD-10-CM

## 2023-10-23 DIAGNOSIS — M54.59 OTHER LOW BACK PAIN: Primary | ICD-10-CM

## 2023-10-23 PROCEDURE — 97810 ACUP 1/> WO ESTIM 1ST 15 MIN: CPT | Performed by: ACUPUNCTURIST

## 2023-10-23 NOTE — PROGRESS NOTES
ACUPUNCTURIST TREATMENT NOTE      Lisa Reyna, a 72 year old female, is here today for Follow - Up exam. Patient is referred by No ref. provider found.    HPI  Main Complaint: Chronic low back pain with (R) sciatica. Worse lately with cold and damp weather  Secondary Complaints: Chronic pain of (L) wrist, worse lately with cold and damp weather. Chemotherapy induced neuropathy of bilateral feet, mainly in great toes.    Past Medical History  Past Medical History Reviewed: No   has a past medical history of Arthritis, Asthma, Breast cancer (H) (2004), Coronary artery disease, Endometrial polyp (1992), GERD (gastroesophageal reflux disease), Heart attack (H), Heart murmur, History of measles, mumps, or rubella (as a child), HTN (hypertension), radiation therapy (2004), Hyperlipemia, Osteopenia, Other chronic pain, Ovarian cyst, Prediabetes, UTI (urinary tract infection), and Walking troubles.    Objective    TCM Exam Completed: Yes  Limbs/Back: Left Upper Extremity, Bilateral Lower Extremity, and Lower Back    Tongue/Pulse Exam Completed: No    Patient Assessment  Patient Type: Oncology  Patient Complaint: Chronic low back pain with (R) sciatica, chronic pain at (L) wrist; neuropathy at feet        10/16/2023     1:55 PM 10/23/2023     1:15 PM   Acupuncture   Intervention Reason Pain,Pain 2,Neuropathy Pain,Pain 2,Neuropathy   Pain Location Low back Low back   Pre-session Pain Rating 3 0   Post-session Pain Rating 0 0   Pain 2 Location (L) wrist (L) wrist   Pre-session Pain 2 Rating 5 0   Post-session Pain 2 Rating 2 0   Neuropathy Location Feet Feet   Pre-session Neuropathy rating 3 3   Post-session Neuropathy rating 1 1   Patient complaint: Chronic low back pain; chronic pain at (L) wrist; neuropathy at feet Chronic low back pain with (R) sciatica, chronic pain at (L) wrist; neuropathy at feet   Initial insertions (L) Si 3, 4, 5, 6, (L) Ling gu, (R) Bl 60, 62, 65, Sp 2, 3, Lauren 2, 3, Great toe irasema x2 (L) Si 3, 4, 5,  6, (L) Ling , (R) Bl 60, 62, 65, Sp 1, 2, 3, Lauren 2, 3, Great toe irasema x2, (R) Gb 36, 41   Number of needles inserted 20 24   Number of needles removed 20 24   Accessory Techniques TDP Heat Lamp TDP Heat Lamp   TDP Heat Lamp location used Feet Feet   TCM Diagnosis Qi Deficiency,Blood Deficiency,Yin Deficiency,Qi Stagnation,Kidney Qi Deficiency,Spleen Qi Deficiency,Damp and Phlegm Accumulation Qi Deficiency,Blood Deficiency,Yin Deficiency,Qi Stagnation,Kidney Qi Deficiency,Spleen Qi Deficiency,Damp and Phlegm Accumulation   Treatment Principle unblock channels, move and tonify qi and blood unblock channels, move and tonify qi and blood       TCM Diagnosis: Qi Deficiency;Blood Deficiency;Yin Deficiency;Qi Stagnation;Kidney Qi Deficiency;Spleen Qi Deficiency;Damp and Phlegm Accumulation      Treatment Principle: unblock channels, move and tonify qi and blood    TCM / Acupuncture Treatment  Acupuncture Points:       Initial insertions: (L) Si 3, 4, 5, 6, (L) Ling gu, (R) Bl 60, 62, 65, Sp 1, 2, 3, Lauren 2, 3, Great toe irasema x2, (R) Gb 36, 41                    Number of needles inserted: 24  Number of needles removed: 24           Assessment and Plan  Treatment Observations:    Acupuncture Treatment Recommendations:         It is my recommendation that this patient seek advice from their Primary Care Provider about active symptoms not addressed during this visit. The risks and benefits of acupuncture were reviewed and the patient stated understanding. The patient's questions were answered to their satisfaction. Consent was provided for treatment. We thank you for the referral and opportunity to treat this patient.    Time Spent with Patient:   I spent a total of 15 minutes face-to-face with Lisa Reyna during today's office visit.     Woody Nielsen

## 2023-10-30 ENCOUNTER — TELEPHONE (OUTPATIENT)
Dept: ONCOLOGY | Facility: CLINIC | Age: 73
End: 2023-10-30
Payer: COMMERCIAL

## 2023-10-30 NOTE — TELEPHONE ENCOUNTER
Patient calls with questions regarding her Letrozole prescription. She reports that she tried to refill it and was told that the clinic discontinued her prescription.    Per chart review, a year supply of medication was sent to the pharmacy on 8/16/23. [facility] is called. Patient had not filled any medication off of the new prescription and was requesting off of an old prescription. Pharmacy will get medication ready. Patient is notified and she denies further questions.    Rabia Silvestre RN

## 2023-11-03 ENCOUNTER — TELEPHONE (OUTPATIENT)
Dept: CARDIOLOGY | Facility: CLINIC | Age: 73
End: 2023-11-03
Payer: COMMERCIAL

## 2023-11-03 DIAGNOSIS — I25.10 CORONARY ARTERY DISEASE INVOLVING NATIVE CORONARY ARTERY OF NATIVE HEART WITHOUT ANGINA PECTORIS: ICD-10-CM

## 2023-11-03 DIAGNOSIS — E78.2 MIXED HYPERLIPIDEMIA: ICD-10-CM

## 2023-11-03 DIAGNOSIS — I10 ESSENTIAL HYPERTENSION: Primary | ICD-10-CM

## 2023-11-03 RX ORDER — METOPROLOL SUCCINATE 50 MG/1
50 TABLET, EXTENDED RELEASE ORAL DAILY
Qty: 90 TABLET | Refills: 3 | Status: SHIPPED | OUTPATIENT
Start: 2023-11-03

## 2023-11-03 NOTE — TELEPHONE ENCOUNTER
[facility] Call [facility]    Phone Message    May a detailed message be left on voicemail: yes     Reason for Call: Medication Refill Request    Has the patient contacted the pharmacy for the refill? Yes   Name of medication being requested: metoprolol succinate ER (TOPROL XL) 50 MG 24 hr tablet   Provider who prescribed the medication: Dr. Fernandez  Pharmacy: [address] [place] [address]    Date medication is needed: 11/3/23   Patient states that no one has called her about this medication and she did not know she needed an appt . Patient states she had an MRI of her heart ordered by Dr. Fernandez in the spring and she did not think she needed to see the Dr. Please review and contact the patient to discuss.     Action Taken: Other: cardiology    Travel Screening: Not Applicable  Thank you!  [facility]

## 2023-11-03 NOTE — TELEPHONE ENCOUNTER
Phone call to patient to advise her need for annual follow-up for medication refill management. She verbalized understanding. Call transferred to  and appt secured. -sea

## 2023-11-13 ENCOUNTER — PROCEDURE ONLY VISIT (OUTPATIENT)
Dept: ONCOLOGY | Facility: CLINIC | Age: 73
End: 2023-11-13
Attending: INTERNAL MEDICINE
Payer: COMMERCIAL

## 2023-11-13 DIAGNOSIS — M54.59 OTHER LOW BACK PAIN: Primary | ICD-10-CM

## 2023-11-13 DIAGNOSIS — G62.0 CHEMOTHERAPY-INDUCED PERIPHERAL NEUROPATHY (H): ICD-10-CM

## 2023-11-13 DIAGNOSIS — T45.1X5A CHEMOTHERAPY-INDUCED PERIPHERAL NEUROPATHY (H): ICD-10-CM

## 2023-11-13 PROCEDURE — 97810 ACUP 1/> WO ESTIM 1ST 15 MIN: CPT | Performed by: ACUPUNCTURIST

## 2023-11-13 NOTE — PROGRESS NOTES
ACUPUNCTURIST TREATMENT NOTE      Lisa Reyna, a 72 year old female, is here today for Follow - Up exam. Patient is referred by No ref. provider found.    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    Past Medical History  Past Medical History Reviewed: Yes   has a past medical history of Arthritis, Asthma, Breast cancer (H) (2004), Coronary artery disease, Endometrial polyp (1992), GERD (gastroesophageal reflux disease), Heart attack (H), Heart murmur, History of measles, mumps, or rubella (as a child), HTN (hypertension), radiation therapy (2004), Hyperlipemia, Osteopenia, Other chronic pain, Ovarian cyst, Prediabetes, UTI (urinary tract infection), and Walking troubles.    Objective    TCM Exam Completed: Yes  Limbs/Back: Bilateral Lower Extremity and Lower Back    Tongue/Pulse Exam Completed: No    Patient Assessment  Patient Type: Oncology  Patient Complaint: Chronic low back pain, neuropathy of feet        10/23/2023     1:15 PM 11/13/2023     3:00 PM   Acupuncture   Intervention Reason Pain,Pain 2,Neuropathy Pain,Neuropathy   Pain Location Low back Low back   Pre-session Pain Rating 0 4   Post-session Pain Rating 0 2   Pain 2 Location (L) wrist    Pre-session Pain 2 Rating 0    Post-session Pain 2 Rating 0    Neuropathy Location Feet Feet   Pre-session Neuropathy rating 3 3   Post-session Neuropathy rating 1 0   Patient complaint: Chronic low back pain with (R) sciatica, chronic pain at (L) wrist; neuropathy at feet Chronic low back pain, neuropathy of feet   Initial insertions (L) Si 3, 4, 5, 6, (L) Ling gu, (R) Bl 60, 62, 65, Sp 1, 2, 3, Lauren 2, 3, Great toe irasema x2, (R) Gb 36, 41 (L) Si 3, 4, 5, 6, (L) Ling gu, (R) Bl 60, 62, 65, Sp 1, 3, Lauren 2, 3, 4, St 41, Gb 40   Number of needles inserted 24 22   Number of needles removed 24 22   Accessory Techniques TDP Heat Lamp TDP Heat Lamp   TDP Heat Lamp location used Feet Feet    TCM Diagnosis Qi Deficiency,Blood Deficiency,Yin Deficiency,Qi Stagnation,Kidney Qi Deficiency,Spleen Qi Deficiency,Damp and Phlegm Accumulation Qi Deficiency,Blood Deficiency,Yin Deficiency,Qi Stagnation,Kidney Qi Deficiency,Spleen Qi Deficiency,Damp and Phlegm Accumulation   Treatment Principle unblock channels, move and tonify qi and blood unblock channels, move and tonify qi and blood       TCM Diagnosis: Qi Deficiency;Blood Deficiency;Yin Deficiency;Qi Stagnation;Kidney Qi Deficiency;Spleen Qi Deficiency;Damp and Phlegm Accumulation      Treatment Principle: unblock channels, move and tonify qi and blood    TCM / Acupuncture Treatment  Acupuncture Points:       Initial insertions: (L) Si 3, 4, 5, 6, (L) Ling gu, (R) Bl 60, 62, 65, Sp 1, 3, Lauren 2, 3, 4, St 41, Gb 40                    Number of needles inserted: 22  Number of needles removed: 22           Assessment and Plan  Treatment Observations:    Acupuncture Treatment Recommendations:         It is my recommendation that this patient seek advice from their Primary Care Provider about active symptoms not addressed during this visit. The risks and benefits of acupuncture were reviewed and the patient stated understanding. The patient's questions were answered to their satisfaction. Consent was provided for treatment. We thank you for the referral and opportunity to treat this patient.    Time Spent with Patient:   I spent a total of 15 minutes face-to-face with Lisa Reyna during today's office visit.     Woody Nielsen

## 2023-11-27 ENCOUNTER — PROCEDURE ONLY VISIT (OUTPATIENT)
Dept: ONCOLOGY | Facility: CLINIC | Age: 73
End: 2023-11-27
Attending: INTERNAL MEDICINE
Payer: COMMERCIAL

## 2023-11-27 DIAGNOSIS — G62.0 CHEMOTHERAPY-INDUCED PERIPHERAL NEUROPATHY (H): ICD-10-CM

## 2023-11-27 DIAGNOSIS — T45.1X5A CHEMOTHERAPY-INDUCED PERIPHERAL NEUROPATHY (H): ICD-10-CM

## 2023-11-27 DIAGNOSIS — M54.59 OTHER LOW BACK PAIN: Primary | ICD-10-CM

## 2023-11-27 PROCEDURE — 97810 ACUP 1/> WO ESTIM 1ST 15 MIN: CPT | Performed by: ACUPUNCTURIST

## 2023-11-27 NOTE — PROGRESS NOTES
ACUPUNCTURIST TREATMENT NOTE      Lisa Reyna, a 72 year old female, is here today for Follow - Up exam. Patient is referred by No ref. provider found.    HPI  Main Complaint: Chronic low back pain  Secondary Complaints: Chemotherapy induced neuropathy of feet    Past Medical History  Past Medical History Reviewed: Yes   has a past medical history of Arthritis, Asthma, Breast cancer (H) (2004), Coronary artery disease, Endometrial polyp (1992), GERD (gastroesophageal reflux disease), Heart attack (H), Heart murmur, History of measles, mumps, or rubella (as a child), HTN (hypertension), radiation therapy (2004), Hyperlipemia, Osteopenia, Other chronic pain, Ovarian cyst, Prediabetes, UTI (urinary tract infection), and Walking troubles.    Objective    TCM Exam Completed: Yes  Limbs/Back: Bilateral Lower Extremity and Lower Back    Tongue/Pulse Exam Completed: No    Patient Assessment  Patient Type: Oncology  Patient Complaint: Chronic low back pain, neuropathy of feet        11/13/2023     3:00 PM 11/27/2023     2:00 PM   Acupuncture   Intervention Reason Pain,Neuropathy Pain,Neuropathy   Pain Location Low back Low back   Pre-session Pain Rating 4 4   Post-session Pain Rating 2 1   Neuropathy Location Feet Feet   Pre-session Neuropathy rating 3 2   Post-session Neuropathy rating 0 0   Patient complaint: Chronic low back pain, neuropathy of feet Chronic low back pain, neuropathy of feet   Initial insertions (L) Si 3, 4, 5, 6, (L) Ling gu, (R) Bl 60, 62, 65, Sp 1, 3, Lauren 2, 3, 4, St 41, Gb 40 (L) Si 3, 4, 5, 6, (L) Ling gu, (R) Bl 60, 62, 65, Sp 1, 2, 3, Lauren 3, [place]   Number of needles inserted 22 24   Number of needles removed 22 24   Accessory Techniques TDP Heat Lamp TDP Heat Lamp   TDP Heat Lamp location used Feet Feet   TCM Diagnosis Qi Deficiency,Blood Deficiency,Yin Deficiency,Qi Stagnation,Kidney Qi Deficiency,Spleen Qi Deficiency,Damp and Phlegm Accumulation Qi Deficiency,Blood Deficiency,Yin Deficiency,Qi  Stagnation,Kidney Qi Deficiency,Spleen Qi Deficiency,Damp and Phlegm Accumulation   Treatment Principle unblock channels, move and tonify qi and blood unblock channels, move and tonify qi and blood       TCM Diagnosis: Qi Deficiency;Blood Deficiency;Yin Deficiency;Qi Stagnation;Kidney Qi Deficiency;Spleen Qi Deficiency;Damp and Phlegm Accumulation      Treatment Principle: unblock channels, move and tonify qi and blood    TCM / Acupuncture Treatment  Acupuncture Points:       Initial insertions: (L) Si 3, 4, 5, 6, (L) Ling gu, (R) Bl 60, 62, 65, Sp 1, 2, 3, Lauren 3, [place]                    Number of needles inserted: 24  Number of needles removed: 24           Assessment and Plan  Treatment Observations:    Acupuncture Treatment Recommendations:         It is my recommendation that this patient seek advice from their Primary Care Provider about active symptoms not addressed during this visit. The risks and benefits of acupuncture were reviewed and the patient stated understanding. The patient's questions were answered to their satisfaction. Consent was provided for treatment. We thank you for the referral and opportunity to treat this patient.    Time Spent with Patient:   I spent a total of 15 minutes face-to-face with Lisa Reyna during today's office visit.     Woody Nielsen

## 2024-01-03 ENCOUNTER — PATIENT OUTREACH (OUTPATIENT)
Dept: ONCOLOGY | Facility: HOSPITAL | Age: 74
End: 2024-01-03
Payer: COMMERCIAL

## 2024-01-03 NOTE — PROGRESS NOTES
[facility] [place]: Cancer Care                                    Malignant neoplasm of lower-inner quadrant of right breast of female, estrogen receptor positive                                                         Oral chemotherapy pharmacist, can you please review, research and advise, please?    Patient called and left message; she would like to start OTC Privagen, but label says it contains soy.   She is on letrozole (AI).   If unable to advise, we can send to Shira Richard CNP.      Signature:  Elizabeth Ho RN

## 2024-01-04 NOTE — PROGRESS NOTES
"Patient called and message left for her to return call to triage nurse. Per Asher Sommers [facility] - Verify that patient is talking about Prevagen for memory improvement.   I honestly dont think that just because it contains soy that she needs to avoid it. It's not high in soy and I dont believe patients need to avoid all soy products just because a hormone receptor positive berast cancer. Just avoid high doses of soy or consuming soy products (like tofu) daily, but occasional dietary intake is fine in my opinion.     Since it's a \"inactive ingredient\" in Prevagen, I believe itd be an insignificant amount and since I otherwise dont have any concerns with the Prevagen Id be okay if she gives it a try (though dont think any evidence about Prevagen is impressive when it comes to memory improvement).     Rabia Silvestre RN  "

## 2024-01-04 NOTE — PROGRESS NOTES
Patient returned call. She reports that she was talking about Prevagen that is used for memory. She is given information from pharmacist. Patient verbalizes understanding. She would like to give the OTC supplement a try and will call back if she has any further questions.    Rabia Silvestre RN

## 2024-01-31 ENCOUNTER — ANCILLARY PROCEDURE (OUTPATIENT)
Dept: MAMMOGRAPHY | Facility: CLINIC | Age: 74
End: 2024-01-31
Attending: INTERNAL MEDICINE
Payer: COMMERCIAL

## 2024-01-31 ENCOUNTER — LAB REQUISITION (OUTPATIENT)
Dept: LAB | Facility: CLINIC | Age: 74
End: 2024-01-31
Payer: COMMERCIAL

## 2024-01-31 ENCOUNTER — TRANSFERRED RECORDS (OUTPATIENT)
Dept: HEALTH INFORMATION MANAGEMENT | Facility: CLINIC | Age: 74
End: 2024-01-31

## 2024-01-31 DIAGNOSIS — E11.22 TYPE 2 DIABETES MELLITUS WITH DIABETIC CHRONIC KIDNEY DISEASE (H): ICD-10-CM

## 2024-01-31 DIAGNOSIS — Z12.31 ENCOUNTER FOR SCREENING MAMMOGRAM FOR BREAST CANCER: ICD-10-CM

## 2024-01-31 DIAGNOSIS — Z17.0 MALIGNANT NEOPLASM OF LOWER-INNER QUADRANT OF RIGHT BREAST OF FEMALE, ESTROGEN RECEPTOR POSITIVE (H): ICD-10-CM

## 2024-01-31 DIAGNOSIS — C50.311 MALIGNANT NEOPLASM OF LOWER-INNER QUADRANT OF RIGHT BREAST OF FEMALE, ESTROGEN RECEPTOR POSITIVE (H): ICD-10-CM

## 2024-01-31 DIAGNOSIS — E78.1 PURE HYPERGLYCERIDEMIA: ICD-10-CM

## 2024-01-31 LAB — HBA1C MFR BLD: 5.7 % (ref 4.2–6.1)

## 2024-01-31 PROCEDURE — 80061 LIPID PANEL: CPT | Mod: ORL | Performed by: NURSE PRACTITIONER

## 2024-01-31 PROCEDURE — 80053 COMPREHEN METABOLIC PANEL: CPT | Mod: ORL | Performed by: NURSE PRACTITIONER

## 2024-01-31 PROCEDURE — 77063 BREAST TOMOSYNTHESIS BI: CPT

## 2024-02-01 LAB
ALBUMIN SERPL BCG-MCNC: 4.3 G/DL (ref 3.5–5.2)
ALP SERPL-CCNC: 61 U/L (ref 40–150)
ALT SERPL W P-5'-P-CCNC: 17 U/L (ref 0–50)
ANION GAP SERPL CALCULATED.3IONS-SCNC: 13 MMOL/L (ref 7–15)
AST SERPL W P-5'-P-CCNC: 19 U/L (ref 0–45)
BILIRUB SERPL-MCNC: 0.6 MG/DL
BUN SERPL-MCNC: 17.8 MG/DL (ref 8–23)
CALCIUM SERPL-MCNC: 10 MG/DL (ref 8.8–10.2)
CHLORIDE SERPL-SCNC: 98 MMOL/L (ref 98–107)
CHOLEST SERPL-MCNC: 146 MG/DL
CREAT SERPL-MCNC: 1.15 MG/DL (ref 0.51–0.95)
DEPRECATED HCO3 PLAS-SCNC: 25 MMOL/L (ref 22–29)
EGFRCR SERPLBLD CKD-EPI 2021: 50 ML/MIN/1.73M2
FASTING STATUS PATIENT QL REPORTED: ABNORMAL
GLUCOSE SERPL-MCNC: 128 MG/DL (ref 70–99)
HDLC SERPL-MCNC: 53 MG/DL
LDLC SERPL CALC-MCNC: 61 MG/DL
NONHDLC SERPL-MCNC: 93 MG/DL
POTASSIUM SERPL-SCNC: 3.8 MMOL/L (ref 3.4–5.3)
PROT SERPL-MCNC: 6.8 G/DL (ref 6.4–8.3)
SODIUM SERPL-SCNC: 136 MMOL/L (ref 135–145)
TRIGL SERPL-MCNC: 158 MG/DL

## 2024-02-15 ENCOUNTER — ONCOLOGY VISIT (OUTPATIENT)
Dept: ONCOLOGY | Facility: HOSPITAL | Age: 74
End: 2024-02-15
Attending: INTERNAL MEDICINE
Payer: COMMERCIAL

## 2024-02-15 ENCOUNTER — PATIENT OUTREACH (OUTPATIENT)
Dept: ONCOLOGY | Facility: HOSPITAL | Age: 74
End: 2024-02-15
Payer: COMMERCIAL

## 2024-02-15 VITALS
WEIGHT: 190 LBS | BODY MASS INDEX: 34.96 KG/M2 | HEIGHT: 62 IN | RESPIRATION RATE: 20 BRPM | DIASTOLIC BLOOD PRESSURE: 81 MMHG | SYSTOLIC BLOOD PRESSURE: 150 MMHG | OXYGEN SATURATION: 100 % | HEART RATE: 77 BPM

## 2024-02-15 DIAGNOSIS — Z79.811 LONG TERM (CURRENT) USE OF AROMATASE INHIBITORS: ICD-10-CM

## 2024-02-15 DIAGNOSIS — Z17.0 MALIGNANT NEOPLASM OF LOWER-INNER QUADRANT OF RIGHT BREAST OF FEMALE, ESTROGEN RECEPTOR POSITIVE (H): Primary | ICD-10-CM

## 2024-02-15 DIAGNOSIS — C50.311 MALIGNANT NEOPLASM OF LOWER-INNER QUADRANT OF RIGHT BREAST OF FEMALE, ESTROGEN RECEPTOR POSITIVE (H): Primary | ICD-10-CM

## 2024-02-15 PROCEDURE — G2211 COMPLEX E/M VISIT ADD ON: HCPCS | Performed by: INTERNAL MEDICINE

## 2024-02-15 PROCEDURE — G0463 HOSPITAL OUTPT CLINIC VISIT: HCPCS | Performed by: INTERNAL MEDICINE

## 2024-02-15 PROCEDURE — 99214 OFFICE O/P EST MOD 30 MIN: CPT | Performed by: INTERNAL MEDICINE

## 2024-02-15 ASSESSMENT — PAIN SCALES - GENERAL: PAINLEVEL: NO PAIN (0)

## 2024-02-15 NOTE — PROGRESS NOTES
[facility] Hematology and Oncology Progress Note    Patient: Lisa Reyna  MRN: [id number]  Date of Service: Feb 15, 2024         Reason for Visit    Chief Complaint   Patient presents with    Oncology Clinic Visit     Malignant neoplasm of lower-inner quadrant of right breast of female, estrogen receptor positive (H)       Assessment and Plan     Cancer Staging   Malignant neoplasm of lower-inner quadrant of right breast of female, estrogen receptor positive (H)  Staging form: Breast, AJCC 8th Edition  - Pathologic stage from 2/11/2022: Stage IA (pT1c, pN0(sn), cM0, G2, ER+, AR+, HER2-, Oncotype DX score: 30) - Signed by Tamar Koch MD on 3/13/2022  - Clinical: No stage assigned - Unsigned      ECOG Performance    0 - Independent     Pain  Pain Score: No Pain (0)    #.  History of invasive ductal carcinoma of LIQ of right breast, stage IA, ER+AR+HER2-. Oncotype 30/19%, >15%.  #.  Grade 1 neuropathy of both feet, exacerbated by chemotherapy on pre-existing neuropathy of both feet, improving with acupuncture.     She is clinically well without signs and symptoms suggestive for breast cancer recurrence.  Mammogram from 1/2024 was benign. She does not have any indication for systemic imaging.  I discussed that we will obtain imaging with clinical signs and symptoms concerning for breast cancer recurrence, but not routinely.   She tolerates current aromatase inhibitor therapy well without major intolerable side effects.  Plan is to complete 5 years of adjuvant endocrine therapy.   She is advised to continue annual screening mammogram, next due in 1/2025.   I recommended continue clinical surveillance with follow up clinical exam in 6 months.  She is advised to call me sooner with any concerns.    #. Bone health   I recommended her to continue calcium/vitamin D and weightbearing exercises for bone health.  Recommended DEXA scan in 8/2024.    The longitudinal plan of care for the diagnosis(es)/condition(s)  as documented were addressed during this visit. Due to the added complexity in care, I will continue to support Lisa in the subsequent management and with ongoing continuity of care.       Encounter Diagnoses:    Problem List Items Addressed This Visit          Oncology Diagnoses    Malignant neoplasm of lower-inner quadrant of right breast of female, estrogen receptor positive (H) - Primary       Other    Long term (current) use of aromatase inhibitors           CC: Cayla Skelton, NP   ______________________________________________________________________________  Diagnosis  6/24/2004- She had prior history of right breast DCIS    1/18/2022- screening mammogram detected     Invasive ductal carcinoma with focal micropapillary pattern.  Grade 2.  12 mm.     Margins were negative but close at 0.5 mm from the nearest junction of the inferior and medial margin, 0.5 mm from medial margin and 2 mm from inferior margin.     There is associated DCIS with EIC negative, nuclear grade intermediate, solid and focal cribriform pattern of 10%.  DCIS margins were uninvolved at 3 mm from nearest inferior margin, 5 mm from medial margins.     1 sentinel lymph node was negative for metastatic carcinoma.    ER positive, greater than 10% of the cell, 61-70%, LA positive (21-30%), HER-2 negative by IHC (1+).     Oncotype DX recurrence score 30/19% / >15%.    5/26/2022- genetic counseling and testing completed.  No pathogenic mutation found.    Treatment to date  6/2004- post right breast lumpectomy (Dr. Jones) on 6/24/2004, post adjuvant radiation (Dr. Caleb Elena) followed by adjuvant tamoxifen for 5 years (Dr. Noé Fernandes).    2/11/2022- right breast lumpectomy and right sentinel lymph node biopsy (Dr. Alcantara).   3/25/2022-5/31/2022- completed 4 cycles of adjuvant TC  7/14/202- discussed with Dr. Alcantara regarding clinical observation versus mastectomy.  She chose to observe.  Radiation therapy was omitted due to  "prior radiation.  7/20/2022- initiated letrozole    History of Present Illness    Ms. Lisa Reyna presented today unaccompanied for follow-up.    She does not have any concerns today.  She does not have headaches, or bone pain.  She takes letrozole regularly.  No intolerable side effects.  She also take calcium/vitamin D.  She reported her neuropathy is overall stable.    Review of systems  Apart from describing in HPI, the remainder of comprehensive ROS was negative.    Past History    Past Medical History:   Diagnosis Date    Arthritis     Asthma     Seaonal usually in spring    Breast cancer (H) 2004    rt lumpect    Coronary artery disease     Endometrial polyp 1992    was protruding through cervix    GERD (gastroesophageal reflux disease)     Heart attack (H)     Heart murmur     History of measles, mumps, or rubella as a child    hx of measles, mumps and [place]ian mealses. Rubella immune 5/1980    HTN (hypertension)     Hx of radiation therapy 2004    Hyperlipemia     Osteopenia     Other chronic pain     Ovarian cyst     Prediabetes     UTI (urinary tract infection)     Walking troubles        Past Surgical History:   Procedure Laterality Date    BIOPSY BREAST Left 2008    cyst removal    BIOPSY BREAST Right 2004    CERVICAL DISC SURGERY  6/25/2007    C6-7 sarah laminectomy with microdiskectomy    LUMPECTOMY BREAST Right 6/24/2004    LUMPECTOMY BREAST Right 2/11/2022    Procedure: Right Lumpectomy after Wire Localization; [place] Lymph Node Biopsy;  Surgeon: Mami Alcantara MD;  Location: [facility]    OOPHORECTOMY Bilateral 2011    TOTAL HIP ARTHROPLASTY Left 9/30/2009    TUBAL LIGATION  1/10/1992    with endometrial polyp removal    WRIST SURGERY         Physical Exam    BP (!) 150/81   Pulse 77   Resp 20   Ht 1.575 m (5' 2\")   Wt 86.2 kg (190 lb)   SpO2 100%   BMI 34.75 kg/m      General: alert, awake, not in acute distress  HEENT: Head: Normal, normocephalic, atraumatic.  Eye: Normal external " eye, conjunctiva, lids cornea, ALENA.  Pharynx: Normal buccal mucosa. Normal pharynx.  Neck / Thyroid: Supple, no masses, nodes, nodules or enlargement.  Lymphatics: No abnormally enlarged lymph nodes.  Chest: Normal chest wall and respirations. Clear to auscultation.  Breasts: No palpable abnormalities.  Heart: S1 S2 RRR.   Abdomen: abdomen is soft without significant tenderness, masses, organomegaly or guarding  Extremities: normal strength, tone, and muscle mass  Skin: normal. no rash or abnormalities  CNS: non focal.    Lab Results    No results found for this or any previous visit (from the past 168 hour(s)).    Imaging    MA Screen Bilateral w/Justen    Result Date: 1/31/2024  BILATERAL FULL FIELD DIGITAL SCREENING MAMMOGRAM WITH TOMOSYNTHESIS Performed on: 1/31/24 Compared to: 07/28/2023, 01/30/2023, 07/25/2022, 01/20/2022, 01/18/2022, 11/25/2020, 08/27/2019, and 06/20/2018 Technique:  This study was evaluated with the assistance of Computer-Aided Detection.  Breast Tomosynthesis was used in interpretation. Findings: The breasts are heterogeneously dense, which may obscure small masses.  There are breast conservation changes in the right breast., There are benign appearing calcifications in both breasts. There is no radiographic evidence of malignancy.     IMPRESSION: ACR BI-RADS Category 2: Benign BREAST CANCER SCREENING RECOMMENDATION: Routine yearly mammography beginning at age 40 or as discussed with your provider. The results and recommendations of this examination will be communicated to the patient. Susanna Ronquillo MD      30 minutes spent on the date of the encounter doing chart review, history and exam, documentation, communication of the treatment plan with the care team and further activities as noted above.    Signed by: Tamar Koch MD

## 2024-02-15 NOTE — PROGRESS NOTES
"Oncology Rooming Note    February 15, 2024 10:23 AM   Lisa Reyna is a 73 year old female who presents for:    Chief Complaint   Patient presents with    Oncology Clinic Visit     Malignant neoplasm of lower-inner quadrant of right breast of female, estrogen receptor positive (H)     Initial Vitals: BP (!) 150/81   Pulse 77   Resp 20   Ht 1.575 m (5' 2\")   Wt 86.2 kg (190 lb)   SpO2 100%   BMI 34.75 kg/m   Estimated body mass index is 34.75 kg/m  as calculated from the following:    Height as of this encounter: 1.575 m (5' 2\").    Weight as of this encounter: 86.2 kg (190 lb). Body surface area is 1.94 meters squared.  No Pain (0) Comment: Data Unavailable   No LMP recorded. Patient is postmenopausal.  Allergies reviewed: Yes  Medications reviewed: Yes    Medications: Medication refills not needed today.  Pharmacy name entered into [facility]: [address] [place] [address]    Frailty Screening:   Is the patient here for a new oncology consult visit in cancer care? 2. No      Clinical concerns: No concerns.       Erin Solorio MA            "

## 2024-02-15 NOTE — LETTER
"    2/15/2024         RE: Lisa Reyna  [address] [facility] [address]        Dear Colleague,    Thank you for referring your patient, Lisa Reyna, to the [facility]. Please see a copy of my visit note below.    Oncology Rooming Note    February 15, 2024 10:23 AM   Lisa Reyna is a 73 year old female who presents for:    Chief Complaint   Patient presents with     Oncology Clinic Visit     Malignant neoplasm of lower-inner quadrant of right breast of female, estrogen receptor positive (H)     Initial Vitals: BP (!) 150/81   Pulse 77   Resp 20   Ht 1.575 m (5' 2\")   Wt 86.2 kg (190 lb)   SpO2 100%   BMI 34.75 kg/m   Estimated body mass index is 34.75 kg/m  as calculated from the following:    Height as of this encounter: 1.575 m (5' 2\").    Weight as of this encounter: 86.2 kg (190 lb). Body surface area is 1.94 meters squared.  No Pain (0) Comment: Data Unavailable   No LMP recorded. Patient is postmenopausal.  Allergies reviewed: Yes  Medications reviewed: Yes    Medications: Medication refills not needed today.  Pharmacy name entered into [facility]: [facility], [place] [address] [place] [address] [address] [place] [address]    Frailty Screening:   Is the patient here for a new oncology consult visit in cancer care? 2. No      Clinical concerns: No concerns.       Erin Solorio MA              [facility] Hematology and Oncology Progress Note    Patient: Lisa Reyna  MRN: [id number]  Date of Service: Feb 15, 2024         Reason for Visit    Chief Complaint   Patient presents with     Oncology Clinic Visit     Malignant neoplasm of lower-inner quadrant of right breast of female, estrogen receptor positive (H)       Assessment and Plan     Cancer Staging   Malignant neoplasm of lower-inner quadrant of right breast of female, estrogen receptor positive (H)  Staging form: Breast, AJCC 8th Edition  - Pathologic stage from 2/11/2022: Stage IA (pT1c, pN0(sn), cM0, G2, ER+, " TN+, HER2-, Oncotype DX score: 30) - Signed by Tamar Koch MD on 3/13/2022  - Clinical: No stage assigned - Unsigned      ECOG Performance    0 - Independent     Pain  Pain Score: No Pain (0)    #.  History of invasive ductal carcinoma of LIQ of right breast, stage IA, ER+TN+HER2-. Oncotype 30/19%, >15%.  #.  Grade 1 neuropathy of both feet, exacerbated by chemotherapy on pre-existing neuropathy of both feet, improving with acupuncture.     She is clinically well without signs and symptoms suggestive for breast cancer recurrence.  Mammogram from 1/2024 was benign. She does not have any indication for systemic imaging.  I discussed that we will obtain imaging with clinical signs and symptoms concerning for breast cancer recurrence, but not routinely.   She tolerates current aromatase inhibitor therapy well without major intolerable side effects.  Plan is to complete 5 years of adjuvant endocrine therapy.   She is advised to continue annual screening mammogram, next due in 1/2025.   I recommended continue clinical surveillance with follow up clinical exam in 6 months.  She is advised to call me sooner with any concerns.    #. Bone health   I recommended her to continue calcium/vitamin D and weightbearing exercises for bone health.  Recommended DEXA scan in 8/2024.    The longitudinal plan of care for the diagnosis(es)/condition(s) as documented were addressed during this visit. Due to the added complexity in care, I will continue to support Lisa in the subsequent management and with ongoing continuity of care.       Encounter Diagnoses:    Problem List Items Addressed This Visit          Oncology Diagnoses    Malignant neoplasm of lower-inner quadrant of right breast of female, estrogen receptor positive (H) - Primary       Other    Long term (current) use of aromatase inhibitors           CC: Cayla Skelton, NP    ______________________________________________________________________________  Diagnosis  6/24/2004- She had prior history of right breast DCIS    1/18/2022- screening mammogram detected     Invasive ductal carcinoma with focal micropapillary pattern.  Grade 2.  12 mm.     Margins were negative but close at 0.5 mm from the nearest junction of the inferior and medial margin, 0.5 mm from medial margin and 2 mm from inferior margin.     There is associated DCIS with EIC negative, nuclear grade intermediate, solid and focal cribriform pattern of 10%.  DCIS margins were uninvolved at 3 mm from nearest inferior margin, 5 mm from medial margins.     1 sentinel lymph node was negative for metastatic carcinoma.    ER positive, greater than 10% of the cell, 61-70%, DE positive (21-30%), HER-2 negative by IHC (1+).     Oncotype DX recurrence score 30/19% / >15%.    5/26/2022- genetic counseling and testing completed.  No pathogenic mutation found.    Treatment to date  6/2004- post right breast lumpectomy (Dr. Jones) on 6/24/2004, post adjuvant radiation (Dr. Caleb Elena) followed by adjuvant tamoxifen for 5 years (Dr. Noé Fernandes).    2/11/2022- right breast lumpectomy and right sentinel lymph node biopsy (Dr. Alcantara).   3/25/2022-5/31/2022- completed 4 cycles of adjuvant TC  7/14/202- discussed with Dr. Alcantara regarding clinical observation versus mastectomy.  She chose to observe.  Radiation therapy was omitted due to prior radiation.  7/20/2022- initiated letrozole    History of Present Illness    Ms. Lisa Reyna presented today unaccompanied for follow-up.    She does not have any concerns today.  She does not have headaches, or bone pain.  She takes letrozole regularly.  No intolerable side effects.  She also take calcium/vitamin D.  She reported her neuropathy is overall stable.    Review of systems  Apart from describing in HPI, the remainder of comprehensive ROS was negative.    Past  "History    Past Medical History:   Diagnosis Date     Arthritis      Asthma     Seaonal usually in spring     Breast cancer (H) 2004    rt lumpect     Coronary artery disease      Endometrial polyp 1992    was protruding through cervix     GERD (gastroesophageal reflux disease)      Heart attack (H)      Heart murmur      History of measles, mumps, or rubella as a child    hx of measles, mumps and Turkish mealses. Rubella immune 5/1980     HTN (hypertension)      Hx of radiation therapy 2004     Hyperlipemia      Osteopenia      Other chronic pain      Ovarian cyst      Prediabetes      UTI (urinary tract infection)      Walking troubles        Past Surgical History:   Procedure Laterality Date     BIOPSY BREAST Left 2008    cyst removal     BIOPSY BREAST Right 2004     CERVICAL DISC SURGERY  6/25/2007    C6-7 sarah laminectomy with microdiskectomy     LUMPECTOMY BREAST Right 6/24/2004     LUMPECTOMY BREAST Right 2/11/2022    Procedure: Right Lumpectomy after Wire Localization; [place] Lymph Node Biopsy;  Surgeon: Mami Alcantara MD;  Location: [facility]     OOPHORECTOMY Bilateral 2011     TOTAL HIP ARTHROPLASTY Left 9/30/2009     TUBAL LIGATION  1/10/1992    with endometrial polyp removal     WRIST SURGERY         Physical Exam    BP (!) 150/81   Pulse 77   Resp 20   Ht 1.575 m (5' 2\")   Wt 86.2 kg (190 lb)   SpO2 100%   BMI 34.75 kg/m      General: alert, awake, not in acute distress  HEENT: Head: Normal, normocephalic, atraumatic.  Eye: Normal external eye, conjunctiva, lids cornea, ALENA.  Pharynx: Normal buccal mucosa. Normal pharynx.  Neck / Thyroid: Supple, no masses, nodes, nodules or enlargement.  Lymphatics: No abnormally enlarged lymph nodes.  Chest: Normal chest wall and respirations. Clear to auscultation.  Breasts: No palpable abnormalities.  Heart: S1 S2 RRR.   Abdomen: abdomen is soft without significant tenderness, masses, organomegaly or guarding  Extremities: normal strength, tone, and " muscle mass  Skin: normal. no rash or abnormalities  CNS: non focal.    Lab Results    No results found for this or any previous visit (from the past 168 hour(s)).    Imaging    MA Screen Bilateral w/Justen    Result Date: 1/31/2024  BILATERAL FULL FIELD DIGITAL SCREENING MAMMOGRAM WITH TOMOSYNTHESIS Performed on: 1/31/24 Compared to: 07/28/2023, 01/30/2023, 07/25/2022, 01/20/2022, 01/18/2022, 11/25/2020, 08/27/2019, and 06/20/2018 Technique:  This study was evaluated with the assistance of Computer-Aided Detection.  Breast Tomosynthesis was used in interpretation. Findings: The breasts are heterogeneously dense, which may obscure small masses.  There are breast conservation changes in the right breast., There are benign appearing calcifications in both breasts. There is no radiographic evidence of malignancy.     IMPRESSION: ACR BI-RADS Category 2: Benign BREAST CANCER SCREENING RECOMMENDATION: Routine yearly mammography beginning at age 40 or as discussed with your provider. The results and recommendations of this examination will be communicated to the patient. Susanna Ronquillo MD      30 minutes spent on the date of the encounter doing chart review, history and exam, documentation, communication of the treatment plan with the care team and further activities as noted above.    Signed by: Tamar Koch MD      Again, thank you for allowing me to participate in the care of your patient.        Sincerely,        Tamar Koch MD

## 2024-02-15 NOTE — PROGRESS NOTES
[facility]: Cancer Care Follow-Up Note                                    Discussion with Patient:                                                      Checked in with patient while here for follow-up with Dr. Koch.    See below.    Dates of Treatment:                                                      Infusion given in last 28 days       None          Letrozole daily.    Assessment:                                                      Patient reports Neuropathy in bilat feet.  She said it has gotten a little worse since [facility] discontinued acupuncture services here.  She is looking for new place to go to.  She uses cane with ambulation.    Patient had questions on frequency of mammograms and will discuss with Dr. Koch at her visit today.    Intervention/Education provided during outreach:                                                       Patient denies needs from [facility] today.    Patient to follow up as scheduled at next appt  Confirmed patient has clinic and triage numbers    Signature:  Anais Galindo RN

## 2024-02-26 ENCOUNTER — OFFICE VISIT (OUTPATIENT)
Dept: CARDIOLOGY | Facility: CLINIC | Age: 74
End: 2024-02-26
Attending: INTERNAL MEDICINE
Payer: COMMERCIAL

## 2024-02-26 VITALS
WEIGHT: 195 LBS | RESPIRATION RATE: 14 BRPM | BODY MASS INDEX: 35.67 KG/M2 | HEART RATE: 84 BPM | SYSTOLIC BLOOD PRESSURE: 144 MMHG | DIASTOLIC BLOOD PRESSURE: 58 MMHG

## 2024-02-26 DIAGNOSIS — I10 ESSENTIAL HYPERTENSION: ICD-10-CM

## 2024-02-26 DIAGNOSIS — E66.812 CLASS 2 SEVERE OBESITY DUE TO EXCESS CALORIES WITH SERIOUS COMORBIDITY AND BODY MASS INDEX (BMI) OF 35.0 TO 35.9 IN ADULT (H): ICD-10-CM

## 2024-02-26 DIAGNOSIS — I25.10 CORONARY ARTERY DISEASE INVOLVING NATIVE CORONARY ARTERY OF NATIVE HEART WITHOUT ANGINA PECTORIS: Primary | ICD-10-CM

## 2024-02-26 DIAGNOSIS — N18.31 STAGE 3A CHRONIC KIDNEY DISEASE (H): ICD-10-CM

## 2024-02-26 DIAGNOSIS — E66.01 CLASS 2 SEVERE OBESITY DUE TO EXCESS CALORIES WITH SERIOUS COMORBIDITY AND BODY MASS INDEX (BMI) OF 35.0 TO 35.9 IN ADULT (H): ICD-10-CM

## 2024-02-26 DIAGNOSIS — E78.2 MIXED HYPERLIPIDEMIA: ICD-10-CM

## 2024-02-26 PROCEDURE — 99214 OFFICE O/P EST MOD 30 MIN: CPT | Performed by: INTERNAL MEDICINE

## 2024-02-26 RX ORDER — NITROGLYCERIN 0.4 MG/1
0.4 TABLET SUBLINGUAL EVERY 5 MIN PRN
Qty: 30 TABLET | Refills: 2 | Status: SHIPPED | OUTPATIENT
Start: 2024-02-26

## 2024-02-26 RX ORDER — LOSARTAN POTASSIUM 50 MG/1
75 TABLET ORAL DAILY
Qty: 135 TABLET | Refills: 3 | Status: SHIPPED | OUTPATIENT
Start: 2024-02-26

## 2024-02-26 NOTE — LETTER
2/26/2024    Cayla Skelton, NP  [address] [place] [address]    RE: Lisa Reyna       Dear Colleague,     I had the pleasure of seeing Lisa Reyna in the [facility].            Assessment/Plan:   1. Coronary artery disease, non-ST elevation MI.  She has no chest pain or shortness of breath.  Stress cardiac MRI in 2022 was reported negative for inducible myocardial ischemia, normal LV systolic function, no myocardial scar identified.  No obvious valvular disease.  Continue aspirin, metoprolol, losartan, and Zocor.      2. Essential hypertension. Her blood pressure is mildly elevated.  Continue metoprolol XL 50 mg daily, increase losartan from 50 mg to 75 mg daily for better blood pressure control.  Continue hydrochlorothiazide 12.5 mg daily.   She will continue to monitor her blood pressure.  The target of her blood pressure less than 130/80 mmHg.     3.  Dyslipidemia.  Her recent laboratory test was reported normal liver function, LDL 61.  The patient has no muscle aching.  Continue Zocor.    4.  Stage III CKD: Her creatinine level 1.15, stable.       5.  Obesity: Continue lifestyle modification.    Thank you for the opportunity to be involved in the care of Lisa Reyna. If you have any questions, please feel free to contact me.  I will see the patient again in 12 months and as needed.    Much or all of the text in this note was generated through the use of Dragon Dictate voice-to-text software. Errors in spelling or words which seem out of context are unintentional. Sound alike errors, in particular, may have escaped editing.       History of Present Illness:   It is my pleasure to see Lisa Reyna at the [facility] clinic for routine cardiology follow up.  Lisa Reyna is a 73 year old female with a medical history of coronary artery disease, mild to moderate disease in the LAD per coronary angiogram in 7-2014, essential hypertension, dyslipidemia, obesity,  breast cancer s/p chemotherapy and obesity.      Lisa states that she has been doing pretty good over last a year.  She denies chest pain, shortness of breath on exertion, palpitations, dizziness, orthopnea, PND or leg edema.  Her blood pressure is mildly elevated.  Her heart rate is controlled.  LDL was well-controlled.  She had no side effects from current medications.      Past Medical History:     Patient Active Problem List   Diagnosis    Chest pain    Acute myocardial infarction (H)    Hypertension    Hyperlipidemia    GERD (gastroesophageal reflux disease)    Angina pectoris (H24)    Coronary artery disease    Obesity    Osteoarthritis of knee    Acute abdominal pain    Enteritis    Essential hypertension    Malignant neoplasm of lower-inner quadrant of right breast of female, estrogen receptor positive (H)    Chemotherapy-induced peripheral neuropathy  (H24)    Long term (current) use of aromatase inhibitors       Past Surgical History:     Past Surgical History:   Procedure Laterality Date    BIOPSY BREAST Left 2008    cyst removal    BIOPSY BREAST Right 2004    CERVICAL DISC SURGERY  6/25/2007    C6-7 sarah laminectomy with microdiskectomy    LUMPECTOMY BREAST Right 6/24/2004    LUMPECTOMY BREAST Right 2/11/2022    Procedure: Right Lumpectomy after Wire Localization; [place] Lymph Node Biopsy;  Surgeon: Mami Alcantara MD;  Location: [facility]    OOPHORECTOMY Bilateral 2011    TOTAL HIP ARTHROPLASTY Left 9/30/2009    TUBAL LIGATION  1/10/1992    with endometrial polyp removal    WRIST SURGERY         Family History:     Family History   Problem Relation Age of Onset    Colon Cancer Maternal Grandfather     Cancer Paternal Grandmother     Throat cancer Paternal Grandfather     Breast Cancer No family hx of         Social History:    reports that she has never smoked. She has never used smokeless tobacco. She reports current alcohol use of about 5.0 standard drinks of alcohol per week. She reports  that she does not use drugs.    Review of Systems:   12 systems are reviewed negative except for in HPI.    Meds:     Current Outpatient Medications:     Apoaequorin (PREVAGEN) 10 MG CAPS, Take 10 mg by mouth daily, Disp: , Rfl:     ascorbic acid (VITAMIN C) 1000 MG tablet, [ASCORBIC ACID (VITAMIN C) 1000 MG TABLET] Take 1,000 mg by mouth as needed. , Disp: , Rfl:     aspirin 81 MG EC tablet, [ASPIRIN 81 MG EC TABLET] Take 81 mg by mouth daily., Disp: , Rfl:     calcium carbonate (OS-TAMI) 600 mg (1,500 mg) tablet, [CALCIUM CARBONATE (OS-TAMI) 600 MG (1,500 MG) TABLET] Take 600 mg by mouth 2 (two) times a day., Disp: , Rfl:     cholecalciferol, vitamin D3, (VITAMIN D3) 1,000 unit capsule, [CHOLECALCIFEROL, VITAMIN D3, (VITAMIN D3) 1,000 UNIT CAPSULE] Take 1,000 Units by mouth daily., Disp: , Rfl:     famotidine (PEPCID AC MAXIMUM STRENGTH) 20 MG tablet, [FAMOTIDINE (PEPCID AC MAXIMUM STRENGTH) 20 MG TABLET] Take 20 mg by mouth daily., Disp: , Rfl:     hydroCHLOROthiazide (HYDRODIURIL) 12.5 MG tablet, [HYDROCHLOROTHIAZIDE (HYDRODIURIL) 12.5 MG TABLET] Take 12.5 mg by mouth daily., Disp: , Rfl: 0    letrozole (FEMARA) 2.5 MG tablet, Take 1 tablet (2.5 mg) by mouth daily, Disp: 90 tablet, Rfl: 3    loratadine (CLARITIN) 10 mg tablet, [LORATADINE (CLARITIN) 10 MG TABLET] Take 10 mg by mouth daily as needed for allergies., Disp: , Rfl:     losartan (COZAAR) 50 MG tablet, Take 1.5 tablets (75 mg) by mouth daily, Disp: 135 tablet, Rfl: 3    metoprolol succinate ER (TOPROL XL) 50 MG 24 hr tablet, Take 1 tablet (50 mg) by mouth daily, Disp: 90 tablet, Rfl: 3    nitroGLYcerin (NITROSTAT) 0.4 MG sublingual tablet, Place 1 tablet (0.4 mg) under the tongue every 5 minutes as needed for chest pain For chest pain place 1 tablet under the tongue every 5 minutes for 3 doses. If symptoms persist 5 minutes after 1st dose call 911., Disp: 30 tablet, Rfl: 2    POLYETHYLENE GLYCOL 3350 (MIRALAX ORAL), Take 1 packet by mouth daily as  needed, Disp: , Rfl:     simvastatin (ZOCOR) 80 MG tablet, [SIMVASTATIN (ZOCOR) 80 MG TABLET] Take 80 mg by mouth daily., Disp: , Rfl:     Allergies:   Vicodin es [hydrocodone-acetaminophen], Amlodipine, Bactrim [sulfamethoxazole-trimethoprim], Lisinopril, Morphine, Percocet [oxycodone-acetaminophen], Pollen extract-tree extract [pollen extract], and Sulfamethoxazole-trimethoprim      Objective:      Physical Exam  88.5 kg (195 lb)     Body mass index is 35.67 kg/m .  BP (!) 144/58 (BP Location: Right arm, Patient Position: Sitting, Cuff Size: Adult Large)   Pulse 84   Resp 14   Wt 88.5 kg (195 lb)   BMI 35.67 kg/m      General Appearance:   Awake, Alert, No acute distress.   HEENT:  Pupil equal and reactive to light. No scleral icterus; the mucous membranes were moist.   Neck: No cervical bruits. No JVD. No thyromegaly.     Chest: The spine was straight. The chest was symmetric.   Lungs:   Respirations unlabored; Lungs are clear to auscultation. No crackles. No wheezing.   Cardiovascular:   Regular rhythm and rate, normal first and second heart sounds with no murmurs. No rubs or gallops.    Abdomen:  Obese. Soft. No tenderness. Non-distended. Bowels sounds are present   Extremities: Equal tibial pulses. No leg edema.   Skin: No rashes or ulcers. Warm, Dry.   Musculoskeletal: No tenderness. No deformity.   Neurologic: Mood and affect are appropriate. No focal deficits.         EKG: Personally reviewed  Sinus rhythm with 1st degree A-V block   Otherwise normal ECG   When compared with ECG of 31-AUG-2022 11:43,   No significant change was found     Cardiac Imaging Studies  1.  Pharmacological Regadenoson stress cardiac MRI is negative for inducible myocardial ischemia.   2.  Pharmacological stress ECG is negative for inducible myocardial ischemia.   3. Normal left ventricular size, wall thickness and systolic function. The quantified left ventricular  ejection fraction is 71%.  No myocardial scar is identified.   "  4.  Normal right ventricular size and systolic function.    5.  Aortic valve is trileaflet. There is no aortic regurgitation. There is a trivial aortic stenosis.    Lab Review   Lab Results   Component Value Date     01/31/2024    CO2 25 01/31/2024    CO2 26 10/18/2022    BUN 17.8 01/31/2024    BUN 20 10/18/2022     Lab Results   Component Value Date    WBC 4.9 10/18/2022    HGB 13.2 10/18/2022    HCT 41.3 10/18/2022    MCV 95 10/18/2022     10/18/2022     Lab Results   Component Value Date    CHOL 146 01/31/2024    CHOL 167 05/24/2023    CHOL 169 09/27/2022     Lab Results   Component Value Date    HDL 53 01/31/2024    HDL 68 05/24/2023    HDL 61 09/27/2022     No components found for: \"LDLCALC\"  Lab Results   Component Value Date    TRIG 158 (H) 01/31/2024    TRIG 84 05/24/2023    TRIG 87 09/27/2022     No results found for: \"CHOLHDL\"  Lab Results   Component Value Date    TROPONINI <0.01 09/13/2022     Lab Results   Component Value Date    BNP 13 09/13/2022                 Thank you for allowing me to participate in the care of your patient.      Sincerely,     Latoya Fernandez MD     [facility]  cc:   Latoya Fernandez MD  [address] [facility] [address]  [place] [address]      "

## 2024-02-26 NOTE — PROGRESS NOTES
Assessment/Plan:   1. Coronary artery disease, non-ST elevation MI.  She has no chest pain or shortness of breath.  Stress cardiac MRI in 2022 was reported negative for inducible myocardial ischemia, normal LV systolic function, no myocardial scar identified.  No obvious valvular disease.  Continue aspirin, metoprolol, losartan, and Zocor.      2. Essential hypertension. Her blood pressure is mildly elevated.  Continue metoprolol XL 50 mg daily, increase losartan from 50 mg to 75 mg daily for better blood pressure control.  Continue hydrochlorothiazide 12.5 mg daily.   She will continue to monitor her blood pressure.  The target of her blood pressure less than 130/80 mmHg.     3.  Dyslipidemia.  Her recent laboratory test was reported normal liver function, LDL 61.  The patient has no muscle aching.  Continue Zocor.    4.  Stage III CKD: Her creatinine level 1.15, stable.       5.  Obesity: Continue lifestyle modification.    Thank you for the opportunity to be involved in the care of Lisa Reyna. If you have any questions, please feel free to contact me.  I will see the patient again in 12 months and as needed.    Much or all of the text in this note was generated through the use of Dragon Dictate voice-to-text software. Errors in spelling or words which seem out of context are unintentional. Sound alike errors, in particular, may have escaped editing.       History of Present Illness:   It is my pleasure to see Lisa Reyna at the [facility] for routine cardiology follow up.  Lisa Reyna is a 73 year old female with a medical history of coronary artery disease, mild to moderate disease in the LAD per coronary angiogram in 7-2014, essential hypertension, dyslipidemia, obesity, breast cancer s/p chemotherapy and obesity.      Lisa states that she has been doing pretty good over last a year.  She denies chest pain, shortness of breath on exertion, palpitations, dizziness,  orthopnea, PND or leg edema.  Her blood pressure is mildly elevated.  Her heart rate is controlled.  LDL was well-controlled.  She had no side effects from current medications.      Past Medical History:     Patient Active Problem List   Diagnosis    Chest pain    Acute myocardial infarction (H)    Hypertension    Hyperlipidemia    GERD (gastroesophageal reflux disease)    Angina pectoris (H24)    Coronary artery disease    Obesity    Osteoarthritis of knee    Acute abdominal pain    Enteritis    Essential hypertension    Malignant neoplasm of lower-inner quadrant of right breast of female, estrogen receptor positive (H)    Chemotherapy-induced peripheral neuropathy  (H24)    Long term (current) use of aromatase inhibitors       Past Surgical History:     Past Surgical History:   Procedure Laterality Date    BIOPSY BREAST Left 2008    cyst removal    BIOPSY BREAST Right 2004    CERVICAL DISC SURGERY  6/25/2007    C6-7 sarah laminectomy with microdiskectomy    LUMPECTOMY BREAST Right 6/24/2004    LUMPECTOMY BREAST Right 2/11/2022    Procedure: Right Lumpectomy after Wire Localization; [place] Lymph Node Biopsy;  Surgeon: Mami Alcantara MD;  Location: [place] [facility]    OOPHORECTOMY Bilateral 2011    TOTAL HIP ARTHROPLASTY Left 9/30/2009    TUBAL LIGATION  1/10/1992    with endometrial polyp removal    WRIST SURGERY         Family History:     Family History   Problem Relation Age of Onset    Colon Cancer Maternal Grandfather     Cancer Paternal Grandmother     Throat cancer Paternal Grandfather     Breast Cancer No family hx of         Social History:    reports that she has never smoked. She has never used smokeless tobacco. She reports current alcohol use of about 5.0 standard drinks of alcohol per week. She reports that she does not use drugs.    Review of Systems:   12 systems are reviewed negative except for in HPI.    Meds:     Current Outpatient Medications:     Apoaequorin (PREVAGEN) 10 MG CAPS, Take 10 mg  by mouth daily, Disp: , Rfl:     ascorbic acid (VITAMIN C) 1000 MG tablet, [ASCORBIC ACID (VITAMIN C) 1000 MG TABLET] Take 1,000 mg by mouth as needed. , Disp: , Rfl:     aspirin 81 MG EC tablet, [ASPIRIN 81 MG EC TABLET] Take 81 mg by mouth daily., Disp: , Rfl:     calcium carbonate (OS-TAMI) 600 mg (1,500 mg) tablet, [CALCIUM CARBONATE (OS-TAMI) 600 MG (1,500 MG) TABLET] Take 600 mg by mouth 2 (two) times a day., Disp: , Rfl:     cholecalciferol, vitamin D3, (VITAMIN D3) 1,000 unit capsule, [CHOLECALCIFEROL, VITAMIN D3, (VITAMIN D3) 1,000 UNIT CAPSULE] Take 1,000 Units by mouth daily., Disp: , Rfl:     famotidine (PEPCID AC MAXIMUM STRENGTH) 20 MG tablet, [FAMOTIDINE (PEPCID AC MAXIMUM STRENGTH) 20 MG TABLET] Take 20 mg by mouth daily., Disp: , Rfl:     hydroCHLOROthiazide (HYDRODIURIL) 12.5 MG tablet, [HYDROCHLOROTHIAZIDE (HYDRODIURIL) 12.5 MG TABLET] Take 12.5 mg by mouth daily., Disp: , Rfl: 0    letrozole (FEMARA) 2.5 MG tablet, Take 1 tablet (2.5 mg) by mouth daily, Disp: 90 tablet, Rfl: 3    loratadine (CLARITIN) 10 mg tablet, [LORATADINE (CLARITIN) 10 MG TABLET] Take 10 mg by mouth daily as needed for allergies., Disp: , Rfl:     losartan (COZAAR) 50 MG tablet, Take 1.5 tablets (75 mg) by mouth daily, Disp: 135 tablet, Rfl: 3    metoprolol succinate ER (TOPROL XL) 50 MG 24 hr tablet, Take 1 tablet (50 mg) by mouth daily, Disp: 90 tablet, Rfl: 3    nitroGLYcerin (NITROSTAT) 0.4 MG sublingual tablet, Place 1 tablet (0.4 mg) under the tongue every 5 minutes as needed for chest pain For chest pain place 1 tablet under the tongue every 5 minutes for 3 doses. If symptoms persist 5 minutes after 1st dose call 911., Disp: 30 tablet, Rfl: 2    POLYETHYLENE GLYCOL 3350 (MIRALAX ORAL), Take 1 packet by mouth daily as needed, Disp: , Rfl:     simvastatin (ZOCOR) 80 MG tablet, [SIMVASTATIN (ZOCOR) 80 MG TABLET] Take 80 mg by mouth daily., Disp: , Rfl:     Allergies:   Vicodin es [hydrocodone-acetaminophen], Amlodipine,  Bactrim [sulfamethoxazole-trimethoprim], Lisinopril, Morphine, Percocet [oxycodone-acetaminophen], Pollen extract-tree extract [pollen extract], and Sulfamethoxazole-trimethoprim      Objective:      Physical Exam  88.5 kg (195 lb)     Body mass index is 35.67 kg/m .  BP (!) 144/58 (BP Location: Right arm, Patient Position: Sitting, Cuff Size: Adult Large)   Pulse 84   Resp 14   Wt 88.5 kg (195 lb)   BMI 35.67 kg/m      General Appearance:   Awake, Alert, No acute distress.   HEENT:  Pupil equal and reactive to light. No scleral icterus; the mucous membranes were moist.   Neck: No cervical bruits. No JVD. No thyromegaly.     Chest: The spine was straight. The chest was symmetric.   Lungs:   Respirations unlabored; Lungs are clear to auscultation. No crackles. No wheezing.   Cardiovascular:   Regular rhythm and rate, normal first and second heart sounds with no murmurs. No rubs or gallops.    Abdomen:  Obese. Soft. No tenderness. Non-distended. Bowels sounds are present   Extremities: Equal tibial pulses. No leg edema.   Skin: No rashes or ulcers. Warm, Dry.   Musculoskeletal: No tenderness. No deformity.   Neurologic: Mood and affect are appropriate. No focal deficits.         EKG: Personally reviewed  Sinus rhythm with 1st degree A-V block   Otherwise normal ECG   When compared with ECG of 31-AUG-2022 11:43,   No significant change was found     Cardiac Imaging Studies  1.  Pharmacological Regadenoson stress cardiac MRI is negative for inducible myocardial ischemia.   2.  Pharmacological stress ECG is negative for inducible myocardial ischemia.   3. Normal left ventricular size, wall thickness and systolic function. The quantified left ventricular  ejection fraction is 71%.  No myocardial scar is identified.    4.  Normal right ventricular size and systolic function.    5.  Aortic valve is trileaflet. There is no aortic regurgitation. There is a trivial aortic stenosis.    Lab Review   Lab Results   Component  "Value Date     01/31/2024    CO2 25 01/31/2024    CO2 26 10/18/2022    BUN 17.8 01/31/2024    BUN 20 10/18/2022     Lab Results   Component Value Date    WBC 4.9 10/18/2022    HGB 13.2 10/18/2022    HCT 41.3 10/18/2022    MCV 95 10/18/2022     10/18/2022     Lab Results   Component Value Date    CHOL 146 01/31/2024    CHOL 167 05/24/2023    CHOL 169 09/27/2022     Lab Results   Component Value Date    HDL 53 01/31/2024    HDL 68 05/24/2023    HDL 61 09/27/2022     No components found for: \"LDLCALC\"  Lab Results   Component Value Date    TRIG 158 (H) 01/31/2024    TRIG 84 05/24/2023    TRIG 87 09/27/2022     No results found for: \"CHOLHDL\"  Lab Results   Component Value Date    TROPONINI <0.01 09/13/2022     Lab Results   Component Value Date    BNP 13 09/13/2022             "

## 2024-05-20 ENCOUNTER — TELEPHONE (OUTPATIENT)
Dept: ONCOLOGY | Facility: HOSPITAL | Age: 74
End: 2024-05-20
Payer: COMMERCIAL

## 2024-05-20 NOTE — TELEPHONE ENCOUNTER
Patient called and LM, asking about mammogram and if needed with six month follow-up visit, on recall list for August, 24.    She is clinically well without signs and symptoms suggestive for breast cancer recurrence.  Mammogram from 1/2024 was benign. She does not have any indication for systemic imaging.  I discussed that we will obtain imaging with clinical signs and symptoms concerning for breast cancer recurrence, but not routinely.     The patient was contacted and is aware of plan and verbalizes agreement. She will call first part of July for appointment if she is not contacted. Elizabeth Ho RN

## 2024-05-29 ENCOUNTER — LAB REQUISITION (OUTPATIENT)
Dept: LAB | Facility: CLINIC | Age: 74
End: 2024-05-29
Payer: COMMERCIAL

## 2024-05-29 DIAGNOSIS — E11.22 TYPE 2 DIABETES MELLITUS WITH DIABETIC CHRONIC KIDNEY DISEASE (H): ICD-10-CM

## 2024-05-29 PROCEDURE — 82043 UR ALBUMIN QUANTITATIVE: CPT | Mod: ORL | Performed by: NURSE PRACTITIONER

## 2024-05-29 PROCEDURE — 80048 BASIC METABOLIC PNL TOTAL CA: CPT | Mod: ORL | Performed by: NURSE PRACTITIONER

## 2024-05-30 LAB
ANION GAP SERPL CALCULATED.3IONS-SCNC: 13 MMOL/L (ref 7–15)
BUN SERPL-MCNC: 15.2 MG/DL (ref 8–23)
CALCIUM SERPL-MCNC: 9.5 MG/DL (ref 8.8–10.2)
CHLORIDE SERPL-SCNC: 98 MMOL/L (ref 98–107)
CREAT SERPL-MCNC: 1.1 MG/DL (ref 0.51–0.95)
CREAT UR-MCNC: 59.7 MG/DL
DEPRECATED HCO3 PLAS-SCNC: 25 MMOL/L (ref 22–29)
EGFRCR SERPLBLD CKD-EPI 2021: 52 ML/MIN/1.73M2
GLUCOSE SERPL-MCNC: 109 MG/DL (ref 70–99)
MICROALBUMIN UR-MCNC: <12 MG/L
MICROALBUMIN/CREAT UR: NORMAL MG/G{CREAT}
POTASSIUM SERPL-SCNC: 3.9 MMOL/L (ref 3.4–5.3)
SODIUM SERPL-SCNC: 136 MMOL/L (ref 135–145)

## 2024-08-14 NOTE — PROGRESS NOTES
[facility] Hematology and Oncology Outpatient Progress Note    Patient: Lisa Reyna  MRN: [id number]  Date of Service: Aug 15, 2024          Reason for Visit    Chief Complaint   Patient presents with    Oncology Clinic Visit     Returning patient consult: Malignant neoplasm of lower-inner quadrant of right breast of female, estrogen receptor positive 6 months follow up.        Cancer Staging   Malignant neoplasm of lower-inner quadrant of right breast of female, estrogen receptor positive (H)  Staging form: Breast, AJCC 8th Edition  - Pathologic stage from 2/11/2022: Stage IA (pT1c, pN0(sn), cM0, G2, ER+, RI+, HER2-, Oncotype DX score: 30) - Signed by Tamar Koch MD on 3/13/2022  - Clinical: No stage assigned - Unsigned      Primary Hematologist/Oncologist: Dr. Tamar Koch        Assessment/Plan  #.  History of invasive ductal carcinoma of LIQ of right breast, stage IA, ER+RI+HER2-. Oncotype 30/19%, >15%.  #.  Grade 1 neuropathy of both feet, exacerbated by chemotherapy on pre-existing neuropathy of both feet, improving with acupuncture.  She is clinically stable without signs or symptoms suggesting breast cancer recurrence on exam.  She continues on letrozole and tolerates this well.  We are planning to continue this for 5 years, through July 2027.  She is advised to continue annual screening mammogram, next due in 1/2025.  Continue clinical surveillance with follow up clinical exam in 6 months.  She is advised to call me sooner with any concerns.    #. Bone health   I recommended her to continue calcium/vitamin D and weightbearing exercises for bone health.  Recommended DEXA scan in 8/2026 -of note she is going to be seeing her primary care doctor who will be ordering and managing.    ______________________________________________________________________________    History of Present Illness/ Interval History    Ms. Lisa Reyna  is a 73 year old female patient who returns in follow-up.  She  reports that she has been feeling well, denies any new health concerns today.  She has no breast concerns.  She endorses daily use of letrozole, with good toleration.  She maintains a good appetite, and denies any concerns with bowel or bladder.  No chest pain, no shortness of breath.      ECOG performance status   0- Fully active, without restriction      Pain  Pain Score: No Pain (0)    ROS  A 14 point review of systems was obtained.  Positive findings noted in the history.  The remainder of the review of system is otherwise negative.      Oncology History/Treatment  Diagnosis  6/24/2004- She had prior history of right breast DCIS    1/18/2022- screening mammogram detected     Invasive ductal carcinoma with focal micropapillary pattern.  Grade 2.  12 mm.     Margins were negative but close at 0.5 mm from the nearest junction of the inferior and medial margin, 0.5 mm from medial margin and 2 mm from inferior margin.     There is associated DCIS with EIC negative, nuclear grade intermediate, solid and focal cribriform pattern of 10%.  DCIS margins were uninvolved at 3 mm from nearest inferior margin, 5 mm from medial margins.     1 sentinel lymph node was negative for metastatic carcinoma.    ER positive, greater than 10% of the cell, 61-70%, AZ positive (21-30%), HER-2 negative by IHC (1+).     Oncotype DX recurrence score 30/19% / >15%.    5/26/2022- genetic counseling and testing completed.  No pathogenic mutation found.    Treatment to date  6/2004- post right breast lumpectomy (Dr. Jones) on 6/24/2004, post adjuvant radiation (Dr. Caleb Elena) followed by adjuvant tamoxifen for 5 years (Dr. Noé Fernandes).    2/11/2022- right breast lumpectomy and right sentinel lymph node biopsy (Dr. Alcantara).   3/25/2022-5/31/2022- completed 4 cycles of adjuvant TC  7/14/202- discussed with Dr. Alcantara regarding clinical observation versus mastectomy.  She chose to observe.  Radiation therapy was omitted due to  "prior radiation.  7/20/2022- initiated letrozole      Physical Exam    /82   Pulse 75   Temp 97.9  F (36.6  C) (Tympanic)   Resp 18   Ht 1.575 m (5' 2\")   Wt 88 kg (194 lb)   SpO2 98%   BMI 35.48 kg/m      General: Well-appearing female in no acute distress. Cooperative in conversation.  Eyes: EOMI, PERRL. No scleral icterus.  ENT: Oral mucosa is moist without lesions or thrush. No ulcerations or mucositis noted.  Lymphatic: Neck is supple without cervical, axillary, or supraclavicular lymphadenopathy.   Breasts: Left breast normal without suspicious masses, skin changes or axillary nodes.  Right breast, with deformity from previous surgical incision and scarring.  No overt abnormalities palpable.  Cardiovascular: RRR No murmurs, gallops, or rubs. No peripheral edema.  Respiratory: CTA bilaterally. No wheezes or crackles.  Gastrointestinal: BS +. Abdomen soft, non-tender. No palpable hepatosplenomegaly or masses.   Neurologic: Alert and oriented x3. Cranial nerves II through XII are grossly intact.  Skin: No rashes, petechiae, or bruising noted. Warm, dry, intact.      Lab Results    No results found for this or any previous visit (from the past 168 hour(s)).  I reviewed the above labs today.  Imaging    No results found.      Billing  Total time 38 minutes, to include face to face visit, review of EMR, ordering, documentation and coordination of care on date of service   complexity modifier for longitudinal care.     Signed by: GONZALO Wong CNP    Chart documentation with Dragon Voice recognition Software. Although reviewed after completion, some words and grammatical errors may remain.  "

## 2024-08-15 ENCOUNTER — ONCOLOGY VISIT (OUTPATIENT)
Dept: ONCOLOGY | Facility: HOSPITAL | Age: 74
End: 2024-08-15
Attending: INTERNAL MEDICINE
Payer: COMMERCIAL

## 2024-08-15 VITALS
BODY MASS INDEX: 35.7 KG/M2 | TEMPERATURE: 97.9 F | SYSTOLIC BLOOD PRESSURE: 134 MMHG | DIASTOLIC BLOOD PRESSURE: 82 MMHG | WEIGHT: 194 LBS | HEART RATE: 75 BPM | OXYGEN SATURATION: 98 % | RESPIRATION RATE: 18 BRPM | HEIGHT: 62 IN

## 2024-08-15 DIAGNOSIS — Z17.0 MALIGNANT NEOPLASM OF LOWER-INNER QUADRANT OF RIGHT BREAST OF FEMALE, ESTROGEN RECEPTOR POSITIVE (H): Primary | ICD-10-CM

## 2024-08-15 DIAGNOSIS — G62.0 CHEMOTHERAPY-INDUCED PERIPHERAL NEUROPATHY (H): ICD-10-CM

## 2024-08-15 DIAGNOSIS — Z79.811 LONG TERM (CURRENT) USE OF AROMATASE INHIBITORS: ICD-10-CM

## 2024-08-15 DIAGNOSIS — C50.311 MALIGNANT NEOPLASM OF LOWER-INNER QUADRANT OF RIGHT BREAST OF FEMALE, ESTROGEN RECEPTOR POSITIVE (H): Primary | ICD-10-CM

## 2024-08-15 DIAGNOSIS — T45.1X5A CHEMOTHERAPY-INDUCED PERIPHERAL NEUROPATHY (H): ICD-10-CM

## 2024-08-15 DIAGNOSIS — Z12.31 ENCOUNTER FOR SCREENING MAMMOGRAM FOR BREAST CANCER: ICD-10-CM

## 2024-08-15 PROCEDURE — G0463 HOSPITAL OUTPT CLINIC VISIT: HCPCS

## 2024-08-15 PROCEDURE — 99214 OFFICE O/P EST MOD 30 MIN: CPT

## 2024-08-15 PROCEDURE — G2211 COMPLEX E/M VISIT ADD ON: HCPCS

## 2024-08-15 RX ORDER — SENNOSIDES A AND B 8.6 MG/1
1 TABLET, FILM COATED ORAL PRN
COMMUNITY

## 2024-08-15 RX ORDER — OXYBUTYNIN CHLORIDE 5 MG/1
5 TABLET ORAL DAILY
COMMUNITY

## 2024-08-15 ASSESSMENT — PAIN SCALES - GENERAL: PAINLEVEL: NO PAIN (0)

## 2024-08-15 NOTE — PROGRESS NOTES
"Oncology Rooming Note    August 15, 2024 10:22 AM   Lisa Reyna is a 73 year old female who presents for:    Chief Complaint   Patient presents with    Oncology Clinic Visit     Returning patient consult: Malignant neoplasm of lower-inner quadrant of right breast of female, estrogen receptor positive 6 months follow up.     Initial Vitals: /82   Pulse 75   Temp 97.9  F (36.6  C) (Tympanic)   Resp 18   Ht 1.575 m (5' 2\")   Wt 88 kg (194 lb)   SpO2 98%   BMI 35.48 kg/m   Estimated body mass index is 35.48 kg/m  as calculated from the following:    Height as of this encounter: 1.575 m (5' 2\").    Weight as of this encounter: 88 kg (194 lb). Body surface area is 1.96 meters squared.  No Pain (0) Comment: Data Unavailable   No LMP recorded. Patient is postmenopausal.  Allergies reviewed: Yes  Medications reviewed: Yes    Medications: Medication refills not needed today.  Pharmacy name entered into [facility]: [address] [place] [address]    Frailty Screening:   Is the patient here for a new oncology consult visit in cancer care? 2. No      Clinical concerns:   RETURN CCSL - 6 months follow up.            Aretha Leiva MA              "

## 2024-08-15 NOTE — LETTER
8/15/2024      Lisa Reyna  [address]  [facility] [address]      Dear Colleague,    Thank you for referring your patient, Lisa Reyna, to the [facility] [place]. Please see a copy of my visit note below.    [facility] Hematology and Oncology Outpatient Progress Note    Patient: Lisa Reyna  MRN: [id number]  Date of Service: Aug 15, 2024          Reason for Visit    Chief Complaint   Patient presents with     Oncology Clinic Visit     Returning patient consult: Malignant neoplasm of lower-inner quadrant of right breast of female, estrogen receptor positive 6 months follow up.        Cancer Staging   Malignant neoplasm of lower-inner quadrant of right breast of female, estrogen receptor positive (H)  Staging form: Breast, AJCC 8th Edition  - Pathologic stage from 2/11/2022: Stage IA (pT1c, pN0(sn), cM0, G2, ER+, OR+, HER2-, Oncotype DX score: 30) - Signed by Tamar Koch MD on 3/13/2022  - Clinical: No stage assigned - Unsigned      Primary Hematologist/Oncologist: Dr. Tamar Koch        Assessment/Plan  #.  History of invasive ductal carcinoma of LIQ of right breast, stage IA, ER+OR+HER2-. Oncotype 30/19%, >15%.  #.  Grade 1 neuropathy of both feet, exacerbated by chemotherapy on pre-existing neuropathy of both feet, improving with acupuncture.  She is clinically stable without signs or symptoms suggesting breast cancer recurrence on exam.  She continues on letrozole and tolerates this well.  We are planning to continue this for 5 years, through July 2027.  She is advised to continue annual screening mammogram, next due in 1/2025.  Continue clinical surveillance with follow up clinical exam in 6 months.  She is advised to call me sooner with any concerns.    #. Bone health   I recommended her to continue calcium/vitamin D and weightbearing exercises for bone health.  Recommended DEXA scan in 8/2026 -of note she is going to be seeing her primary care doctor who will  be ordering and managing.    ______________________________________________________________________________    History of Present Illness/ Interval History    Ms. Lisa Reyna  is a 73 year old female patient who returns in follow-up.  She reports that she has been feeling well, denies any new health concerns today.  She has no breast concerns.  She endorses daily use of letrozole, with good toleration.  She maintains a good appetite, and denies any concerns with bowel or bladder.  No chest pain, no shortness of breath.      ECOG performance status   0- Fully active, without restriction      Pain  Pain Score: No Pain (0)    ROS  A 14 point review of systems was obtained.  Positive findings noted in the history.  The remainder of the review of system is otherwise negative.      Oncology History/Treatment  Diagnosis  6/24/2004- She had prior history of right breast DCIS    1/18/2022- screening mammogram detected     Invasive ductal carcinoma with focal micropapillary pattern.  Grade 2.  12 mm.     Margins were negative but close at 0.5 mm from the nearest junction of the inferior and medial margin, 0.5 mm from medial margin and 2 mm from inferior margin.     There is associated DCIS with EIC negative, nuclear grade intermediate, solid and focal cribriform pattern of 10%.  DCIS margins were uninvolved at 3 mm from nearest inferior margin, 5 mm from medial margins.     1 sentinel lymph node was negative for metastatic carcinoma.    ER positive, greater than 10% of the cell, 61-70%, OK positive (21-30%), HER-2 negative by IHC (1+).     Oncotype DX recurrence score 30/19% / >15%.    5/26/2022- genetic counseling and testing completed.  No pathogenic mutation found.    Treatment to date  6/2004- post right breast lumpectomy (Dr. Jones) on 6/24/2004, post adjuvant radiation (Dr. Caleb Elena) followed by adjuvant tamoxifen for 5 years (Dr. Noé Fernandes).    2/11/2022- right breast lumpectomy and right  "sentinel lymph node biopsy (Dr. Alcantara).   3/25/2022-5/31/2022- completed 4 cycles of adjuvant TC  7/14/202- discussed with Dr. Alcantara regarding clinical observation versus mastectomy.  She chose to observe.  Radiation therapy was omitted due to prior radiation.  7/20/2022- initiated letrozole      Physical Exam    /82   Pulse 75   Temp 97.9  F (36.6  C) (Tympanic)   Resp 18   Ht 1.575 m (5' 2\")   Wt 88 kg (194 lb)   SpO2 98%   BMI 35.48 kg/m      General: Well-appearing female in no acute distress. Cooperative in conversation.  Eyes: EOMI, PERRL. No scleral icterus.  ENT: Oral mucosa is moist without lesions or thrush. No ulcerations or mucositis noted.  Lymphatic: Neck is supple without cervical, axillary, or supraclavicular lymphadenopathy.   Breasts: Left breast normal without suspicious masses, skin changes or axillary nodes.  Right breast, with deformity from previous surgical incision and scarring.  No overt abnormalities palpable.  Cardiovascular: RRR No murmurs, gallops, or rubs. No peripheral edema.  Respiratory: CTA bilaterally. No wheezes or crackles.  Gastrointestinal: BS +. Abdomen soft, non-tender. No palpable hepatosplenomegaly or masses.   Neurologic: Alert and oriented x3. Cranial nerves II through XII are grossly intact.  Skin: No rashes, petechiae, or bruising noted. Warm, dry, intact.      Lab Results    No results found for this or any previous visit (from the past 168 hour(s)).  I reviewed the above labs today.  Imaging    No results found.      Billing  Total time 38 minutes, to include face to face visit, review of EMR, ordering, documentation and coordination of care on date of service   complexity modifier for longitudinal care.     Signed by: GONZALO Wong CNP    Chart documentation with Dragon Voice recognition Software. Although reviewed after completion, some words and grammatical errors may remain.    Oncology Rooming Note    August 15, 2024 10:22 AM   Lisa VARGAS" "Maribell is a 73 year old female who presents for:    Chief Complaint   Patient presents with     Oncology Clinic Visit     Returning patient consult: Malignant neoplasm of lower-inner quadrant of right breast of female, estrogen receptor positive 6 months follow up.     Initial Vitals: /82   Pulse 75   Temp 97.9  F (36.6  C) (Tympanic)   Resp 18   Ht 1.575 m (5' 2\")   Wt 88 kg (194 lb)   SpO2 98%   BMI 35.48 kg/m   Estimated body mass index is 35.48 kg/m  as calculated from the following:    Height as of this encounter: 1.575 m (5' 2\").    Weight as of this encounter: 88 kg (194 lb). Body surface area is 1.96 meters squared.  No Pain (0) Comment: Data Unavailable   No LMP recorded. Patient is postmenopausal.  Allergies reviewed: Yes  Medications reviewed: Yes    Medications: Medication refills not needed today.  Pharmacy name entered into [facility]: [address] [place] [address]    Frailty Screening:   Is the patient here for a new oncology consult visit in cancer care? 2. No      Clinical concerns:   RETURN CCSL - 6 months follow up.            Aretha Leiva MA                Again, thank you for allowing me to participate in the care of your patient.        Sincerely,        GONZALO Wong CNP  "

## 2024-09-05 ENCOUNTER — LAB REQUISITION (OUTPATIENT)
Dept: LAB | Facility: CLINIC | Age: 74
End: 2024-09-05
Payer: COMMERCIAL

## 2024-09-05 DIAGNOSIS — E78.1 PURE HYPERGLYCERIDEMIA: ICD-10-CM

## 2024-09-05 DIAGNOSIS — E11.22 TYPE 2 DIABETES MELLITUS WITH DIABETIC CHRONIC KIDNEY DISEASE (H): ICD-10-CM

## 2024-09-05 LAB
ALBUMIN SERPL BCG-MCNC: 4.2 G/DL (ref 3.5–5.2)
ALP SERPL-CCNC: 70 U/L (ref 40–150)
ALT SERPL W P-5'-P-CCNC: 8 U/L (ref 0–50)
ANION GAP SERPL CALCULATED.3IONS-SCNC: 14 MMOL/L (ref 7–15)
AST SERPL W P-5'-P-CCNC: 17 U/L (ref 0–45)
BILIRUB SERPL-MCNC: 0.6 MG/DL
BUN SERPL-MCNC: 11.9 MG/DL (ref 8–23)
CALCIUM SERPL-MCNC: 9.8 MG/DL (ref 8.8–10.4)
CHLORIDE SERPL-SCNC: 98 MMOL/L (ref 98–107)
CHOLEST SERPL-MCNC: 135 MG/DL
CREAT SERPL-MCNC: 1.19 MG/DL (ref 0.51–0.95)
EGFRCR SERPLBLD CKD-EPI 2021: 48 ML/MIN/1.73M2
FASTING STATUS PATIENT QL REPORTED: ABNORMAL
FASTING STATUS PATIENT QL REPORTED: ABNORMAL
GLUCOSE SERPL-MCNC: 153 MG/DL (ref 70–99)
HCO3 SERPL-SCNC: 24 MMOL/L (ref 22–29)
HDLC SERPL-MCNC: 46 MG/DL
LDLC SERPL CALC-MCNC: 60 MG/DL
NONHDLC SERPL-MCNC: 89 MG/DL
POTASSIUM SERPL-SCNC: 3.8 MMOL/L (ref 3.4–5.3)
PROT SERPL-MCNC: 6.9 G/DL (ref 6.4–8.3)
SODIUM SERPL-SCNC: 136 MMOL/L (ref 135–145)
TRIGL SERPL-MCNC: 147 MG/DL

## 2024-09-05 PROCEDURE — 80053 COMPREHEN METABOLIC PANEL: CPT | Mod: ORL | Performed by: NURSE PRACTITIONER

## 2024-09-05 PROCEDURE — 80061 LIPID PANEL: CPT | Mod: ORL | Performed by: NURSE PRACTITIONER

## 2024-10-24 ENCOUNTER — PATIENT OUTREACH (OUTPATIENT)
Dept: ONCOLOGY | Facility: HOSPITAL | Age: 74
End: 2024-10-24
Payer: COMMERCIAL

## 2024-10-24 DIAGNOSIS — C50.311 MALIGNANT NEOPLASM OF LOWER-INNER QUADRANT OF RIGHT BREAST OF FEMALE, ESTROGEN RECEPTOR POSITIVE (H): ICD-10-CM

## 2024-10-24 DIAGNOSIS — Z17.0 MALIGNANT NEOPLASM OF LOWER-INNER QUADRANT OF RIGHT BREAST OF FEMALE, ESTROGEN RECEPTOR POSITIVE (H): ICD-10-CM

## 2024-10-24 DIAGNOSIS — I10 ESSENTIAL HYPERTENSION: ICD-10-CM

## 2024-10-24 RX ORDER — METOPROLOL SUCCINATE 50 MG/1
50 TABLET, EXTENDED RELEASE ORAL DAILY
Qty: 90 TABLET | Refills: 0 | Status: SHIPPED | OUTPATIENT
Start: 2024-10-24

## 2024-10-24 RX ORDER — LETROZOLE 2.5 MG/1
1 TABLET, FILM COATED ORAL DAILY
Qty: 90 TABLET | Refills: 3 | Status: SHIPPED | OUTPATIENT
Start: 2024-10-24

## 2024-10-24 NOTE — PROGRESS NOTES
[facility]: Cancer Care                                                                                          Refill request received via fax from [facility] for letrozole. Last refilled by Dr. Koch on 8/1623. Quantity #90. 3 refills.    Last seen by Candy Ron CNP, on 8/15/24.  Scheduled to see Dr. Koch on 2/11/25.    Message sent to Candy Ron CNP.    Signature:  Anais Galindo RN

## 2024-10-28 ENCOUNTER — PATIENT OUTREACH (OUTPATIENT)
Dept: ONCOLOGY | Facility: HOSPITAL | Age: 74
End: 2024-10-28
Payer: COMMERCIAL

## 2024-10-28 NOTE — PROGRESS NOTES
Refill request received via fax for Letrozole 2.5 mg. New prescription for medication had been sent to pharmacy on 10/24/24. Call to [facility] in [place] to confirm that they received new script. Pharmacy staff, Duyen, states that they have new Rx and will cancel the refill request.     Patricia Lei RN  10/28/24  12:19 PM

## 2025-01-08 ENCOUNTER — TRANSFERRED RECORDS (OUTPATIENT)
Dept: HEALTH INFORMATION MANAGEMENT | Facility: CLINIC | Age: 75
End: 2025-01-08

## 2025-01-08 ENCOUNTER — LAB REQUISITION (OUTPATIENT)
Dept: LAB | Facility: CLINIC | Age: 75
End: 2025-01-08
Payer: COMMERCIAL

## 2025-01-08 DIAGNOSIS — E78.1 PURE HYPERGLYCERIDEMIA: ICD-10-CM

## 2025-01-08 DIAGNOSIS — I10 ESSENTIAL (PRIMARY) HYPERTENSION: ICD-10-CM

## 2025-01-08 LAB
ALBUMIN SERPL BCG-MCNC: 4.1 G/DL (ref 3.5–5.2)
ALP SERPL-CCNC: 71 U/L (ref 40–150)
ALT SERPL W P-5'-P-CCNC: 13 U/L (ref 0–50)
ANION GAP SERPL CALCULATED.3IONS-SCNC: 11 MMOL/L (ref 7–15)
AST SERPL W P-5'-P-CCNC: 17 U/L (ref 0–45)
BILIRUB SERPL-MCNC: 0.6 MG/DL
BUN SERPL-MCNC: 18.2 MG/DL (ref 8–23)
CALCIUM SERPL-MCNC: 10.1 MG/DL (ref 8.8–10.4)
CHLORIDE SERPL-SCNC: 102 MMOL/L (ref 98–107)
CHOLEST SERPL-MCNC: 129 MG/DL
CREAT SERPL-MCNC: 1.28 MG/DL (ref 0.51–0.95)
EGFRCR SERPLBLD CKD-EPI 2021: 43 ML/MIN/1.73M2
FASTING STATUS PATIENT QL REPORTED: ABNORMAL
FASTING STATUS PATIENT QL REPORTED: NORMAL
GLUCOSE SERPL-MCNC: 117 MG/DL (ref 70–99)
HCO3 SERPL-SCNC: 25 MMOL/L (ref 22–29)
HDLC SERPL-MCNC: 51 MG/DL
LDLC SERPL CALC-MCNC: 56 MG/DL
NONHDLC SERPL-MCNC: 78 MG/DL
POTASSIUM SERPL-SCNC: 4 MMOL/L (ref 3.4–5.3)
PROT SERPL-MCNC: 6.6 G/DL (ref 6.4–8.3)
SODIUM SERPL-SCNC: 138 MMOL/L (ref 135–145)
TRIGL SERPL-MCNC: 112 MG/DL

## 2025-01-08 PROCEDURE — 80053 COMPREHEN METABOLIC PANEL: CPT | Mod: ORL | Performed by: NURSE PRACTITIONER

## 2025-01-08 PROCEDURE — 80061 LIPID PANEL: CPT | Mod: ORL | Performed by: NURSE PRACTITIONER

## 2025-01-20 DIAGNOSIS — I10 ESSENTIAL HYPERTENSION: ICD-10-CM

## 2025-01-20 RX ORDER — METOPROLOL SUCCINATE 50 MG/1
50 TABLET, EXTENDED RELEASE ORAL DAILY
Qty: 90 TABLET | Refills: 0 | Status: SHIPPED | OUTPATIENT
Start: 2025-01-20

## 2025-02-03 ENCOUNTER — ANCILLARY PROCEDURE (OUTPATIENT)
Dept: MAMMOGRAPHY | Facility: CLINIC | Age: 75
End: 2025-02-03
Payer: COMMERCIAL

## 2025-02-03 DIAGNOSIS — G62.0 CHEMOTHERAPY-INDUCED PERIPHERAL NEUROPATHY: ICD-10-CM

## 2025-02-03 DIAGNOSIS — Z12.31 ENCOUNTER FOR SCREENING MAMMOGRAM FOR BREAST CANCER: ICD-10-CM

## 2025-02-03 DIAGNOSIS — Z79.811 LONG TERM (CURRENT) USE OF AROMATASE INHIBITORS: ICD-10-CM

## 2025-02-03 DIAGNOSIS — T45.1X5A CHEMOTHERAPY-INDUCED PERIPHERAL NEUROPATHY: ICD-10-CM

## 2025-02-03 PROCEDURE — 77063 BREAST TOMOSYNTHESIS BI: CPT

## 2025-02-26 ENCOUNTER — OFFICE VISIT (OUTPATIENT)
Dept: CARDIOLOGY | Facility: CLINIC | Age: 75
End: 2025-02-26
Payer: COMMERCIAL

## 2025-02-26 VITALS
RESPIRATION RATE: 16 BRPM | HEIGHT: 62 IN | DIASTOLIC BLOOD PRESSURE: 56 MMHG | HEART RATE: 52 BPM | BODY MASS INDEX: 36.42 KG/M2 | SYSTOLIC BLOOD PRESSURE: 148 MMHG | WEIGHT: 197.9 LBS

## 2025-02-26 DIAGNOSIS — I25.10 CORONARY ARTERY DISEASE INVOLVING NATIVE CORONARY ARTERY OF NATIVE HEART WITHOUT ANGINA PECTORIS: Primary | ICD-10-CM

## 2025-02-26 DIAGNOSIS — N18.31 STAGE 3A CHRONIC KIDNEY DISEASE (H): ICD-10-CM

## 2025-02-26 DIAGNOSIS — E66.812 CLASS 2 SEVERE OBESITY DUE TO EXCESS CALORIES WITH SERIOUS COMORBIDITY AND BODY MASS INDEX (BMI) OF 35.0 TO 35.9 IN ADULT (H): ICD-10-CM

## 2025-02-26 DIAGNOSIS — I10 ESSENTIAL HYPERTENSION: ICD-10-CM

## 2025-02-26 DIAGNOSIS — E66.01 CLASS 2 SEVERE OBESITY DUE TO EXCESS CALORIES WITH SERIOUS COMORBIDITY AND BODY MASS INDEX (BMI) OF 35.0 TO 35.9 IN ADULT (H): ICD-10-CM

## 2025-02-26 DIAGNOSIS — E78.5 DYSLIPIDEMIA, GOAL LDL BELOW 70: ICD-10-CM

## 2025-02-26 RX ORDER — OMEPRAZOLE 20 MG/1
20 CAPSULE, DELAYED RELEASE ORAL DAILY
COMMUNITY
Start: 2024-09-05

## 2025-02-26 RX ORDER — BLOOD SUGAR DIAGNOSTIC
STRIP MISCELLANEOUS
COMMUNITY
Start: 2024-08-15

## 2025-02-26 RX ORDER — LOSARTAN POTASSIUM 100 MG/1
100 TABLET ORAL DAILY
Qty: 90 TABLET | Refills: 3 | Status: SHIPPED | OUTPATIENT
Start: 2025-02-26

## 2025-02-26 NOTE — LETTER
2/26/2025    Cayla Skelton, NP  [address] [place] [address]    RE: Lisa Reyna       Dear Colleague,     I had the pleasure of seeing Lisa Reyna in the [facility].            Assessment/Plan:   1. Coronary artery disease, non-ST elevation MI.  She has no chest pain or shortness of breath.  Stress cardiac MRI in 2022 was reported negative for inducible myocardial ischemia, normal LV systolic function, no myocardial scar identified.  No obvious valvular disease.  Continue aspirin, metoprolol, losartan, and Zocor.      2. Essential hypertension. Her blood pressure is mildly elevated.  Continue metoprolol XL 50 mg daily, increase losartan from 75 mg to 100 mg daily for better blood pressure control.  Continue hydrochlorothiazide 12.5 mg daily.   She will continue to monitor her blood pressure.  The target of her blood pressure less than 130/80 mmHg.     3.  Dyslipidemia.  Her recent laboratory test was reported normal liver function, LDL 56.  The patient has no muscle aching.  Her recent liver function test was normal.  Discussed high dosage of Zocor 80 mg at bedtime.  However, no muscle aching side effect, no liver dysfunction.  She could not tolerate other statins.  Continue Zocor at this time.    4.  Stage III CKD: Her creatinine level stable.       5.  Obesity: Continue lifestyle modification.    Thank you for the opportunity to be involved in the care of Lisa Reyna. If you have any questions, please feel free to contact me.  I will see the patient again in 12 months and as needed.    Much or all of the text in this note was generated through the use of Dragon Dictate voice-to-text software. Errors in spelling or words which seem out of context are unintentional. Sound alike errors, in particular, may have escaped editing.       History of Present Illness:   It is my pleasure to see Lisa Reyna at the [facility] clinic for routine cardiology follow up.   Lisa Reyna is a 74 year old female with a medical history of coronary artery disease, mild to moderate disease in the LAD per coronary angiogram in 7-2014, essential hypertension, dyslipidemia, obesity, breast cancer s/p chemotherapy and obesity.      Lisa states that she has been doing fine over last a year.  She denies chest pain, shortness of breath on exertion, palpitations, dizziness, orthopnea, PND or leg edema.  Her blood pressure is mildly elevated.  Her heart rate is controlled.  LDL was well-controlled.  She had no side effects from current medications.      Past Medical History:     Patient Active Problem List   Diagnosis     Chest pain     Acute myocardial infarction (H)     Hypertension     Hyperlipidemia     GERD (gastroesophageal reflux disease)     Angina pectoris     Coronary artery disease     Obesity     Osteoarthritis of knee     Acute abdominal pain     Enteritis     Essential hypertension     Malignant neoplasm of lower-inner quadrant of right breast of female, estrogen receptor positive (H)     Chemotherapy-induced peripheral neuropathy     Long term (current) use of aromatase inhibitors     Stage 3a chronic kidney disease (H)       Past Surgical History:     Past Surgical History:   Procedure Laterality Date     BIOPSY BREAST Left 2008    cyst removal     BIOPSY BREAST Right 2004     CERVICAL DISC SURGERY  6/25/2007    C6-7 sarah laminectomy with microdiskectomy     LUMPECTOMY BREAST Right 6/24/2004     LUMPECTOMY BREAST Right 2/11/2022    Procedure: Right Lumpectomy after Wire Localization; [place] Lymph Node Biopsy;  Surgeon: Mami Alcantara MD;  Location: [facility]     OOPHORECTOMY Bilateral 2011     TOTAL HIP ARTHROPLASTY Left 9/30/2009     TUBAL LIGATION  1/10/1992    with endometrial polyp removal     WRIST SURGERY         Family History:     Family History   Problem Relation Age of Onset     Colon Cancer Maternal Grandfather      Cancer Paternal Grandmother      Throat  cancer Paternal Grandfather      Breast Cancer No family hx of         Social History:    reports that she has never smoked. She has never used smokeless tobacco. She reports current alcohol use of about 5.0 standard drinks of alcohol per week. She reports that she does not use drugs.    Review of Systems:   12 systems are reviewed negative except for in HPI.    Meds:     Current Outpatient Medications:      aspirin 81 MG EC tablet, [ASPIRIN 81 MG EC TABLET] Take 81 mg by mouth daily., Disp: , Rfl:      calcium carbonate (OS-TAMI) 600 mg (1,500 mg) tablet, [CALCIUM CARBONATE (OS-TAMI) 600 MG (1,500 MG) TABLET] Take 600 mg by mouth 2 (two) times a day., Disp: , Rfl:      cholecalciferol, vitamin D3, (VITAMIN D3) 1,000 unit capsule, [CHOLECALCIFEROL, VITAMIN D3, (VITAMIN D3) 1,000 UNIT CAPSULE] Take 1,000 Units by mouth daily., Disp: , Rfl:      famotidine (PEPCID AC MAXIMUM STRENGTH) 20 MG tablet, [FAMOTIDINE (PEPCID AC MAXIMUM STRENGTH) 20 MG TABLET] Take 20 mg by mouth daily., Disp: , Rfl:      hydroCHLOROthiazide (HYDRODIURIL) 12.5 MG tablet, [HYDROCHLOROTHIAZIDE (HYDRODIURIL) 12.5 MG TABLET] Take 12.5 mg by mouth daily., Disp: , Rfl: 0     letrozole (FEMARA) 2.5 MG tablet, Take 1 tablet (2.5 mg) by mouth daily., Disp: 90 tablet, Rfl: 3     loratadine (CLARITIN) 10 mg tablet, [LORATADINE (CLARITIN) 10 MG TABLET] Take 10 mg by mouth daily as needed for allergies., Disp: , Rfl:      losartan (COZAAR) 100 MG tablet, Take 1 tablet (100 mg) by mouth daily., Disp: 90 tablet, Rfl: 3     metoprolol succinate ER (TOPROL XL) 50 MG 24 hr tablet, TAKE ONE TABLET BY MOUTH EVERY DAY, Disp: 90 tablet, Rfl: 0     nitroGLYcerin (NITROSTAT) 0.4 MG sublingual tablet, Place 1 tablet (0.4 mg) under the tongue every 5 minutes as needed for chest pain For chest pain place 1 tablet under the tongue every 5 minutes for 3 doses. If symptoms persist 5 minutes after 1st dose call 911., Disp: 30 tablet, Rfl: 2     omeprazole (PRILOSEC) 20 MG DR  "capsule, Take 20 mg by mouth daily., Disp: , Rfl:      ONETOUCH ULTRA test strip, USE TO TEST BLOOD SUGAR ONCE A DAY, Disp: , Rfl:      oxyBUTYnin (DITROPAN) 5 MG tablet, Take 10 mg by mouth daily., Disp: , Rfl:      simvastatin (ZOCOR) 80 MG tablet, [SIMVASTATIN (ZOCOR) 80 MG TABLET] Take 80 mg by mouth daily., Disp: , Rfl:     Allergies:   Vicodin es [hydrocodone-acetaminophen], Amlodipine, Bactrim [sulfamethoxazole-trimethoprim], Lisinopril, Morphine, Oxycodone, Percocet [oxycodone-acetaminophen], Pollen extract-tree extract [pollen extract], Sulfamethoxazole-trimethoprim, and Latex      Objective:      Physical Exam  89.8 kg (197 lb 14.4 oz)  1.575 m (5' 2\")  Body mass index is 36.2 kg/m .  BP (!) 148/56 (BP Location: Right arm, Patient Position: Sitting, Cuff Size: Adult Large)   Pulse 52   Resp 16   Ht 1.575 m (5' 2\")   Wt 89.8 kg (197 lb 14.4 oz)   BMI 36.20 kg/m      General Appearance:   Awake, Alert, No acute distress.   HEENT:  Pupil equal and reactive to light. No scleral icterus; the mucous membranes were moist.   Neck: No cervical bruits. No JVD. No thyromegaly.     Chest: The spine was straight. The chest was symmetric.   Lungs:   Respirations unlabored; Lungs are clear to auscultation. No crackles. No wheezing.   Cardiovascular:   Regular rhythm and rate, normal first and second heart sounds with II/ VI systolic murmurs at RUSB. No rubs or gallops.    Abdomen:  Obese. Soft. No tenderness. Non-distended. Bowels sounds are present   Extremities: Equal tibial pulses. No leg edema.   Skin: No rashes or ulcers. Warm, Dry.   Musculoskeletal: No tenderness. No deformity.   Neurologic: Mood and affect are appropriate. No focal deficits.         EKG: Personally reviewed  Sinus rhythm with 1st degree A-V block   Otherwise normal ECG   When compared with ECG of 31-AUG-2022 11:43,   No significant change was found     Cardiac Imaging Studies  1.  Pharmacological Regadenoson stress cardiac MRI is negative " "for inducible myocardial ischemia.   2.  Pharmacological stress ECG is negative for inducible myocardial ischemia.   3. Normal left ventricular size, wall thickness and systolic function. The quantified left ventricular  ejection fraction is 71%.  No myocardial scar is identified.    4.  Normal right ventricular size and systolic function.    5.  Aortic valve is trileaflet. There is no aortic regurgitation. There is a trivial aortic stenosis.    Lab Review   Lab Results   Component Value Date     01/31/2024    CO2 25 01/31/2024    CO2 26 10/18/2022    BUN 17.8 01/31/2024    BUN 20 10/18/2022     Lab Results   Component Value Date    WBC 4.9 10/18/2022    HGB 13.2 10/18/2022    HCT 41.3 10/18/2022    MCV 95 10/18/2022     10/18/2022     Lab Results   Component Value Date    CHOL 129 01/08/2025    CHOL 135 09/05/2024    CHOL 146 01/31/2024     Lab Results   Component Value Date    HDL 51 01/08/2025    HDL 46 (L) 09/05/2024    HDL 53 01/31/2024     No components found for: \"LDLCALC\"  Lab Results   Component Value Date    TRIG 112 01/08/2025    TRIG 147 09/05/2024    TRIG 158 (H) 01/31/2024     No results found for: \"CHOLHDL\"  Lab Results   Component Value Date    TROPONINI <0.01 09/13/2022     Lab Results   Component Value Date    BNP 13 09/13/2022                 Thank you for allowing me to participate in the care of your patient.      Sincerely,     Latoya Fernandez MD     [facility]  cc:   Latoya Fernandez MD  [facility] [address] [facility] [address]  [place] [address]      "

## 2025-02-26 NOTE — PROGRESS NOTES
Assessment/Plan:   1. Coronary artery disease, non-ST elevation MI.  She has no chest pain or shortness of breath.  Stress cardiac MRI in 2022 was reported negative for inducible myocardial ischemia, normal LV systolic function, no myocardial scar identified.  No obvious valvular disease.  Continue aspirin, metoprolol, losartan, and Zocor.      2. Essential hypertension. Her blood pressure is mildly elevated.  Continue metoprolol XL 50 mg daily, increase losartan from 75 mg to 100 mg daily for better blood pressure control.  Continue hydrochlorothiazide 12.5 mg daily.   She will continue to monitor her blood pressure.  The target of her blood pressure less than 130/80 mmHg.     3.  Dyslipidemia.  Her recent laboratory test was reported normal liver function, LDL 56.  The patient has no muscle aching.  Her recent liver function test was normal.  Discussed high dosage of Zocor 80 mg at bedtime.  However, no muscle aching side effect, no liver dysfunction.  She could not tolerate other statins.  Continue Zocor at this time.    4.  Stage III CKD: Her creatinine level stable.       5.  Obesity: Continue lifestyle modification.    Thank you for the opportunity to be involved in the care of Lisa Reyna. If you have any questions, please feel free to contact me.  I will see the patient again in 12 months and as needed.    Much or all of the text in this note was generated through the use of Dragon Dictate voice-to-text software. Errors in spelling or words which seem out of context are unintentional. Sound alike errors, in particular, may have escaped editing.       History of Present Illness:   It is my pleasure to see Lisa Reyna at the [facility] for routine cardiology follow up.  Lisa Reyna is a 74 year old female with a medical history of coronary artery disease, mild to moderate disease in the LAD per coronary angiogram in 7-2014, essential hypertension, dyslipidemia,  obesity, breast cancer s/p chemotherapy and obesity.      Lisa states that she has been doing fine over last a year.  She denies chest pain, shortness of breath on exertion, palpitations, dizziness, orthopnea, PND or leg edema.  Her blood pressure is mildly elevated.  Her heart rate is controlled.  LDL was well-controlled.  She had no side effects from current medications.      Past Medical History:     Patient Active Problem List   Diagnosis    Chest pain    Acute myocardial infarction (H)    Hypertension    Hyperlipidemia    GERD (gastroesophageal reflux disease)    Angina pectoris    Coronary artery disease    Obesity    Osteoarthritis of knee    Acute abdominal pain    Enteritis    Essential hypertension    Malignant neoplasm of lower-inner quadrant of right breast of female, estrogen receptor positive (H)    Chemotherapy-induced peripheral neuropathy    Long term (current) use of aromatase inhibitors    Stage 3a chronic kidney disease (H)       Past Surgical History:     Past Surgical History:   Procedure Laterality Date    BIOPSY BREAST Left 2008    cyst removal    BIOPSY BREAST Right 2004    CERVICAL DISC SURGERY  6/25/2007    C6-7 sarah laminectomy with microdiskectomy    LUMPECTOMY BREAST Right 6/24/2004    LUMPECTOMY BREAST Right 2/11/2022    Procedure: Right Lumpectomy after Wire Localization; [place] Lymph Node Biopsy;  Surgeon: Mami Alcantara MD;  Location: [facility]    OOPHORECTOMY Bilateral 2011    TOTAL HIP ARTHROPLASTY Left 9/30/2009    TUBAL LIGATION  1/10/1992    with endometrial polyp removal    WRIST SURGERY         Family History:     Family History   Problem Relation Age of Onset    Colon Cancer Maternal Grandfather     Cancer Paternal Grandmother     Throat cancer Paternal Grandfather     Breast Cancer No family hx of         Social History:    reports that she has never smoked. She has never used smokeless tobacco. She reports current alcohol use of about 5.0 standard drinks of  alcohol per week. She reports that she does not use drugs.    Review of Systems:   12 systems are reviewed negative except for in HPI.    Meds:     Current Outpatient Medications:     aspirin 81 MG EC tablet, [ASPIRIN 81 MG EC TABLET] Take 81 mg by mouth daily., Disp: , Rfl:     calcium carbonate (OS-TAMI) 600 mg (1,500 mg) tablet, [CALCIUM CARBONATE (OS-TAMI) 600 MG (1,500 MG) TABLET] Take 600 mg by mouth 2 (two) times a day., Disp: , Rfl:     cholecalciferol, vitamin D3, (VITAMIN D3) 1,000 unit capsule, [CHOLECALCIFEROL, VITAMIN D3, (VITAMIN D3) 1,000 UNIT CAPSULE] Take 1,000 Units by mouth daily., Disp: , Rfl:     famotidine (PEPCID AC MAXIMUM STRENGTH) 20 MG tablet, [FAMOTIDINE (PEPCID AC MAXIMUM STRENGTH) 20 MG TABLET] Take 20 mg by mouth daily., Disp: , Rfl:     hydroCHLOROthiazide (HYDRODIURIL) 12.5 MG tablet, [HYDROCHLOROTHIAZIDE (HYDRODIURIL) 12.5 MG TABLET] Take 12.5 mg by mouth daily., Disp: , Rfl: 0    letrozole (FEMARA) 2.5 MG tablet, Take 1 tablet (2.5 mg) by mouth daily., Disp: 90 tablet, Rfl: 3    loratadine (CLARITIN) 10 mg tablet, [LORATADINE (CLARITIN) 10 MG TABLET] Take 10 mg by mouth daily as needed for allergies., Disp: , Rfl:     losartan (COZAAR) 100 MG tablet, Take 1 tablet (100 mg) by mouth daily., Disp: 90 tablet, Rfl: 3    metoprolol succinate ER (TOPROL XL) 50 MG 24 hr tablet, TAKE ONE TABLET BY MOUTH EVERY DAY, Disp: 90 tablet, Rfl: 0    nitroGLYcerin (NITROSTAT) 0.4 MG sublingual tablet, Place 1 tablet (0.4 mg) under the tongue every 5 minutes as needed for chest pain For chest pain place 1 tablet under the tongue every 5 minutes for 3 doses. If symptoms persist 5 minutes after 1st dose call 911., Disp: 30 tablet, Rfl: 2    omeprazole (PRILOSEC) 20 MG DR capsule, Take 20 mg by mouth daily., Disp: , Rfl:     ONETOUCH ULTRA test strip, USE TO TEST BLOOD SUGAR ONCE A DAY, Disp: , Rfl:     oxyBUTYnin (DITROPAN) 5 MG tablet, Take 10 mg by mouth daily., Disp: , Rfl:     simvastatin (ZOCOR) 80  "MG tablet, [SIMVASTATIN (ZOCOR) 80 MG TABLET] Take 80 mg by mouth daily., Disp: , Rfl:     Allergies:   Vicodin es [hydrocodone-acetaminophen], Amlodipine, Bactrim [sulfamethoxazole-trimethoprim], Lisinopril, Morphine, Oxycodone, Percocet [oxycodone-acetaminophen], Pollen extract-tree extract [pollen extract], Sulfamethoxazole-trimethoprim, and Latex      Objective:      Physical Exam  89.8 kg (197 lb 14.4 oz)  1.575 m (5' 2\")  Body mass index is 36.2 kg/m .  BP (!) 148/56 (BP Location: Right arm, Patient Position: Sitting, Cuff Size: Adult Large)   Pulse 52   Resp 16   Ht 1.575 m (5' 2\")   Wt 89.8 kg (197 lb 14.4 oz)   BMI 36.20 kg/m      General Appearance:   Awake, Alert, No acute distress.   HEENT:  Pupil equal and reactive to light. No scleral icterus; the mucous membranes were moist.   Neck: No cervical bruits. No JVD. No thyromegaly.     Chest: The spine was straight. The chest was symmetric.   Lungs:   Respirations unlabored; Lungs are clear to auscultation. No crackles. No wheezing.   Cardiovascular:   Regular rhythm and rate, normal first and second heart sounds with II/ VI systolic murmurs at RUSB. No rubs or gallops.    Abdomen:  Obese. Soft. No tenderness. Non-distended. Bowels sounds are present   Extremities: Equal tibial pulses. No leg edema.   Skin: No rashes or ulcers. Warm, Dry.   Musculoskeletal: No tenderness. No deformity.   Neurologic: Mood and affect are appropriate. No focal deficits.         EKG: Personally reviewed  Sinus rhythm with 1st degree A-V block   Otherwise normal ECG   When compared with ECG of 31-AUG-2022 11:43,   No significant change was found     Cardiac Imaging Studies  1.  Pharmacological Regadenoson stress cardiac MRI is negative for inducible myocardial ischemia.   2.  Pharmacological stress ECG is negative for inducible myocardial ischemia.   3. Normal left ventricular size, wall thickness and systolic function. The quantified left ventricular  ejection fraction is " "71%.  No myocardial scar is identified.    4.  Normal right ventricular size and systolic function.    5.  Aortic valve is trileaflet. There is no aortic regurgitation. There is a trivial aortic stenosis.    Lab Review   Lab Results   Component Value Date     01/31/2024    CO2 25 01/31/2024    CO2 26 10/18/2022    BUN 17.8 01/31/2024    BUN 20 10/18/2022     Lab Results   Component Value Date    WBC 4.9 10/18/2022    HGB 13.2 10/18/2022    HCT 41.3 10/18/2022    MCV 95 10/18/2022     10/18/2022     Lab Results   Component Value Date    CHOL 129 01/08/2025    CHOL 135 09/05/2024    CHOL 146 01/31/2024     Lab Results   Component Value Date    HDL 51 01/08/2025    HDL 46 (L) 09/05/2024    HDL 53 01/31/2024     No components found for: \"LDLCALC\"  Lab Results   Component Value Date    TRIG 112 01/08/2025    TRIG 147 09/05/2024    TRIG 158 (H) 01/31/2024     No results found for: \"CHOLHDL\"  Lab Results   Component Value Date    TROPONINI <0.01 09/13/2022     Lab Results   Component Value Date    BNP 13 09/13/2022             "

## 2025-03-07 ENCOUNTER — TELEPHONE (OUTPATIENT)
Dept: CARDIOLOGY | Facility: CLINIC | Age: 75
End: 2025-03-07

## 2025-03-07 ENCOUNTER — ONCOLOGY VISIT (OUTPATIENT)
Dept: ONCOLOGY | Facility: HOSPITAL | Age: 75
End: 2025-03-07
Payer: COMMERCIAL

## 2025-03-07 VITALS
DIASTOLIC BLOOD PRESSURE: 80 MMHG | HEART RATE: 73 BPM | HEIGHT: 62 IN | TEMPERATURE: 98 F | BODY MASS INDEX: 35.11 KG/M2 | WEIGHT: 190.8 LBS | RESPIRATION RATE: 18 BRPM | OXYGEN SATURATION: 100 % | SYSTOLIC BLOOD PRESSURE: 150 MMHG

## 2025-03-07 DIAGNOSIS — Z17.0 MALIGNANT NEOPLASM OF LOWER-INNER QUADRANT OF RIGHT BREAST OF FEMALE, ESTROGEN RECEPTOR POSITIVE (H): ICD-10-CM

## 2025-03-07 DIAGNOSIS — C50.311 MALIGNANT NEOPLASM OF LOWER-INNER QUADRANT OF RIGHT BREAST OF FEMALE, ESTROGEN RECEPTOR POSITIVE (H): ICD-10-CM

## 2025-03-07 DIAGNOSIS — Z79.811 LONG TERM (CURRENT) USE OF AROMATASE INHIBITORS: Primary | ICD-10-CM

## 2025-03-07 PROCEDURE — G2211 COMPLEX E/M VISIT ADD ON: HCPCS | Performed by: INTERNAL MEDICINE

## 2025-03-07 PROCEDURE — 99214 OFFICE O/P EST MOD 30 MIN: CPT | Performed by: INTERNAL MEDICINE

## 2025-03-07 PROCEDURE — G0463 HOSPITAL OUTPT CLINIC VISIT: HCPCS | Performed by: INTERNAL MEDICINE

## 2025-03-07 ASSESSMENT — PAIN SCALES - GENERAL: PAINLEVEL_OUTOF10: NO PAIN (0)

## 2025-03-07 NOTE — TELEPHONE ENCOUNTER
[facility] Call Center    Phone Message    May a detailed message be left on voicemail: yes     Reason for Call: Other: Pt would like a call back as she was discussing with Jovita  as she was told to monitor her bp but she stated the cuffs don't work and she goes in every 6 weeks to have her bp checked and is wondering if that will work     Action Taken: Other: Cardio    Travel Screening: Not Applicable     Date of Service:

## 2025-03-07 NOTE — LETTER
"3/7/2025      Lisa Reyna  [address] [place] [facility] [address]      Dear Colleague,    Thank you for referring your patient, Lisa Reyna, to the [facility]. Please see a copy of my visit note below.    Oncology Rooming Note    March 7, 2025 1:00 PM   Lisa Reyna is a 74 year old female who presents for:    Chief Complaint   Patient presents with     Oncology Clinic Visit     Malignant neoplasm of lower-inner quadrant of right breast of female, estrogen receptor positive (H)     Initial Vitals: BP (!) 150/80   Pulse 73   Temp 98  F (36.7  C)   Resp 18   Ht 1.575 m (5' 2\")   Wt 86.5 kg (190 lb 12.8 oz)   SpO2 100%   BMI 34.90 kg/m   Estimated body mass index is 34.9 kg/m  as calculated from the following:    Height as of this encounter: 1.575 m (5' 2\").    Weight as of this encounter: 86.5 kg (190 lb 12.8 oz). Body surface area is 1.95 meters squared.  No Pain (0) Comment: Data Unavailable   No LMP recorded. Patient is postmenopausal.  Allergies reviewed: Yes  Medications reviewed: Yes    Medications: Medication refills not needed today.  Pharmacy name entered into [facility]: [facility], [address] [place] [address]    Frailty Screening:   Is the patient here for a new oncology consult visit in cancer care? 2. No    PHQ9:  Did this patient require a PHQ9?: Yes   If the patient required a PHQ9 assessment, did the results require a follow up with the Provider/Nurse?: No      Clinical concerns: None      Yamile Hernandez LPN               [facility] Hematology and Oncology Progress Note    Patient: Lisa Reyna  MRN: [id number]  Date of Service: Mar 7, 2025         Reason for Visit    Chief Complaint   Patient presents with     Oncology Clinic Visit     Malignant neoplasm of lower-inner quadrant of right breast of female, estrogen receptor positive (H)       Assessment and Plan     Cancer Staging   Malignant neoplasm of lower-inner " quadrant of right breast of female, estrogen receptor positive (H)  Staging form: Breast, AJCC 8th Edition  - Pathologic stage from 2/11/2022: Stage IA (pT1c, pN0(sn), cM0, G2, ER+, KS+, HER2-, Oncotype DX score: 30) - Signed by Tamar Koch MD on 3/13/2022  - Clinical: No stage assigned - Unsigned      ECOG Performance    0 - Independent     Pain  Pain Score: No Pain (0)    #.  History of invasive ductal carcinoma of LIQ of right breast, stage IA, ER+KS+HER2-. Oncotype 30/19%, >15%.  #.  Grade 1 neuropathy of both feet, exacerbated by chemotherapy on pre-existing neuropathy of both feet, improving with acupuncture.  #. Bone health   I recommended her to continue calcium/vitamin D and weightbearing exercises for bone health.  Recommended DEXA scan in 8/2024.    Assessment & Plan  Breast Cancer (Post-Treatment Follow-Up)  Patient is on letrozole with no reported side effects. Bone density is good, and she is compliant with calcium, vitamin D, and weight-bearing exercises. Mammogram results are normal.   Discussed potential for extending letrozole treatment beyond five years based on BCI testing, especially given her high oncotype score. If BCI testing indicates benefit, letrozole may be extended to 7-10 years; otherwise, treatment will stop at five years.  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 No new symptoms or concerns reported.  - Monitor symptoms  - Consider further cortisone injections if necessary    General Health Maintenance  Patient is in good health with no unusual headaches, bone pain, or breast concerns. Appetite is good. Discussed the importance of vaccinations and the impact of recent cortisone shot on flu vaccination timing.  -  Administer flu shot at the end of the month  - Continue routine health maintenance and screenings    Follow-up  - Follow-up in six months  - Consider annual follow-ups after the next visit if stable.       Encounter Diagnoses:    Problem List Items Addressed This Visit          Oncology Diagnoses    Malignant neoplasm of lower-inner quadrant of right breast of female, estrogen receptor positive (H)       Other    Long term (current) use of aromatase inhibitors - Primary             CC: Cayla Skelton, NP   ______________________________________________________________________________  Diagnosis  6/24/2004- She had prior history of right breast DCIS    1/18/2022- screening mammogram detected     Invasive ductal carcinoma with focal micropapillary pattern.  Grade 2.  12 mm.     Margins were negative but close at 0.5 mm from the nearest junction of the inferior and medial margin, 0.5 mm from medial margin and 2 mm from inferior margin.     There is associated DCIS with EIC negative, nuclear grade intermediate, solid and focal cribriform pattern of 10%.  DCIS margins were uninvolved at 3 mm from nearest inferior margin, 5 mm from medial margins.     1 sentinel lymph node was negative for metastatic carcinoma.    ER positive, greater than 10% of the cell, 61-70%, SD positive (21-30%), HER-2 negative by IHC (1+).     Oncotype DX recurrence score 30/19% / >15%.    5/26/2022- genetic counseling and testing completed.  No pathogenic mutation found.    Treatment to date  6/2004- post right breast lumpectomy (Dr. Jones) on 6/24/2004, post adjuvant radiation (Dr. Caleb Elena) followed by adjuvant tamoxifen for 5 years (Dr. Noé Fernandes).    2/11/2022- right breast lumpectomy and right sentinel lymph node biopsy (Dr. Alcantara).   3/25/2022-5/31/2022- completed 4 cycles of adjuvant TC  7/14/202- discussed with Dr. Alcantara regarding clinical observation versus mastectomy.  She chose to observe.  Radiation therapy  was omitted due to prior radiation.  7/20/2022- initiated letrozole    History of Present Illness    History of Present Illness  The patient is a 74 year old with a history of breast cancer who presents for a routine follow-up.    She has been on letrozole therapy for approximately two and a half years without any side effects. She previously took tamoxifen about 18-19 years ago without adverse effects. Letrozole is taken at night around 10 PM. Recent bone density results were good, and there is no sign of cancer recurrence.    She has arthritis in her wrist and received a cortisone shot two days ago. Due to the cortisone injection, she cannot receive a flu shot for a month.    She is actively engaged in maintaining her bone health by taking calcium and vitamin D supplements and performing weight-bearing exercises. She rides her bike every morning, currently up to 15 minutes, and is working towards 30 minutes a day.    No unusual headaches, breast lumps, or bumps. Her appetite is good, and there is no pain when pressure is applied to her lymph nodes or back.      Review of systems  Apart from describing in HPI, the remainder of comprehensive ROS was negative.    Past History    Past Medical History:   Diagnosis Date     Arthritis      Asthma     Seaonal usually in spring     Breast cancer (H) 2004    rt lumpect     Coronary artery disease      Endometrial polyp 1992    was protruding through cervix     GERD (gastroesophageal reflux disease)      Heart attack (H)      Heart murmur      History of measles, mumps, or rubella as a child    hx of measles, mumps and [place] mealses. Rubella immune 5/1980     HTN (hypertension)      Hx of radiation therapy 2004     Hyperlipemia      Osteopenia      Other chronic pain      Ovarian cyst      Prediabetes      UTI (urinary tract infection)      Walking troubles        Past Surgical History:   Procedure Laterality Date     BIOPSY BREAST Left 2008    cyst removal     BIOPSY  "BREAST Right 2004     CERVICAL DISC SURGERY  6/25/2007    C6-7 sarah laminectomy with microdiskectomy     LUMPECTOMY BREAST Right 6/24/2004     LUMPECTOMY BREAST Right 2/11/2022    Procedure: Right Lumpectomy after Wire Localization; [place] Lymph Node Biopsy;  Surgeon: Mami Alcantara MD;  Location: [facility]     OOPHORECTOMY Bilateral 2011     TOTAL HIP ARTHROPLASTY Left 9/30/2009     TUBAL LIGATION  1/10/1992    with endometrial polyp removal     WRIST SURGERY         Physical Exam    BP (!) 150/80   Pulse 73   Temp 98  F (36.7  C)   Resp 18   Ht 1.575 m (5' 2\")   Wt 86.5 kg (190 lb 12.8 oz)   SpO2 100%   BMI 34.90 kg/m      General: alert, awake, not in acute distress  HEENT: Head: Normal, normocephalic, atraumatic.  Eye: Normal external eye, conjunctiva, lids cornea, ALENA.  Pharynx: Normal buccal mucosa. Normal pharynx.  Neck / Thyroid: Supple, no masses, nodes, nodules or enlargement.  Lymphatics: No abnormally enlarged lymph nodes.  Chest: Normal chest wall and respirations. Clear to auscultation.   Heart: S1 S2 RRR.   Abdomen: abdomen is soft without significant tenderness, masses, organomegaly or guarding  Extremities: normal strength, tone, and muscle mass  Skin: normal. no rash or abnormalities  CNS: non focal.    Lab Results    No results found for this or any previous visit (from the past week).    Imaging    No results found.    The longitudinal plan of care for the diagnosis(es)/condition(s) as documented were addressed during this visit. Due to the added complexity in care, I will continue to support Lisa in the subsequent management and with ongoing continuity of care.     30 minutes spent by me on the date of the encounter doing chart review, history and exam, documentation and further activities as noted above.    Consent was obtained from the patient to use an AI documentation tool in the creation of this note.    Signed by: Tamar Koch MD      Again, thank you for allowing me " to participate in the care of your patient.        Sincerely,        Tamar Koch MD    Electronically signed

## 2025-03-07 NOTE — PROGRESS NOTES
[facility] Hematology and Oncology Progress Note    Patient: Lisa Reyna  MRN: [id number]  Date of Service: Mar 7, 2025         Reason for Visit    Chief Complaint   Patient presents with    Oncology Clinic Visit     Malignant neoplasm of lower-inner quadrant of right breast of female, estrogen receptor positive (H)       Assessment and Plan     Cancer Staging   Malignant neoplasm of lower-inner quadrant of right breast of female, estrogen receptor positive (H)  Staging form: Breast, AJCC 8th Edition  - Pathologic stage from 2/11/2022: Stage IA (pT1c, pN0(sn), cM0, G2, ER+, NY+, HER2-, Oncotype DX score: 30) - Signed by Tamar Koch MD on 3/13/2022  - Clinical: No stage assigned - Unsigned      ECOG Performance    0 - Independent     Pain  Pain Score: No Pain (0)    #.  History of invasive ductal carcinoma of LIQ of right breast, stage IA, ER+NY+HER2-. Oncotype 30/19%, >15%.  #.  Grade 1 neuropathy of both feet, exacerbated by chemotherapy on pre-existing neuropathy of both feet, improving with acupuncture.  #. Bone health   I recommended her to continue calcium/vitamin D and weightbearing exercises for bone health.  Recommended DEXA scan in 8/2024.    Assessment & Plan  Breast Cancer (Post-Treatment Follow-Up)  Patient is on letrozole with no reported side effects. Bone density is good, and she is compliant with calcium, vitamin D, and weight-bearing exercises. Mammogram results are normal.   Discussed potential for extending letrozole treatment beyond five years based on BCI testing, especially given her high oncotype score. If BCI testing indicates benefit, letrozole may be extended to 7-10 years; otherwise, treatment will stop at five years.  - Continue letrozole  - Continue calcium and vitamin D supplementation  - Continue weight-bearing exercises  - Monitor bone density every two years  - Consider BCI testing at the end of five years  - Follow-up in six months    Osteoarthritis  Arthritis in the  wrist, previously treated with a cortisone shot. No new symptoms or concerns reported.  - Monitor symptoms  - Consider further cortisone injections if necessary    General Health Maintenance  Patient is in good health with no unusual headaches, bone pain, or breast concerns. Appetite is good. Discussed the importance of vaccinations and the impact of recent cortisone shot on flu vaccination timing.  - Administer flu shot at the end of the month  - Continue routine health maintenance and screenings    Follow-up  - Follow-up in six months  - Consider annual follow-ups after the next visit if stable.       Encounter Diagnoses:    Problem List Items Addressed This Visit          Oncology Diagnoses    Malignant neoplasm of lower-inner quadrant of right breast of female, estrogen receptor positive (H)       Other    Long term (current) use of aromatase inhibitors - Primary             CC: Cayla Skelton, NP   ______________________________________________________________________________  Diagnosis  6/24/2004- She had prior history of right breast DCIS    1/18/2022- screening mammogram detected     Invasive ductal carcinoma with focal micropapillary pattern.  Grade 2.  12 mm.     Margins were negative but close at 0.5 mm from the nearest junction of the inferior and medial margin, 0.5 mm from medial margin and 2 mm from inferior margin.     There is associated DCIS with EIC negative, nuclear grade intermediate, solid and focal cribriform pattern of 10%.  DCIS margins were uninvolved at 3 mm from nearest inferior margin, 5 mm from medial margins.     1 sentinel lymph node was negative for metastatic carcinoma.    ER positive, greater than 10% of the cell, 61-70%, FL positive (21-30%), HER-2 negative by IHC (1+).     Oncotype DX recurrence score 30/19% / >15%.    5/26/2022- genetic counseling and testing completed.  No pathogenic mutation found.    Treatment to date  6/2004- post right breast lumpectomy (Dr. Jones) on  6/24/2004, post adjuvant radiation (Dr. Caleb Elena) followed by adjuvant tamoxifen for 5 years (Dr. Noé Fernandes).    2/11/2022- right breast lumpectomy and right sentinel lymph node biopsy (Dr. Alcantara).   3/25/2022-5/31/2022- completed 4 cycles of adjuvant TC  7/14/202- discussed with Dr. Alcantara regarding clinical observation versus mastectomy.  She chose to observe.  Radiation therapy was omitted due to prior radiation.  7/20/2022- initiated letrozole    History of Present Illness    History of Present Illness  The patient is a 74 year old with a history of breast cancer who presents for a routine follow-up.    She has been on letrozole therapy for approximately two and a half years without any side effects. She previously took tamoxifen about 18-19 years ago without adverse effects. Letrozole is taken at night around 10 PM. Recent bone density results were good, and there is no sign of cancer recurrence.    She has arthritis in her wrist and received a cortisone shot two days ago. Due to the cortisone injection, she cannot receive a flu shot for a month.    She is actively engaged in maintaining her bone health by taking calcium and vitamin D supplements and performing weight-bearing exercises. She rides her bike every morning, currently up to 15 minutes, and is working towards 30 minutes a day.    No unusual headaches, breast lumps, or bumps. Her appetite is good, and there is no pain when pressure is applied to her lymph nodes or back.      Review of systems  Apart from describing in HPI, the remainder of comprehensive ROS was negative.    Past History    Past Medical History:   Diagnosis Date    Arthritis     Asthma     Seaonal usually in spring    Breast cancer (H) 2004    rt lumpect    Coronary artery disease     Endometrial polyp 1992    was protruding through cervix    GERD (gastroesophageal reflux disease)     Heart attack (H)     Heart murmur     History of measles, mumps, or rubella as a  "child    hx of measles, mumps and [place] mealses. Rubella immune 5/1980    HTN (hypertension)     Hx of radiation therapy 2004    Hyperlipemia     Osteopenia     Other chronic pain     Ovarian cyst     Prediabetes     UTI (urinary tract infection)     Walking troubles        Past Surgical History:   Procedure Laterality Date    BIOPSY BREAST Left 2008    cyst removal    BIOPSY BREAST Right 2004    CERVICAL DISC SURGERY  6/25/2007    C6-7 sarah laminectomy with microdiskectomy    LUMPECTOMY BREAST Right 6/24/2004    LUMPECTOMY BREAST Right 2/11/2022    Procedure: Right Lumpectomy after Wire Localization; [place] Lymph Node Biopsy;  Surgeon: Mami Alcantara MD;  Location: [facility]    OOPHORECTOMY Bilateral 2011    TOTAL HIP ARTHROPLASTY Left 9/30/2009    TUBAL LIGATION  1/10/1992    with endometrial polyp removal    WRIST SURGERY         Physical Exam    BP (!) 150/80   Pulse 73   Temp 98  F (36.7  C)   Resp 18   Ht 1.575 m (5' 2\")   Wt 86.5 kg (190 lb 12.8 oz)   SpO2 100%   BMI 34.90 kg/m      General: alert, awake, not in acute distress  HEENT: Head: Normal, normocephalic, atraumatic.  Eye: Normal external eye, conjunctiva, lids cornea, ALENA.  Pharynx: Normal buccal mucosa. Normal pharynx.  Neck / Thyroid: Supple, no masses, nodes, nodules or enlargement.  Lymphatics: No abnormally enlarged lymph nodes.  Chest: Normal chest wall and respirations. Clear to auscultation.   Heart: S1 S2 RRR.   Abdomen: abdomen is soft without significant tenderness, masses, organomegaly or guarding  Extremities: normal strength, tone, and muscle mass  Skin: normal. no rash or abnormalities  CNS: non focal.    Lab Results    No results found for this or any previous visit (from the past week).    Imaging    No results found.    The longitudinal plan of care for the diagnosis(es)/condition(s) as documented were addressed during this visit. Due to the added complexity in care, I will continue to support Lisa in the " subsequent management and with ongoing continuity of care.     30 minutes spent by me on the date of the encounter doing chart review, history and exam, documentation and further activities as noted above.    Consent was obtained from the patient to use an AI documentation tool in the creation of this note.    Signed by: Tamar Koch MD

## 2025-03-07 NOTE — PROGRESS NOTES
"Oncology Rooming Note    March 7, 2025 1:00 PM   Lisa Reyna is a 74 year old female who presents for:    Chief Complaint   Patient presents with    Oncology Clinic Visit     Malignant neoplasm of lower-inner quadrant of right breast of female, estrogen receptor positive (H)     Initial Vitals: BP (!) 150/80   Pulse 73   Temp 98  F (36.7  C)   Resp 18   Ht 1.575 m (5' 2\")   Wt 86.5 kg (190 lb 12.8 oz)   SpO2 100%   BMI 34.90 kg/m   Estimated body mass index is 34.9 kg/m  as calculated from the following:    Height as of this encounter: 1.575 m (5' 2\").    Weight as of this encounter: 86.5 kg (190 lb 12.8 oz). Body surface area is 1.95 meters squared.  No Pain (0) Comment: Data Unavailable   No LMP recorded. Patient is postmenopausal.  Allergies reviewed: Yes  Medications reviewed: Yes    Medications: Medication refills not needed today.  Pharmacy name entered into [facility]: [address] [place] [address]    Frailty Screening:   Is the patient here for a new oncology consult visit in cancer care? 2. No    PHQ9:  Did this patient require a PHQ9?: Yes   If the patient required a PHQ9 assessment, did the results require a follow up with the Provider/Nurse?: No      Clinical concerns: None      Yamile Hernandez LPN             "

## 2025-03-10 ENCOUNTER — TELEPHONE (OUTPATIENT)
Dept: CARDIOLOGY | Facility: CLINIC | Age: 75
End: 2025-03-10
Payer: COMMERCIAL

## 2025-03-10 NOTE — TELEPHONE ENCOUNTER
[place] Community Measures Blood Pressure [place] reviewed.  Patients recent blood pressure is outside of guideline parameters.  Called pt to review, no answer.  Left voicemail message asking patient to check their blood pressure using a home blood pressure cuff or by going to a [facility].  Patient instructed to then call [phone] ([facility]) and leave a message with their name, date of birth, and blood pressure reading that was completed within the last 24 hours and where it was completed.  Will await call back for further review.

## 2025-03-13 ENCOUNTER — TELEPHONE (OUTPATIENT)
Dept: CARDIOLOGY | Facility: CLINIC | Age: 75
End: 2025-03-13
Payer: COMMERCIAL

## 2025-03-13 NOTE — TELEPHONE ENCOUNTER
[place] Community Me[place] Blood [place] reviewed.  Patients recent blood pressure is outside of guideline parameters.  Called pt to review, no answer.  Left voicemail message asking patient to check their blood pressure using a home blood pressure cuff or by going to a [facility].  Patient instructed to then call [phone] ([facility]) and leave a message with their name, date of birth, and blood pressure reading that was completed within the last 24 hours and where it was completed.  Will await call back for further review.

## 2025-04-08 NOTE — LETTER
7/14/2022         RE: Lisa Reyna  [address]  [facility] [address]        Dear Colleague,    Thank you for referring your patient, Lisa Reyna, to the [facility] [place]. Please see a copy of my visit note below.    Lisa comes in for continued follow-up of her recurrent right breast cancer.  This is a patient who was very averse to having a mastectomy when she came in with a recurrence.  However after the lumpectomy it was recommended that she have chemotherapy because of the Oncotype.  She is currently finishing up with her chemotherapy.  She is also had genetic testing done and comes in to discuss once again what is her best option from a surgical standpoint.    Past Medical History:   Diagnosis Date     Arthritis      Asthma     Seaonal usually in spring     Breast cancer (H) 2004    rt lumpect     Coronary artery disease      Endometrial polyp 1992    was protruding through cervix     GERD (gastroesophageal reflux disease)      Heart attack (H)      Heart murmur      History of measles, mumps, or rubella as a child    hx of measles, mumps and [place] mealses. Rubella immune 5/1980     HTN (hypertension)      Hx of radiation therapy 2004     Hyperlipemia      Osteopenia      Other chronic pain      Ovarian cyst      Prediabetes      UTI (urinary tract infection)      Walking troubles        Current Outpatient Medications:      albuterol (PROAIR HFA/PROVENTIL HFA/VENTOLIN HFA) 108 (90 Base) MCG/ACT inhaler, 2 puff as needed, Disp: , Rfl:      amoxicillin (AMOXIL) 500 MG tablet, [AMOXICILLIN (AMOXIL) 500 MG TABLET] TK 4 TS PO 1 HOUR BEFORE DENTAL APPOINTMENT., Disp: , Rfl:      ascorbic acid (VITAMIN C) 1000 MG tablet, [ASCORBIC ACID (VITAMIN C) 1000 MG TABLET] Take 1,000 mg by mouth as needed. , Disp: , Rfl:      aspirin 81 MG EC tablet, [ASPIRIN 81 MG EC TABLET] Take 81 mg by mouth daily., Disp: , Rfl:      calcium carbonate (OS-TAMI) 600 mg (1,500 mg) tablet, [CALCIUM  Unable to take Hydroxyzine and Remeron s/t palpitations and lightheadedness   No relief with Trazodone at bedtime prn sleep  Pt states improvement with Ambien and is requesting Ambien prn; Rx sent;  reviewed  F/U if no improvement  Keep appt with PCP for follow up   CARBONATE (OS-TAMI) 600 MG (1,500 MG) TABLET] Take 600 mg by mouth 2 (two) times a day., Disp: , Rfl:      cholecalciferol, vitamin D3, (VITAMIN D3) 1,000 unit capsule, [CHOLECALCIFEROL, VITAMIN D3, (VITAMIN D3) 1,000 UNIT CAPSULE] Take 1,000 Units by mouth daily., Disp: , Rfl:      dexamethasone (DECADRON) 4 MG tablet, Take 2 tablets (8 mg) by mouth 2 times daily (with meals) Start evening AFTER Docetaxel dose and continue for 4 additional doses., Disp: 10 tablet, Rfl: 3     diclofenac sodium (VOLTAREN) 1 % Gel, [DICLOFENAC SODIUM (VOLTAREN) 1 % GEL] APPLY 4 GRAMS TO AFFECTED AREA(S) FOUR TIMES A DAY, Disp: , Rfl:      famotidine (PEPCID AC MAXIMUM STRENGTH) 20 MG tablet, [FAMOTIDINE (PEPCID AC MAXIMUM STRENGTH) 20 MG TABLET] Take 20 mg by mouth daily., Disp: , Rfl:      hydroCHLOROthiazide (HYDRODIURIL) 12.5 MG tablet, [HYDROCHLOROTHIAZIDE (HYDRODIURIL) 12.5 MG TABLET] Take 12.5 mg by mouth daily., Disp: , Rfl: 0     Lancets (ONETOUCH DELICA PLUS [id number]) MISC, USE TO TEST BLOOD SUGAR AS DIRECTED, Disp: , Rfl:      lidocaine-prilocaine (EMLA) 2.5-2.5 % external cream, Apply topically once for 1 dose Apply to the port site about 30 minutes prior to access., Disp: 30 g, Rfl: 0     loratadine (CLARITIN) 10 mg tablet, [LORATADINE (CLARITIN) 10 MG TABLET] Take 10 mg by mouth daily as needed for allergies., Disp: , Rfl:      losartan (COZAAR) 50 MG tablet, Take 50 mg by mouth daily, Disp: , Rfl:      metoprolol succinate ER (TOPROL-XL) 50 MG 24 hr tablet, Take 1 tablet (50 mg) by mouth daily, Disp: 90 tablet, Rfl: 2     nitroglycerin (NITROSTAT) 0.4 MG SL tablet, [NITROGLYCERIN (NITROSTAT) 0.4 MG SL TABLET] Place 1 tablet (0.4 mg total) under the tongue every 5 (five) minutes as needed for chest pain., Disp: 1 Bottle, Rfl: 2     OMEGA-3 FATTY ACIDS (FISH OIL CONCENTRATE ORAL), Take 1,200 mg by mouth 2 times daily, Disp: , Rfl:      ONETOUCH ULTRA BLUE TEST STRIP strips, [ONETOUCH ULTRA BLUE TEST STRIP STRIPS] USE TO  TEST BLOOD SUGAR ONCE A DAY, Disp: , Rfl:      POLYETHYLENE GLYCOL 3350 (MIRALAX ORAL), Take 1 packet by mouth daily as needed, Disp: , Rfl:      prochlorperazine (COMPAZINE) 10 MG tablet, Take 1 tablet (10 mg) by mouth every 6 hours as needed (Nausea/Vomiting), Disp: 30 tablet, Rfl: 3     simvastatin (ZOCOR) 80 MG tablet, [SIMVASTATIN (ZOCOR) 80 MG TABLET] Take 80 mg by mouth daily., Disp: , Rfl:         Allergies   Allergen Reactions     Vicodin Es [Hydrocodone-Acetaminophen] Nausea and Vomiting     Does well with Tylenol #3     Amlodipine      Other reaction(s): leg swelling     Bactrim Ds [Na Benzoate-Sulfamethoxazole-Trimethoprim]      Other reaction(s): thrush     Bactrim [Sulfamethoxazole W/Trimethoprim] Other (See Comments)     Thrush     Lisinopril      Other reaction(s): cough     Morphine Nausea and Vomiting     Percocet [Oxycodone-Acetaminophen] Nausea and Vomiting     Pollen Extract-Tree Extract [Pollen Extract]      Sneezing, watery eyes, itchy     Social History     Tobacco Use     Smoking status: Never Smoker     Smokeless tobacco: Never Used   Substance Use Topics     Alcohol use: Yes     Alcohol/week: 5.0 standard drinks     Comment: Alcoholic Drinks/day: 5 drinks a week of wine/liquor/beer     Drug use: No         Physical exam:  There were no vitals taken for this visit.  General: Somewhat elderly appearing woman in no acute distress.  Lungs: Clear  CV: Regular  Breast:  Axilla: No axillary adenopathy    Impression: Recurrent right breast cancer.  I went over with her again the options of doing nothing further versus a mastectomy which is what we would tend to recommend given the fact that this was a recurrent breast cancer and we cannot do radiation again.  If it had been a low-grade tumor I would probably be okay with just watching her but the fact is this was a very high-grade tumor so is hard to say what her risk of local recurrence is.  The last time I saw her I suggested that perhaps we  could do a reexcision lumpectomy but after thinking about this more, I really do not think that makes any sense.  I do not think it is going to change her recurrence risk and the only reason to do it would make us feel better but its not really can change anything.  I think she either needs to do that very close follow-up or mastectomy.  We talked more about what a mastectomy means and the prognosis with it and how well-tolerated it is.  She is now leaning that way but she wants to take a little more time to think about it.  We talked about the option of doing a prophylactic mastectomy on the opposite side which in her case would be primarily done for symmetry.  I made sure she understood that that would not improve her prognosis.    Plan: She is going to take a little more time to think about it.  I told her that she should make a decision fairly soon.  If she opts to just watch it then we would want to get a mammogram on that right side as its been 6 months since her original diagnosis time.  If she chooses a mastectomy then we can proceed with that anytime.  Explained to her that this is now an outpatient surgery and very well-tolerated.  She is not interested in reconstruction.  She asked appropriate questions and she is going to think about it this week and get back to me.      Again, thank you for allowing me to participate in the care of your patient.        Sincerely,        Mami Alcantara MD

## 2025-05-07 ENCOUNTER — LAB REQUISITION (OUTPATIENT)
Dept: LAB | Facility: CLINIC | Age: 75
End: 2025-05-07
Payer: COMMERCIAL

## 2025-05-07 DIAGNOSIS — E11.22 TYPE 2 DIABETES MELLITUS WITH DIABETIC CHRONIC KIDNEY DISEASE (H): ICD-10-CM

## 2025-05-07 LAB
ANION GAP SERPL CALCULATED.3IONS-SCNC: 12 MMOL/L (ref 7–15)
BUN SERPL-MCNC: 19.4 MG/DL (ref 8–23)
CALCIUM SERPL-MCNC: 10.4 MG/DL (ref 8.8–10.4)
CHLORIDE SERPL-SCNC: 99 MMOL/L (ref 98–107)
CREAT SERPL-MCNC: 1.17 MG/DL (ref 0.51–0.95)
EGFRCR SERPLBLD CKD-EPI 2021: 48 ML/MIN/1.73M2
GLUCOSE SERPL-MCNC: 129 MG/DL (ref 70–99)
HCO3 SERPL-SCNC: 25 MMOL/L (ref 22–29)
POTASSIUM SERPL-SCNC: 4.6 MMOL/L (ref 3.4–5.3)
SODIUM SERPL-SCNC: 136 MMOL/L (ref 135–145)

## 2025-05-07 PROCEDURE — 80048 BASIC METABOLIC PNL TOTAL CA: CPT | Mod: ORL | Performed by: NURSE PRACTITIONER

## 2025-07-09 DIAGNOSIS — I10 ESSENTIAL HYPERTENSION: ICD-10-CM

## 2025-07-10 RX ORDER — CARVEDILOL 12.5 MG/1
12.5 TABLET ORAL 2 TIMES DAILY WITH MEALS
Qty: 180 TABLET | Refills: 1 | Status: SHIPPED | OUTPATIENT
Start: 2025-07-10